# Patient Record
Sex: MALE | Race: WHITE | Employment: OTHER | URBAN - METROPOLITAN AREA
[De-identification: names, ages, dates, MRNs, and addresses within clinical notes are randomized per-mention and may not be internally consistent; named-entity substitution may affect disease eponyms.]

---

## 2020-02-11 ENCOUNTER — APPOINTMENT (OUTPATIENT)
Dept: CT IMAGING | Age: 80
DRG: 065 | End: 2020-02-11
Payer: MEDICARE

## 2020-02-11 ENCOUNTER — HOSPITAL ENCOUNTER (INPATIENT)
Age: 80
LOS: 3 days | Discharge: NURSING, REHAB OR LONG-TERM CARE | DRG: 065 | End: 2020-02-14
Attending: EMERGENCY MEDICINE | Admitting: INTERNAL MEDICINE
Payer: MEDICARE

## 2020-02-11 ENCOUNTER — APPOINTMENT (OUTPATIENT)
Dept: GENERAL RADIOLOGY | Age: 80
DRG: 065 | End: 2020-02-11
Payer: MEDICARE

## 2020-02-11 PROBLEM — I65.02 STENOSIS OF LEFT VERTEBRAL ARTERY: Status: ACTIVE | Noted: 2020-02-11

## 2020-02-11 PROBLEM — I63.9 ACUTE CVA (CEREBROVASCULAR ACCIDENT) (HCC): Status: ACTIVE | Noted: 2020-02-11

## 2020-02-11 LAB
A/G RATIO: 1.4 (ref 1.1–2.2)
ALBUMIN SERPL-MCNC: 4.2 G/DL (ref 3.4–5)
ALP BLD-CCNC: 70 U/L (ref 40–129)
ALT SERPL-CCNC: 15 U/L (ref 10–40)
ANION GAP SERPL CALCULATED.3IONS-SCNC: 12 MMOL/L (ref 3–16)
AST SERPL-CCNC: 22 U/L (ref 15–37)
BASOPHILS ABSOLUTE: 0 K/UL (ref 0–0.2)
BASOPHILS RELATIVE PERCENT: 0.6 %
BILIRUB SERPL-MCNC: 0.4 MG/DL (ref 0–1)
BUN BLDV-MCNC: 13 MG/DL (ref 7–20)
CALCIUM SERPL-MCNC: 9.2 MG/DL (ref 8.3–10.6)
CHLORIDE BLD-SCNC: 99 MMOL/L (ref 99–110)
CO2: 26 MMOL/L (ref 21–32)
CREAT SERPL-MCNC: 1.1 MG/DL (ref 0.8–1.3)
EOSINOPHILS ABSOLUTE: 0.1 K/UL (ref 0–0.6)
EOSINOPHILS RELATIVE PERCENT: 1.3 %
GFR AFRICAN AMERICAN: >60
GFR NON-AFRICAN AMERICAN: >60
GLOBULIN: 3 G/DL
GLUCOSE BLD-MCNC: 104 MG/DL (ref 70–99)
GLUCOSE BLD-MCNC: 167 MG/DL (ref 70–99)
GLUCOSE BLD-MCNC: 188 MG/DL (ref 70–99)
HCT VFR BLD CALC: 42 % (ref 40.5–52.5)
HEMOGLOBIN: 14.5 G/DL (ref 13.5–17.5)
LYMPHOCYTES ABSOLUTE: 2.1 K/UL (ref 1–5.1)
LYMPHOCYTES RELATIVE PERCENT: 33.9 %
MCH RBC QN AUTO: 30.7 PG (ref 26–34)
MCHC RBC AUTO-ENTMCNC: 34.6 G/DL (ref 31–36)
MCV RBC AUTO: 88.6 FL (ref 80–100)
MONOCYTES ABSOLUTE: 0.8 K/UL (ref 0–1.3)
MONOCYTES RELATIVE PERCENT: 12 %
NEUTROPHILS ABSOLUTE: 3.3 K/UL (ref 1.7–7.7)
NEUTROPHILS RELATIVE PERCENT: 52.2 %
PDW BLD-RTO: 14.2 % (ref 12.4–15.4)
PERFORMED ON: ABNORMAL
PERFORMED ON: ABNORMAL
PLATELET # BLD: 234 K/UL (ref 135–450)
PMV BLD AUTO: 7.1 FL (ref 5–10.5)
POTASSIUM REFLEX MAGNESIUM: 4 MMOL/L (ref 3.5–5.1)
RBC # BLD: 4.74 M/UL (ref 4.2–5.9)
SODIUM BLD-SCNC: 137 MMOL/L (ref 136–145)
TOTAL PROTEIN: 7.2 G/DL (ref 6.4–8.2)
TROPONIN: <0.01 NG/ML
WBC # BLD: 6.3 K/UL (ref 4–11)

## 2020-02-11 PROCEDURE — 84484 ASSAY OF TROPONIN QUANT: CPT

## 2020-02-11 PROCEDURE — 2580000003 HC RX 258: Performed by: INTERNAL MEDICINE

## 2020-02-11 PROCEDURE — 6370000000 HC RX 637 (ALT 250 FOR IP): Performed by: INTERNAL MEDICINE

## 2020-02-11 PROCEDURE — 6360000002 HC RX W HCPCS: Performed by: INTERNAL MEDICINE

## 2020-02-11 PROCEDURE — 70496 CT ANGIOGRAPHY HEAD: CPT

## 2020-02-11 PROCEDURE — 85025 COMPLETE CBC W/AUTO DIFF WBC: CPT

## 2020-02-11 PROCEDURE — 71045 X-RAY EXAM CHEST 1 VIEW: CPT

## 2020-02-11 PROCEDURE — 80053 COMPREHEN METABOLIC PANEL: CPT

## 2020-02-11 PROCEDURE — 93005 ELECTROCARDIOGRAM TRACING: CPT | Performed by: EMERGENCY MEDICINE

## 2020-02-11 PROCEDURE — 2060000000 HC ICU INTERMEDIATE R&B

## 2020-02-11 PROCEDURE — 96374 THER/PROPH/DIAG INJ IV PUSH: CPT

## 2020-02-11 PROCEDURE — 6360000004 HC RX CONTRAST MEDICATION: Performed by: EMERGENCY MEDICINE

## 2020-02-11 PROCEDURE — 6360000002 HC RX W HCPCS: Performed by: EMERGENCY MEDICINE

## 2020-02-11 PROCEDURE — 70450 CT HEAD/BRAIN W/O DYE: CPT

## 2020-02-11 PROCEDURE — 99285 EMERGENCY DEPT VISIT HI MDM: CPT

## 2020-02-11 RX ORDER — HYDRALAZINE HYDROCHLORIDE 20 MG/ML
10 INJECTION INTRAMUSCULAR; INTRAVENOUS ONCE
Status: COMPLETED | OUTPATIENT
Start: 2020-02-11 | End: 2020-02-11

## 2020-02-11 RX ORDER — INSULIN GLARGINE 100 [IU]/ML
0.15 INJECTION, SOLUTION SUBCUTANEOUS NIGHTLY
Status: DISCONTINUED | OUTPATIENT
Start: 2020-02-11 | End: 2020-02-14 | Stop reason: HOSPADM

## 2020-02-11 RX ORDER — ONDANSETRON 2 MG/ML
4 INJECTION INTRAMUSCULAR; INTRAVENOUS EVERY 6 HOURS PRN
Status: DISCONTINUED | OUTPATIENT
Start: 2020-02-11 | End: 2020-02-14 | Stop reason: HOSPADM

## 2020-02-11 RX ORDER — DEXTROSE MONOHYDRATE 50 MG/ML
100 INJECTION, SOLUTION INTRAVENOUS PRN
Status: DISCONTINUED | OUTPATIENT
Start: 2020-02-11 | End: 2020-02-14 | Stop reason: HOSPADM

## 2020-02-11 RX ORDER — NICOTINE POLACRILEX 4 MG
15 LOZENGE BUCCAL PRN
Status: DISCONTINUED | OUTPATIENT
Start: 2020-02-11 | End: 2020-02-14 | Stop reason: HOSPADM

## 2020-02-11 RX ORDER — LABETALOL HYDROCHLORIDE 5 MG/ML
10 INJECTION, SOLUTION INTRAVENOUS EVERY 10 MIN PRN
Status: DISCONTINUED | OUTPATIENT
Start: 2020-02-11 | End: 2020-02-13

## 2020-02-11 RX ORDER — ATORVASTATIN CALCIUM 40 MG/1
80 TABLET, FILM COATED ORAL NIGHTLY
Status: DISCONTINUED | OUTPATIENT
Start: 2020-02-11 | End: 2020-02-14 | Stop reason: HOSPADM

## 2020-02-11 RX ORDER — ASPIRIN 300 MG/1
300 SUPPOSITORY RECTAL DAILY
Status: DISCONTINUED | OUTPATIENT
Start: 2020-02-11 | End: 2020-02-14 | Stop reason: HOSPADM

## 2020-02-11 RX ORDER — SODIUM CHLORIDE 0.9 % (FLUSH) 0.9 %
10 SYRINGE (ML) INJECTION PRN
Status: DISCONTINUED | OUTPATIENT
Start: 2020-02-11 | End: 2020-02-14 | Stop reason: HOSPADM

## 2020-02-11 RX ORDER — DEXTROSE MONOHYDRATE 25 G/50ML
12.5 INJECTION, SOLUTION INTRAVENOUS PRN
Status: DISCONTINUED | OUTPATIENT
Start: 2020-02-11 | End: 2020-02-14 | Stop reason: HOSPADM

## 2020-02-11 RX ORDER — SODIUM CHLORIDE 0.9 % (FLUSH) 0.9 %
10 SYRINGE (ML) INJECTION EVERY 12 HOURS SCHEDULED
Status: DISCONTINUED | OUTPATIENT
Start: 2020-02-11 | End: 2020-02-14 | Stop reason: HOSPADM

## 2020-02-11 RX ORDER — ASPIRIN 81 MG/1
81 TABLET ORAL DAILY
Status: DISCONTINUED | OUTPATIENT
Start: 2020-02-11 | End: 2020-02-14 | Stop reason: HOSPADM

## 2020-02-11 RX ORDER — SODIUM CHLORIDE 9 MG/ML
INJECTION, SOLUTION INTRAVENOUS CONTINUOUS
Status: DISCONTINUED | OUTPATIENT
Start: 2020-02-11 | End: 2020-02-12

## 2020-02-11 RX ADMIN — HYDRALAZINE HYDROCHLORIDE 10 MG: 20 INJECTION INTRAMUSCULAR; INTRAVENOUS at 18:11

## 2020-02-11 RX ADMIN — SODIUM CHLORIDE, PRESERVATIVE FREE 10 ML: 5 INJECTION INTRAVENOUS at 20:47

## 2020-02-11 RX ADMIN — ASPIRIN 81 MG: 81 TABLET, COATED ORAL at 20:02

## 2020-02-11 RX ADMIN — ATORVASTATIN CALCIUM 80 MG: 40 TABLET, FILM COATED ORAL at 20:56

## 2020-02-11 RX ADMIN — IOPAMIDOL 75 ML: 755 INJECTION, SOLUTION INTRAVENOUS at 16:20

## 2020-02-11 RX ADMIN — SODIUM CHLORIDE: 9 INJECTION, SOLUTION INTRAVENOUS at 20:01

## 2020-02-11 RX ADMIN — ENOXAPARIN SODIUM 40 MG: 40 INJECTION SUBCUTANEOUS at 20:57

## 2020-02-11 SDOH — HEALTH STABILITY: MENTAL HEALTH: HOW OFTEN DO YOU HAVE A DRINK CONTAINING ALCOHOL?: NEVER

## 2020-02-11 ASSESSMENT — ENCOUNTER SYMPTOMS
SHORTNESS OF BREATH: 0
DIARRHEA: 0
SORE THROAT: 0
NAUSEA: 0
EYE PAIN: 0
WHEEZING: 0
EYE REDNESS: 0
EYE DISCHARGE: 0
VOMITING: 0
BACK PAIN: 0
RHINORRHEA: 0
COUGH: 0
ABDOMINAL PAIN: 0

## 2020-02-11 ASSESSMENT — PAIN SCALES - GENERAL
PAINLEVEL_OUTOF10: 0

## 2020-02-11 NOTE — PROGRESS NOTES
Medication Reconciliation    List of medications patient is currently taking is complete. Source of information: 1. Conversation with patient at bedside                                      2. EPIC records      Allergies  Patient has no known allergies. Notes regarding home medications:   1. Patient denies use of any prescription/OTC medications.     Galileo Motta PharmD, BCPS  2/11/2020 4:34 PM

## 2020-02-11 NOTE — ED NOTES
When attempted to stand the pt pt staggered to the right. Dr Donell Epley at bedside code stroke called.       Darin Rivero RN  02/11/20 5170

## 2020-02-11 NOTE — ED NOTES
Called stroke team [phone] at [phone], Dr. Solitario Lopez called back [address] [place] [address]  02/11/20 0094

## 2020-02-11 NOTE — ED PROVIDER NOTES
[facility]  eMERGENCY dEPARTMENT eNCOUnter        Pt Name: Dorian King  MRN: [id number]  [place]kelgfcharlotte 1940  Date of evaluation: 2/11/2020  Provider: Shavon Ronquillo MD  PCP: No primary care provider on file. CHIEF COMPLAINT       Chief Complaint   Patient presents with    Extremity Weakness     pt state she woke up this am at 1042 and his right leg was not working right. states he went to sleep at midnight last night and was fine. states he almost fell. bp per ems was 220/114 states he has no taken his bp meds for several years. BG was 138       HISTORY OFPRESENT ILLNESS   (Location/Symptom, Timing/Onset, Context/Setting, Quality, Duration, Modifying Factors,Severity)  Note limiting factors. Dorian King is a 70 y.o. male       Location/Symptom: Falling to the right right leg was not working properly  Timing/Onset: 10:42 AM  Context/Setting: Patient stopped taking all of his medications 2 years ago just because he was tired of taking them. This did include blood pressure medication. He went to bed at midnight in his usual state of health. This gentleman still drives. When he got up at [phone] when he tried to go use the men's room started falling to the right. Denying any other symptoms. He has no headache nausea vomiting visual symptoms. Quality: As above he does not have any pain  Duration: Woke up with this at 1042  Modifying Factors: As mentioned he did stop taking all of his medications 3 years ago  Severity: 0 out of 10    Nursing Noteswere all reviewed and agreed with or any disagreements were addressed  in the HPI. REVIEW OF SYSTEMS    (2-9 systems for level 4, 10 or more for level 5)     Review of Systems   Constitutional: Negative for chills, fatigue and fever. HENT: Negative for ear pain, rhinorrhea and sore throat. Eyes: Negative for pain, discharge, redness and visual disturbance.    Respiratory: Negative for cough, shortness of cardiomediastinal silhouette is without acute process. The osseous structures are without acute process. No acute process. Cta Head Neck W Contrast    Result Date: 2/11/2020  EXAMINATION: CTA OF THE HEAD AND NECK WITH CONTRAST 2/11/2020 4:18 pm: TECHNIQUE: CTA of the head and neck was performed with the administration of intravenous contrast. Multiplanar reformatted images are provided for review. MIP images are provided for review. Stenosis of the internal carotid arteries measured using NASCET criteria. Dose modulation, iterative reconstruction, and/or weight based adjustment of the mA/kV was utilized to reduce the radiation dose to as low as reasonably achievable. 3D reconstructed images were performed on a separate workstation and provided for review. COMPARISON: Noncontrast CT head performed earlier the same day. HISTORY: ORDERING SYSTEM PROVIDED HISTORY: falling to the right TECHNOLOGIST PROVIDED HISTORY: Reason for exam:->falling to the right Reason for Exam: Extremity Weakness (pt state she woke up this am at 1042 and his right leg was not working right. states he went to sleep at midnight last night and was fine. states he almost fell. bp per ems was 220/114 states he has no taken his bp meds for several years. BG was 138) Acuity: Acute Type of Exam: Initial FINDINGS: CTA NECK: AORTIC ARCH/ARCH VESSELS: Normal, three-vessel aortic arch anatomy. Calcified and noncalcified atherosclerotic plaque of the aortic arch and proximal arch vessels. No dissection or arterial injury. No significant stenosis of the brachiocephalic or subclavian arteries. CAROTID ARTERIES: Common carotid arteries are normal in course and caliber without focal stenosis. Noncalcified and calcified atherosclerotic plaque of the right carotid bifurcation and proximal right internal carotid artery. There is mild, less than 50%, stenosis of the right internal carotid artery by NASCET criteria.  Calcified and noncalcified 1632 02/11/20 1755 02/11/20 1811   BP: (!) 216/103 (!) 231/103 (!) 216/102 (!) 194/82   Pulse: 92 96 99    Resp: 23 27 18    Temp:       TempSrc:       SpO2: 100% 100% 97%    Weight:       Height:           Patient was given the following medications:  Medications   sodium chloride flush 0.9 % injection 10 mL (has no administration in time range)   sodium chloride flush 0.9 % injection 10 mL (has no administration in time range)   magnesium hydroxide (MILK OF MAGNESIA) 400 MG/5ML suspension 30 mL (has no administration in time range)   ondansetron (ZOFRAN) injection 4 mg (has no administration in time range)   atorvastatin (LIPITOR) tablet 80 mg (has no administration in time range)   enoxaparin (LOVENOX) injection 40 mg (has no administration in time range)   aspirin EC tablet 81 mg (has no administration in time range)     Or   aspirin suppository 300 mg (has no administration in time range)   labetalol (NORMODYNE;TRANDATE) injection 10 mg (has no administration in time range)   0.9 % sodium chloride infusion (has no administration in time range)   insulin glargine (LANTUS) injection vial 9 Units (has no administration in time range)   insulin lispro (HUMALOG) injection vial 3 Units (has no administration in time range)   insulin lispro (HUMALOG) injection vial 0-6 Units (has no administration in time range)   insulin lispro (HUMALOG) injection vial 0-3 Units (has no administration in time range)   glucose (GLUTOSE) 40 % oral gel 15 g (has no administration in time range)   dextrose 50 % IV solution (has no administration in time range)   glucagon (rDNA) injection 1 mg (has no administration in time range)   dextrose 5 % solution (has no administration in time range)   iopamidol (ISOVUE-370) 76 % injection 75 mL (75 mLs Intravenous Given 2/11/20 1620)   hydrALAZINE (APRESOLINE) injection 10 mg (10 mg Intravenous Given 2/11/20 1811)       The patient was made a stroke alert. Case was discussed with Dr. Khushboo Nair twice.

## 2020-02-11 NOTE — H&P
Hospital Medicine History and Physical    2/11/2020    Date of Admission: 2/11/2020    Date of Service: Pt seen/examined on 2/11/2020 and admitted to inpatient. Assessment:  Principal Problem:    Acute CVA (cerebrovascular accident) ([facility]  Active Problems:    Stenosis of left vertebral artery    Diabetes mellitus type 2, uncontrolled ([facility] [address]    Essential hypertension    Noncompliance with medications  Resolved Problems:    * No resolved hospital problems. *    Plan:  1. Acute CVA. Right cerebellar ataxia on exam.  Hold antihypertensive medications to allow permissive hypertension; PRN IV labetalol for systolic blood pressure greater than 432 or diastolic blood pressure greater than 120. Continue antiplatelet therapy, high-dose statin. Obtain MRI-brain, echocardiogram, lipid profile, A1c. Neurochecks. PT/OT. Neurology consult. 2. Hypertensive crisis. Suspect elevated blood pressure in response to acute CVA. Blood pressure management as noted above. 3. Severe stenosis of origin of left vertebral artery. Allow permissive hypertension. Neurology has been consulted; will await GI evaluation and further recommendations. 4. Diabetes type 2, suspect uncontrolled. Check hemoglobin A1c. Continue insulin as ordered. 5. Noncompliance with medication use. Patient has been counseled about importance of compliance with medications. Activities: Up with assist  Prophylaxis: Subcutaneous Lovenox  Code status: Full code    ==========================================================  Chief complaint:  Chief Complaint   Patient presents with    Extremity Weakness     pt state she woke up this am at [address] and his right leg was not working right. states he went to sleep at midnight last night and was fine. states he almost fell. bp per ems was 220/114 states he has no taken his bp meds for several years. BG was 138     History of Presenting Illness:   This is a pleasant 70 y.o. male with history of diabetes type 2, essential hypertension, who presents to the emergency room for evaluation of right leg weakness that started upon waking up this morning. Patient felt like his right leg was not working right, and this was not the case the night prior to ER presentation. He reports history of essential hypertension but he has not been compliant with medication use for years. Per EMS, patient's blood pressure was 220/114 on arrival.    Past Medical History:      Diagnosis Date    Active rheumatic fever     Diabetes mellitus ([address]     Hypertension        Past Surgical History:      Procedure Laterality Date    APPENDECTOMY         Medications (prior to admission):  Prior to Admission medications    Not on File       Allergy(ies):  Patient has no known allergies. Social History:  TOBACCO:  reports that he has never smoked. He has never used smokeless tobacco.  ETOH:  reports no history of alcohol use. Family History:      Family history unknown: Yes       Review of Systems:  Pertinent positives are listed in HPI. At least 10-point ROS reviewed and were negative. Vitals and physical examination:  BP (!) 216/102   Pulse 99   Temp 99.2 °F (37.3 °C) (Oral)   Resp 18   Ht 4' 8\" (1.422 m)   Wt 125 lb 7.1 oz (56.9 kg)   SpO2 97%   BMI 28.12 kg/m²   Gen/overall appearance: Not in acute distress. Alert. Oriented x3. Head: Normocephalic, atraumatic  Eyes: EOMI, good acuity  ENT: Oral mucosa moist  Neck: No JVD, thyromegaly  CVS: Nml S1S2, no MRG, RRR  Pulm: Clear bilaterally. No crackles/wheezes  Gastrointestinal: Soft, NT/ND, +BS  Musculoskeletal: No edema. Warm  Neuro: Right dysmetria, right lower extremity ataxia. Psychiatry: Appropriate affect. Not agitated. Skin: Warm, dry with normal turgor.  No rash  Capillary refill: Brisk,< 3 seconds   Peripheral Pulses: +2 palpable, equal bilaterally       Labs/imaging/EKG:  CBC:   Recent Labs     02/11/20  1617   WBC 6.3   HGB 14.5        BMP:    Recent Labs 02/11/20  1617      K 4.0   CL 99   CO2 26   BUN 13   CREATININE 1.1   GLUCOSE 188*     Hepatic:   Recent Labs     02/11/20  1617   AST 22   ALT 15   BILITOT 0.4   ALKPHOS 70       Ct Head Wo Contrast    Result Date: 2/11/2020  EXAMINATION: CT OF THE HEAD WITHOUT CONTRAST  2/11/2020 4:18 pm TECHNIQUE: CT of the head was performed without the administration of intravenous contrast. Dose modulation, iterative reconstruction, and/or weight based adjustment of the mA/kV was utilized to reduce the radiation dose to as low as reasonably achievable. COMPARISON: None. HISTORY: ORDERING SYSTEM PROVIDED HISTORY: falling to the right since 1042 AM TECHNOLOGIST PROVIDED HISTORY: Reason for exam:->falling to the right since 1042 AM Has a \"code stroke\" or \"stroke alert\" been called? ->Yes Reason for Exam: Extremity Weakness (pt state she woke up this am at 1042 and his right leg was not working right. states he went to sleep at midnight last night and was fine. states he almost fell. bp per ems was 220/114 states he has no taken his bp meds for several years. BG was 138) Acuity: Acute Type of Exam: Initial FINDINGS: BRAIN/VENTRICLES: The ventricles and sulci are diffusely enlarged. Low attenuation is seen in the periventricular and subcortical white matter. No acute intracranial hemorrhage or acute infarct is identified. ORBITS: The visualized portion of the orbits demonstrate no acute abnormality. SINUSES: The visualized paranasal sinuses and mastoid air cells demonstrate no acute abnormality. SOFT TISSUES/SKULL:  No acute abnormality of the visualized skull or soft tissues. No acute intracranial abnormality. Findings were discussed with Ashlyn Morales at 4:32 pm on 2/11/2020. Xr Chest Portable    Result Date: 2/11/2020  EXAMINATION: ONE XRAY VIEW OF THE CHEST 2/11/2020 4:05 pm COMPARISON: None.  HISTORY: ORDERING SYSTEM PROVIDED HISTORY: weakness TECHNOLOGIST PROVIDED HISTORY: Reason for exam:->weakness Reason for Exam: weakness Acuity: Acute Type of Exam: Initial FINDINGS: The lungs are without acute focal process. There is no effusion or pneumothorax. The cardiomediastinal silhouette is without acute process. The osseous structures are without acute process. No acute process. Cta Head Neck W Contrast    Result Date: 2/11/2020  EXAMINATION: CTA OF THE HEAD AND NECK WITH CONTRAST 2/11/2020 4:18 pm: TECHNIQUE: CTA of the head and neck was performed with the administration of intravenous contrast. Multiplanar reformatted images are provided for review. MIP images are provided for review. Stenosis of the internal carotid arteries measured using NASCET criteria. Dose modulation, iterative reconstruction, and/or weight based adjustment of the mA/kV was utilized to reduce the radiation dose to as low as reasonably achievable. 3D reconstructed images were performed on a separate workstation and provided for review. COMPARISON: Noncontrast CT head performed earlier the same day. HISTORY: ORDERING SYSTEM PROVIDED HISTORY: falling to the right TECHNOLOGIST PROVIDED HISTORY: Reason for exam:->falling to the right Reason for Exam: Extremity Weakness (pt state she woke up this am at 1042 and his right leg was not working right. states he went to sleep at midnight last night and was fine. states he almost fell. bp per ems was 220/114 states he has no taken his bp meds for several years. BG was 138) Acuity: Acute Type of Exam: Initial FINDINGS: CTA NECK: AORTIC ARCH/ARCH VESSELS: Normal, three-vessel aortic arch anatomy. Calcified and noncalcified atherosclerotic plaque of the aortic arch and proximal arch vessels. No dissection or arterial injury. No significant stenosis of the brachiocephalic or subclavian arteries. CAROTID ARTERIES: Common carotid arteries are normal in course and caliber without focal stenosis.  Noncalcified and calcified atherosclerotic plaque of the right carotid bifurcation and proximal right internal carotid artery. There is mild, less than 50%, stenosis of the right internal carotid artery by NASCET criteria. Calcified and noncalcified atherosclerotic plaque of the left carotid bifurcation and proximal left internal carotid artery. There is mild, less than 50%, stenosis of the left internal carotid artery by NASCET criteria. No arterial injury or dissection. VERTEBRAL ARTERIES: Vertebral arteries arise from their respective subclavian arteries. There is severe stenosis of the left vertebral artery origin. No focal stenosis of the right vertebral artery. No arterial injury or dissection. SOFT TISSUES: The lung apices are clear. No cervical or superior mediastinal lymphadenopathy. The larynx and pharynx are unremarkable. No acute abnormality of the salivary and thyroid glands. BONES: No acute osseous abnormality. CTA HEAD: ANTERIOR CIRCULATION: No significant stenosis of the intracranial internal carotid, anterior cerebral, or middle cerebral arteries. No aneurysm. POSTERIOR CIRCULATION: No focal stenosis of the intracranial vertebral arteries. PICA origins are visualized. No focal stenosis of the basilar artery or bilateral posterior cerebral arteries. No aneurysm. OTHER: No dural venous sinus thrombosis on this non-dedicated study. BRAIN: No mass effect or midline shift. No extra-axial fluid collection. The gray-white differentiation is maintained. Mild, less than 50%, stenosis of the internal carotid arteries by NASCET criteria. 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 No intracranial flow-limiting stenosis. EKG: Normal sinus rhythm with nonspecific ST/T changes, PACs, PVC, right bundle branch block. I reviewed EKG. Discussed with ER provider. Thank you No primary care provider on file.  for the opportunity to be involved in this patient's care.    -----------------------------  Ann Ray MD  [facility] hospitalist

## 2020-02-12 ENCOUNTER — APPOINTMENT (OUTPATIENT)
Dept: MRI IMAGING | Age: 80
DRG: 065 | End: 2020-02-12
Payer: MEDICARE

## 2020-02-12 ENCOUNTER — APPOINTMENT (OUTPATIENT)
Dept: CT IMAGING | Age: 80
DRG: 065 | End: 2020-02-12
Payer: MEDICARE

## 2020-02-12 PROBLEM — E11.65 DIABETES MELLITUS WITH HYPERGLYCEMIA (HCC): Status: ACTIVE | Noted: 2020-02-12

## 2020-02-12 PROBLEM — I16.9 HYPERTENSIVE CRISIS: Status: ACTIVE | Noted: 2020-02-12

## 2020-02-12 LAB
CHOLESTEROL, TOTAL: 299 MG/DL (ref 0–199)
EKG ATRIAL RATE: 94 BPM
EKG DIAGNOSIS: NORMAL
EKG P AXIS: 69 DEGREES
EKG P-R INTERVAL: 168 MS
EKG Q-T INTERVAL: 386 MS
EKG QRS DURATION: 126 MS
EKG QTC CALCULATION (BAZETT): 482 MS
EKG R AXIS: 79 DEGREES
EKG T AXIS: 48 DEGREES
EKG VENTRICULAR RATE: 94 BPM
ESTIMATED AVERAGE GLUCOSE: 151.3 MG/DL
GLUCOSE BLD-MCNC: 104 MG/DL (ref 70–99)
GLUCOSE BLD-MCNC: 106 MG/DL (ref 70–99)
GLUCOSE BLD-MCNC: 119 MG/DL (ref 70–99)
GLUCOSE BLD-MCNC: 150 MG/DL (ref 70–99)
HBA1C MFR BLD: 6.9 %
HCT VFR BLD CALC: 41.7 % (ref 40.5–52.5)
HDLC SERPL-MCNC: 54 MG/DL (ref 40–60)
HEMOGLOBIN: 14.2 G/DL (ref 13.5–17.5)
LDL CHOLESTEROL CALCULATED: 221 MG/DL
MCH RBC QN AUTO: 30.4 PG (ref 26–34)
MCHC RBC AUTO-ENTMCNC: 34 G/DL (ref 31–36)
MCV RBC AUTO: 89.2 FL (ref 80–100)
PDW BLD-RTO: 14.1 % (ref 12.4–15.4)
PERFORMED ON: ABNORMAL
PLATELET # BLD: 206 K/UL (ref 135–450)
PMV BLD AUTO: 6.8 FL (ref 5–10.5)
RBC # BLD: 4.68 M/UL (ref 4.2–5.9)
TRIGL SERPL-MCNC: 121 MG/DL (ref 0–150)
VLDLC SERPL CALC-MCNC: 24 MG/DL
WBC # BLD: 8.2 K/UL (ref 4–11)

## 2020-02-12 PROCEDURE — 6360000002 HC RX W HCPCS: Performed by: INTERNAL MEDICINE

## 2020-02-12 PROCEDURE — 97166 OT EVAL MOD COMPLEX 45 MIN: CPT

## 2020-02-12 PROCEDURE — 97116 GAIT TRAINING THERAPY: CPT

## 2020-02-12 PROCEDURE — 36415 COLL VENOUS BLD VENIPUNCTURE: CPT

## 2020-02-12 PROCEDURE — 97162 PT EVAL MOD COMPLEX 30 MIN: CPT

## 2020-02-12 PROCEDURE — 2060000000 HC ICU INTERMEDIATE R&B

## 2020-02-12 PROCEDURE — 94760 N-INVAS EAR/PLS OXIMETRY 1: CPT

## 2020-02-12 PROCEDURE — 92523 SPEECH SOUND LANG COMPREHEN: CPT

## 2020-02-12 PROCEDURE — 6370000000 HC RX 637 (ALT 250 FOR IP): Performed by: INTERNAL MEDICINE

## 2020-02-12 PROCEDURE — 2580000003 HC RX 258: Performed by: INTERNAL MEDICINE

## 2020-02-12 PROCEDURE — 97530 THERAPEUTIC ACTIVITIES: CPT

## 2020-02-12 PROCEDURE — 70450 CT HEAD/BRAIN W/O DYE: CPT

## 2020-02-12 PROCEDURE — 92610 EVALUATE SWALLOWING FUNCTION: CPT

## 2020-02-12 PROCEDURE — 83036 HEMOGLOBIN GLYCOSYLATED A1C: CPT

## 2020-02-12 PROCEDURE — 80061 LIPID PANEL: CPT

## 2020-02-12 PROCEDURE — 85027 COMPLETE CBC AUTOMATED: CPT

## 2020-02-12 PROCEDURE — 70551 MRI BRAIN STEM W/O DYE: CPT

## 2020-02-12 PROCEDURE — 93010 ELECTROCARDIOGRAM REPORT: CPT | Performed by: INTERNAL MEDICINE

## 2020-02-12 PROCEDURE — 97535 SELF CARE MNGMENT TRAINING: CPT

## 2020-02-12 RX ADMIN — ASPIRIN 81 MG: 81 TABLET, COATED ORAL at 07:45

## 2020-02-12 RX ADMIN — INSULIN LISPRO 3 UNITS: 100 INJECTION, SOLUTION INTRAVENOUS; SUBCUTANEOUS at 07:51

## 2020-02-12 RX ADMIN — INSULIN GLARGINE 9 UNITS: 100 INJECTION, SOLUTION SUBCUTANEOUS at 20:49

## 2020-02-12 RX ADMIN — ATORVASTATIN CALCIUM 80 MG: 40 TABLET, FILM COATED ORAL at 20:48

## 2020-02-12 RX ADMIN — SODIUM CHLORIDE, PRESERVATIVE FREE 10 ML: 5 INJECTION INTRAVENOUS at 20:49

## 2020-02-12 RX ADMIN — ENOXAPARIN SODIUM 40 MG: 40 INJECTION SUBCUTANEOUS at 20:48

## 2020-02-12 ASSESSMENT — PAIN SCALES - GENERAL
PAINLEVEL_OUTOF10: 0

## 2020-02-12 NOTE — PROGRESS NOTES
initial research, cut off scores do not perfectly predict discharge location: 25% of patients with a score of 18 or greater actually went to a rehab facility and 20% of people with a score less than 18 actually went home. Based on my clinical judgement I recommend that the patient have 5-7 sessions per week of Physical Therapy at d/c to increase the patients independence. Safety devices:   Type of devices:  All fall risk precautions in place, Bed alarm in place, Call light within reach, Left in bed, Nurse notified, Patient at risk for falls        PLAN:  Times per week: 3-5x/wk while in the hospital;    Current Treatment Recommendations: Functional Mobility Training     OutComes Score  How much difficulty turning over in bed?: None  How much difficulty sitting down on / standing up from a chair with arms?: A Little  How much difficulty moving from lying on back to sitting on side of bed?: None  How much help from another person moving to and from a bed to a chair?: A Little  How much help from another person needed to walk in hospital room?: A Little  How much help from another person for climbing 3-5 steps with a railing?: A Little  AM-[facility] Inpatient Mobility Raw Score : 20  AM-[facility] Inpatient T-Scale Score : 47.67  Mobility Inpatient CMS 0-100% Score: 35.83  Mobility Inpatient CMS G-Code Modifier : CJ       Goals  Patient Goals   Patient goals : to get back to being very active  Time Frame for Short term goals: upon d/c  Short term goal 1: sup<>sit Ind   Short term goal 2: sit<>stand Ind   Short term goal 3: amb 150' with or without device Ind  Short term goal 4: pt will perform 10 reps of standing balance exercises with supervision           Therapy Time    Individual Concurrent Group Co-treatment   Time In 1125            Time Out 1205          Minutes 40             Timed Code Treatment Minutes: 25 Minutes      Lit Keenan PT

## 2020-02-12 NOTE — PLAN OF CARE
Problem: Falls - Risk of:  Goal: Will remain free from falls  Description  Will remain free from falls  2/12/2020 0932 by Flor Thompson RN  Outcome: Ongoing  2/11/2020 2245 by Ha Scott RN  Outcome: Ongoing   Bed in lowest position. Patient encouraged to call for assistance, call light in reach. Problem: Falls - Risk of:  Goal: Absence of physical injury  Description  Absence of physical injury  2/11/2020 2245 by Ha Scott RN  Outcome: Ongoing   Patient free from falls. No s/s distress.  Light in reach
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
## 2020-02-12 NOTE — ED NOTES
Entered room pt walking around bed. Explained that for his safety, he should call for assistance before ambulating. Pt verbalizes understanding.       Carin Arechiga RN  02/11/20 2009

## 2020-02-12 NOTE — PROGRESS NOTES
function, rule out aspiration and make recommendations regarding safe dietary consistencies, effective compensatory strategies, and safe eating environment. Dysphagia Treatment Diagnosis: Oropharyngeal Dysphagia   Speech Language Treatment Diagnosis: Dysarthria, cognitive-language      IMPRESSIONS  Dysphagia Diagnosis: Mild pharyngeal stage dysphagia characterized by decreased laryngeal elevation without overt signs/symptoms of penetration/aspiration  Cognitive Diagnosis: Moderate cognitive-language impairments characterized by decreased delayed recall for novel information and the need for extra time for verbal reasoning/problem solving. Speech Diagnosis: Decreased coordination/motor planning for articulatory agility for motor speech. Errors noted in conversation approximately 25% without breakdown in communication    Recommended Diet:  Diet Solids Recommendation: Regular  Liquid Consistency Recommendation: Thin  Medication Administration:  PO  Strategies: 90 degree positioning with all p.o. intake    Plan/Recommendations:   Requires SLP Intervention: Yes  Duration/Frequency of Treatment: ST to tx 3-5 times per week during acute admission    Dysphagia Goals  The patient will tolerate recommended diet without observed clinical signs of aspiration,   The patient/caregiver will demonstrate understanding of compensatory strategies for improved swallowing safety. Speech and Language Goals  Goal 1: The patient will improve articulatory agility for self expression in conversation to <25% errors via articulatory precision tasks and comp strat usage  Goal 2: The patient will improve functional recall of novel information using compensatory memory aids/strategies with mod cues. Goal 3: The patient will improve verbal reasoning/problem solving to independent.     Prognosis  Guarded for cognitive-language; patient reports progressively decreased recall and decreased motor planning prior to current episode    Education  Consulted and agree with results and recommendations: Patient, Family member, RN  Patient Education: Completed on results/recs/plan  Patient Education Response: Verbalizes understanding    Discharge Recommendations:    D/C Recommendations: (suspect potential benefit for continued skilled ST upon discharge from acute care)      ASSESSMENT/OBJECTIVE:  Oral/Motor  Labial Coordination: Reduced  Lingual Coordination: Reduced  Auditory Comprehension  Basic Questions: (WFL)  One Step Basic Commands: (WFL)  Multistep Basic Commands: (WFL)  L/R Discrimination: Moderate  Conversation: (WFL for general)  Reading Comprehension  Reading Status: Within functional limits(for phrases/sentences)  Verbal Expression  Automatic Speech: (WFL)  Confrontation: (with extra time)  Convergent: (WFL)  Divergent: Moderate  Responsive: (with extra time)  Written Expression  Written Expression: Unable to assess(d/t difficulty using dominant hand)  Motor Speech  Dysarthria : Mild(appears ataxic)  Pragmatics/Social Functioning  Pragmatics: Within functional limits  Cognitive-Language  Orientation Level: Oriented to person;Oriented to place;Oriented to situation;Oriented to time  Sustained Attention: [facility])  Memory  Daily Routines: [facility])  People Encountered: [facility])  Prospective Memory: Severe  Short-term Memory: Severe  Working Memory: Mild  Problem Solving  Complex Functional Tasks: To be assessed in therapy  Simple Functional Tasks: [facility] for daily problems)  Verbal Reasoning Skills: Mild  Numeric Reasoning: Within Functional Limits  Dysphagia  Oral Phase: WFL  Decreased Laryngeal Elevation: All    Please accept this as Speech Therapy Discharge status, if pt is discharged prior to next therapy session. SLP Individual Minutes  Time In: 2041  Time Out: 3304  Minutes: 60    Dysphagia Individual Minutes  Time In: 1115  Time Out: 1130  Minutes: 15    Timed Code Treatment: 0  Total Treatment Time: [address]  Dakota Dry, [address] Cherrie Simon, CCC-SLP, [id number]  Speech-Language Pathologist  02/12/20 11:30 AM

## 2020-02-12 NOTE — CARE COORDINATION
Discharge Planning:  SW attempted to meet with pt to discuss d/c plans. Pt was pleasant and agreed to talk with this SW.  Pt stated that PTA he was living in a senior community, in a condo. Pt stated that he is a very active person and loves to be outside. Pt was having a difficult time with his speech when talking with this SW and expressed frustration about this. 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 SW talked with pt about the current recommendations from PT/OT. Pt was struggling to articulate his responses but stated multiple times that what he really wants to work on is his speech and that he thinks that it is reasonable to go home with his son. Pt stated that his son has been staying with him but is gone for work. Pt stated that he really needs to watch his finances and make the right decision. SW tried to explain to this pt that no move would be made to [facility] without pts insurance approval.  Pt replied with \"well, I will think about things, but for now I think I will just work on the physical stuff here. \"  SW talked with pt about HC and pt declined the need for [place] at this time. SW will try to meet with pt and pts son when his son comes to visit tomorrow. Pt stated that his son was bringing clothes to the hospital tomorrow. SW talked with pt about [place], pt stated that he appreciated the information but \"hes fine right now. \"  Pt appears at times to be struggling to fully understand what this SW is saying. GARRETT contacted Katie Urbina with [facility] to give her an update. GARRETT will continue to follow.   Electronically signed by Michael Galan on 2/12/2020 at 4:09 PM
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
## 2020-02-12 NOTE — PROGRESS NOTES
Was completing hourly rounding on patient. Patient was up in the chair at this time 1837 patient stated he would like to get back to bed. Removed chair alarm. Assisted patient contact guard to the bed. Patient requested I empty his urinal. Instructed patient to have a seat. patient sat on side of bed. He stated \" give me a minute\". I had bathroom door open, emptied patients urinal, as I  turned around patient stood up , I instructed patient to have a seat . one of his legs \" gave out\" and he felll on his bottom as I was walking back towards him. Patient denied any pain. assessed coccyx no visual injury.    patient states \" im fine\"   notified Fauzia Sultana supervisor and hospitalist dr Octavio Giordano

## 2020-02-13 ENCOUNTER — APPOINTMENT (OUTPATIENT)
Dept: GENERAL RADIOLOGY | Age: 80
DRG: 065 | End: 2020-02-13
Payer: MEDICARE

## 2020-02-13 PROBLEM — R50.9 ACUTE FEBRILE ILLNESS: Status: ACTIVE | Noted: 2020-02-13

## 2020-02-13 PROBLEM — G93.40 ACUTE ENCEPHALOPATHY: Status: ACTIVE | Noted: 2020-02-13

## 2020-02-13 LAB
ALBUMIN SERPL-MCNC: 3.9 G/DL (ref 3.4–5)
ANION GAP SERPL CALCULATED.3IONS-SCNC: 14 MMOL/L (ref 3–16)
BASOPHILS ABSOLUTE: 0.1 K/UL (ref 0–0.2)
BASOPHILS RELATIVE PERCENT: 0.9 %
BILIRUBIN URINE: NEGATIVE
BLOOD, URINE: NEGATIVE
BUN BLDV-MCNC: 17 MG/DL (ref 7–20)
CALCIUM SERPL-MCNC: 8.9 MG/DL (ref 8.3–10.6)
CHLORIDE BLD-SCNC: 106 MMOL/L (ref 99–110)
CLARITY: CLEAR
CO2: 21 MMOL/L (ref 21–32)
COLOR: YELLOW
CREAT SERPL-MCNC: 1 MG/DL (ref 0.8–1.3)
EOSINOPHILS ABSOLUTE: 0.1 K/UL (ref 0–0.6)
EOSINOPHILS RELATIVE PERCENT: 0.8 %
EPITHELIAL CELLS, UA: 0 /HPF (ref 0–5)
GFR AFRICAN AMERICAN: >60
GFR NON-AFRICAN AMERICAN: >60
GLUCOSE BLD-MCNC: 112 MG/DL (ref 70–99)
GLUCOSE BLD-MCNC: 128 MG/DL (ref 70–99)
GLUCOSE BLD-MCNC: 169 MG/DL (ref 70–99)
GLUCOSE BLD-MCNC: 87 MG/DL (ref 70–99)
GLUCOSE BLD-MCNC: 94 MG/DL (ref 70–99)
GLUCOSE URINE: 100 MG/DL
HCT VFR BLD CALC: 43.3 % (ref 40.5–52.5)
HEMOGLOBIN: 14.5 G/DL (ref 13.5–17.5)
HYALINE CASTS: 1 /LPF (ref 0–8)
KETONES, URINE: 15 MG/DL
LEUKOCYTE ESTERASE, URINE: NEGATIVE
LV EF: 60 %
LVEF MODALITY: NORMAL
LYMPHOCYTES ABSOLUTE: 2 K/UL (ref 1–5.1)
LYMPHOCYTES RELATIVE PERCENT: 26.1 %
MAGNESIUM: 2 MG/DL (ref 1.8–2.4)
MCH RBC QN AUTO: 29.8 PG (ref 26–34)
MCHC RBC AUTO-ENTMCNC: 33.3 G/DL (ref 31–36)
MCV RBC AUTO: 89.4 FL (ref 80–100)
MICROSCOPIC EXAMINATION: YES
MONOCYTES ABSOLUTE: 0.9 K/UL (ref 0–1.3)
MONOCYTES RELATIVE PERCENT: 11.4 %
NEUTROPHILS ABSOLUTE: 4.6 K/UL (ref 1.7–7.7)
NEUTROPHILS RELATIVE PERCENT: 60.8 %
NITRITE, URINE: NEGATIVE
PDW BLD-RTO: 14.3 % (ref 12.4–15.4)
PERFORMED ON: ABNORMAL
PERFORMED ON: NORMAL
PH UA: 5.5 (ref 5–8)
PHOSPHORUS: 3.3 MG/DL (ref 2.5–4.9)
PLATELET # BLD: 221 K/UL (ref 135–450)
PMV BLD AUTO: 7.5 FL (ref 5–10.5)
POTASSIUM SERPL-SCNC: 3.5 MMOL/L (ref 3.5–5.1)
PROCALCITONIN: 0.08 NG/ML (ref 0–0.15)
PROTEIN UA: ABNORMAL MG/DL
RAPID INFLUENZA  B AGN: NEGATIVE
RAPID INFLUENZA A AGN: NEGATIVE
RBC # BLD: 4.85 M/UL (ref 4.2–5.9)
RBC UA: 1 /HPF (ref 0–4)
REPORT: NORMAL
RESPIRATORY PANEL PCR: NORMAL
S PYO AG THROAT QL: NEGATIVE
SODIUM BLD-SCNC: 141 MMOL/L (ref 136–145)
SPECIFIC GRAVITY UA: 1.02 (ref 1–1.03)
URINE REFLEX TO CULTURE: ABNORMAL
URINE TYPE: ABNORMAL
UROBILINOGEN, URINE: 1 E.U./DL
WBC # BLD: 7.6 K/UL (ref 4–11)
WBC UA: 1 /HPF (ref 0–5)

## 2020-02-13 PROCEDURE — 6370000000 HC RX 637 (ALT 250 FOR IP): Performed by: INTERNAL MEDICINE

## 2020-02-13 PROCEDURE — 94760 N-INVAS EAR/PLS OXIMETRY 1: CPT

## 2020-02-13 PROCEDURE — 2060000000 HC ICU INTERMEDIATE R&B

## 2020-02-13 PROCEDURE — 71045 X-RAY EXAM CHEST 1 VIEW: CPT

## 2020-02-13 PROCEDURE — 92526 ORAL FUNCTION THERAPY: CPT

## 2020-02-13 PROCEDURE — 2580000003 HC RX 258: Performed by: INTERNAL MEDICINE

## 2020-02-13 PROCEDURE — 80069 RENAL FUNCTION PANEL: CPT

## 2020-02-13 PROCEDURE — 87081 CULTURE SCREEN ONLY: CPT

## 2020-02-13 PROCEDURE — 84145 PROCALCITONIN (PCT): CPT

## 2020-02-13 PROCEDURE — 81001 URINALYSIS AUTO W/SCOPE: CPT

## 2020-02-13 PROCEDURE — 83735 ASSAY OF MAGNESIUM: CPT

## 2020-02-13 PROCEDURE — 85025 COMPLETE CBC W/AUTO DIFF WBC: CPT

## 2020-02-13 PROCEDURE — 97129 THER IVNTJ 1ST 15 MIN: CPT

## 2020-02-13 PROCEDURE — C8923 2D TTE W OR W/O FOL W/CON,CO: HCPCS

## 2020-02-13 PROCEDURE — 87804 INFLUENZA ASSAY W/OPTIC: CPT

## 2020-02-13 PROCEDURE — 6360000002 HC RX W HCPCS: Performed by: INTERNAL MEDICINE

## 2020-02-13 PROCEDURE — 36415 COLL VENOUS BLD VENIPUNCTURE: CPT

## 2020-02-13 PROCEDURE — 92507 TX SP LANG VOICE COMM INDIV: CPT

## 2020-02-13 PROCEDURE — 87880 STREP A ASSAY W/OPTIC: CPT

## 2020-02-13 PROCEDURE — 0100U HC RESPIRPTHGN MULT REV TRANS & AMP PRB TECH 21 TRGT: CPT

## 2020-02-13 RX ORDER — POTASSIUM CHLORIDE 750 MG/1
40 TABLET, FILM COATED, EXTENDED RELEASE ORAL DAILY
Status: DISCONTINUED | OUTPATIENT
Start: 2020-02-13 | End: 2020-02-14 | Stop reason: HOSPADM

## 2020-02-13 RX ORDER — CARVEDILOL 12.5 MG/1
12.5 TABLET ORAL 2 TIMES DAILY WITH MEALS
Status: DISCONTINUED | OUTPATIENT
Start: 2020-02-13 | End: 2020-02-14 | Stop reason: HOSPADM

## 2020-02-13 RX ORDER — CHLORTHALIDONE 25 MG/1
50 TABLET ORAL DAILY
Status: DISCONTINUED | OUTPATIENT
Start: 2020-02-13 | End: 2020-02-14 | Stop reason: HOSPADM

## 2020-02-13 RX ORDER — CHLORTHALIDONE 25 MG/1
25 TABLET ORAL DAILY
Status: DISCONTINUED | OUTPATIENT
Start: 2020-02-13 | End: 2020-02-13

## 2020-02-13 RX ORDER — LABETALOL HYDROCHLORIDE 5 MG/ML
10 INJECTION, SOLUTION INTRAVENOUS EVERY 4 HOURS PRN
Status: DISCONTINUED | OUTPATIENT
Start: 2020-02-13 | End: 2020-02-14 | Stop reason: HOSPADM

## 2020-02-13 RX ADMIN — INSULIN GLARGINE 9 UNITS: 100 INJECTION, SOLUTION SUBCUTANEOUS at 20:45

## 2020-02-13 RX ADMIN — POTASSIUM CHLORIDE 40 MEQ: 750 TABLET, FILM COATED, EXTENDED RELEASE ORAL at 12:38

## 2020-02-13 RX ADMIN — CARVEDILOL 12.5 MG: 12.5 TABLET, FILM COATED ORAL at 12:39

## 2020-02-13 RX ADMIN — ASPIRIN 81 MG: 81 TABLET, COATED ORAL at 08:15

## 2020-02-13 RX ADMIN — SODIUM CHLORIDE, PRESERVATIVE FREE 10 ML: 5 INJECTION INTRAVENOUS at 20:50

## 2020-02-13 RX ADMIN — INSULIN LISPRO 1 UNITS: 100 INJECTION, SOLUTION INTRAVENOUS; SUBCUTANEOUS at 20:45

## 2020-02-13 RX ADMIN — ENOXAPARIN SODIUM 40 MG: 40 INJECTION SUBCUTANEOUS at 20:45

## 2020-02-13 RX ADMIN — ATORVASTATIN CALCIUM 80 MG: 40 TABLET, FILM COATED ORAL at 20:45

## 2020-02-13 RX ADMIN — SODIUM CHLORIDE, PRESERVATIVE FREE 10 ML: 5 INJECTION INTRAVENOUS at 08:17

## 2020-02-13 RX ADMIN — ONDANSETRON 4 MG: 2 INJECTION INTRAMUSCULAR; INTRAVENOUS at 08:16

## 2020-02-13 RX ADMIN — ONDANSETRON 4 MG: 2 INJECTION INTRAMUSCULAR; INTRAVENOUS at 08:20

## 2020-02-13 RX ADMIN — CHLORTHALIDONE 50 MG: 25 TABLET ORAL at 12:39

## 2020-02-13 RX ADMIN — CARVEDILOL 12.5 MG: 12.5 TABLET, FILM COATED ORAL at 18:53

## 2020-02-13 ASSESSMENT — PAIN SCALES - GENERAL
PAINLEVEL_OUTOF10: 0

## 2020-02-13 ASSESSMENT — PAIN - FUNCTIONAL ASSESSMENT: PAIN_FUNCTIONAL_ASSESSMENT: ACTIVITIES ARE NOT PREVENTED

## 2020-02-13 ASSESSMENT — PAIN DESCRIPTION - PAIN TYPE: TYPE: OTHER (COMMENT)

## 2020-02-13 NOTE — PROGRESS NOTES
Pt has been confused to time and place since the beginning of the shift. He knows he had a stroke, keeps talking about going downtown. He spoke to his son on the phone, and his confusion is unchanged. Has a telle camera in the room.  Will continue to monitor

## 2020-02-13 NOTE — PLAN OF CARE
Problem: Falls - Risk of:  Goal: Will remain free from falls  Description  Will remain free from falls  2/13/2020 1108 by Ridge Hassan RN  Outcome: Ongoing  2/12/2020 2347 by Shirley Seaman RN  Outcome: Ongoing  Goal: Absence of physical injury  Description  Absence of physical injury  2/13/2020 1108 by Ridge Hassan RN  Outcome: Ongoing  2/12/2020 2347 by Shirley Seaman RN  Outcome: Ongoing     Problem: HEMODYNAMIC STATUS  Goal: Patient has stable vital signs and fluid balance  2/13/2020 1108 by Ridge Hassan RN  Outcome: Ongoing  2/12/2020 2347 by Shirley Seaman RN  Outcome: Ongoing     Problem: ACTIVITY INTOLERANCE/IMPAIRED MOBILITY  Goal: Mobility/activity is maintained at optimum level for patient  2/13/2020 1108 by Ridge Hassan RN  Outcome: Ongoing  2/12/2020 2347 by Shirley Seaman RN  Outcome: Ongoing     Problem: COMMUNICATION IMPAIRMENT  Goal: Ability to express needs and understand communication  2/13/2020 1108 by Ridge Hassan RN  Outcome: Ongoing  2/12/2020 2347 by Shirley Seaman RN  Outcome: Ongoing

## 2020-02-13 NOTE — CARE COORDINATION
Pre-cert initiated with insurance for ARU stay in anticipation of the patient coming to the unit before going home since he did require a pre-cert in an attempt to not delay discharge process.

## 2020-02-13 NOTE — PROGRESS NOTES
Expression  Conversation: no apparent word-finding errors in conversation this date  Written Expression  Written Expression: not addressed this date d/t difficulty using dominant hand  Motor Speech  Intelligibility: errors noted approximately 25% of conversation; comp start to reduce rate reinforcement; tongue twister left for room practice  Cognitive-Language  Orientation Level: Oriented to person;Oriented to place;Oriented to situation;Oriented to time  Sustained Attention: Min cues for sustained attention to dining task without distractors  Selective Attention: MAX cues for attention to dining task when distractors present in background  Memory: mod cues for recall of past/future events  Problem Solving: not directly addressed  Dysphagia  Oral Phase: [facility]  Decreased Laryngeal Elevation: All    ASSESSMENT  Accepted and tolerated tx at bedside. Lunch tray arrived at session initiation; focus on dysphagia follow-up and speech/cognitive-language addressed as complemented by dining task  Cognitive Diagnosis: Moderate cognitive-language impairments characterized by decreased delayed recall for novel information and the need for extra time for verbal reasoning/problem solving. Speech Diagnosis: Decreased coordination/motor planning for articulatory agility for motor speech. Errors noted in conversation approximately 25% without breakdown in communication  Dysphagia Diagnosis: Mild pharyngeal stage dysphagia characterized by decreased laryngeal elevation without overt signs/symptoms of penetration/aspiration  Diet Solids Recommendation: Regular  Liquid Consistency Recommendation:  Thin  Medication Administration:  PO  Strategies: 90 degree positioning with all p.o. intake     Plan/Recommendations:   Requires SLP Intervention: Yes  Duration/Frequency of Treatment: ST to tx 3-5 times per week during acute admission     Dysphagia Goals  The patient will tolerate recommended diet without observed clinical signs of aspiration, continue  The patient/caregiver will demonstrate understanding of compensatory strategies for improved swallowing safety. continue  Speech and Language Goals  Goal 1: The patient will improve articulatory agility for self expression in conversation to <25% errors via articulatory precision tasks and comp strat usage continue  Goal 2: The patient will improve functional recall of novel information using compensatory memory aids/strategies with mod cues. continue  Goal 3: The patient will improve verbal reasoning/problem solving to independent. continue     Prognosis  Guarded for cognitive-language; patient reports progressively decreased recall and decreased motor planning prior to current episode     Education  Consulted and agree with results and recommendations: Patient, Family member, RN  Patient Education: Completed on results/recs/plan  Patient Education Response: Verbalizes understanding     Discharge Recommendations:    D/C Recommendations: (suspect potential benefit for continued skilled ST upon discharge from acute care)       Please accept this as Speech Therapy Discharge status, if pt is discharged prior to next therapy session. Timed Code Treatment: 15  Total Treatment Time: [address], [address] [place] [address], [id number]  Speech-Language Pathologist  02/13/20 1:30 PM

## 2020-02-13 NOTE — PROGRESS NOTES
emphasized . The notebook also provides education on Stroke community resources and stroke advocacy. The need for follow-up after discharge was highlighted with patient/family with them being able to repeat understanding of the importance of this.       Electronically signed by Jhonatan Pace RN on 2/12/2020 at 11:53 PM

## 2020-02-13 NOTE — PROGRESS NOTES
Asked patient if he would like to me tell his family. He stated \" no need to do that. I fell on my own. It was my fault\"     Again offered to patient to call. Reviewed safety standards.  Call light in reach , Bed alarm in place

## 2020-02-13 NOTE — CARE COORDINATION
Spoke with patient and son at the bedside. Answered questions regarding ARU admission. They are both agreeable to rehab stay. Explained that his admission is dependent on insurance approval and they voiced understanding.

## 2020-02-14 ENCOUNTER — HOSPITAL ENCOUNTER (INPATIENT)
Age: 80
LOS: 1 days | Discharge: ANOTHER HOSPITAL | DRG: 092 | End: 2020-02-15
Attending: PHYSICAL MEDICINE & REHABILITATION | Admitting: PHYSICAL MEDICINE & REHABILITATION
Payer: MEDICARE

## 2020-02-14 VITALS
BODY MASS INDEX: 22.68 KG/M2 | HEIGHT: 60 IN | WEIGHT: 115.52 LBS | HEART RATE: 64 BPM | DIASTOLIC BLOOD PRESSURE: 83 MMHG | TEMPERATURE: 97.2 F | RESPIRATION RATE: 18 BRPM | OXYGEN SATURATION: 99 % | SYSTOLIC BLOOD PRESSURE: 133 MMHG

## 2020-02-14 PROBLEM — Z87.898 HISTORY OF MEMORY LOSS: Status: ACTIVE | Noted: 2020-02-14

## 2020-02-14 PROBLEM — I16.9 HYPERTENSIVE CRISIS: Status: RESOLVED | Noted: 2020-02-12 | Resolved: 2020-02-14

## 2020-02-14 PROBLEM — G93.40 ACUTE ENCEPHALOPATHY: Status: RESOLVED | Noted: 2020-02-13 | Resolved: 2020-02-14

## 2020-02-14 PROBLEM — I63.9 CEREBRAL INFARCTION, LEFT HEMISPHERE (HCC): Status: ACTIVE | Noted: 2020-02-14

## 2020-02-14 PROBLEM — R50.9 ACUTE FEBRILE ILLNESS: Status: RESOLVED | Noted: 2020-02-13 | Resolved: 2020-02-14

## 2020-02-14 LAB
ALBUMIN SERPL-MCNC: 3.6 G/DL (ref 3.4–5)
ANION GAP SERPL CALCULATED.3IONS-SCNC: 11 MMOL/L (ref 3–16)
BUN BLDV-MCNC: 20 MG/DL (ref 7–20)
CALCIUM SERPL-MCNC: 8.9 MG/DL (ref 8.3–10.6)
CHLORIDE BLD-SCNC: 107 MMOL/L (ref 99–110)
CO2: 22 MMOL/L (ref 21–32)
CREAT SERPL-MCNC: 1.2 MG/DL (ref 0.8–1.3)
GFR AFRICAN AMERICAN: >60
GFR NON-AFRICAN AMERICAN: 59
GLUCOSE BLD-MCNC: 110 MG/DL (ref 70–99)
GLUCOSE BLD-MCNC: 116 MG/DL (ref 70–99)
GLUCOSE BLD-MCNC: 121 MG/DL (ref 70–99)
MAGNESIUM: 2 MG/DL (ref 1.8–2.4)
PERFORMED ON: ABNORMAL
PERFORMED ON: ABNORMAL
PHOSPHORUS: 3.9 MG/DL (ref 2.5–4.9)
POTASSIUM SERPL-SCNC: 3.9 MMOL/L (ref 3.5–5.1)
SODIUM BLD-SCNC: 140 MMOL/L (ref 136–145)

## 2020-02-14 PROCEDURE — 36415 COLL VENOUS BLD VENIPUNCTURE: CPT

## 2020-02-14 PROCEDURE — 97116 GAIT TRAINING THERAPY: CPT

## 2020-02-14 PROCEDURE — 97162 PT EVAL MOD COMPLEX 30 MIN: CPT

## 2020-02-14 PROCEDURE — 94760 N-INVAS EAR/PLS OXIMETRY 1: CPT

## 2020-02-14 PROCEDURE — 6370000000 HC RX 637 (ALT 250 FOR IP): Performed by: INTERNAL MEDICINE

## 2020-02-14 PROCEDURE — 51798 US URINE CAPACITY MEASURE: CPT

## 2020-02-14 PROCEDURE — 80069 RENAL FUNCTION PANEL: CPT

## 2020-02-14 PROCEDURE — 97110 THERAPEUTIC EXERCISES: CPT

## 2020-02-14 PROCEDURE — 2580000003 HC RX 258: Performed by: INTERNAL MEDICINE

## 2020-02-14 PROCEDURE — 6370000000 HC RX 637 (ALT 250 FOR IP): Performed by: PHYSICAL MEDICINE & REHABILITATION

## 2020-02-14 PROCEDURE — 6360000002 HC RX W HCPCS: Performed by: PHYSICAL MEDICINE & REHABILITATION

## 2020-02-14 PROCEDURE — 97530 THERAPEUTIC ACTIVITIES: CPT

## 2020-02-14 PROCEDURE — 1280000000 HC REHAB R&B

## 2020-02-14 PROCEDURE — 83735 ASSAY OF MAGNESIUM: CPT

## 2020-02-14 RX ORDER — ATORVASTATIN CALCIUM 80 MG/1
80 TABLET, FILM COATED ORAL NIGHTLY
Qty: 30 TABLET | Refills: 3 | Status: ON HOLD | OUTPATIENT
Start: 2020-02-14 | End: 2020-03-03 | Stop reason: SDUPTHER

## 2020-02-14 RX ORDER — CHLORTHALIDONE 25 MG/1
50 TABLET ORAL DAILY
Status: CANCELLED | OUTPATIENT
Start: 2020-02-15

## 2020-02-14 RX ORDER — ONDANSETRON 4 MG/1
4 TABLET, FILM COATED ORAL EVERY 8 HOURS PRN
Status: CANCELLED | OUTPATIENT
Start: 2020-02-14

## 2020-02-14 RX ORDER — POLYETHYLENE GLYCOL 3350 17 G/17G
17 POWDER, FOR SOLUTION ORAL DAILY
Status: CANCELLED | OUTPATIENT
Start: 2020-02-14

## 2020-02-14 RX ORDER — ASPIRIN 300 MG/1
300 SUPPOSITORY RECTAL DAILY
Status: DISCONTINUED | OUTPATIENT
Start: 2020-02-15 | End: 2020-02-15 | Stop reason: HOSPADM

## 2020-02-14 RX ORDER — BISACODYL 10 MG
10 SUPPOSITORY, RECTAL RECTAL DAILY PRN
Status: CANCELLED | OUTPATIENT
Start: 2020-02-14

## 2020-02-14 RX ORDER — ASPIRIN 81 MG/1
81 TABLET ORAL DAILY
Status: CANCELLED | OUTPATIENT
Start: 2020-02-15

## 2020-02-14 RX ORDER — LISINOPRIL 10 MG/1
10 TABLET ORAL 2 TIMES DAILY
Qty: 30 TABLET | Refills: 3 | Status: ON HOLD | OUTPATIENT
Start: 2020-02-14 | End: 2020-03-02 | Stop reason: HOSPADM

## 2020-02-14 RX ORDER — POTASSIUM CHLORIDE 750 MG/1
20 TABLET, FILM COATED, EXTENDED RELEASE ORAL DAILY
Status: CANCELLED | OUTPATIENT
Start: 2020-02-15

## 2020-02-14 RX ORDER — ATORVASTATIN CALCIUM 80 MG/1
80 TABLET, FILM COATED ORAL NIGHTLY
Status: DISCONTINUED | OUTPATIENT
Start: 2020-02-14 | End: 2020-02-15 | Stop reason: HOSPADM

## 2020-02-14 RX ORDER — CARVEDILOL 12.5 MG/1
12.5 TABLET ORAL 2 TIMES DAILY WITH MEALS
Status: DISCONTINUED | OUTPATIENT
Start: 2020-02-14 | End: 2020-02-15 | Stop reason: HOSPADM

## 2020-02-14 RX ORDER — SENNA AND DOCUSATE SODIUM 50; 8.6 MG/1; MG/1
2 TABLET, FILM COATED ORAL 2 TIMES DAILY
Status: DISCONTINUED | OUTPATIENT
Start: 2020-02-14 | End: 2020-02-15 | Stop reason: HOSPADM

## 2020-02-14 RX ORDER — ONDANSETRON 4 MG/1
4 TABLET, FILM COATED ORAL EVERY 8 HOURS PRN
Status: DISCONTINUED | OUTPATIENT
Start: 2020-02-14 | End: 2020-02-15 | Stop reason: HOSPADM

## 2020-02-14 RX ORDER — QUETIAPINE FUMARATE 25 MG/1
25 TABLET, FILM COATED ORAL NIGHTLY
Qty: 60 TABLET | Refills: 3 | Status: SHIPPED | OUTPATIENT
Start: 2020-02-14 | End: 2020-06-04

## 2020-02-14 RX ORDER — CARVEDILOL 12.5 MG/1
12.5 TABLET ORAL 2 TIMES DAILY WITH MEALS
Status: CANCELLED | OUTPATIENT
Start: 2020-02-14

## 2020-02-14 RX ORDER — POTASSIUM CHLORIDE 750 MG/1
20 TABLET, FILM COATED, EXTENDED RELEASE ORAL DAILY
Status: DISCONTINUED | OUTPATIENT
Start: 2020-02-15 | End: 2020-02-15 | Stop reason: HOSPADM

## 2020-02-14 RX ORDER — ACETAMINOPHEN 325 MG/1
650 TABLET ORAL EVERY 4 HOURS PRN
Status: DISCONTINUED | OUTPATIENT
Start: 2020-02-14 | End: 2020-02-15 | Stop reason: HOSPADM

## 2020-02-14 RX ORDER — SENNA AND DOCUSATE SODIUM 50; 8.6 MG/1; MG/1
2 TABLET, FILM COATED ORAL 2 TIMES DAILY
Status: CANCELLED | OUTPATIENT
Start: 2020-02-14

## 2020-02-14 RX ORDER — ACETAMINOPHEN 325 MG/1
650 TABLET ORAL EVERY 4 HOURS PRN
Status: CANCELLED | OUTPATIENT
Start: 2020-02-14

## 2020-02-14 RX ORDER — CARVEDILOL 12.5 MG/1
12.5 TABLET ORAL 2 TIMES DAILY WITH MEALS
Qty: 60 TABLET | Refills: 3 | Status: ON HOLD | OUTPATIENT
Start: 2020-02-14 | End: 2020-02-19 | Stop reason: HOSPADM

## 2020-02-14 RX ORDER — ASPIRIN 81 MG/1
81 TABLET ORAL DAILY
Qty: 30 TABLET | Refills: 3 | Status: ON HOLD | OUTPATIENT
Start: 2020-02-15 | End: 2020-03-03 | Stop reason: SDUPTHER

## 2020-02-14 RX ORDER — ASPIRIN 300 MG/1
300 SUPPOSITORY RECTAL DAILY
Status: CANCELLED | OUTPATIENT
Start: 2020-02-15

## 2020-02-14 RX ORDER — POLYETHYLENE GLYCOL 3350 17 G/17G
17 POWDER, FOR SOLUTION ORAL DAILY
Status: DISCONTINUED | OUTPATIENT
Start: 2020-02-14 | End: 2020-02-15 | Stop reason: HOSPADM

## 2020-02-14 RX ORDER — QUETIAPINE FUMARATE 25 MG/1
25 TABLET, FILM COATED ORAL NIGHTLY
Status: CANCELLED | OUTPATIENT
Start: 2020-02-14

## 2020-02-14 RX ORDER — LISINOPRIL 10 MG/1
10 TABLET ORAL 2 TIMES DAILY
Status: DISCONTINUED | OUTPATIENT
Start: 2020-02-14 | End: 2020-02-15 | Stop reason: HOSPADM

## 2020-02-14 RX ORDER — ATORVASTATIN CALCIUM 80 MG/1
80 TABLET, FILM COATED ORAL NIGHTLY
Status: CANCELLED | OUTPATIENT
Start: 2020-02-14

## 2020-02-14 RX ORDER — BISACODYL 10 MG
10 SUPPOSITORY, RECTAL RECTAL DAILY PRN
Status: DISCONTINUED | OUTPATIENT
Start: 2020-02-14 | End: 2020-02-15 | Stop reason: HOSPADM

## 2020-02-14 RX ORDER — QUETIAPINE FUMARATE 25 MG/1
25 TABLET, FILM COATED ORAL NIGHTLY
Status: DISCONTINUED | OUTPATIENT
Start: 2020-02-14 | End: 2020-02-15 | Stop reason: HOSPADM

## 2020-02-14 RX ORDER — POTASSIUM CHLORIDE 1500 MG/1
20 TABLET, FILM COATED, EXTENDED RELEASE ORAL DAILY
Qty: 60 TABLET | Refills: 3 | Status: ON HOLD | OUTPATIENT
Start: 2020-02-15 | End: 2020-03-02 | Stop reason: HOSPADM

## 2020-02-14 RX ORDER — CHLORTHALIDONE 50 MG/1
50 TABLET ORAL DAILY
Qty: 30 TABLET | Refills: 3 | Status: ON HOLD | OUTPATIENT
Start: 2020-02-15 | End: 2020-02-19 | Stop reason: HOSPADM

## 2020-02-14 RX ORDER — CLOPIDOGREL BISULFATE 75 MG/1
75 TABLET ORAL DAILY
Status: DISCONTINUED | OUTPATIENT
Start: 2020-02-14 | End: 2020-02-15 | Stop reason: HOSPADM

## 2020-02-14 RX ORDER — ASPIRIN 81 MG/1
81 TABLET ORAL DAILY
Status: DISCONTINUED | OUTPATIENT
Start: 2020-02-15 | End: 2020-02-15 | Stop reason: HOSPADM

## 2020-02-14 RX ORDER — LISINOPRIL 10 MG/1
10 TABLET ORAL 2 TIMES DAILY
Status: CANCELLED | OUTPATIENT
Start: 2020-02-14

## 2020-02-14 RX ORDER — CHLORTHALIDONE 25 MG/1
50 TABLET ORAL DAILY
Status: DISCONTINUED | OUTPATIENT
Start: 2020-02-15 | End: 2020-02-15 | Stop reason: HOSPADM

## 2020-02-14 RX ORDER — CLOPIDOGREL BISULFATE 75 MG/1
75 TABLET ORAL DAILY
Status: CANCELLED | OUTPATIENT
Start: 2020-02-14

## 2020-02-14 RX ORDER — QUETIAPINE FUMARATE 25 MG/1
25 TABLET, FILM COATED ORAL NIGHTLY
Status: DISCONTINUED | OUTPATIENT
Start: 2020-02-14 | End: 2020-02-14 | Stop reason: HOSPADM

## 2020-02-14 RX ORDER — LISINOPRIL 10 MG/1
10 TABLET ORAL 2 TIMES DAILY
Status: DISCONTINUED | OUTPATIENT
Start: 2020-02-14 | End: 2020-02-14 | Stop reason: HOSPADM

## 2020-02-14 RX ADMIN — CLOPIDOGREL BISULFATE 75 MG: 75 TABLET ORAL at 18:21

## 2020-02-14 RX ADMIN — POTASSIUM CHLORIDE 40 MEQ: 750 TABLET, FILM COATED, EXTENDED RELEASE ORAL at 09:28

## 2020-02-14 RX ADMIN — CARVEDILOL 12.5 MG: 12.5 TABLET, FILM COATED ORAL at 18:21

## 2020-02-14 RX ADMIN — ATORVASTATIN CALCIUM 80 MG: 80 TABLET, FILM COATED ORAL at 20:11

## 2020-02-14 RX ADMIN — CARVEDILOL 12.5 MG: 12.5 TABLET, FILM COATED ORAL at 09:28

## 2020-02-14 RX ADMIN — METFORMIN HYDROCHLORIDE 500 MG: 500 TABLET ORAL at 13:33

## 2020-02-14 RX ADMIN — ASPIRIN 81 MG: 81 TABLET, COATED ORAL at 09:28

## 2020-02-14 RX ADMIN — SODIUM CHLORIDE, PRESERVATIVE FREE 10 ML: 5 INJECTION INTRAVENOUS at 09:28

## 2020-02-14 RX ADMIN — CHLORTHALIDONE 50 MG: 25 TABLET ORAL at 09:28

## 2020-02-14 RX ADMIN — QUETIAPINE FUMARATE 25 MG: 25 TABLET ORAL at 20:11

## 2020-02-14 RX ADMIN — ENOXAPARIN SODIUM 40 MG: 40 INJECTION SUBCUTANEOUS at 20:11

## 2020-02-14 RX ADMIN — LISINOPRIL 10 MG: 10 TABLET ORAL at 20:11

## 2020-02-14 RX ADMIN — SENNOSIDES AND DOCUSATE SODIUM 2 TABLET: 8.6; 5 TABLET ORAL at 20:11

## 2020-02-14 RX ADMIN — LISINOPRIL 10 MG: 10 TABLET ORAL at 13:33

## 2020-02-14 ASSESSMENT — PAIN SCALES - GENERAL
PAINLEVEL_OUTOF10: 0

## 2020-02-14 NOTE — CONSULTS
Department of Marisa Remak Dr. Terrilyn Prader Progress Note  2/14/2020  12:58 PM    Patient Name:   Marla Mei  YOB: 1940    Diagnosis: Acute CVA (cerebrovascular accident) [facility])        Subjective:  Rehab consult received. Chart reviewed. Patient seen. Patient discussed with our rehab unit admission nurse. 68-year-old male with history of diabetes type 2, essential hypertension, who presents to the hospital with right leg weakness. He reports history of essential hypertension but he has not been compliant with medication use for years.  Patient diagnosed with acute CVA, likely secondary to uncontrolled hypertension.  MRI-brain with acute lacunar infarct within the left posterior limb of internal capsule.  Patient seen by neurology, and they found a right sided ataxia, 4+/5 strength in right arm and leg.  Patient on aspirin and high-dose statin. Patient started therapies, but needs more therapy before discharge to home safely. Patient lives at home with his son in a 1 level condo, and the son works daily. Patient was approved by his insurance for rehab unit treatment.     BP (!) 166/80   Pulse 60   Temp 98 °F (36.7 °C) (Oral)   Resp 16   Ht 4' 8\" (1.422 m)   Wt 115 lb 8.3 oz (52.4 kg)   SpO2 97%   BMI 25.90 kg/m²     Last 24 hour lab  Recent Results (from the past 24 hour(s))   POCT Glucose    Collection Time: 02/13/20  4:40 PM   Result Value Ref Range    POC Glucose 128 (H) 70 - 99 mg/dl    Performed on ACCU-CHEK    POCT Glucose    Collection Time: 02/13/20  8:38 PM   Result Value Ref Range    POC Glucose 169 (H) 70 - 99 mg/dl    Performed on ACCU-CHEK    Renal Function Panel    Collection Time: 02/14/20  5:11 AM   Result Value Ref Range    Sodium 140 136 - 145 mmol/L    Potassium 3.9 3.5 - 5.1 mmol/L    Chloride 107 99 - 110 mmol/L    CO2 22 21 - 32 mmol/L    Anion Gap 11 3 - 16    Glucose 121 (H) 70 - 99 mg/dL    BUN 20 7 - 20 mg/dL    CREATININE 1.2 0.8 - 1.3 mg/dL GFR Non- 59 (A) >60    GFR African American >60 >60    Calcium 8.9 8.3 - 10.6 mg/dL    Phosphorus 3.9 2.5 - 4.9 mg/dL    Alb 3.6 3.4 - 5.0 g/dL   Magnesium    Collection Time: 02/14/20  5:11 AM   Result Value Ref Range    Magnesium 2.00 1.80 - 2.40 mg/dL   POCT Glucose    Collection Time: 02/14/20  7:41 AM   Result Value Ref Range    POC Glucose 110 (H) 70 - 99 mg/dl    Performed on ACCU-CHEK    POCT Glucose    Collection Time: 02/14/20 11:34 AM   Result Value Ref Range    POC Glucose 116 (H) 70 - 99 mg/dl    Performed on ACCU-CHEK          Plan: We will plan to admit to our rehab unit today.       Dr. Slick Lara

## 2020-02-14 NOTE — DISCHARGE SUMMARY
dementia in both father and mother. I discussed with patient's son who reports that patient has had ongoing memory issues, sometimes with visual hallucination. I have started patient on Seroquel 25 mg nightly. Overall, he appears quite stable at this time. I have recommended ongoing follow-up with neurology and primary care physician as outpatient, to determine if patient may indeed have underlying dementia. 5. Diabetes type 2, appears diet-controlled with most recent hemoglobin A1c of 6.9. Start metformin 500 mg daily. 6. Patient sustained a fall during hospital stay. He did not hit his head in the process of falls; reportedly landed on his buttock. Follow-up CT-head was unremarkable. 7. Noncompliance with medications. Patient has been counseled about importance of compliance with medications. 8. Patient remained stable at this time and is being discharged to rehab unit. Invasive procedures:  None. Discharge Diagnoses:   Principal Problem:    Acute CVA (cerebrovascular accident) ([address]  Active Problems:    Diabetes mellitus with hyperglycemia ([address]    Stenosis of left vertebral artery    Diabetes mellitus type 2, controlled ([facility]    Essential hypertension    Noncompliance with medications    History of memory loss  Resolved Problems:    Hypertensive crisis    Acute encephalopathy    Acute febrile illness      Physical Exam: BP (!) 166/80   Pulse 60   Temp 98 °F (36.7 °C) (Oral)   Resp 16   Ht 4' 8\" (1.422 m)   Wt 115 lb 8.3 oz (52.4 kg)   SpO2 97%   BMI 25.90 kg/m²   Gen/overall appearance: Not in acute distress. Alert. Head: Normocephalic, atraumatic  Eyes: EOMI, good acuity  ENT: Oral mucosa moist  Neck: No JVD, thyromegaly  CVS: Nml S1S2, no MRG, RRR  Pulm: Clear bilaterally. No crackles/wheezes  Gastrointestinal: Soft, NT/ND, +BS  Musculoskeletal: No edema. Warm  Neuro: No focal deficit. Moves extremity spontaneously. Psychiatry: Appropriate affect. Not agitated.   Skin: Warm, dry with normal turgor. No rash  Capillary refill: Brisk,< 3 seconds   Peripheral Pulses: +2 palpable, equal bilaterally     Significant diagnostic studies that may require follow up:  Ct Head Wo Contrast    Result Date: 2/12/2020  EXAMINATION: CT OF THE HEAD WITHOUT CONTRAST  2/12/2020 9:18 pm TECHNIQUE: CT of the head was performed without the administration of intravenous contrast. Dose modulation, iterative reconstruction, and/or weight based adjustment of the mA/kV was utilized to reduce the radiation dose to as low as reasonably achievable. COMPARISON: 02/11/2020 HISTORY: ORDERING SYSTEM PROVIDED HISTORY: post fall TECHNOLOGIST PROVIDED HISTORY: Reason for exam:->post fall Has a \"code stroke\" or \"stroke alert\" been called? ->No Reason for Exam: post fall Acuity: Acute Type of Exam: Initial FINDINGS: BRAIN/VENTRICLES: Ventricles are midline in position. No intracerebral masses are identified. No mass effect. No midline shift. No acute intracranial hemorrhage is seen. There is intracranial atherosclerosis. . Subtle periventricular hypodensity is seen. Scattered additional areas of hypodensity seen throughout the frontal parietal matter,. .. Punctate ill-defined hypodensity is seen in the posterior limb of the left in the internal capsule ORBITS: The visualized portion of the orbits demonstrate no acute abnormality. SINUSES: No significant mastoid opacification noted. No air-fluid levels in the sinuses. There is mild bowing and spurring of the nasal septum. SOFT TISSUES/SKULL:  No acute abnormality of the visualized skull or soft tissues. No hemorrhage or mass identified.  Tiny ill-defined area of hypodensity in the posterior limb of left internal capsule, compatible with recent tiny infarct     Ct Head Wo Contrast    Result Date: 2/11/2020  EXAMINATION: CT OF THE HEAD WITHOUT CONTRAST  2/11/2020 4:18 pm TECHNIQUE: CT of the head was performed without the administration of intravenous contrast. Dose modulation, iterative reconstruction, and/or weight based adjustment of the mA/kV was utilized to reduce the radiation dose to as low as reasonably achievable. COMPARISON: None. HISTORY: ORDERING SYSTEM PROVIDED HISTORY: falling to the right since 1042 AM TECHNOLOGIST PROVIDED HISTORY: Reason for exam:->falling to the right since 1042 AM Has a \"code stroke\" or \"stroke alert\" been called? ->Yes Reason for Exam: Extremity Weakness (pt state she woke up this am at 1042 and his right leg was not working right. states he went to sleep at midnight last night and was fine. states he almost fell. bp per ems was 220/114 states he has no taken his bp meds for several years. BG was 138) Acuity: Acute Type of Exam: Initial FINDINGS: BRAIN/VENTRICLES: The ventricles and sulci are diffusely enlarged. Low attenuation is seen in the periventricular and subcortical white matter. No acute intracranial hemorrhage or acute infarct is identified. ORBITS: The visualized portion of the orbits demonstrate no acute abnormality. SINUSES: The visualized paranasal sinuses and mastoid air cells demonstrate no acute abnormality. SOFT TISSUES/SKULL:  No acute abnormality of the visualized skull or soft tissues. No acute intracranial abnormality. Findings were discussed with Arnulfo Rodriguez at 4:32 pm on 2/11/2020. Xr Chest Portable    Result Date: 2/11/2020  EXAMINATION: ONE XRAY VIEW OF THE CHEST 2/11/2020 4:05 pm COMPARISON: None. HISTORY: ORDERING SYSTEM PROVIDED HISTORY: weakness TECHNOLOGIST PROVIDED HISTORY: Reason for exam:->weakness Reason for Exam: weakness Acuity: Acute Type of Exam: Initial FINDINGS: The lungs are without acute focal process. There is no effusion or pneumothorax. The cardiomediastinal silhouette is without acute process. The osseous structures are without acute process. No acute process.      Xr Chest 1 Vw    Result Date: 2/13/2020  EXAMINATION: ONE XRAY VIEW OF THE CHEST 2/13/2020 11:43 am COMPARISON: 02/11/2020 HISTORY: ORDERING SYSTEM PROVIDED HISTORY: Fever TECHNOLOGIST PROVIDED HISTORY: Reason for exam:->Fever Reason for Exam: fever Acuity: Acute Type of Exam: Initial FINDINGS: The cardiac silhouette, mediastinal and hilar contours are normal.  Thoracic aortic calcification is present. The lungs are clear. There are no pleural effusions. No acute osseous abnormalities are identified. No acute cardiopulmonary disease. Cta Head Neck W Contrast    Result Date: 2/13/2020  EXAMINATION: CTA OF THE HEAD AND NECK WITH CONTRAST 2/11/2020 4:18 pm: TECHNIQUE: CTA of the head and neck was performed with the administration of intravenous contrast. Multiplanar reformatted images are provided for review. MIP images are provided for review. Stenosis of the internal carotid arteries measured using NASCET criteria. Dose modulation, iterative reconstruction, and/or weight based adjustment of the mA/kV was utilized to reduce the radiation dose to as low as reasonably achievable. 3D reconstructed images were performed on a separate workstation and provided for review. COMPARISON: Noncontrast CT head performed earlier the same day. HISTORY: ORDERING SYSTEM PROVIDED HISTORY: falling to the right TECHNOLOGIST PROVIDED HISTORY: Reason for exam:->falling to the right Reason for Exam: Extremity Weakness (pt state she woke up this am at 1042 and his right leg was not working right. states he went to sleep at midnight last night and was fine. states he almost fell. bp per ems was 220/114 states he has no taken his bp meds for several years. BG was 138) Acuity: Acute Type of Exam: Initial FINDINGS: CTA NECK: AORTIC ARCH/ARCH VESSELS: Normal, three-vessel aortic arch anatomy. Calcified and noncalcified atherosclerotic plaque of the aortic arch and proximal arch vessels. No dissection or arterial injury. No significant stenosis of the brachiocephalic or subclavian arteries.  CAROTID ARTERIES: Common carotid arteries are normal in course and

## 2020-02-14 NOTE — PROGRESS NOTES
Physical Therapy  Facility/Department: [facility] 5W PROGRESSIVE CARE  Daily Treatment Note  NAME: Rosa Isela Mckeon  : 1940  MRN: [id number]    Date of Service: 2020    Discharge Recommendations:  5-7 sessions per week, Patient would benefit from continued therapy after discharge   PT Equipment Recommendations  Equipment Needed: No    Assessment   Body structures, Functions, Activity limitations: Decreased functional mobility ; Decreased balance  Assessment: Alexandria Taylor is a 70 y.o. M admit 20 with ataxia and self reports of R leg weakness -- brain MRI 20: \"Acute lacunar infarct within the left posterior limb of internal capsule. \" Prior to admit pt was living at home with his son and ambulating independently -- reports multiple recent falls. Physical exam today revealed normal leg strength but patient with gait abnormality characterezed by occasional LOB to the L, no ataxia noted today. I anticipate that he would benefit from intensive skilled PT 5-7x/wk at discharge to improve his balance and progress him to an independent level of function. Will follow. Treatment Diagnosis: Unsteady gait, abnormal gait, decreased safety insight  Prognosis: Good  History: Active rheumatic fever, Diabetes mellitus type 2, uncontrolled ([address] Essential hypertension  REQUIRES PT FOLLOW UP: Yes  Activity Tolerance  Activity Tolerance: Patient Tolerated treatment well     Patient Diagnosis(es): The encounter diagnosis was Truncal ataxia. has a past medical history of Active rheumatic fever, Diabetes mellitus type 2, uncontrolled ([address] Essential hypertension, and Noncompliance with medications. has a past surgical history that includes Appendectomy. Restrictions  Restrictions/Precautions  Restrictions/Precautions: Fall Risk  Subjective   General  Chart Reviewed:  Yes  Additional Pertinent Hx: Alexandria Taylor is a 70 y.o. M admit 20 with ataxia and self reports of R leg weakness -- brain MRI 20: Leticia Archibald

## 2020-02-14 NOTE — DISCHARGE INSTR - COC
25.90 kg/m²     Last documented pain score (0-10 scale): Pain Level: 0  Last Weight:   Wt Readings from Last 1 Encounters:   02/14/20 115 lb 8.3 oz (52.4 kg)     Mental Status:  oriented and alert forgetfull pos dmentia per son    IV Access:  Left for arm    Nursing Mobility/ADLs:  Walking   Assisted  Transfer  Assisted  Bathing  Assisted  Dressing  Assisted  Toileting  Assisted  Feeding  Independent  Med Admin  Dependent  Med Delivery   whole    Wound Care Documentation and Therapy:        Elimination:  Continence:   · Bowel: Yes  · Bladder: Yes  Urinary Catheter: None   Colostomy/Ileostomy/Ileal Conduit: No       Date of Last BM: 02/13/2020    Intake/Output Summary (Last 24 hours) at 2/14/2020 1107  Last data filed at 2/14/2020 1102  Gross per 24 hour   Intake 600 ml   Output 1225 ml   Net -625 ml     I/O last 3 completed shifts: In: 240 [P.O.:240]  Out: 875 [Urine:875]    Safety Concerns: At Risk for Falls    Impairments/Disabilities:      None    Nutrition Therapy:  Current Nutrition Therapy:   - Oral Diet:  Cardiac and Low Sodium (2gm)    Routes of Feeding: Oral  Liquids: Thin Liquids  Daily Fluid Restriction: no  Last Modified Barium Swallow with Video (Video Swallowing Test): not done    Treatments at the Time of Hospital Discharge:   Respiratory Treatments:   Oxygen Therapy:  is not on home oxygen therapy.   Ventilator:    - No ventilator support    Rehab Therapies: Physical Therapy and Occupational Therapy  Weight Bearing Status/Restrictions: No weight bearing restirctions  Other Medical Equipment (for information only, NOT a DME order):  walker  Other Treatments:     Patient's personal belongings (please select all that are sent with patient):  None    RN SIGNATURE:  Electronically signed by Araceli Campbell RN on 2/14/20 at 11:12 AM    CASE MANAGEMENT/SOCIAL WORK SECTION    Inpatient Status Date: ***    Readmission Risk Assessment Score:  Readmission Risk              Risk of Unplanned

## 2020-02-15 ENCOUNTER — HOSPITAL ENCOUNTER (INPATIENT)
Age: 80
LOS: 6 days | Discharge: NURSING, REHAB OR LONG-TERM CARE | DRG: 064 | End: 2020-02-21
Attending: INTERNAL MEDICINE | Admitting: INTERNAL MEDICINE
Payer: MEDICARE

## 2020-02-15 ENCOUNTER — APPOINTMENT (OUTPATIENT)
Dept: MRI IMAGING | Age: 80
DRG: 064 | End: 2020-02-15
Attending: INTERNAL MEDICINE
Payer: MEDICARE

## 2020-02-15 ENCOUNTER — APPOINTMENT (OUTPATIENT)
Dept: CT IMAGING | Age: 80
DRG: 064 | End: 2020-02-15
Attending: INTERNAL MEDICINE
Payer: MEDICARE

## 2020-02-15 VITALS
DIASTOLIC BLOOD PRESSURE: 40 MMHG | WEIGHT: 116.84 LBS | SYSTOLIC BLOOD PRESSURE: 82 MMHG | HEIGHT: 60 IN | RESPIRATION RATE: 16 BRPM | HEART RATE: 92 BPM | BODY MASS INDEX: 22.94 KG/M2 | OXYGEN SATURATION: 92 % | TEMPERATURE: 97.8 F

## 2020-02-15 PROBLEM — R41.82 ALTERED MENTAL STATE: Status: ACTIVE | Noted: 2020-02-15

## 2020-02-15 LAB
A/G RATIO: 1.4 (ref 1.1–2.2)
ALBUMIN SERPL-MCNC: 3.8 G/DL (ref 3.4–5)
ALP BLD-CCNC: 61 U/L (ref 40–129)
ALT SERPL-CCNC: 20 U/L (ref 10–40)
ANION GAP SERPL CALCULATED.3IONS-SCNC: 14 MMOL/L (ref 3–16)
ANION GAP SERPL CALCULATED.3IONS-SCNC: 15 MMOL/L (ref 3–16)
AST SERPL-CCNC: 26 U/L (ref 15–37)
BASOPHILS ABSOLUTE: 0 K/UL (ref 0–0.2)
BASOPHILS RELATIVE PERCENT: 0.2 %
BILIRUB SERPL-MCNC: 0.5 MG/DL (ref 0–1)
BUN BLDV-MCNC: 26 MG/DL (ref 7–20)
BUN BLDV-MCNC: 26 MG/DL (ref 7–20)
CALCIUM SERPL-MCNC: 8.8 MG/DL (ref 8.3–10.6)
CALCIUM SERPL-MCNC: 9.5 MG/DL (ref 8.3–10.6)
CHLORIDE BLD-SCNC: 105 MMOL/L (ref 99–110)
CHLORIDE BLD-SCNC: 99 MMOL/L (ref 99–110)
CO2: 21 MMOL/L (ref 21–32)
CO2: 22 MMOL/L (ref 21–32)
CREAT SERPL-MCNC: 1.3 MG/DL (ref 0.8–1.3)
CREAT SERPL-MCNC: 1.3 MG/DL (ref 0.8–1.3)
EOSINOPHILS ABSOLUTE: 0.1 K/UL (ref 0–0.6)
EOSINOPHILS RELATIVE PERCENT: 0.5 %
GFR AFRICAN AMERICAN: >60
GFR AFRICAN AMERICAN: >60
GFR NON-AFRICAN AMERICAN: 53
GFR NON-AFRICAN AMERICAN: 53
GLOBULIN: 2.8 G/DL
GLUCOSE BLD-MCNC: 114 MG/DL (ref 70–99)
GLUCOSE BLD-MCNC: 117 MG/DL (ref 70–99)
GLUCOSE BLD-MCNC: 128 MG/DL (ref 70–99)
GLUCOSE BLD-MCNC: 137 MG/DL (ref 70–99)
GLUCOSE BLD-MCNC: 140 MG/DL (ref 70–99)
HCT VFR BLD CALC: 42.3 % (ref 40.5–52.5)
HCT VFR BLD CALC: 43.4 % (ref 40.5–52.5)
HEMOGLOBIN: 14.3 G/DL (ref 13.5–17.5)
HEMOGLOBIN: 14.6 G/DL (ref 13.5–17.5)
LACTIC ACID: 1.5 MMOL/L (ref 0.4–2)
LYMPHOCYTES ABSOLUTE: 1.3 K/UL (ref 1–5.1)
LYMPHOCYTES RELATIVE PERCENT: 12.6 %
MAGNESIUM: 2 MG/DL (ref 1.8–2.4)
MCH RBC QN AUTO: 30 PG (ref 26–34)
MCH RBC QN AUTO: 30.4 PG (ref 26–34)
MCHC RBC AUTO-ENTMCNC: 33.6 G/DL (ref 31–36)
MCHC RBC AUTO-ENTMCNC: 33.8 G/DL (ref 31–36)
MCV RBC AUTO: 89.3 FL (ref 80–100)
MCV RBC AUTO: 90 FL (ref 80–100)
MONOCYTES ABSOLUTE: 0.7 K/UL (ref 0–1.3)
MONOCYTES RELATIVE PERCENT: 7.1 %
NEUTROPHILS ABSOLUTE: 8.2 K/UL (ref 1.7–7.7)
NEUTROPHILS RELATIVE PERCENT: 79.6 %
PDW BLD-RTO: 14.3 % (ref 12.4–15.4)
PDW BLD-RTO: 14.4 % (ref 12.4–15.4)
PERFORMED ON: ABNORMAL
PLATELET # BLD: 210 K/UL (ref 135–450)
PLATELET # BLD: 229 K/UL (ref 135–450)
PMV BLD AUTO: 7.2 FL (ref 5–10.5)
PMV BLD AUTO: 7.4 FL (ref 5–10.5)
POTASSIUM REFLEX MAGNESIUM: 4.1 MMOL/L (ref 3.5–5.1)
POTASSIUM SERPL-SCNC: 3.9 MMOL/L (ref 3.5–5.1)
RBC # BLD: 4.71 M/UL (ref 4.2–5.9)
RBC # BLD: 4.86 M/UL (ref 4.2–5.9)
S PYO THROAT QL CULT: NORMAL
SODIUM BLD-SCNC: 136 MMOL/L (ref 136–145)
SODIUM BLD-SCNC: 140 MMOL/L (ref 136–145)
TOTAL PROTEIN: 6.6 G/DL (ref 6.4–8.2)
TROPONIN: <0.01 NG/ML
WBC # BLD: 10.3 K/UL (ref 4–11)
WBC # BLD: 7.8 K/UL (ref 4–11)

## 2020-02-15 PROCEDURE — 97110 THERAPEUTIC EXERCISES: CPT

## 2020-02-15 PROCEDURE — 97535 SELF CARE MNGMENT TRAINING: CPT

## 2020-02-15 PROCEDURE — 2060000000 HC ICU INTERMEDIATE R&B

## 2020-02-15 PROCEDURE — 70450 CT HEAD/BRAIN W/O DYE: CPT

## 2020-02-15 PROCEDURE — 97530 THERAPEUTIC ACTIVITIES: CPT

## 2020-02-15 PROCEDURE — 2580000003 HC RX 258: Performed by: INTERNAL MEDICINE

## 2020-02-15 PROCEDURE — 6370000000 HC RX 637 (ALT 250 FOR IP): Performed by: PHYSICAL MEDICINE & REHABILITATION

## 2020-02-15 PROCEDURE — 6370000000 HC RX 637 (ALT 250 FOR IP): Performed by: INTERNAL MEDICINE

## 2020-02-15 PROCEDURE — 85027 COMPLETE CBC AUTOMATED: CPT

## 2020-02-15 PROCEDURE — 97167 OT EVAL HIGH COMPLEX 60 MIN: CPT

## 2020-02-15 PROCEDURE — 36415 COLL VENOUS BLD VENIPUNCTURE: CPT

## 2020-02-15 PROCEDURE — 83605 ASSAY OF LACTIC ACID: CPT

## 2020-02-15 PROCEDURE — 85025 COMPLETE CBC W/AUTO DIFF WBC: CPT

## 2020-02-15 PROCEDURE — 84484 ASSAY OF TROPONIN QUANT: CPT

## 2020-02-15 PROCEDURE — 70551 MRI BRAIN STEM W/O DYE: CPT

## 2020-02-15 PROCEDURE — 83735 ASSAY OF MAGNESIUM: CPT

## 2020-02-15 PROCEDURE — 93005 ELECTROCARDIOGRAM TRACING: CPT | Performed by: INTERNAL MEDICINE

## 2020-02-15 PROCEDURE — 97162 PT EVAL MOD COMPLEX 30 MIN: CPT

## 2020-02-15 PROCEDURE — 80053 COMPREHEN METABOLIC PANEL: CPT

## 2020-02-15 PROCEDURE — 97116 GAIT TRAINING THERAPY: CPT

## 2020-02-15 RX ORDER — ONDANSETRON 2 MG/ML
4 INJECTION INTRAMUSCULAR; INTRAVENOUS EVERY 6 HOURS PRN
Status: DISCONTINUED | OUTPATIENT
Start: 2020-02-15 | End: 2020-02-21 | Stop reason: HOSPADM

## 2020-02-15 RX ORDER — SODIUM CHLORIDE 0.9 % (FLUSH) 0.9 %
10 SYRINGE (ML) INJECTION PRN
Status: DISCONTINUED | OUTPATIENT
Start: 2020-02-15 | End: 2020-02-21 | Stop reason: HOSPADM

## 2020-02-15 RX ORDER — ACETAMINOPHEN 650 MG/1
650 SUPPOSITORY RECTAL EVERY 4 HOURS PRN
Status: DISCONTINUED | OUTPATIENT
Start: 2020-02-15 | End: 2020-02-21 | Stop reason: HOSPADM

## 2020-02-15 RX ORDER — SODIUM CHLORIDE 0.9 % (FLUSH) 0.9 %
10 SYRINGE (ML) INJECTION EVERY 12 HOURS SCHEDULED
Status: DISCONTINUED | OUTPATIENT
Start: 2020-02-15 | End: 2020-02-21 | Stop reason: HOSPADM

## 2020-02-15 RX ORDER — SODIUM CHLORIDE 9 MG/ML
INJECTION, SOLUTION INTRAVENOUS CONTINUOUS
Status: DISCONTINUED | OUTPATIENT
Start: 2020-02-15 | End: 2020-02-16

## 2020-02-15 RX ORDER — 0.9 % SODIUM CHLORIDE 0.9 %
1000 INTRAVENOUS SOLUTION INTRAVENOUS ONCE
Status: DISCONTINUED | OUTPATIENT
Start: 2020-02-15 | End: 2020-02-15 | Stop reason: HOSPADM

## 2020-02-15 RX ADMIN — ASPIRIN 81 MG: 81 TABLET, COATED ORAL at 08:41

## 2020-02-15 RX ADMIN — CARVEDILOL 12.5 MG: 12.5 TABLET, FILM COATED ORAL at 08:42

## 2020-02-15 RX ADMIN — LISINOPRIL 10 MG: 10 TABLET ORAL at 08:41

## 2020-02-15 RX ADMIN — SODIUM CHLORIDE 1000 ML: 9 INJECTION, SOLUTION INTRAVENOUS at 12:11

## 2020-02-15 RX ADMIN — POTASSIUM CHLORIDE 20 MEQ: 750 TABLET, FILM COATED, EXTENDED RELEASE ORAL at 08:42

## 2020-02-15 RX ADMIN — METFORMIN HYDROCHLORIDE 500 MG: 500 TABLET ORAL at 08:41

## 2020-02-15 RX ADMIN — CHLORTHALIDONE 50 MG: 25 TABLET ORAL at 08:41

## 2020-02-15 RX ADMIN — SODIUM CHLORIDE: 9 INJECTION, SOLUTION INTRAVENOUS at 15:30

## 2020-02-15 RX ADMIN — CLOPIDOGREL BISULFATE 75 MG: 75 TABLET ORAL at 08:42

## 2020-02-15 ASSESSMENT — PAIN SCALES - GENERAL
PAINLEVEL_OUTOF10: 0
PAINLEVEL_OUTOF10: 0

## 2020-02-15 NOTE — H&P
300'    OT:   ADL  Grooming: Contact guard assistance(standing sinkside)  Toileting: Contact guard assistance(standing balance)  Additional Comments: anticipate pt will require CGA for standing components of ADLs  Bed mobility  Supine to Sit: Modified independent  Sit to Supine: Modified independent  Scooting: Supervision  Comment: HOB elevated  Transfers  Sit to stand: Stand by assistance  Stand to sit: Stand by assistance    SLP:   Cognitive Diagnosis: Moderate cognitive-language impairments characterized by decreased delayed recall for novel information and the need for extra time for verbal reasoning/problem solving. Speech Diagnosis: Decreased coordination/motor planning for articulatory agility for motor speech.  Errors noted in conversation approximately 25% without breakdown in communication  Dysphagia Diagnosis: Mild pharyngeal stage dysphagia characterized by decreased laryngeal elevation without overt signs/symptoms of penetration/aspiration       has a past medical history of Active rheumatic fever, Diabetes mellitus type 2, uncontrolled ([address] Essential hypertension, and Noncompliance with medications. reports that he has never smoked. He has never used smokeless tobacco. He reports that he does not drink alcohol or use drugs. family history includes Dementia in his father and mother. Prior to Admission medications    Medication Sig Start Date End Date Taking?  Authorizing Provider   aspirin 81 MG EC tablet Take 1 tablet by mouth daily 2/15/20   Lorenza Khan MD   atorvastatin (LIPITOR) 80 MG tablet Take 1 tablet by mouth nightly 2/14/20   Lorenza Khan MD   lisinopril (PRINIVIL;ZESTRIL) 10 MG tablet Take 1 tablet by mouth 2 times daily 2/14/20   Lorenza Khan MD   QUEtiapine (SEROQUEL) 25 MG tablet Take 1 tablet by mouth nightly 2/14/20   Lorenza Khan MD   carvedilol (COREG) 12.5 MG tablet Take 1 tablet by mouth 2 times daily (with meals) 2/14/20   Lorenza Khna MD cannot be  safely provided at a lower level of care such as a skilled nursing facility. I have compared the patients medical and functional status at the time of the  preadmission screening and the same on this date, and there are no significant changes. By signing this document, I acknowledge that I have personally performed a  full physical examination on this patient within 24 hours of admission to  this inpatient rehabilitation facility and have determined the patient to be  able to tolerate the above course of treatment at an intensive level for a  reasonable period of time. I will be completing a detailed individualized  Plan of Care for this patient by day four of the patients stay based upon the  Preadmission Screen, this Post-Admission Evaluation, and the therapy  evaluations. Assessment and Plan:      1. Acute lacunar infarct within the left posterior limb of internal capsule- Likely secondary to uncontrolled hypertension, aspirin,lipitor, plavix. 2. Hypertensive crisis- chlorthalidone,Coreg, lisinopril     3. Diabetes type 2-  diet-controlled, metformin. 4. Noncompliance with medications    Bowels: Schedule Miralax + Senna S. Follow bowel movements. Enema or suppository if needed. Bladder: Check PVR x 3. [facility] if PVR > 200ml or if any volume is > 500 ml. Pain: tylenol is ordered prn.        Tate Mejia MD, 2/15/2020, 8:49 AM

## 2020-02-15 NOTE — PLAN OF CARE
Problem: Falls - Risk of:  Goal: Will remain free from falls  Description  Will remain free from falls  2/15/2020 0927 by Gweneth Heimlich, RN  Outcome: Ongoing  Note:   Fall risk band on patient. Orange light on outside of room. Non skid footwear in place. Alarms used appropriately. Patient instructed to call and wait for staff before getting up. Rounding done to anticipate needs. Appropriate safety devices used for transfers. 2/15/2020 0127 by Heidi Mejia RN  Outcome: Ongoing  2/15/2020 0112 by Heidi Mejia RN  Outcome: Ongoing  Goal: Absence of physical injury  Description  Absence of physical injury  2/15/2020 0127 by Heidi Mejia RN  Outcome: Ongoing  2/15/2020 0112 by Heidi Mejia RN  Outcome: Ongoing     Problem: Skin Integrity:  Goal: Will show no infection signs and symptoms  Description  Will show no infection signs and symptoms  2/15/2020 0927 by Gweneth Heimlich, RN  Outcome: Ongoing  Note:   Skin assessment completed on admission and every shift. Barrier wipes used in the event of incontinence. Pressure relief techniques used as needed while in chair and in bed. Position changes encouraged at least every two hours while in bed.    2/15/2020 0127 by Heidi Mejia RN  Outcome: Ongoing  2/15/2020 0112 by Heidi Mejia RN  Outcome: Ongoing  Goal: Absence of new skin breakdown  Description  Absence of new skin breakdown  2/15/2020 0127 by Heidi Mejia RN  Outcome: Ongoing  2/15/2020 0112 by Heidi Mejia RN  Outcome: Ongoing     Problem: Pain:  Goal: Pain level will decrease  Description  Pain level will decrease  2/15/2020 0927 by Gweneth Heimlich, RN  Outcome: Ongoing  Note:   Patient able to express pain and rate pain using pain scale. Medicate as needed per orders. Reassess patient pain level within one hour after oral pain medication/intervention to assure patient has reduced pain sensation and document outcome. Non pharmaceutical interventions as appropriate.    2/15/2020 0127 by Anu Stanley Nighat Smiley RN  Outcome: Ongoing  2/15/2020 0112 by Tuan Beckham RN  Outcome: Ongoing     Problem: IP BOWEL ELIMINATION  Goal: LTG - patient will utilize adaptive techniques/equipment to complete bowel elimination  2/15/2020 0127 by Tuan Beckham RN  Outcome: Ongoing  2/15/2020 0112 by Tuan Beckham RN  Outcome: Ongoing  Goal: LTG - patient will have regular and routine bowel evacuation  2/15/2020 0127 by Tuan Beckham RN  Outcome: Ongoing  2/15/2020 0112 by Tuan Beckham RN  Outcome: Ongoing     Problem: IP BLADDER/VOIDING  Goal: LTG - patient will complete bladder elimination  2/15/2020 0127 by Tuan Beckham RN  Outcome: Ongoing  2/15/2020 0112 by Tuan Beckham RN  Outcome: Ongoing  Goal: LTG - Patient will utilize adaptive techniques/equipment to complete bladder elimination  2/15/2020 0127 by Tuan Beckham RN  Outcome: Ongoing  2/15/2020 0112 by Tuan Beckham RN  Outcome: Ongoing  Goal: LTG - patient will achieve acceptable level of continence  2/15/2020 0127 by Tuan Beckham RN  Outcome: Ongoing

## 2020-02-15 NOTE — PROGRESS NOTES
Call in to Clinical , order being placed for admit to PCU, just need discharge order. Call in to Dr. Dominik Cam, results from ECG reported, Right Bundle Block Branch, hospitalist will see pt upstairs after admit, verbal order given to place discharge order. Order placed.  Electronically signed by Caleb Smith RN on 2/15/2020 at 12:26 PM

## 2020-02-15 NOTE — PLAN OF CARE
Problem: Falls - Risk of:  Goal: Will remain free from falls  Description  Will remain free from falls  2/15/2020 0127 by Carlotta Subramanian RN  Outcome: Ongoing  2/15/2020 0112 by Carlotta Subramanian RN  Outcome: Ongoing  Goal: Absence of physical injury  Description  Absence of physical injury  2/15/2020 0127 by Carlotta Subramanian RN  Outcome: Ongoing  2/15/2020 0112 by Carlotta Subramanian RN  Outcome: Ongoing     Problem: Skin Integrity:  Goal: Will show no infection signs and symptoms  Description  Will show no infection signs and symptoms  2/15/2020 0127 by Carlotta Subramanian RN  Outcome: Ongoing  2/15/2020 0112 by Carlotta Subramanian RN  Outcome: Ongoing  Goal: Absence of new skin breakdown  Description  Absence of new skin breakdown  2/15/2020 0127 by Carlotta Subramanian RN  Outcome: Ongoing  2/15/2020 0112 by Carlotta Subramanian RN  Outcome: Ongoing     Problem: Pain:  Goal: Pain level will decrease  Description  Pain level will decrease  2/15/2020 0127 by Carlotta Subramanian RN  Outcome: Ongoing  2/15/2020 0112 by Carlotta Subramanian RN  Outcome: Ongoing     Problem: IP BOWEL ELIMINATION  Goal: LTG - patient will utilize adaptive techniques/equipment to complete bowel elimination  2/15/2020 0127 by Carlotta Subramanian RN  Outcome: Ongoing  2/15/2020 0112 by Carlotta Subramanian RN  Outcome: Ongoing  Goal: LTG - patient will have regular and routine bowel evacuation  2/15/2020 0127 by Carlotta Subramanian RN  Outcome: Ongoing  2/15/2020 0112 by Carlotta Subramanian RN  Outcome: Ongoing     Problem: IP BLADDER/VOIDING  Goal: LTG - patient will complete bladder elimination  2/15/2020 0127 by Carlotta Subramanian RN  Outcome: Ongoing  2/15/2020 0112 by Carlotta Subramanian RN  Outcome: Ongoing  Goal: LTG - Patient will utilize adaptive techniques/equipment to complete bladder elimination  2/15/2020 0127 by Carlotta Subramanian RN  Outcome: Ongoing  2/15/2020 0112 by Carlotta Subramanian RN  Outcome: Ongoing  Goal: LTG - patient will achieve acceptable level of continence  Outcome: Ongoing

## 2020-02-15 NOTE — PROGRESS NOTES
Patient admitted to rehab with CVA. A/A/O x  4. Up sitting in chair. Transfers with  Walker x 1. On low sodium diet, tolerating well. Medications taken whole with thin. On lovenox, plavix for DVT prophylaxis. Skin  Warm and dry. On room air. Has been continent of bowel and bladder. LBM 2/15. Chair/bed alarms in use and call light in reach. Will monitor for safety.

## 2020-02-15 NOTE — PROGRESS NOTES
Call placed to emergency contact Castro Zheng at [phone] to update him on change in condition.   Left [facility] to return call to [facility].

## 2020-02-15 NOTE — PROGRESS NOTES
4 Eyes Skin Assessment     The patient is being assess for  Admission    I agree that 2 RN's have performed a thorough Head to Toe Skin Assessment on the patient. ALL assessment sites listed below have been assessed. Areas assessed by both nurses: Juliano Miller RN  [x]   Head, Face, and Ears   [x]   Shoulders, Back, and Chest  [x]   Arms, Elbows, and Hands   [x]   Coccyx, Sacrum, and IschIum  [x]   Legs, Feet, and Heels        Does the Patient have Skin Breakdown?   No         Gal Prevention initiated:  Yes   Wound Care Orders initiated:  No      [facility] nurse consulted for Pressure Injury (Stage 3,4, Unstageable, DTI, NWPT, and Complex wounds), New and Established Ostomies:  No      Nurse 1 eSignature: Electronically signed by Ana Ellis RN on 2/15/20 at 12:39 AM    **SHARE this note so that the co-signing nurse is able to place an eSignature**    Nurse 2 eSignature: Electronically signed by Teo Bonner RN on 2/15/2020 at 3:07 AM

## 2020-02-15 NOTE — PLAN OF CARE
[address] [id number]  [phone]    Vinny Taylor    : 1940  [facility]  MRN: [id number]   PHYSICIAN:  Nicola Anderson MD    Rehabilitation Diagnosis:    1. Acute lacunar infarct within the left posterior limb of internal capsule- Likely secondary to uncontrolled hypertension, aspirin,lipitor, plavix.       2. Hypertensive crisis- chlorthalidone,Coreg, lisinopril      3. Diabetes type 2-  diet-controlled, metformin.     4. Noncompliance with medications     Bowels: Schedule Miralax + Senna S. Follow bowel movements. Enema or suppository if needed.      Bladder: Check PVR x 3. [address] if PVR > 200ml or if any volume is > 500 ml.      Pain: tylenol is ordered prn. ADMIT DATE:2020    Patient Goals: \"I want to be able to get my groceries, take care of my cat, ride my bike\"    Admitting Impairments: Decreased functional mobility ; Decreased ADL status; Decreased strength;Decreased sensation;Decreased safe awareness;Decreased endurance;Decreased high-level IADLs;Decreased balance;Decreased cognition;Decreased vision/visual deficit; Decreased fine motor control    Barriers: decreased insight, decreased coordination, decreased balance, medical co-morbidities     Participation: patient lives with son, is independent and active, enjoys biking in neighborhood; rides rollercoasters; music (plays piano/organ/ strings), cooking        CARE PLAN     NURSING:  Vinny Taylor while on this unit will:     [] Be continent of bowel and bladder     [] Have an adequate number of bowel movements  [] Urinate with no urinary retention >300ml in bladder  [] Complete bladder protocol with anderson removal  [] Maintain O2 SATs at ___%  [] Have pain managed while on ARU       [] Be pain free by discharge   [] Have no skin breakdown while on ARU  [] Have improved skin integrity via wound measurements  [] Have no signs/symptoms of days            SPEECH THERAPY: Goals will be left blank if speech is not following this patient. Goals: These goals were reviewed with this patient at the time of assessment and Ap Murrieta is in agreement    Plan of Care: Pt to be seen ***   mins per day for *** day/week *** weeks. CASE MANAGEMENT:  Goals:   Assist patient/family with discharge planning, patient/family counseling,   and coordination with insurance during ARU stay. Admission Period/Goal QIM CODES  QIM Admit/Goal Score   Eating 4/6   Oral Hygiene 3/6   [address] [place] 3/6   Shower/Bathe Self 4/6   Upper Body Dressing 5/6   Lower Body Dressing 4/6   Putting on/Taking off Footwear 4/6   Roll Left and Right  4/6   Sit to Lying  4/6   Lying to Sitting on Side of Bed  4/6    Sit to Stand  3/6   Chair/Bed to Chair Transfer 4/6   Toilet Transfer  4/6   Car Transfer 3/6   Walk 10 feet  3/6   Walk 50 feet with 2 Turns  3/6   Walk 150 feet with 2 turns  88/6   Walk 10 feet on Uneven Surfaces 88/6   1 Step 3/6   4 Steps  3/6   12 Steps  88/6   Picking up Object  88/4   Wheel 50 feet with 2 Turns   Type?   na   Wheel 150 feet with 2 Turns   Type? na            Ap Murrieta will be seen a minimum of 3 hours of therapy per day, a minimum of 5 out of 7 days per week. [] In this rare instance due to the nature of this patient's medical involvement, this patient will be seen 15 hours per week (900 minutes within a 7 day period). Treatments may include therapeutic exercises, gait training, neuromuscular re-ed, transfer training, community reintegration, bed mobility, w/c mobility and training, self care, home mgmt, cognitive training, energy conservation,dysphagia tx, speech/language/communication therapy, group therapy, and patient/family education.  In addition, dietician/nutritionist may monitor calorie count as well as intake and collaboratively work with SLP on dietary upgrades. Neuropsychology/Psychology may evaluate and provide necessary support.     Medical issues being managed closely and that require 24 hour availability of a physician:   [] Swallowing Precautions  [] Bowel/Bladder Fx  [] Weight bearing precautions   [] Wound Care    [] Pain Mgmt   [] Infection Protection   [] DVT Prophylaxis   [] Fall Precautions  [] Fluid/Electrolyte/Nutrition Balance   [] Voice Protection   [] Respiratory  [] Other:    Medical Prognosis: [] Good  [] Fair    [] Guarded   Total expected IRF days 7-10  Anticipated discharge destination:    [x] Home Independently     [] Home with supervision    []SNF     [] Other                                           Physician anticipated functional outcomes:  ***   IPOC brief synthesis: ***        I have reviewed this initial plan of care and agree with its contents:    Title   Name    Date    Time    Physician:     Case Mgmt:    OT: Ericka Lim OTR/L  #[id number] 2/15/20 9:52 AM      PT:    RN:     ST:    :  [facility]  2/17/2020  5612    Note: patient with rapid response, transfer to progressive care unit with new bilateral CVAs  on 2/15/2020
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
## 2020-02-15 NOTE — H&P
Hospital Medicine History & Physical      PCP: No primary care provider on file. Date of Admission: 2/15/2020    Date of Service: Pt seen/examined on 2/15/2020 and Admitted to Inpatient     Chief Complaint: \"rapid response for syncope\"    History Of Present Illness: The patient is a 70 y.o. male who was admitted at [address] [place] [address],[address] for stroke and d/kwasi to IP rehab yesterday 2/14. This am, he was working with therapy and was going to have a BM when he became minimally responsive and hence rapid response called. During arrival, pt was placed back in bed, was minimally responsive, O2 sats stable but BP was 80/40 and pt was having active diarrhea in bed. Peripheral pulses weak, peripheries cold and clammy. Pt transferred to PCU and admitted. Past Medical History:        Diagnosis Date    Active rheumatic fever     Diabetes mellitus type 2, uncontrolled ([facility]     Essential hypertension     Noncompliance with medications        Past Surgical History:        Procedure Laterality Date    APPENDECTOMY         Medications Prior to Admission:    Prior to Admission medications    Medication Sig Start Date End Date Taking?  Authorizing Provider   aspirin 81 MG EC tablet Take 1 tablet by mouth daily 2/15/20   Brian Huynh MD   atorvastatin (LIPITOR) 80 MG tablet Take 1 tablet by mouth nightly 2/14/20   Brian Huynh MD   lisinopril (PRINIVIL;ZESTRIL) 10 MG tablet Take 1 tablet by mouth 2 times daily 2/14/20   Brian Huynh MD   QUEtiapine (SEROQUEL) 25 MG tablet Take 1 tablet by mouth nightly 2/14/20   Brian Huynh MD   carvedilol (COREG) 12.5 MG tablet Take 1 tablet by mouth 2 times daily (with meals) 2/14/20   Brian Huynh MD   chlorthalidone (HYGROTEN) 50 MG tablet Take 1 tablet by mouth daily 2/15/20   Brian Huynh MD   potassium chloride (KLOR-CON M) 20 MEQ TBCR extended release tablet Take 1 tablet by mouth daily 2/15/20   Femi Denis MD   metFORMIN (GLUCOPHAGE) 500 MG tablet Take 1 tablet by mouth daily (with breakfast) 2/14/20   Femi Denis MD       Allergies:  Patient has no known allergies. Social History:  The patient currently lives at home    TOBACCO:   reports that he has never smoked. He has never used smokeless tobacco.  ETOH:   reports no history of alcohol use. Family History:  Reviewed in detail and negative for DM, Early CAD, Cancer, CVA. Positive as follows:        Problem Relation Age of Onset    Dementia Mother     Dementia Father        REVIEW OF SYSTEMS:   Unable to be obtained 2/2 AMS    PHYSICAL EXAM:    There were no vitals taken for this visit. General appearance: Lethargic  HEENT Normal cephalic, atraumatic without obvious deformity. Pupils equal, round, and reactive to light. Extra ocular muscles intact. Conjunctivae/corneas clear. Neck: Supple, No jugular venous distention/bruits. Trachea midline without thyromegaly or adenopathy with full range of motion. Lungs: diminished b/l  Heart: Regular rate and rhythm with Normal S1/S2   Abdomen: Soft, non-distended without rigidity or guarding and positive bowel sounds   Extremities: No clubbing, cyanosis, or edema bilaterally. Skin: Skin color, texture, turgor normal.  No rashes or lesions.   Neurologic: lethargic, could not be assessed  Mental status: lethargic  Capillary Refill: Acceptable  < 3 seconds  Peripheral Pulses: +2 Easily felt, not easily obliterated with pressure      CBC   Recent Labs     02/13/20  0516 02/15/20  0639   WBC 7.6 7.8   HGB 14.5 14.6   HCT 43.3 43.4    229      RENAL  Recent Labs     02/13/20  0516 02/14/20  0511 02/15/20  0639    140 136   K 3.5 3.9 4.1    107 99   CO2 21 22 22   PHOS 3.3 3.9  --    BUN 17 20 26*   CREATININE 1.0 1.2 1.3     LFT'S  No results for input(s): AST, ALT, ALB, BILIDIR, BILITOT,

## 2020-02-16 PROBLEM — R00.1 BRADYCARDIA: Status: ACTIVE | Noted: 2020-02-16

## 2020-02-16 PROBLEM — R55 HYPOTENSIVE SYNCOPE: Status: ACTIVE | Noted: 2020-02-16

## 2020-02-16 LAB
ANION GAP SERPL CALCULATED.3IONS-SCNC: 15 MMOL/L (ref 3–16)
BASOPHILS ABSOLUTE: 0 K/UL (ref 0–0.2)
BASOPHILS RELATIVE PERCENT: 0.3 %
BUN BLDV-MCNC: 24 MG/DL (ref 7–20)
C DIFF TOXIN/ANTIGEN: NORMAL
CALCIUM SERPL-MCNC: 8.9 MG/DL (ref 8.3–10.6)
CHLORIDE BLD-SCNC: 103 MMOL/L (ref 99–110)
CO2: 21 MMOL/L (ref 21–32)
CREAT SERPL-MCNC: 1.2 MG/DL (ref 0.8–1.3)
EKG ATRIAL RATE: 55 BPM
EKG DIAGNOSIS: NORMAL
EKG P AXIS: 50 DEGREES
EKG P-R INTERVAL: 152 MS
EKG Q-T INTERVAL: 452 MS
EKG QRS DURATION: 126 MS
EKG QTC CALCULATION (BAZETT): 432 MS
EKG R AXIS: 88 DEGREES
EKG T AXIS: 61 DEGREES
EKG VENTRICULAR RATE: 55 BPM
EOSINOPHILS ABSOLUTE: 0.1 K/UL (ref 0–0.6)
EOSINOPHILS RELATIVE PERCENT: 0.9 %
GFR AFRICAN AMERICAN: >60
GFR NON-AFRICAN AMERICAN: 59
GLUCOSE BLD-MCNC: 112 MG/DL (ref 70–99)
GLUCOSE BLD-MCNC: 117 MG/DL (ref 70–99)
GLUCOSE BLD-MCNC: 147 MG/DL (ref 70–99)
GLUCOSE BLD-MCNC: 152 MG/DL (ref 70–99)
GLUCOSE BLD-MCNC: 197 MG/DL (ref 70–99)
HCT VFR BLD CALC: 40.8 % (ref 40.5–52.5)
HEMOGLOBIN: 14 G/DL (ref 13.5–17.5)
LACTIC ACID: 1 MMOL/L (ref 0.4–2)
LYMPHOCYTES ABSOLUTE: 1.5 K/UL (ref 1–5.1)
LYMPHOCYTES RELATIVE PERCENT: 14.2 %
MAGNESIUM: 1.7 MG/DL (ref 1.8–2.4)
MCH RBC QN AUTO: 30.7 PG (ref 26–34)
MCHC RBC AUTO-ENTMCNC: 34.3 G/DL (ref 31–36)
MCV RBC AUTO: 89.5 FL (ref 80–100)
MONOCYTES ABSOLUTE: 1.1 K/UL (ref 0–1.3)
MONOCYTES RELATIVE PERCENT: 10.3 %
NEUTROPHILS ABSOLUTE: 7.6 K/UL (ref 1.7–7.7)
NEUTROPHILS RELATIVE PERCENT: 74.3 %
PDW BLD-RTO: 14 % (ref 12.4–15.4)
PERFORMED ON: ABNORMAL
PLATELET # BLD: 231 K/UL (ref 135–450)
PMV BLD AUTO: 7.2 FL (ref 5–10.5)
POTASSIUM REFLEX MAGNESIUM: 4.1 MMOL/L (ref 3.5–5.1)
RBC # BLD: 4.56 M/UL (ref 4.2–5.9)
SODIUM BLD-SCNC: 139 MMOL/L (ref 136–145)
WBC # BLD: 10.3 K/UL (ref 4–11)
WHITE BLOOD CELLS (WBC), STOOL: ABNORMAL

## 2020-02-16 PROCEDURE — 2580000003 HC RX 258: Performed by: INTERNAL MEDICINE

## 2020-02-16 PROCEDURE — 87505 NFCT AGENT DETECTION GI: CPT

## 2020-02-16 PROCEDURE — 87449 NOS EACH ORGANISM AG IA: CPT

## 2020-02-16 PROCEDURE — 2060000000 HC ICU INTERMEDIATE R&B

## 2020-02-16 PROCEDURE — 94760 N-INVAS EAR/PLS OXIMETRY 1: CPT

## 2020-02-16 PROCEDURE — 83735 ASSAY OF MAGNESIUM: CPT

## 2020-02-16 PROCEDURE — 83605 ASSAY OF LACTIC ACID: CPT

## 2020-02-16 PROCEDURE — 93010 ELECTROCARDIOGRAM REPORT: CPT | Performed by: INTERNAL MEDICINE

## 2020-02-16 PROCEDURE — 87324 CLOSTRIDIUM AG IA: CPT

## 2020-02-16 PROCEDURE — 83630 LACTOFERRIN FECAL (QUAL): CPT

## 2020-02-16 PROCEDURE — 6360000002 HC RX W HCPCS: Performed by: INTERNAL MEDICINE

## 2020-02-16 PROCEDURE — 2500000003 HC RX 250 WO HCPCS: Performed by: NURSE PRACTITIONER

## 2020-02-16 PROCEDURE — 80048 BASIC METABOLIC PNL TOTAL CA: CPT

## 2020-02-16 PROCEDURE — 36415 COLL VENOUS BLD VENIPUNCTURE: CPT

## 2020-02-16 PROCEDURE — 99222 1ST HOSP IP/OBS MODERATE 55: CPT | Performed by: INTERNAL MEDICINE

## 2020-02-16 PROCEDURE — 97530 THERAPEUTIC ACTIVITIES: CPT

## 2020-02-16 PROCEDURE — 97164 PT RE-EVAL EST PLAN CARE: CPT

## 2020-02-16 PROCEDURE — 85025 COMPLETE CBC W/AUTO DIFF WBC: CPT

## 2020-02-16 RX ORDER — ATORVASTATIN CALCIUM 80 MG/1
80 TABLET, FILM COATED ORAL NIGHTLY
Status: DISCONTINUED | OUTPATIENT
Start: 2020-02-16 | End: 2020-02-21 | Stop reason: HOSPADM

## 2020-02-16 RX ORDER — LABETALOL HYDROCHLORIDE 5 MG/ML
10 INJECTION, SOLUTION INTRAVENOUS EVERY 10 MIN PRN
Status: DISCONTINUED | OUTPATIENT
Start: 2020-02-16 | End: 2020-02-21 | Stop reason: HOSPADM

## 2020-02-16 RX ORDER — NICOTINE POLACRILEX 4 MG
15 LOZENGE BUCCAL PRN
Status: DISCONTINUED | OUTPATIENT
Start: 2020-02-16 | End: 2020-02-21 | Stop reason: HOSPADM

## 2020-02-16 RX ORDER — DEXTROSE MONOHYDRATE 50 MG/ML
100 INJECTION, SOLUTION INTRAVENOUS PRN
Status: DISCONTINUED | OUTPATIENT
Start: 2020-02-16 | End: 2020-02-21 | Stop reason: HOSPADM

## 2020-02-16 RX ORDER — ASPIRIN 81 MG/1
81 TABLET, CHEWABLE ORAL DAILY
Status: DISCONTINUED | OUTPATIENT
Start: 2020-02-17 | End: 2020-02-21 | Stop reason: HOSPADM

## 2020-02-16 RX ORDER — CLOPIDOGREL BISULFATE 75 MG/1
75 TABLET ORAL DAILY
Status: DISCONTINUED | OUTPATIENT
Start: 2020-02-17 | End: 2020-02-21 | Stop reason: HOSPADM

## 2020-02-16 RX ORDER — DEXTROSE MONOHYDRATE 25 G/50ML
12.5 INJECTION, SOLUTION INTRAVENOUS PRN
Status: DISCONTINUED | OUTPATIENT
Start: 2020-02-16 | End: 2020-02-21 | Stop reason: HOSPADM

## 2020-02-16 RX ORDER — ONDANSETRON 2 MG/ML
4 INJECTION INTRAMUSCULAR; INTRAVENOUS EVERY 6 HOURS PRN
Status: DISCONTINUED | OUTPATIENT
Start: 2020-02-16 | End: 2020-02-16

## 2020-02-16 RX ADMIN — ENOXAPARIN SODIUM 40 MG: 40 INJECTION SUBCUTANEOUS at 09:55

## 2020-02-16 RX ADMIN — SODIUM CHLORIDE: 9 INJECTION, SOLUTION INTRAVENOUS at 06:32

## 2020-02-16 RX ADMIN — LABETALOL HYDROCHLORIDE 10 MG: 5 INJECTION INTRAVENOUS at 19:55

## 2020-02-16 ASSESSMENT — PAIN SCALES - GENERAL
PAINLEVEL_OUTOF10: 0
PAINLEVEL_OUTOF10: 0

## 2020-02-16 NOTE — PROGRESS NOTES
4 Eyes Skin Assessment     The patient is being assess for  Admission    I agree that 2 RN's have performed a thorough Head to Toe Skin Assessment on the patient. ALL assessment sites listed below have been assessed. Areas assessed by both nurses:   [x]   Head, Face, and Ears   [x]   Shoulders, Back, and Chest  [x]   Arms, Elbows, and Hands   [x]   Coccyx, Sacrum, and IschIum  [x]   Legs, Feet, and Heels        Does the Patient have Skin Breakdown?   No         Gal Prevention initiated:  No   Wound Care Orders initiated:  No      [facility] nurse consulted for Pressure Injury (Stage 3,4, Unstageable, DTI, NWPT, and Complex wounds), New and Established Ostomies:  No      Nurse 1 eSignature: Electronically signed by Axel Zuniga RN on 2/15/20 at 7:06 PM    **SHARE this note so that the co-signing nurse is able to place an eSignature**    Nurse 2 eSignature: {Esignature:[id number]}

## 2020-02-16 NOTE — CONSULTS
Vertigo  · Cardiovascular:  loss of consciousness  · Respiratory: No cough or wheezing    · Gastrointestinal: No abdominal pain, appetite loss, blood in stools, constipation, diarrhea or heartburn  · Genitourinary: No dysuria, trouble voiding, or hematuria  · Musculoskeletal:  No gait disturbance, weakness or joint complaints  · Integumentary: No rash or pruritis  · Neurological:  TIA and stroke symptoms  · Psychiatric: No anxiety or depression  · Endocrine: No malaise, fatigue or temperature intolerance  · Hematologic/Lymphatic: No bleeding problems, blood clots or swollen lymph nodes  · Allergic/Immunologic: No nasal congestion or hives      Objective Data:     BP (!) 144/74   Pulse 84   Temp 98.4 °F (36.9 °C) (Oral)   Resp 18   Ht 4' 8\" (1.422 m)   Wt 114 lb 10.2 oz (52 kg)   SpO2 98%   BMI 25.70 kg/m²     General appearance: alert, appears stated age and cooperative  Alert, awake, oriented x 3  Eyes:  No erythema  Head: atraumatic  Neck:  no JVD  Lungs: clear to auscultation bilaterally  Heart: regular rate and rhythm, S1, S2 normal, no murmur, click, rub or gallop  Abdomen: soft, non-tender; bowel sounds normal; no masses,  no organomegaly  Extremities: extremities normal, atraumatic, no cyanosis or edema  Skin: Skin color, texture, turgor normal. No rashes or lesions  Hematologic: no remarkable bruising       ECG: sinus bradycardia, rate=55     Data Review    Echo:  Summary   Normal LV size,wall thickness and motion. Estimated ejection fraction is   60%. The left atrium is normal in size. Normal right ventricular size and function. Mild mitral and tricuspid regurgitation.       Signature      ------------------------------------------------------------------   Electronically signed by Tova Salazar MD (Interpreting   physician) on 02/13/2020 at 11:58 AM   ------------------------------------------------------------------         Recent Labs     02/14/20  3508 02/15/20  9294 02/15/20  0500 02/16/20  0619    136 140 139   K 3.9 4.1 3.9 4.1    99 105 103   CO2 22 22 21 21   PHOS 3.9  --   --   --    BUN 20 26* 26* 24*   CREATININE 1.2 1.3 1.3 1.2     Recent Labs     02/15/20  0639 02/15/20  1522 02/16/20  0619   WBC 7.8 10.3 10.3   HGB 14.6 14.3 14.0   HCT 43.4 42.3 40.8   MCV 89.3 90.0 89.5    210 231     Lab Results   Component Value Date    TROPONINI <0.01 02/15/2020         Assessment:     Active Problems:    Altered mental state    Bradycardia    Hypotensive syncope  Resolved Problems:    * No resolved hospital problems. *      Plan:     1. The vital signs documented indicate loc from hypotension. Not clear if this was from new bp meds, prolonged standing in rehab post cva, or new cva. Agree with adjusting meds to avoid hypotension. 2. Neurology requests RENEE/ monitoring because of new cva. Will hold npo for tomorrow incase possible.

## 2020-02-16 NOTE — PROGRESS NOTES
Patient has had several watery BMs this shift - very minimal in amount. Unable to collect stool sample due to such little amount. Will continue to attempt to collect sample.     Electronically signed by Juana Roman RN on 2/16/2020 at 3:42 AM
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
## 2020-02-16 NOTE — PROGRESS NOTES
Neurology consult has been called @ 8:33 am 2/16/2020   RN is aware.  Ordered by :Flores Fuentes MD \"acute recurrent CVA\"  Mike Barajas

## 2020-02-16 NOTE — PLAN OF CARE
Problem: Falls - Risk of:  Goal: Will remain free from falls  Description  Will remain free from falls  Outcome: Ongoing  Fall risk precautions in place. Bed in lowest position with wheels locked,bed alarm in place and activated, non-skid socks on pt, fall risk ID on pt, call light in reach, pt encouraged to call before getting out of bed and for any other needs or complaints. Problem: Confusion - Acute:  Goal: Absence of continued neurological deterioration signs and symptoms  Description  Absence of continued neurological deterioration signs and symptoms  Outcome: Ongoing     Problem: ACTIVITY INTOLERANCE/IMPAIRED MOBILITY  Goal: Mobility/activity is maintained at optimum level for patient  Outcome: Ongoing  Assess pts mobility status and compare to normal baseline. Determine if pt uses crutches/cane/walker and make sure they are within reach. Provide bedside commode to conserve pt energy. Encourage adeqaute rest periods, especially before meals, other activities of daily living, exercise sessions, and ambulation. Keep items pt uses frequently within reach to avoid contact straining. Encourage verbalization of feeling regarding limitations regarding activity. Let the pt perform small activities for themselves. Progress the pts activity gradually to eventually get back to their baseline. Assess the need for PT/OT.

## 2020-02-16 NOTE — CONSULTS
nightly  lisinopril (PRINIVIL;ZESTRIL) 10 MG tablet, Take 1 tablet by mouth 2 times daily  QUEtiapine (SEROQUEL) 25 MG tablet, Take 1 tablet by mouth nightly  carvedilol (COREG) 12.5 MG tablet, Take 1 tablet by mouth 2 times daily (with meals)  chlorthalidone (HYGROTEN) 50 MG tablet, Take 1 tablet by mouth daily  potassium chloride (KLOR-CON M) 20 MEQ TBCR extended release tablet, Take 1 tablet by mouth daily  metFORMIN (GLUCOPHAGE) 500 MG tablet, Take 1 tablet by mouth daily (with breakfast)    No Known Allergies    Family History   Problem Relation Age of Onset    Dementia Mother     Dementia Father      Social History     Tobacco Use   Smoking Status Never Smoker   Smokeless Tobacco Never Used     Social History     Substance and Sexual Activity   Drug Use Never     Social History     Substance and Sexual Activity   Alcohol Use Never    Frequency: Never     ROS:  Constitutional- No weight loss or fevers  Eyes- No diplopia. No photophobia. Ears/nose/throat- No dysphagia. No Dysarthria  Cardiovascular- No palpitations. No chest pain  Respiratory- No dyspnea. No Cough  Gastrointestinal- No Abdominal pain. No Vomiting. Genitourinary- No incontinence. No urinary retention  Musculoskeletal- No myalgia. No arthralgia  Skin- No rash. No easy bruising. Psychiatric- No depression. No anxiety  Endocrine- No diabetes. No thyroid issues. Hematologic- No bleeding difficulty. No fatigue  Neurologic- No weakness. No Headache. Exam:  Blood pressure (!) 170/73, pulse 76, temperature 98.6 °F (37 °C), temperature source Oral, resp. rate 17, height 4' 8\" (1.422 m), weight 114 lb 10.2 oz (52 kg), SpO2 98 %. Constitutional    Vital signs: BP, HR, and RR reviewed   General alert, no distress, well-nourished  Eyes: unable to visualize the fundi  Cardiovascular: pulses symmetric in all 4 extremities. No peripheral edema. Psychiatric: cooperative with examination, no psychotic behavior noted.   Neurologic  Mental status: orientation to person, place, time. General fund of knowledge grossly intact   Memory grossly intact   Attention intact as able to attend well to the exam     Language fluent in conversation. No aphasia. Comprehension intact; follows simple commands  Cranial nerves:   CN2: visual fields full   CN 3,4,6: extraocular muscles intact. Pupils are equal, round, reactive bilaterally. CN5: facial sensation symmetric   CN7: face symmetric without dysarthria  CN8: hearing grossly intact  CN9: palate elevated symmetrically  CN11: trap full strength on shoulder shrug  CN12: tongue midline with protrusion  Strength: No pronator drift. Good strength in all 4 extremities. No gross focal paresis. Deep tendon reflexes: normal in all 4 extremities  Sensory: sensory changes in his hands bilaterally. Cerebellar/coordination: a bit of ataxia in his BUE. Tone: normal in all 4 extremities  Gait: deferred at this time for safety. Labs  HgA1c 6.9      Glucose 112  Na 139  K 4.1  BUN 24  Creatinine 1.2  Mg 1.70    WBC 10.3K  Hg 14.0  Platelets 678    Studies  MRI brain w/o 2/15/20, independently reviewed  Increase in size of the previously noted infarct in the left posterior limb of the internal capsule. Numerous additional acute infarcts. MRI brain w/o 2/12/20, independently reviewed  Acute infarct w/in the left posterior limb of the internal capsule. CTA head/neck 2/11/20, independently reviewed  Mild, less than 50%, stenosis of the ICAs. 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 TTE 2/13/20  EF 60%  Normal left atrium. Impression  1. Recent left internal capsule infarct, now w/ additional areas of bilateral acute brain infarcts. Wonder if he may have been hypoperfusing. Would be difficult to exclude central embolic etiology. 2.  Syncope, r/t hypotension and labile HR. 3.  Hyperlipidemia   4. DM    Recommendations  1. RENEE to r/o cardiac source.     2.

## 2020-02-17 LAB
GI BACTERIAL PATHOGENS BY PCR: NORMAL
GLUCOSE BLD-MCNC: 113 MG/DL (ref 70–99)
GLUCOSE BLD-MCNC: 115 MG/DL (ref 70–99)
GLUCOSE BLD-MCNC: 118 MG/DL (ref 70–99)
GLUCOSE BLD-MCNC: 127 MG/DL (ref 70–99)
GLUCOSE BLD-MCNC: 149 MG/DL (ref 70–99)
PERFORMED ON: ABNORMAL

## 2020-02-17 PROCEDURE — 2060000000 HC ICU INTERMEDIATE R&B

## 2020-02-17 PROCEDURE — 6370000000 HC RX 637 (ALT 250 FOR IP): Performed by: INTERNAL MEDICINE

## 2020-02-17 PROCEDURE — 6360000002 HC RX W HCPCS: Performed by: INTERNAL MEDICINE

## 2020-02-17 PROCEDURE — 97166 OT EVAL MOD COMPLEX 45 MIN: CPT

## 2020-02-17 PROCEDURE — 97535 SELF CARE MNGMENT TRAINING: CPT

## 2020-02-17 PROCEDURE — 2580000003 HC RX 258: Performed by: INTERNAL MEDICINE

## 2020-02-17 PROCEDURE — 94761 N-INVAS EAR/PLS OXIMETRY MLT: CPT

## 2020-02-17 RX ORDER — LISINOPRIL 20 MG/1
20 TABLET ORAL DAILY
Status: DISCONTINUED | OUTPATIENT
Start: 2020-02-17 | End: 2020-02-21 | Stop reason: HOSPADM

## 2020-02-17 RX ADMIN — INSULIN LISPRO 1 UNITS: 100 INJECTION, SOLUTION INTRAVENOUS; SUBCUTANEOUS at 16:56

## 2020-02-17 RX ADMIN — ATORVASTATIN CALCIUM 80 MG: 80 TABLET, FILM COATED ORAL at 21:36

## 2020-02-17 RX ADMIN — ENOXAPARIN SODIUM 40 MG: 40 INJECTION SUBCUTANEOUS at 10:45

## 2020-02-17 RX ADMIN — LISINOPRIL 20 MG: 20 TABLET ORAL at 16:56

## 2020-02-17 RX ADMIN — SODIUM CHLORIDE, PRESERVATIVE FREE 10 ML: 5 INJECTION INTRAVENOUS at 21:36

## 2020-02-17 RX ADMIN — SODIUM CHLORIDE, PRESERVATIVE FREE 10 ML: 5 INJECTION INTRAVENOUS at 09:33

## 2020-02-17 ASSESSMENT — PAIN SCALES - GENERAL
PAINLEVEL_OUTOF10: 0

## 2020-02-17 NOTE — CONSULTS
[facility]  Palliative Care   Consult Note    NAME:  [address] [place] [address] RECORD NUMBER:  [id number]  AGE: 70 y.o. GENDER: male  : 1940  TODAY'S DATE:  2020    Subjective     Reason for Consult:  goals of care and code status  Visit Type: Initial Consult      Rigo Lombardo is a 70 y.o. male referred by:  [x] Physician    PAST MEDICAL HISTORY      Diagnosis Date    Active rheumatic fever     Diabetes mellitus type 2, uncontrolled ([address]     Essential hypertension     Noncompliance with medications        PAST SURGICAL HISTORY  Past Surgical History:   Procedure Laterality Date    APPENDECTOMY         FAMILY HISTORY  Family History   Problem Relation Age of Onset    Dementia Mother     Dementia Father        SOCIAL HISTORY  Social History     Tobacco Use    Smoking status: Never Smoker    Smokeless tobacco: Never Used   Substance Use Topics    Alcohol use: Never     Frequency: Never    Drug use: Never       ALLERGIES  No Known Allergies    MEDICATIONS  No current facility-administered medications on file prior to encounter.       Current Outpatient Medications on File Prior to Encounter   Medication Sig Dispense Refill    aspirin 81 MG EC tablet Take 1 tablet by mouth daily 30 tablet 3    atorvastatin (LIPITOR) 80 MG tablet Take 1 tablet by mouth nightly 30 tablet 3    lisinopril (PRINIVIL;ZESTRIL) 10 MG tablet Take 1 tablet by mouth 2 times daily 30 tablet 3    QUEtiapine (SEROQUEL) 25 MG tablet Take 1 tablet by mouth nightly 60 tablet 3    carvedilol (COREG) 12.5 MG tablet Take 1 tablet by mouth 2 times daily (with meals) 60 tablet 3    chlorthalidone (HYGROTEN) 50 MG tablet Take 1 tablet by mouth daily 30 tablet 3    potassium chloride (KLOR-CON M) 20 MEQ TBCR extended release tablet Take 1 tablet by mouth daily 60 tablet 3    metFORMIN (GLUCOPHAGE) 500 MG tablet Take 1 tablet by mouth daily (with breakfast) 60 tablet 3       Objective         BP (!) 183/92
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
## 2020-02-17 NOTE — PROGRESS NOTES
deformity. Neuro: Moves all four extremities. CN 2-12 tested, no defect noted. Psych: Oriented x 3. No anxiety. Data    LABS  CBC:   Recent Labs     02/15/20  0639 02/15/20  1522 02/16/20  0619   WBC 7.8 10.3 10.3   HGB 14.6 14.3 14.0   HCT 43.4 42.3 40.8   MCV 89.3 90.0 89.5    210 231     BMP:   Recent Labs     02/15/20  0639 02/15/20  1522 02/16/20  0619    140 139   K 4.1 3.9 4.1   CL 99 105 103   CO2 22 21 21   BUN 26* 26* 24*   CREATININE 1.3 1.3 1.2   GLUCOSE 137* 140* 117*     POC GLUCOSE:    Recent Labs     02/16/20  1128 02/16/20  1608 02/16/20 2011 02/17/20  0003 02/17/20  0733   POCGLU 197* 152* 147* 115* 118*     LIVER PROFILE:   Recent Labs     02/15/20  1522   AST 26   ALT 20   LABALBU 3.8   BILITOT 0.5   ALKPHOS 61     PT/INR: No results for input(s): PROTIME, INR in the last 72 hours. APTT: No results for input(s): APTT in the last 72 hours. UA:No results for input(s): NITRITE, COLORU, PHUR, LABCAST, WBCUA, RBCUA, MUCUS, TRICHOMONAS, YEAST, BACTERIA, CLARITYU, SPECGRAV, LEUKOCYTESUR, UROBILINOGEN, BILIRUBINUR, BLOODU, GLUCOSEU, KETUA, AMORPHOUS in the last 72 hours. Microbiology:  Wound Culture: No results for input(s): WNDABS, ORG in the last 72 hours. Invalid input(s):  LABGRAM  Nasal Culture: No results for input(s): ORG, MRSAPCR in the last 72 hours. Blood Culture: No results for input(s): BC, BLOODCULT2 in the last 72 hours. Fungal Culture:   No results for input(s): FUNGSM in the last 72 hours. No results for input(s): FUNCXBLD in the last 72 hours. CSF Culture:  No results for input(s): COLORCSF, APPEARCSF, CFTUBE, CLOTCSF, WBCCSF, RBCCSF, NEUTCSF, NUMCELLSCSF, LYMPHSCSF, MONOCSF, GLUCCSF, VOLCSF in the last 72 hours. Respiratory Culture:  No results for input(s): Ceclia Champ in the last 72 hours. AFB:No results for input(s): AFBSMEAR in the last 72 hours. Urine Culture  No results for input(s): LABURIN in the last 72 hours.     RADIOLOGY:    MRI BRAIN agreed with plan. Discussed with consulting physicians, nursing and social work     The note was completed using EMR. Every effort was made to ensure accuracy; however, inadvertent computerized transcription errors may be present.        Eugene Cabello MD
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
## 2020-02-17 NOTE — PROGRESS NOTES
NAME:  Marla Mei  YOB: 1940  MEDICAL RECORD NUMBER:  [id number]  TODAYS DATE:  2/17/2020    Discussed personal risk factors for Stroke /TIA with patient/family, and ways to reduce the risk for a recurrent stroke. Patient's personal risk factors which were identified are:     [] Alcohol Abuse: check with your physician before any alcohol consumption. [] Atrial fibrillation: may cause blood clots. [] Drug Abuse: Seek help, talk with your doctor  [] Clotting Disorder  [] Diabetes  [] Family history of stroke or heart disease  [] High Blood Pressure/Hypertension: work with your physician.  [] High cholesterol: monitor cholesterol levels with your physician.   [] Overweight/Obesity: work with your physician for your ideal body weight.  [] Physical Inactivity: get regular exercise as directed by your physician. [] Personal history of previous TIA or stroke  [] Poor Diet; decrease salt (sodium) in your diet, follow diet directed by physician. [] Smoking: Cigarette/Cigar: stop smoking. Advised pt. that you can reduce your risk for stroke/TIA by modifying/controlling the risk factors that you have. Pt.advised to take the medications as prescribed, which will be detailed in the discharge instructions, and to not stop taking them without consulting their physician. In addition, pt. advised to maintain a healthy diet, exercise regularly and to not smoke. [facility]'s Stroke treatment and prevention, Managing your recovery  notebook  provided and/or reviewed  with patient/family. The notebook includes, but not limited to, sections addressing warning signs & symptoms of a stroke, which are: sudden numbness or weakness especially on one side of the body, sudden confusion, difficulty speaking or understanding, sudden changes in vision, sudden dizziness or loss of balance/ coordination, or sudden severe headache.   The need to call EMS (911) immediately if signs & symptoms occur is

## 2020-02-17 NOTE — CARE COORDINATION
INITIAL CASE MANAGEMENT ASSESSMENT    Reviewed chart, met with patient to assess possible discharge needs. Explained Case Management role/services. SW met with patient alone at bedside. Living Situation: Patient reports that he resides in a senior living [address] with his son. He has a 23lb cat who he cares for throughout the week. He stated that he has no trouble getting into or out of the property at this time. ADLs: Patient reports that prior to medical admission he was independent. He stated that he does not anticipate any needs going home. DME: Patient reports that prior to medical admission he used no durable medical equipment. He stated that he does not anticipate any needs going home. PT/OT Recs: Patient has a continued need for rehabilitation. He will transition back to the [facility] rehab unit. He is in agreement with it at this time. Active Services: Patient reports that prior to medical admission he used no active services. He stated that he does not anticipate any needs going home. Transportation: Prior to medical admission patient stated that he drove himself to and from medical appointments and errands. He stated that his son will likely transport him home at discharge. Medications: Patient reports that he receives medications from [address] [place] [address] for long term but he can't remember where he received medications from locally if he needed them. He stated that he thinks he's likely to use [employer]. PCP: No primary care provider on file. Patient stated that he would like our assistance with finding a provider and obtaining a primary care appointment prior to discharge. PLAN/COMMENTS:  1) Return to [address]  2) Assist patient with obtaining a PCP appointment prior to discharge. Now is not an appropriate time to set up an appointment because we aren't for sure how long his rehabilitation will be.      GARRETT provided contact information for

## 2020-02-17 NOTE — PROGRESS NOTES
2/12/20, independently reviewed  Acute infarct w/in the left posterior limb of the internal capsule. CTA head/neck 2/11/20  Mild, less than 50%, stenosis of the ICAs. 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      TTE 2/13/20  EF 60%  Normal left atrium. Impression  1. Recent left internal capsule infarct, now w/ additional areas of bilateral acute brain infarcts. May be secondary to hypoperfusion. Would be difficult to exclude central embolic etiology. 2.  Syncopal episode  3. Hyperlipidemia   4. DM    Recommendations  1.  RENEE. 2.  Continue DAPT, statin. LDL < 70.    3. Avoid episodes of prolonged hypotension/hypoperfusion. 4.  Telemetry. If workup is unrevealing, would recommend a 30 day event monitor. 5.  Rehabilitation efforts. May need SLP evaluation. Will follow from periphery and review results of RENEE when available. Please call in the interim w/ any additional questions or concerns. Thank you.       Simin Cruz NP  [address] [place] [address] [address] Neurology    A copy of this note was provided for Dr Payal Sparks MD

## 2020-02-18 ENCOUNTER — HOSPITAL ENCOUNTER (INPATIENT)
Dept: CARDIAC CATH/INVASIVE PROCEDURES | Age: 80
Discharge: HOME | DRG: 064 | End: 2020-02-18
Attending: INTERNAL MEDICINE
Payer: MEDICARE

## 2020-02-18 LAB
GLUCOSE BLD-MCNC: 108 MG/DL (ref 70–99)
GLUCOSE BLD-MCNC: 112 MG/DL (ref 70–99)
GLUCOSE BLD-MCNC: 139 MG/DL (ref 70–99)
GLUCOSE BLD-MCNC: 154 MG/DL (ref 70–99)
PERFORMED ON: ABNORMAL

## 2020-02-18 PROCEDURE — 94760 N-INVAS EAR/PLS OXIMETRY 1: CPT

## 2020-02-18 PROCEDURE — 2060000000 HC ICU INTERMEDIATE R&B

## 2020-02-18 PROCEDURE — 6370000000 HC RX 637 (ALT 250 FOR IP): Performed by: INTERNAL MEDICINE

## 2020-02-18 PROCEDURE — 2580000003 HC RX 258: Performed by: INTERNAL MEDICINE

## 2020-02-18 PROCEDURE — 99152 MOD SED SAME PHYS/QHP 5/>YRS: CPT

## 2020-02-18 PROCEDURE — 93312 ECHO TRANSESOPHAGEAL: CPT

## 2020-02-18 PROCEDURE — 6370000000 HC RX 637 (ALT 250 FOR IP)

## 2020-02-18 PROCEDURE — 6360000002 HC RX W HCPCS: Performed by: INTERNAL MEDICINE

## 2020-02-18 PROCEDURE — 6360000002 HC RX W HCPCS

## 2020-02-18 PROCEDURE — 2580000003 HC RX 258

## 2020-02-18 RX ADMIN — INSULIN LISPRO 1 UNITS: 100 INJECTION, SOLUTION INTRAVENOUS; SUBCUTANEOUS at 20:50

## 2020-02-18 RX ADMIN — LISINOPRIL 20 MG: 20 TABLET ORAL at 08:11

## 2020-02-18 RX ADMIN — SODIUM CHLORIDE, PRESERVATIVE FREE 10 ML: 5 INJECTION INTRAVENOUS at 08:11

## 2020-02-18 RX ADMIN — ENOXAPARIN SODIUM 40 MG: 40 INJECTION SUBCUTANEOUS at 08:11

## 2020-02-18 RX ADMIN — SODIUM CHLORIDE, PRESERVATIVE FREE 10 ML: 5 INJECTION INTRAVENOUS at 20:38

## 2020-02-18 RX ADMIN — CLOPIDOGREL BISULFATE 75 MG: 75 TABLET ORAL at 08:11

## 2020-02-18 RX ADMIN — ATORVASTATIN CALCIUM 80 MG: 80 TABLET, FILM COATED ORAL at 20:38

## 2020-02-18 RX ADMIN — ASPIRIN 81 MG 81 MG: 81 TABLET ORAL at 08:11

## 2020-02-18 ASSESSMENT — PAIN SCALES - GENERAL
PAINLEVEL_OUTOF10: 0

## 2020-02-18 NOTE — CONSULTS
Spoke to Dr. Fariha Sosa. Cardiology consult for event monitor. [facility] will follow patient for disposition. If discharged to IRU patient can remain on telemetry monitor.   Anna Marie Kirk MSN, RN [facility]  [phone], [phone]

## 2020-02-18 NOTE — PROGRESS NOTES
Progress Note  Admit Date: 2/15/2020      PCP: No primary care provider on file. CC: F/U for AMS    SUBJECTIVE / Interval History:   Pt feels ok    no complaints         Allergies  Patient has no known allergies. Medications    Scheduled Meds:   lisinopril  20 mg Oral Daily    insulin lispro  0-6 Units Subcutaneous TID WC    insulin lispro  0-3 Units Subcutaneous Nightly    atorvastatin  80 mg Oral Nightly    clopidogrel  75 mg Oral Daily    aspirin  81 mg Oral Daily    sodium chloride flush  10 mL Intravenous 2 times per day    enoxaparin  40 mg Subcutaneous Daily     Continuous Infusions:   dextrose         PRN Meds:  labetalol, glucose, dextrose, glucagon (rDNA), dextrose, sodium chloride flush, magnesium hydroxide, ondansetron, acetaminophen    Vitals    TEMPERATURE:  Current - Temp: 97.8 °F (36.6 °C); Max - Temp  Av.8 °F (36.6 °C)  Min: 97.4 °F (36.3 °C)  Max: 98.3 °F (36.8 °C)  RESPIRATIONS RANGE: Resp  Av.3  Min: 16  Max: 18  PULSE RANGE: Pulse  Av  Min: 69  Max: 83  BLOOD PRESSURE RANGE:  Systolic (56EJK), VZW:350 , Min:148 , MGN:983   ; Diastolic (63IBP), WHK:04, Min:53, Max:92    PULSE OXIMETRY RANGE: SpO2  Av.3 %  Min: 92 %  Max: 98 %  24HR INTAKE/OUTPUT:      Intake/Output Summary (Last 24 hours) at 2020 0840  Last data filed at 2020 1852  Gross per 24 hour   Intake 600 ml   Output 400 ml   Net 200 ml       Exam:    Gen: No distress. Eyes: PERRL. No sclera icterus. No conjunctival injection. ENT: No discharge. Pharynx clear. External appearance of ears and nose normal.  Neck: Trachea midline. No obvious mass. Resp: No accessory muscle use. No crackles. No wheezes. No rhonchi. No dullness on percussion. CV: Regular rate. Regular rhythm. No murmur or rub. No edema. GI: Non-tender. Non-distended. No hernia. Skin: Warm, dry, normal texture and turgor. No nodule on exposed extremities. Lymph: No cervical LAD. No supraclavicular LAD.    M/S: No cyanosis. No clubbing. No joint deformity. Neuro: Moves all four extremities. CN 2-12 tested, no defect noted. Psych: Oriented x 3. No anxiety. Data    LABS  CBC:   Recent Labs     02/15/20  1522 02/16/20  0619   WBC 10.3 10.3   HGB 14.3 14.0   HCT 42.3 40.8   MCV 90.0 89.5    231     BMP:   Recent Labs     02/15/20  1522 02/16/20  0619    139   K 3.9 4.1    103   CO2 21 21   BUN 26* 24*   CREATININE 1.3 1.2   GLUCOSE 140* 117*     POC GLUCOSE:    Recent Labs     02/17/20  0733 02/17/20  1141 02/17/20  1646 02/17/20  2024 02/18/20  0717   POCGLU 118* 127* 149* 113* 112*     LIVER PROFILE:   Recent Labs     02/15/20  1522   AST 26   ALT 20   LABALBU 3.8   BILITOT 0.5   ALKPHOS 61     PT/INR: No results for input(s): PROTIME, INR in the last 72 hours. APTT: No results for input(s): APTT in the last 72 hours. UA:No results for input(s): NITRITE, COLORU, PHUR, LABCAST, WBCUA, RBCUA, MUCUS, TRICHOMONAS, YEAST, BACTERIA, CLARITYU, SPECGRAV, LEUKOCYTESUR, UROBILINOGEN, BILIRUBINUR, BLOODU, GLUCOSEU, KETUA, AMORPHOUS in the last 72 hours. Microbiology:  Wound Culture: No results for input(s): WNDABS, ORG in the last 72 hours. Invalid input(s):  LABGRAM  Nasal Culture: No results for input(s): ORG, MRSAPCR in the last 72 hours. Blood Culture: No results for input(s): BC, BLOODCULT2 in the last 72 hours. Fungal Culture:   No results for input(s): FUNGSM in the last 72 hours. No results for input(s): FUNCXBLD in the last 72 hours. CSF Culture:  No results for input(s): COLORCSF, APPEARCSF, CFTUBE, CLOTCSF, WBCCSF, RBCCSF, NEUTCSF, NUMCELLSCSF, LYMPHSCSF, MONOCSF, GLUCCSF, VOLCSF in the last 72 hours. Respiratory Culture:  No results for input(s): [place]et [place] in the last 72 hours. AFB:No results for input(s): AFBSMEAR in the last 72 hours. Urine Culture  No results for input(s): LABURIN in the last 72 hours.     RADIOLOGY:    MRI BRAIN WO CONTRAST   Final Result   Increasing size of the previously noted infarct in the left posterior limb of   internal capsule      Numerous additional acute infarcts are noted as described. Multifocal small-vessel ischemic change bilaterally. CT HEAD WO CONTRAST   Final Result   New focal hypodensity at the posterior limb of right internal capsule and   evolving hypodensity within the posterior limb of left internal capsule,   compatible with infarcts. Question of anoxic injury is raised. CONSULTS:    IP CONSULT TO CARDIOLOGY  IP CONSULT TO SPIRITUAL SERVICES  IP CONSULT TO NEUROLOGY  IP CONSULT TO PALLIATIVE CARE    ASSESSMENT AND PLAN:      Active Problems:    Altered mental state    Bradycardia    Hypotensive syncope  Resolved Problems:    * No resolved hospital problems. *    Patient is a 70-year-old male who was transferred from acute rehab for altered mental status. Patient was initially admitted to the floor with right-sided weakness and was found to have a left internal capsule infarct. Patient was discharged to inpatient rehab but rapid response was called as the patient lost consciousness in the restroom and his blood pressure dropped to the 80s and the heart rate was fluctuating between tachycardia and bradycardia. Subsequent MRI was done which showed a small areas of infarct which were new.     Acute CVA  -MRI shows new strokes  -On aspirin and statin   -neurology consulted  - RENEE ok  - will need either even monitor or monitor on tele in rehab     Bradycardia with hypotension  -resolved   -Cardiology consult    HTN  - bp trending up  -restart Ace    Syncope  -Orthostatics positive  -Improved with IV fluids    Acute metabolic encephalopathy  -improving     Dm2 -SSI     DVT Prophylaxis: lovenox  Diet: Diet NPO, After Midnight  Code Status: Full Code        Discharge plan - back to acute rehab    The patient and / or the family were informed of the results of any tests, a time was given to answer questions, a plan was proposed

## 2020-02-19 LAB
GLUCOSE BLD-MCNC: 114 MG/DL (ref 70–99)
GLUCOSE BLD-MCNC: 118 MG/DL (ref 70–99)
GLUCOSE BLD-MCNC: 144 MG/DL (ref 70–99)
GLUCOSE BLD-MCNC: 149 MG/DL (ref 70–99)
PERFORMED ON: ABNORMAL

## 2020-02-19 PROCEDURE — 97530 THERAPEUTIC ACTIVITIES: CPT

## 2020-02-19 PROCEDURE — 2580000003 HC RX 258: Performed by: INTERNAL MEDICINE

## 2020-02-19 PROCEDURE — 6360000002 HC RX W HCPCS: Performed by: INTERNAL MEDICINE

## 2020-02-19 PROCEDURE — 6370000000 HC RX 637 (ALT 250 FOR IP): Performed by: INTERNAL MEDICINE

## 2020-02-19 PROCEDURE — 97535 SELF CARE MNGMENT TRAINING: CPT

## 2020-02-19 PROCEDURE — 97116 GAIT TRAINING THERAPY: CPT | Performed by: PHYSICAL THERAPIST

## 2020-02-19 PROCEDURE — 2060000000 HC ICU INTERMEDIATE R&B

## 2020-02-19 RX ORDER — CLOPIDOGREL BISULFATE 75 MG/1
75 TABLET ORAL DAILY
Qty: 30 TABLET | Refills: 3 | Status: ON HOLD | OUTPATIENT
Start: 2020-02-20 | End: 2020-03-03 | Stop reason: SDUPTHER

## 2020-02-19 RX ADMIN — INSULIN LISPRO 1 UNITS: 100 INJECTION, SOLUTION INTRAVENOUS; SUBCUTANEOUS at 08:54

## 2020-02-19 RX ADMIN — CLOPIDOGREL BISULFATE 75 MG: 75 TABLET ORAL at 08:54

## 2020-02-19 RX ADMIN — INSULIN LISPRO 1 UNITS: 100 INJECTION, SOLUTION INTRAVENOUS; SUBCUTANEOUS at 12:00

## 2020-02-19 RX ADMIN — ATORVASTATIN CALCIUM 80 MG: 80 TABLET, FILM COATED ORAL at 21:27

## 2020-02-19 RX ADMIN — ENOXAPARIN SODIUM 40 MG: 40 INJECTION SUBCUTANEOUS at 08:54

## 2020-02-19 RX ADMIN — LISINOPRIL 20 MG: 20 TABLET ORAL at 08:54

## 2020-02-19 RX ADMIN — ASPIRIN 81 MG 81 MG: 81 TABLET ORAL at 08:54

## 2020-02-19 RX ADMIN — SODIUM CHLORIDE, PRESERVATIVE FREE 10 ML: 5 INJECTION INTRAVENOUS at 21:27

## 2020-02-19 RX ADMIN — SODIUM CHLORIDE, PRESERVATIVE FREE 10 ML: 5 INJECTION INTRAVENOUS at 08:55

## 2020-02-19 ASSESSMENT — PAIN SCALES - GENERAL
PAINLEVEL_OUTOF10: 5
PAINLEVEL_OUTOF10: 0

## 2020-02-19 ASSESSMENT — PAIN DESCRIPTION - LOCATION: LOCATION: THROAT

## 2020-02-19 NOTE — PROGRESS NOTES
cyanosis. No clubbing. No joint deformity. Neuro: Moves all four extremities. CN 2-12 tested, no defect noted. Psych: Oriented x 3. No anxiety. Data    LABS  CBC:   No results for input(s): WBC, HGB, HCT, MCV, PLT in the last 72 hours. BMP:   No results for input(s): NA, K, CL, CO2, PHOS, BUN, CREATININE, GLUCOSE in the last 72 hours. Invalid input(s): CA  POC GLUCOSE:    Recent Labs     02/18/20  0717 02/18/20  1108 02/18/20  1628 02/18/20  2039 02/19/20  0738   POCGLU 112* 108* 139* 154* 144*     LIVER PROFILE:   No results for input(s): AST, ALT, LIPASE, AMYLASE, LABALBU, BILIDIR, BILITOT, ALKPHOS in the last 72 hours. PT/INR: No results for input(s): PROTIME, INR in the last 72 hours. APTT: No results for input(s): APTT in the last 72 hours. UA:No results for input(s): NITRITE, COLORU, PHUR, LABCAST, WBCUA, RBCUA, MUCUS, TRICHOMONAS, YEAST, BACTERIA, CLARITYU, SPECGRAV, LEUKOCYTESUR, UROBILINOGEN, BILIRUBINUR, BLOODU, GLUCOSEU, KETUA, AMORPHOUS in the last 72 hours. Microbiology:  Wound Culture: No results for input(s): WNDABS, ORG in the last 72 hours. Invalid input(s):  LABGRAM  Nasal Culture: No results for input(s): ORG, MRSAPCR in the last 72 hours. Blood Culture: No results for input(s): BC, BLOODCULT2 in the last 72 hours. Fungal Culture:   No results for input(s): FUNGSM in the last 72 hours. No results for input(s): FUNCXBLD in the last 72 hours. CSF Culture:  No results for input(s): COLORCSF, APPEARCSF, CFTUBE, CLOTCSF, WBCCSF, RBCCSF, NEUTCSF, NUMCELLSCSF, LYMPHSCSF, MONOCSF, GLUCCSF, VOLCSF in the last 72 hours. Respiratory Culture:  No results for input(s): [place] in the last 72 hours. AFB:No results for input(s): AFBSMEAR in the last 72 hours. Urine Culture  No results for input(s): LABURIN in the last 72 hours.     RADIOLOGY:    MRI BRAIN WO CONTRAST   Final Result   Increasing size of the previously noted infarct in the left posterior limb of   internal metabolic encephalopathy  -improving     Dm2 -SSI     DVT Prophylaxis: lovenox  Diet: DIET CARDIAC;  Code Status: Full Code        Discharge plan - back to acute rehab    The patient and / or the family were informed of the results of any tests, a time was given to answer questions, a plan was proposed and they agreed with plan. Discussed with consulting physicians, nursing and social work     The note was completed using EMR. Every effort was made to ensure accuracy; however, inadvertent computerized transcription errors may be present.        Luisa Barker MD

## 2020-02-19 NOTE — PROGRESS NOTES
Physical Therapy  Facility/Department: [address] PROGRESSIVE CARE  Daily Treatment Note  NAME: Rosaura Madison  : 1940  MRN: [id number]    Date of Service: 2020    Discharge Recommendations:  Patient would benefit from continued therapy after discharge, Continue to assess pending progress, 5-7 sessions per week   PT Equipment Recommendations  Equipment Needed: No  Other: defer to next level of care  Rosaura Madison scored a 17/24 on the AM-PAC short mobility form. Current research shows that an AM-PAC score of 17 or less is typically not associated with a discharge to the patient's home setting. Based on the patients AM-PAC score and their current functional mobility deficits, it is recommended that the patient have 5-7 sessions per week of Physical Therapy at d/c to increase the patients independence. Assessment   Body structures, Functions, Activity limitations: Decreased functional mobility ; Decreased balance  Assessment: Cristine Mena is a 70 y.o. M admit 20 with ataxia and self reports of R leg weakness -- brain MRI 20: \"Acute lacunar infarct within the left posterior limb of internal capsule. \" Patient then had a code called on 2/15/20 due to becoming non responsive with low BP. Prior to admit pt was living at home with his son and ambulating independently -- reports multiple recent falls. Continues to have normal leg strength but patient with gait abnormality characterezed by occasional LOB to the L, ataxia and decreased coordination with R LE including decreased knee flexion and toe drag in swing phase. I anticipate that he would benefit from intensive skilled PT 5-7x/wk at discharge to improve his balance and progress him to an independent level of function. Will follow. Treatment Diagnosis: Unsteady gait, abnormal gait, decreased safety insight  Prognosis: Good  History:  Active rheumatic fever, Diabetes mellitus type 2, uncontrolled ([address] Essential hypertension  Exam: Unsteady gait, abnormal gait, decreased safety insight  Clinical Presentation: evolving  PT Education: General Safety  Barriers to Learning: Impaired memory  REQUIRES PT FOLLOW UP: Yes  Activity Tolerance  Activity Tolerance: Patient Tolerated treatment well     Patient Diagnosis(es): There were no encounter diagnoses. has a past medical history of Active rheumatic fever, Diabetes mellitus type 2, uncontrolled ([address] Essential hypertension, and Noncompliance with medications. has a past surgical history that includes Appendectomy. Restrictions  Restrictions/Precautions  Restrictions/Precautions: Fall Risk  Position Activity Restriction  Other position/activity restrictions: on telemetry  Subjective   General  Chart Reviewed: Yes  Additional Pertinent Hx: Salvador Sultana is a 70 y.o. M admit 2/11/20 with ataxia and self reports of R leg weakness -- brain MRI 2/12/20: \"Acute lacunar infarct within the left posterior limb of internal capsule. \" Patient then had code called on 2/15/20 when he became unresponsive due to drop in BP. New MRI from 2-15 (+) Increasing size of the previously noted infarct in the left posterior limb of internal capsule  Response To Previous Treatment: Patient with no complaints from previous session. Family / Caregiver Present: No  Referring Practitioner: Manoj Rangel MD  Subjective  Subjective: pt agreeable to working with therapy; denies any pain          Orientation  Orientation  Overall Orientation Status: Within Functional Limits  Cognition   Cognition  Memory: Decreased short term memory  Problem Solving: Assistance required to implement solutions;Assistance required to generate solutions  Insights: Decreased awareness of deficits  Initiation: Requires cues for some  Sequencing: Requires cues for some  Cognition Comment: states he feels his memory is poor since the stroke.   Takes extra time to process, mild word finding problems, having hallucinations per pt's report  Objective   Bed 1974         Time Out 1005         Minutes 29              If patient is discharged prior to the next physical therapy visit;  Please see last written PT note for discharge status.     Patricia Baker, PT, [id number]   PATRICIA BAKER, PT

## 2020-02-19 NOTE — CARE COORDINATION
[facility]  Palliative Care   Progress Note    NAME:  [facility] RECORD NUMBER:  [id number]  AGE: 70 y.o. GENDER: male  : 1940  TODAY'S DATE:  2020    Subjective: patient feeling better oriented to person, place, time and situation, but still slow to answer questions    Objective:    Vitals:    20 1100   BP: (!) 122/53   Pulse: 84   Resp: 16   Temp: 97.8 °F (36.6 °C)   SpO2: 97%     Lab Results   Component Value Date    WBC 10.3 2020    HGB 14.0 2020    HCT 40.8 2020    MCV 89.5 2020     2020     Lab Results   Component Value Date    CREATININE 1.2 2020    BUN 24 (H) 2020     2020    K 4.1 2020     2020    CO2 21 2020     Lab Results   Component Value Date    ALT 20 02/15/2020    AST 26 02/15/2020    ALKPHOS 61 02/15/2020    BILITOT 0.5 02/15/2020       Plan: he plans on going to acute rehab then home with his son. He did not discuss code status with his son or advanced care planning. Code Status: Full Code  Discharge Environment:  [] Hospice Consult Agency:  [] Inpatient Hospice    [] Home with [facility]   [] ECF with Hospice  [] ECF skilled care with Hospice to follow   [] Other:    Teaching Time:  0hours  30 min     I will continue to follow [facility] care as needed. Thank you for allowing me to participate in the care of Mr. Dang Sanchez .      Electronically signed by Ayah Watkins RN, [address] on 2020 at 12:28 PM  [address] [place] [address]  Office: [phone] No

## 2020-02-19 NOTE — PROGRESS NOTES
Progress Note    Updates  No significant events overnight. Patient feels OK. Throat is a bit sore from RENEE.       Past Medical History:   Diagnosis Date    Active rheumatic fever     Diabetes mellitus type 2, uncontrolled (HCC)      Current Facility-Administered Medications:     lisinopril (PRINIVIL;ZESTRIL) tablet 20 mg, 20 mg, Oral, Daily, Luisa Barker MD, 20 mg at 02/19/20 0854    labetalol (NORMODYNE;TRANDATE) injection 10 mg, 10 mg, Intravenous, Q10 Min PRN, ERNESTO Moss - CNP, 10 mg at 02/16/20 1955    insulin lispro (HUMALOG) injection vial 0-6 Units, 0-6 Units, Subcutaneous, TID WC, Citlali Nava MD, 1 Units at 02/19/20 0854    insulin lispro (HUMALOG) injection vial 0-3 Units, 0-3 Units, Subcutaneous, Nightly, Citlali Nava MD, 1 Units at 02/18/20 2050    atorvastatin (LIPITOR) tablet 80 mg, 80 mg, Oral, Nightly, Citlali Nava MD, 80 mg at 02/18/20 2038    clopidogrel (PLAVIX) tablet 75 mg, 75 mg, Oral, Daily, Citlali Nava MD, 75 mg at 02/19/20 0854    aspirin chewable tablet 81 mg, 81 mg, Oral, Daily, Citlali Nava MD, 81 mg at 02/19/20 0854    glucose (GLUTOSE) 40 % oral gel 15 g, 15 g, Oral, PRN, Citlali Nava MD    dextrose 50 % IV solution, 12.5 g, Intravenous, PRN, Citlali Nava MD    glucagon (rDNA) injection 1 mg, 1 mg, Intramuscular, PRN, Citlali Nava MD    dextrose 5 % solution, 100 mL/hr, Intravenous, PRN, Citlali Nava MD    sodium chloride flush 0.9 % injection 10 mL, 10 mL, Intravenous, 2 times per day, Citlali Nava MD, 10 mL at 02/19/20 0855    sodium chloride flush 0.9 % injection 10 mL, 10 mL, Intravenous, PRN, Citlali Nava MD    magnesium hydroxide (MILK OF MAGNESIA) 400 MG/5ML suspension 30 mL, 30 mL, Oral, Daily PRN, Citlali Nava MD    ondansetron (ZOFRAN) injection 4 mg, 4 mg, Intravenous, Q6H PRN, Falguni David MD    enoxaparin (LOVENOX) injection 40 mg, 40 mg, No Headache. Labs    HgA1c 6.9    Studies  MRI brain w/o 2/15/20  Increase in size of the previously noted infarct in the left posterior limb of the internal capsule.    Numerous additional acute infarcts.      MRI brain w/o 2/12/20  Acute infarct w/in the left posterior limb of the internal capsule.       CTA head/neck 2/11/20  Mild, less than 50%, stenosis of the ICAs.    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      TTE 2/13/20  EF 60%  Normal left atrium. RENEE 2/18/20  No shunting. No mass or thrombus. Impression  1. Recent left internal capsule infarct, w/ evidence of additional areas of bilateral acute brain infarcts on subsequent MRI. May have hypoperfused w/ regards to newer infarcts given context. RENEE unrevealing.      2.  Hyperlipidemia   3.  DM    Recommendations  1. Continue DAPT for 1-3 months, transitioning back to monotherapy at that time. 2.  Continue high intensity statin. LDL < 70.    3.  Normotension. Normoglycemia. 4.  Telemetry while inpatient. Would recommend longer term cardiac event monitoring if no PAF is identified here. 5.  Rehabilitation efforts. Will sign off. Please call back w/ any additional questions or concerns. Thank you.       Bridget Pace NP  [address] [place] [address] [id number] Neurology    A copy of this note was provided for Dr Dana Aldana MD

## 2020-02-19 NOTE — PLAN OF CARE
Problem: SAFETY  Goal: Free from accidental physical injury  Outcome: Ongoing  Note:   Patient assessed for fall risk; fall precautions initiated. Patient and family instructed about safety devices. Environment kept free of clutter and adequate lighting provided. Bed locked and in lowest position. Call light within reach. Will continue to monitor. Problem: DAILY CARE  Goal: Daily care needs are met  Outcome: Ongoing  Note:   Patient's ability assessed to perform self care and independent activity encouraged according to that ability. Assisted with ADL's as needed. Risk for skin breakdown assessed. Potential discharge needs assessed. Patient and family included in daily care decisions. Problem: PAIN  Goal: Patient's pain/discomfort is manageable  Outcome: Ongoing  Note:   Pain/discomfort being managed with PRN analgesics per MD orders. Pt able to express presence and absence of pain and rate pain appropriately using numerical scale. Problem: Falls - Risk of:  Goal: Will remain free from falls  Description  Will remain free from falls  Outcome: Ongoing  Note:   Bed alarm kept on. Call light in reach. Side rails up x2. Pt reminded to use call light for assistance getting out of bed. Hourly rounding done to anticipate pt needs.        Problem: Confusion - Acute:  Goal: Absence of continued neurological deterioration signs and symptoms  Description  Absence of continued neurological deterioration signs and symptoms  Outcome: Ongoing  Note:         Problem: HEMODYNAMIC STATUS  Goal: Patient has stable vital signs and fluid balance  Outcome: Ongoing  Note:         Problem: COMMUNICATION IMPAIRMENT  Goal: Ability to express needs and understand communication  Outcome: Ongoing  Note:

## 2020-02-19 NOTE — PLAN OF CARE
Problem: DAILY CARE  Goal: Daily care needs are met  Outcome: Ongoing     Problem: PAIN  Goal: Patient's pain/discomfort is manageable  Outcome: Ongoing     Problem: SKIN INTEGRITY  Goal: Skin integrity is maintained or improved  Outcome: Ongoing     Problem: Falls - Risk of:  Goal: Will remain free from falls  Description  Will remain free from falls  Outcome: Ongoing  Goal: Absence of physical injury  Description  Absence of physical injury  Outcome: Ongoing     Problem: Confusion - Acute:  Goal: Absence of continued neurological deterioration signs and symptoms  Description  Absence of continued neurological deterioration signs and symptoms  Outcome: Ongoing     Problem: Injury - Risk of, Physical Injury:  Goal: Will remain free from falls  Description  Will remain free from falls  Outcome: Ongoing  Goal: Absence of physical injury  Description  Absence of physical injury  Outcome: Ongoing     Problem: Mood - Altered:  Goal: Mood stable  Description  Mood stable  Outcome: Ongoing     Problem: Sensory Perception - Impaired:  Goal: Demonstrations of improved sensory functioning will increase  Description  Demonstrations of improved sensory functioning will increase  Outcome: Ongoing     Problem: Sleep Pattern Disturbance:  Goal: Appears well-rested  Description  Appears well-rested  Outcome: Ongoing     Problem: HEMODYNAMIC STATUS  Goal: Patient has stable vital signs and fluid balance  Outcome: Ongoing     Problem: ACTIVITY INTOLERANCE/IMPAIRED MOBILITY  Goal: Mobility/activity is maintained at optimum level for patient  Outcome: Ongoing     Problem: COMMUNICATION IMPAIRMENT  Goal: Ability to express needs and understand communication  Outcome: Ongoing

## 2020-02-19 NOTE — PROGRESS NOTES
monitor)  LE Dressing: Minimal assistance(seated to thread clothes, don socks, footies. Stance clothes over hips with assist)  Toileting: Contact guard assistance(stance at commode to urinate)  Additional Comments: ataxia noted, R hand. Pt also c/o L hand/forearm feeling \"numb\". Pt min impulsive when completing tasks. Standing Balance  Time: 1-3 min  Activity: ADL's, functional mob with RW in room. Functional Mobility  Functional - Mobility Device: Rolling Walker  Activity: To/from bathroom  Assist Level: (CG/Navjot)  Functional Mobility Comments: Assist, cues for steering, R side. Pt also cued for safety, d/t is impulsive, moving quickly  Wheelchair Bed Transfers  Wheelchair/Bed - Technique: Ambulating  Equipment Used: (recliner, chair with arms)  Level of Asssistance: Contact guard assistance  Wheelchair Transfers Comments: RW, cues for hand placement   Cognition  Overall Cognitive Status: Exceptions  Memory: Decreased short term memory  Safety Judgement: Decreased awareness of need for assistance;Decreased awareness of need for safety  Problem Solving: Assistance required to implement solutions;Assistance required to generate solutions  Insights: Decreased awareness of deficits  Initiation: Requires cues for some  Sequencing: Requires cues for some  Cognition Comment: min word finding problems. Decreased insight.  Impulsive        Plan   Plan  Times per week: 3-5  Times per day: Daily  Plan weeks: 3-5 days  Current Treatment Recommendations: Strengthening, Functional Mobility Training, Gait Training, Endurance Training, Balance Training, Neuromuscular Re-education, Safety Education & Training, Self-Care / ADL, Equipment Evaluation, Education, & procurement, Patient/Caregiver Education & Training, Home Management Training, Cognitive/Perceptual Training    AM-PAC Score        AM-[facility] Inpatient Daily Activity Raw Score: 18 (02/19/20 1041)  AM-PAC Inpatient ADL T-Scale Score : 38.66 (02/19/20 1041)  ADL Inpatient CMS 0-100% Score: 46.65 (02/19/20 1041)  ADL Inpatient CMS G-Code Modifier : CK (02/19/20 1041)    Goals  Short term goals  Time Frame for Short term goals: Status: goals ongoing  Short term goal 1: Feeding & grooming IND'ly  Short term goal 2: UB dressing IND'ly  Short term goal 3: LB dressing w/ SBA using margaret tech & A/E prn  Short term goal 4: toilet tasks w/ SBA  Short term goal 5: household transfers w/ SBA using LRAD  Short term goal 6: improve RUE function to use as dominant extremity for 90% of tasks  Short term goal 7: continue to assess IADL skills such as money management, medication management  Long term goals  Time Frame for Long term goals : same as stg  Patient Goals   Patient goals : \"I need to get rid of being unstable on my feet\" (word finding & pausing during statement)--pt transferred from rehab after syncopal episode & is now on acute floor       Therapy Time   Individual Concurrent Group Co-treatment   Time In 1005         Time Out 1043         Minutes 45                 Diony ST/L,515  This note to serve as discharge summary if pt d/c'd prior to next session

## 2020-02-20 LAB
GLUCOSE BLD-MCNC: 109 MG/DL (ref 70–99)
GLUCOSE BLD-MCNC: 120 MG/DL (ref 70–99)
GLUCOSE BLD-MCNC: 157 MG/DL (ref 70–99)
GLUCOSE BLD-MCNC: 162 MG/DL (ref 70–99)
PERFORMED ON: ABNORMAL

## 2020-02-20 PROCEDURE — 6370000000 HC RX 637 (ALT 250 FOR IP): Performed by: INTERNAL MEDICINE

## 2020-02-20 PROCEDURE — 94760 N-INVAS EAR/PLS OXIMETRY 1: CPT

## 2020-02-20 PROCEDURE — 97530 THERAPEUTIC ACTIVITIES: CPT

## 2020-02-20 PROCEDURE — 6360000002 HC RX W HCPCS: Performed by: INTERNAL MEDICINE

## 2020-02-20 PROCEDURE — 2580000003 HC RX 258: Performed by: INTERNAL MEDICINE

## 2020-02-20 PROCEDURE — 97535 SELF CARE MNGMENT TRAINING: CPT

## 2020-02-20 PROCEDURE — 2060000000 HC ICU INTERMEDIATE R&B

## 2020-02-20 RX ADMIN — INSULIN LISPRO 1 UNITS: 100 INJECTION, SOLUTION INTRAVENOUS; SUBCUTANEOUS at 13:12

## 2020-02-20 RX ADMIN — SODIUM CHLORIDE, PRESERVATIVE FREE 10 ML: 5 INJECTION INTRAVENOUS at 09:12

## 2020-02-20 RX ADMIN — INSULIN LISPRO 1 UNITS: 100 INJECTION, SOLUTION INTRAVENOUS; SUBCUTANEOUS at 21:56

## 2020-02-20 RX ADMIN — ENOXAPARIN SODIUM 40 MG: 40 INJECTION SUBCUTANEOUS at 09:11

## 2020-02-20 RX ADMIN — SODIUM CHLORIDE, PRESERVATIVE FREE 10 ML: 5 INJECTION INTRAVENOUS at 22:38

## 2020-02-20 RX ADMIN — CLOPIDOGREL BISULFATE 75 MG: 75 TABLET ORAL at 09:11

## 2020-02-20 RX ADMIN — LISINOPRIL 20 MG: 20 TABLET ORAL at 09:11

## 2020-02-20 RX ADMIN — ATORVASTATIN CALCIUM 80 MG: 80 TABLET, FILM COATED ORAL at 21:52

## 2020-02-20 RX ADMIN — ASPIRIN 81 MG 81 MG: 81 TABLET ORAL at 09:11

## 2020-02-20 ASSESSMENT — PAIN SCALES - GENERAL
PAINLEVEL_OUTOF10: 0

## 2020-02-20 NOTE — DISCHARGE SUMMARY
medications and discharge plan. Signed:  Sammy Ponce MD   2/20/2020    The note was completed using EMR. Every effort was made to ensure accuracy; however, inadvertent computerized transcription errors may be present. Thank you No primary care provider on file. for the opportunity to be involved in this patient's care. If you have any questions or concerns please feel free to contact me at [phone].

## 2020-02-20 NOTE — PROGRESS NOTES
Occupational Therapy  Facility/Department: [address] PROGRESSIVE CARE  Daily Treatment Note  NAME: Elisa Dove  : 1940  MRN: [id number]    Date of Service: 2020    Discharge Recommendations:  Patient would benefit from continued therapy after discharge, 5-7 sessions per week  OT Equipment Recommendations  Other: TBD, may need shower chair    Assessment   Performance deficits / Impairments: Decreased functional mobility ; Decreased ADL status; Decreased strength;Decreased sensation;Decreased safe awareness;Decreased endurance;Decreased high-level IADLs;Decreased balance;Decreased cognition;Decreased vision/visual deficit; Decreased fine motor control;Decreased coordination  Assessment: Pt tolerated session fairly well, limited by the above deficits, including decreased balance, strength, coordination (RUE), cognition (decreased insight, safety awareness) and requiring up to Min A for ADL's, functional mob and SB/CG A for transfers. Pt will benefit from continued OT. Treatment Diagnosis: decreased ADL, IADL, balance, cognition, coordination  Prognosis: Good  History: uncontrolled DM, HTN, Rheumatic fever, falls, small vessel ischemia  OT Education: OT Role;Plan of Care;Transfer Training;ADL Adaptive Strategies  Patient Education: call for assist  Barriers to Learning: is [place]  REQUIRES OT FOLLOW UP: Yes  Activity Tolerance  Activity Tolerance: Patient Tolerated treatment well  Activity Tolerance: HR increasing to 125 Bpm with activity. Safety Devices  Safety Devices in place: Ginna Neff aware)  Type of devices: Gait belt; Chair alarm in place;Call light within reach; Left in chair;Patient at risk for falls;Nurse notified         Patient Diagnosis(es): There were no encounter diagnoses. has a past medical history of Active rheumatic fever, Diabetes mellitus type 2, uncontrolled ([address] Essential hypertension, and Noncompliance with medications.    has a past surgical history that includes Appendectomy. Restrictions  Restrictions/Precautions  Restrictions/Precautions: Fall Risk  Position Activity Restriction  Other position/activity restrictions: on telemetry  Subjective   General  Chart Reviewed: Yes, Progress Notes, Orders  Patient assessed for rehabilitation services?: Yes  Additional Pertinent Hx: pt is a 82-year-old male with history of diabetes type 2, essential hypertension, who presents to the hospital with right leg weakness. He reports history of essential hypertension but he has not been compliant with medication use for years. Patient diagnosed with acute CVA, likely secondary to uncontrolled hypertension. MRI-brain with acute lacunar infarct within the left posterior limb of internal capsule. Patient seen by neurology, and they found a right sided ataxia, 4+/5 strength in right arm and leg. Patient on aspirin and high-dose statin. Patient started therapies, but needs more therapy before discharge to home safely. Patient lives at home with his son in a 1 level condo, and the son works daily. (copied per note Dr Guerrero Banks 2/14/2020) Had syncopal episode while on rehab and was transferred to acute d/t extension of stroke, bradycardia and acute metabolic encephalopathy  Family / Caregiver Present: No  Referring Practitioner: Dr Ray Prince  Diagnosis: acute lacunar infarct left posterior limb internal capsule, syncope, acute metabolic encephalopathy  Subjective  Subjective: Pt met BS, lying in bed. Pt agreeable to OT, requesting to use commode. Pt denied pain, yet continues with c/o L hand/forearm \"numb\" and both hands feeling like \"sponges\".   General Comment  Comments: ok to see per RN      Orientation  Orientation  Overall Orientation Status: Within Functional Limits  Objective    ADL  Feeding: Setup;Supervision(used L hand to open packets, R hand to hold knife to cut [place] toast, R hand to use utensils, hold cup)  Grooming: Stand by assistance;Contact guard assistance(seated/stance to wash hands, face, brush teeth.)  LE Dressing: (pt declined changing clothes from yesterday. Donned shoes with increased time, effort using R hand to tie laces)  Toileting: Contact guard assistance;Stand by assistance(stood at commode to urinate)  Additional Comments: ataxia noted, R hand. Pt also c/o L hand/forearm feeling \"numb\". Pt min impulsive when completing tasks. HR increasing to 125 Bpm with LB dressing. Standing Balance  Time: 2-4 min  Activity: ADL's, functional mob with RW in room, hallway, with CG/Min A. Pt unsteady, difficulty picking up RLE. No LOB. Pt able to visually locate items in room, R and L sides without difficulty.       Functional Mobility  Functional - Mobility Device: Rolling Walker  Activity: To/from bathroom  Functional Mobility Comments: CG/Navjot      Toilet Transfers  Toilet Transfers Comments: NA-pt stood at commode to urinate  Wheelchair Bed Transfers  Wheelchair/Bed - Technique: Ambulating  Equipment Used: Bed(chiar with arms, recliner)  Level of Asssistance: Contact guard assistance;Stand by assistance  Wheelchair Transfers Comments: RW, cues for hand placement  Bed mobility  Supine to Sit: Supervision         Cognition  Overall Cognitive Status: Exceptions  Memory: Decreased short term memory  Safety Judgement: Decreased awareness of need for assistance;Decreased awareness of need for safety            Plan   Plan  Times per week: 3-5  Times per day: Daily  Plan weeks: 3-5 days  Current Treatment Recommendations: Strengthening, Functional Mobility Training, Gait Training, Endurance Training, Balance Training, Neuromuscular Re-education, Safety Education & Training, Self-Care / ADL, Equipment Evaluation, Education, & procurement, Patient/Caregiver Education & Training, Home Management Training, Cognitive/Perceptual Training           AM-PAC Score        AM-[facility] Inpatient Daily Activity Raw Score: 18 (02/20/20 0856)  AM-PAC Inpatient ADL T-Scale Score : 38.66 (02/20/20 0856)  ADL Inpatient CMS 0-100% Score: 46.65 (02/20/20 0856)  ADL Inpatient CMS G-Code Modifier : CK (02/20/20 0856)    Goals  Short term goals  Time Frame for Short term goals: Status: goals ongoing  Short term goal 1: Feeding & grooming IND'ly  Short term goal 2: UB dressing IND'ly  Short term goal 3: LB dressing w/ SBA using margaret tech & A/E prn  Short term goal 4: toilet tasks w/ SBA  Short term goal 5: household transfers w/ SBA using LRAD  Short term goal 6: improve RUE function to use as dominant extremity for 90% of tasks  Short term goal 7: continue to assess IADL skills such as money management, medication management  Long term goals  Time Frame for Long term goals : same as stg  Patient Goals   Patient goals : \"I need to get rid of being unstable on my feet\" (word finding & pausing during statement)--pt transferred from rehab after syncopal episode & is now on acute floor       Therapy Time   Individual Concurrent Group Co-treatment   Time In 0812         Time Out 0856         Minutes 40               Diony ST/L,515  This note to serve as discharge summary if pt d/c'd prior to next session

## 2020-02-20 NOTE — CARE COORDINATION
Case Management:  Plan is for rehab unit today pending insurance approval.  Electronically signed by Giovanni Almeida on 2/20/2020 at 3:47 PM     Case Management:  Kassandra Robison still pending , informed patient.   Electronically signed by Giovanni Almeida on 2/20/2020 at 4:15 PM

## 2020-02-20 NOTE — PLAN OF CARE
Problem: SAFETY  Goal: Free from accidental physical injury  2/19/2020 2336 by Naman Long RN  Outcome: Ongoing     Problem: DAILY CARE  Goal: Daily care needs are met  2/19/2020 2336 by Naman Long RN  Outcome: Ongoing     Problem: PAIN  Goal: Patient's pain/discomfort is manageable  2/19/2020 2336 by Naman Long RN  Outcome: Ongoing     Problem: SKIN INTEGRITY  Goal: Skin integrity is maintained or improved  Outcome: Ongoing     Problem: Falls - Risk of:  Goal: Will remain free from falls  Description  Will remain free from falls  2/19/2020 2336 by Naman Long RN  Outcome: Ongoing     Problem: Confusion - Acute:  Goal: Absence of continued neurological deterioration signs and symptoms  Description  Absence of continued neurological deterioration signs and symptoms  2/19/2020 2336 by Naman Long RN  Outcome: Ongoing     Problem: Injury - Risk of, Physical Injury:  Goal: Will remain free from falls  Description  Will remain free from falls  2/19/2020 2336 by Naman Long RN  Outcome: Ongoing

## 2020-02-20 NOTE — PROGRESS NOTES
cyanosis. No clubbing. No joint deformity. Neuro: Moves all four extremities. CN 2-12 tested, no defect noted. Psych: Oriented x 3. No anxiety. Data    LABS  CBC:   No results for input(s): WBC, HGB, HCT, MCV, PLT in the last 72 hours. BMP:   No results for input(s): NA, K, CL, CO2, PHOS, BUN, CREATININE, GLUCOSE in the last 72 hours. Invalid input(s): CA  POC GLUCOSE:    Recent Labs     02/20/20  0750 02/20/20  1203 02/20/20  1637 02/20/20  2128 02/21/20  0812   POCGLU 120* 162* 109* 157* 106*     LIVER PROFILE:   No results for input(s): AST, ALT, LIPASE, AMYLASE, LABALBU, BILIDIR, BILITOT, ALKPHOS in the last 72 hours. PT/INR: No results for input(s): PROTIME, INR in the last 72 hours. APTT: No results for input(s): APTT in the last 72 hours. UA:No results for input(s): NITRITE, COLORU, PHUR, LABCAST, WBCUA, RBCUA, MUCUS, TRICHOMONAS, YEAST, BACTERIA, CLARITYU, SPECGRAV, LEUKOCYTESUR, UROBILINOGEN, BILIRUBINUR, BLOODU, GLUCOSEU, KETUA, AMORPHOUS in the last 72 hours. Microbiology:  Wound Culture: No results for input(s): WNDABS, ORG in the last 72 hours. Invalid input(s):  LABGRAM  Nasal Culture: No results for input(s): ORG, MRSAPCR in the last 72 hours. Blood Culture: No results for input(s): BC, BLOODCULT2 in the last 72 hours. Fungal Culture:   No results for input(s): FUNGSM in the last 72 hours. No results for input(s): FUNCXBLD in the last 72 hours. CSF Culture:  No results for input(s): COLORCSF, APPEARCSF, CFTUBE, CLOTCSF, WBCCSF, RBCCSF, NEUTCSF, NUMCELLSCSF, LYMPHSCSF, MONOCSF, GLUCCSF, VOLCSF in the last 72 hours. Respiratory Culture:  No results for input(s): [address] in the last 72 hours. AFB:No results for input(s): AFBSMEAR in the last 72 hours. Urine Culture  No results for input(s): LABURIN in the last 72 hours.     RADIOLOGY:    MRI BRAIN WO CONTRAST   Final Result   Increasing size of the previously noted infarct in the left posterior limb of   internal

## 2020-02-20 NOTE — DISCHARGE SUMMARY
[facility] of [facility]  Dr. Maricruz Culver Discharge Summary      Patient Identification:  Gordon Jennings  : 1940  Admit date: 2020  Discharge date: 02/15/2020  Attending provider: Hipolito Michael. MD Aditya  Primary care provider: No primary care provider on file.      Discharge Diagnoses:       Patient Active Problem List   Diagnosis    [facility], [facility]    Essential hypertension    Acute CVA (cerebrovascular accident) ([facility]    Noncompliance with medications    Stenosis of left vertebral artery    Diabetes mellitus with hyperglycemia ([facility]    History of memory loss    Cerebral infarction, left hemisphere ([facility]    Altered mental state            Discharge Functional Status:    Physical therapy:  Bed Mobility: Scooting: Contact guard assistance  Transfers: Sit to Stand: Contact guard assistance  Stand to sit: Contact guard assistance, Minimal Assistance(pt leans backward when backing up and needs min A to sit safely, needs cues for hand placement), Ambulation 1  Surface: level tile  Device: Rolling Walker  Assistance: Contact guard assistance, Minimal assistance  Quality of Gait: rigid trunk, decreased flexion at R knee in swing, decreased step length and height on R, occasional min LOB, adduction of R LE in swing and stance  Gait Deviations: Slow Leslee, Decreased step length, Decreased step height  Distance: 165', multiple shorter distances in gym,    Mobility:  , PT Equipment Recommendations  Equipment Needed: No, Assessment: Vinny Barros is a 70 y.o. M admit 20 with ataxia and self reports of R leg weakness -- brain MRI 20: \"Acute lacunar infarct within the left posterior limb of internal capsule. \" Prior to admit pt was living at home with his son and ambulating independently -- reports multiple recent falls.  Physical exam today revealed normal leg strength but patient with gait abnormality characterezed by occasional LOB to the L, ataxia and decreased coordination with R LE including decreased knee flexion and toe drag in swing phase. I anticipate that he would benefit from intensive skilled PT 5-7x/wk at discharge to improve his balance and progress him to an independent level of function. Will follow.     Occupational therapy:  ,  , Assessment: Pt is a 71 yo male admitted with diagnosis of CVA, right sided weakness, history of falls recently at home. Pt lives with son who works (he does not drive, pt stated he has been in several alcohol rehab facilities and moved in with him due to son running out of money, his wife is , he does have another son who also lives in the area and is helpful. Pt was indep with ADL, IADL, mobilty without a device, driving, riding a bike and generally active. He is currently functioning below his baseline with CGA required for self care, CGA/min assist for transfers and mobility using rolling walker. He demonstrates right UE decreased coordination, right neglect, decreased balance which affect his safety with ADl and mobilty. Antiicpate pt will require 7-10 days inpt rehab with skilled OT services to maximize independence for safe return home           Inpatient Rehabilitation Course:   Gerhardt Butte is a 70 y.o. male admitted to inpatient rehabilitation on 2020 for s/p <principal problem not specified>. The patient participated in an aggressive multidisciplinary inpatient rehabilitation program involving 3 hours per day, 5 days per week of rehabilitation. 46-year-old male with history of diabetes type 2, essential hypertension, who presents to the hospital with right leg weakness. He reports history of essential hypertension but he has not been compliant with medication use for years.  Patient diagnosed with acute CVA, likely secondary to uncontrolled hypertension.   MRI-brain with acute lacunar infarct within the left posterior limb of internal capsule.  Patient seen by neurology, and they fine. states he almost fell. bp per ems was 220/114 states he has no taken his bp meds for several years. BG was 138) Acuity: Acute Type of Exam: Initial FINDINGS: BRAIN/VENTRICLES: The ventricles and sulci are diffusely enlarged. Low attenuation is seen in the periventricular and subcortical white matter. No acute intracranial hemorrhage or acute infarct is identified. ORBITS: The visualized portion of the orbits demonstrate no acute abnormality. SINUSES: The visualized paranasal sinuses and mastoid air cells demonstrate no acute abnormality. SOFT TISSUES/SKULL:  No acute abnormality of the visualized skull or soft tissues.      No acute intracranial abnormality. Findings were discussed with Roselyn Cordova at 4:32 pm on 2/11/2020.      Xr Chest Portable     Result Date: 2/11/2020  EXAMINATION: ONE XRAY VIEW OF THE CHEST 2/11/2020 4:05 pm COMPARISON: None. HISTORY: ORDERING SYSTEM PROVIDED HISTORY: weakness TECHNOLOGIST PROVIDED HISTORY: Reason for exam:->weakness Reason for Exam: weakness Acuity: Acute Type of Exam: Initial FINDINGS: The lungs are without acute focal process. There is no effusion or pneumothorax. The cardiomediastinal silhouette is without acute process. The osseous structures are without acute process.      No acute process.      Xr Chest 1 Vw     Result Date: 2/13/2020  EXAMINATION: ONE XRAY VIEW OF THE CHEST 2/13/2020 11:43 am COMPARISON: 02/11/2020 HISTORY: ORDERING SYSTEM PROVIDED HISTORY: Fever TECHNOLOGIST PROVIDED HISTORY: Reason for exam:->Fever Reason for Exam: fever Acuity: Acute Type of Exam: Initial FINDINGS: The cardiac silhouette, mediastinal and hilar contours are normal.  Thoracic aortic calcification is present. The lungs are clear. There are no pleural effusions.  No acute osseous abnormalities are identified.      No acute cardiopulmonary disease.      Cta Head Neck W Contrast     Result Date: 2/13/2020  EXAMINATION: CTA OF THE HEAD AND NECK WITH CONTRAST 2/11/2020 4:18 pm: arteries arise from their respective subclavian arteries. There is severe stenosis of the left vertebral artery origin. No focal stenosis of the right vertebral artery. No arterial injury or dissection. SOFT TISSUES: The lung apices are clear. No cervical or superior mediastinal lymphadenopathy. The larynx and pharynx are unremarkable. No acute abnormality of the salivary and thyroid glands. BONES: No acute osseous abnormality. CTA HEAD: ANTERIOR CIRCULATION: No significant stenosis of the intracranial internal carotid, anterior cerebral, or middle cerebral arteries. No aneurysm. POSTERIOR CIRCULATION: No focal stenosis of the intracranial vertebral arteries. PICA origins are visualized. No focal stenosis of the basilar artery or bilateral posterior cerebral arteries. No aneurysm. OTHER: No dural venous sinus thrombosis on this non-dedicated study. BRAIN: No mass effect or midline shift. No extra-axial fluid collection. The gray-white differentiation is maintained.      Mild, less than 50%, stenosis of the internal carotid arteries by NASCET criteria. 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 No intracranial flow-limiting stenosis.      Mri Brain Wo Contrast     Result Date: 2/15/2020  EXAMINATION: MRI OF THE BRAIN WITHOUT CONTRAST  2/15/2020 6:36 pm TECHNIQUE: Multiplanar multisequence MRI of the brain was performed without the administration of intravenous contrast. COMPARISON: February 12 HISTORY: ORDERING SYSTEM PROVIDED HISTORY: New CT findings TECHNOLOGIST PROVIDED HISTORY: Reason for exam:->New CT findings Reason for Exam: New focal hypodensity at the posterior limb of right internal capsule and Acuity: Acute Type of Exam: Initial FINDINGS: INTRACRANIAL STRUCTURES/VENTRICLES: Due to artifact. Ventricles and sulci are stable. There is a small amount of high T2 and FLAIR signal noted bilaterally in the periventricular white matter and subcortical white matter.  Focal high T2 and FLAIR lisinopril 10 MG tablet  Commonly known as:  PRINIVIL;ZESTRIL  Take 1 tablet by mouth 2 times daily      metFORMIN 500 MG tablet  Commonly known as:  GLUCOPHAGE  Take 1 tablet by mouth daily (with breakfast)      potassium chloride 20 MEQ Tbcr extended release tablet  Commonly known as:  KLOR-CON M  Take 1 tablet by mouth daily      QUEtiapine 25 MG tablet  Commonly known as:  SEROQUEL  Take 1 tablet by mouth nightly                   I spent over 35 minutes on this discharge encounter between counseling, coordination of care, and medication reconciliation.         To comply with [facility] bylaw R.II.4.1:   Discharge order placed in advance to facilitate patients discharge needs        Benny Faye MD, 2/16/2020, 8:14 AM

## 2020-02-21 ENCOUNTER — HOSPITAL ENCOUNTER (INPATIENT)
Age: 80
LOS: 11 days | Discharge: HOME HEALTH | DRG: 057 | End: 2020-03-03
Attending: PHYSICAL MEDICINE & REHABILITATION | Admitting: PHYSICAL MEDICINE & REHABILITATION
Payer: MEDICARE

## 2020-02-21 VITALS
OXYGEN SATURATION: 99 % | RESPIRATION RATE: 18 BRPM | DIASTOLIC BLOOD PRESSURE: 68 MMHG | TEMPERATURE: 97.2 F | BODY MASS INDEX: 21.99 KG/M2 | SYSTOLIC BLOOD PRESSURE: 159 MMHG | WEIGHT: 111.99 LBS | HEART RATE: 73 BPM | HEIGHT: 60 IN

## 2020-02-21 LAB
GLUCOSE BLD-MCNC: 106 MG/DL (ref 70–99)
GLUCOSE BLD-MCNC: 108 MG/DL (ref 70–99)
GLUCOSE BLD-MCNC: 123 MG/DL (ref 70–99)
GLUCOSE BLD-MCNC: 97 MG/DL (ref 70–99)
PERFORMED ON: ABNORMAL
PERFORMED ON: NORMAL

## 2020-02-21 PROCEDURE — 6360000002 HC RX W HCPCS: Performed by: INTERNAL MEDICINE

## 2020-02-21 PROCEDURE — 2580000003 HC RX 258: Performed by: INTERNAL MEDICINE

## 2020-02-21 PROCEDURE — 6370000000 HC RX 637 (ALT 250 FOR IP): Performed by: PHYSICAL MEDICINE & REHABILITATION

## 2020-02-21 PROCEDURE — 6370000000 HC RX 637 (ALT 250 FOR IP): Performed by: INTERNAL MEDICINE

## 2020-02-21 PROCEDURE — 94760 N-INVAS EAR/PLS OXIMETRY 1: CPT

## 2020-02-21 PROCEDURE — 1280000000 HC REHAB R&B

## 2020-02-21 RX ORDER — LABETALOL HYDROCHLORIDE 5 MG/ML
10 INJECTION, SOLUTION INTRAVENOUS EVERY 10 MIN PRN
Status: DISCONTINUED | OUTPATIENT
Start: 2020-02-21 | End: 2020-02-21

## 2020-02-21 RX ORDER — ACETAMINOPHEN 325 MG/1
650 TABLET ORAL EVERY 4 HOURS PRN
Status: DISCONTINUED | OUTPATIENT
Start: 2020-02-21 | End: 2020-03-03 | Stop reason: HOSPADM

## 2020-02-21 RX ORDER — CLOPIDOGREL BISULFATE 75 MG/1
75 TABLET ORAL DAILY
Status: CANCELLED | OUTPATIENT
Start: 2020-02-22

## 2020-02-21 RX ORDER — HYDRALAZINE HYDROCHLORIDE 25 MG/1
25 TABLET, FILM COATED ORAL EVERY 6 HOURS PRN
Status: DISCONTINUED | OUTPATIENT
Start: 2020-02-21 | End: 2020-03-03 | Stop reason: HOSPADM

## 2020-02-21 RX ORDER — ONDANSETRON 4 MG/1
4 TABLET, FILM COATED ORAL EVERY 8 HOURS PRN
Status: CANCELLED | OUTPATIENT
Start: 2020-02-21

## 2020-02-21 RX ORDER — POLYETHYLENE GLYCOL 3350 17 G/17G
17 POWDER, FOR SOLUTION ORAL DAILY
Status: DISCONTINUED | OUTPATIENT
Start: 2020-02-22 | End: 2020-02-22

## 2020-02-21 RX ORDER — ATORVASTATIN CALCIUM 80 MG/1
80 TABLET, FILM COATED ORAL NIGHTLY
Status: DISCONTINUED | OUTPATIENT
Start: 2020-02-21 | End: 2020-03-03 | Stop reason: HOSPADM

## 2020-02-21 RX ORDER — LISINOPRIL 20 MG/1
20 TABLET ORAL DAILY
Status: DISCONTINUED | OUTPATIENT
Start: 2020-02-22 | End: 2020-03-03 | Stop reason: HOSPADM

## 2020-02-21 RX ORDER — BISACODYL 10 MG
10 SUPPOSITORY, RECTAL RECTAL DAILY PRN
Status: CANCELLED | OUTPATIENT
Start: 2020-02-21

## 2020-02-21 RX ORDER — ACETAMINOPHEN 325 MG/1
650 TABLET ORAL EVERY 4 HOURS PRN
Status: CANCELLED | OUTPATIENT
Start: 2020-02-21

## 2020-02-21 RX ORDER — SENNA AND DOCUSATE SODIUM 50; 8.6 MG/1; MG/1
2 TABLET, FILM COATED ORAL 2 TIMES DAILY
Status: CANCELLED | OUTPATIENT
Start: 2020-02-21

## 2020-02-21 RX ORDER — LABETALOL HYDROCHLORIDE 5 MG/ML
10 INJECTION, SOLUTION INTRAVENOUS EVERY 10 MIN PRN
Status: CANCELLED | OUTPATIENT
Start: 2020-02-21

## 2020-02-21 RX ORDER — POLYETHYLENE GLYCOL 3350 17 G/17G
17 POWDER, FOR SOLUTION ORAL DAILY
Status: CANCELLED | OUTPATIENT
Start: 2020-02-21

## 2020-02-21 RX ORDER — ASPIRIN 81 MG/1
81 TABLET, CHEWABLE ORAL DAILY
Status: CANCELLED | OUTPATIENT
Start: 2020-02-22

## 2020-02-21 RX ORDER — BISACODYL 10 MG
10 SUPPOSITORY, RECTAL RECTAL DAILY PRN
Status: DISCONTINUED | OUTPATIENT
Start: 2020-02-21 | End: 2020-03-03 | Stop reason: HOSPADM

## 2020-02-21 RX ORDER — ONDANSETRON 4 MG/1
4 TABLET, FILM COATED ORAL EVERY 8 HOURS PRN
Status: DISCONTINUED | OUTPATIENT
Start: 2020-02-21 | End: 2020-03-03 | Stop reason: HOSPADM

## 2020-02-21 RX ORDER — ATORVASTATIN CALCIUM 80 MG/1
80 TABLET, FILM COATED ORAL NIGHTLY
Status: CANCELLED | OUTPATIENT
Start: 2020-02-21

## 2020-02-21 RX ORDER — ASPIRIN 81 MG/1
81 TABLET, CHEWABLE ORAL DAILY
Status: DISCONTINUED | OUTPATIENT
Start: 2020-02-22 | End: 2020-03-03 | Stop reason: HOSPADM

## 2020-02-21 RX ORDER — NICOTINE POLACRILEX 4 MG
15 LOZENGE BUCCAL PRN
Status: CANCELLED | OUTPATIENT
Start: 2020-02-21

## 2020-02-21 RX ORDER — NICOTINE POLACRILEX 4 MG
15 LOZENGE BUCCAL PRN
Status: DISCONTINUED | OUTPATIENT
Start: 2020-02-21 | End: 2020-03-03 | Stop reason: HOSPADM

## 2020-02-21 RX ORDER — SENNA AND DOCUSATE SODIUM 50; 8.6 MG/1; MG/1
2 TABLET, FILM COATED ORAL 2 TIMES DAILY
Status: DISCONTINUED | OUTPATIENT
Start: 2020-02-21 | End: 2020-02-22

## 2020-02-21 RX ORDER — CLOPIDOGREL BISULFATE 75 MG/1
75 TABLET ORAL DAILY
Status: DISCONTINUED | OUTPATIENT
Start: 2020-02-22 | End: 2020-03-03 | Stop reason: HOSPADM

## 2020-02-21 RX ORDER — LISINOPRIL 20 MG/1
20 TABLET ORAL DAILY
Status: CANCELLED | OUTPATIENT
Start: 2020-02-22

## 2020-02-21 RX ADMIN — SENNOSIDES AND DOCUSATE SODIUM 2 TABLET: 8.6; 5 TABLET ORAL at 17:33

## 2020-02-21 RX ADMIN — SENNOSIDES AND DOCUSATE SODIUM 2 TABLET: 8.6; 5 TABLET ORAL at 21:38

## 2020-02-21 RX ADMIN — ATORVASTATIN CALCIUM 80 MG: 80 TABLET, FILM COATED ORAL at 21:38

## 2020-02-21 RX ADMIN — ENOXAPARIN SODIUM 40 MG: 40 INJECTION SUBCUTANEOUS at 10:19

## 2020-02-21 RX ADMIN — ASPIRIN 81 MG 81 MG: 81 TABLET ORAL at 10:19

## 2020-02-21 RX ADMIN — CLOPIDOGREL BISULFATE 75 MG: 75 TABLET ORAL at 10:19

## 2020-02-21 RX ADMIN — LISINOPRIL 20 MG: 20 TABLET ORAL at 10:19

## 2020-02-21 RX ADMIN — SODIUM CHLORIDE, PRESERVATIVE FREE 10 ML: 5 INJECTION INTRAVENOUS at 10:22

## 2020-02-21 ASSESSMENT — PAIN SCALES - GENERAL
PAINLEVEL_OUTOF10: 0

## 2020-02-21 NOTE — PROGRESS NOTES
ADMISSION ORIENTATION    [facility] RECORD NUMBER:  [id number]  AGE: 70 y.o. GENDER: male  : 1940  TODAYS DATE:  2020      Room Orientation     Patient admitted to room 3263 per [x] Wheel Chair  [] Bed  [] Recliner  [] Stretcher  [] Other: . Transferred to:  [x] Bed  [] Recliner  [] Other:      Patient Required with walker    Patient was oriented to:  [x] Call Light  [x] Room Phone  [x] TV  [x] Thermostat  [x] Bathroom  [x] Bed and Chair Alarms per Rehab Policy  [x] Closet  [x] Caba Soup and it's Use on Rehab  [] Other:    Patient Verbalized Understanding: Yes  Family Present: NO      Rehab Orientation     [x] 3 Hours of Therapy  through   [x] Focus and Specialty of Physical, Occupational, and Speech and Language Pathology  [x] Weekly Team Conference and Discharge Planning  [x] Roles of the Rehab Doctor, Consulting Doctors, Nursing, Dietary, and Social Work  [x] Everyday Clothing, including proper footwear, for Therapy  [x] Visiting Hours, Visitor Ages, and Visitors Sleeping Overnight in the Hospital  [x] Visitor Participation in Therapy  [x]  and Sundays on Rehab  [x] Introduction of Welcome Folder, including Rehab Expectations, Nurse Manager's Welcome Letter, Visitor's Guide, and Menu Service  [x] Planning Ahead and Ordering Meals  [x] Falls Contract [x] Signed, [x] Copied, and [x] Taped to [place]  [] Other:    Patient Verbalized Understanding:  \"Yes\"  Family Present: NO    Electronically signed by Naeem Walker RN on 2020 at 3:00 PM

## 2020-02-21 NOTE — CONSULTS
Nutrition Assessment (Low Risk)    Type and Reason for Visit: Initial, Consult(per rehab protocol)    Nutrition Recommendations:   Encouraged pt to contact Dietitian should any questions arise regarding diet     Nutrition Assessment:  Patient assessed for nutritional risk. Deemed to be at low risk at this time. Will continue to monitor for changes in status.       Malnutrition Assessment:  · Malnutrition Status: No malnutrition    Nutrition Risk Level   Risk Level: Low    Nutrition Diagnosis:   · Problem: No nutrition diagnosis at this time    Nutrition Intervention:  Food and/or Delivery: Continue current diet  Nutrition Education/Counseling/Coordination of Care:  Continued Inpatient Monitoring, Education declined      Electronically signed by Geraldine Hernandez RD, LD on 2/21/20 at 3:21 PM    Contact Number: [phone]

## 2020-02-21 NOTE — PLAN OF CARE
Problem: SAFETY  Goal: Free from accidental physical injury  Outcome: Ongoing     Problem: DAILY CARE  Goal: Daily care needs are met  Outcome: Ongoing     Problem: PAIN  Goal: Patient's pain/discomfort is manageable  Outcome: Ongoing     Problem: SKIN INTEGRITY  Goal: Skin integrity is maintained or improved  Outcome: Ongoing     Problem: KNOWLEDGE DEFICIT  Goal: Patient/S.O. demonstrates understanding of disease process, treatment plan, medications, and discharge instructions.   Outcome: Ongoing     Problem: Falls - Risk of:  Goal: Will remain free from falls  Description  Will remain free from falls  Outcome: Ongoing     Problem: Injury - Risk of, Physical Injury:  Goal: Will remain free from falls  Description  Will remain free from falls  Outcome: Ongoing     Problem: Confusion - Acute:  Goal: Absence of continued neurological deterioration signs and symptoms  Description  Absence of continued neurological deterioration signs and symptoms  Outcome: Ongoing     Problem: Mood - Altered:  Goal: Mood stable  Description  Mood stable  Outcome: Ongoing     Problem: Sensory Perception - Impaired:  Goal: Demonstrations of improved sensory functioning will increase  Description  Demonstrations of improved sensory functioning will increase  Outcome: Ongoing     Problem: Sleep Pattern Disturbance:  Goal: Appears well-rested  Description  Appears well-rested  Outcome: Ongoing     Problem: ACTIVITY INTOLERANCE/IMPAIRED MOBILITY  Goal: Mobility/activity is maintained at optimum level for patient  Outcome: Ongoing

## 2020-02-21 NOTE — PLAN OF CARE
Problem: SAFETY  Goal: Free from accidental physical injury  2/21/2020 1202 by Pat Noguera RN  Outcome: Ongoing  2/21/2020 0015 by Lolita Briggs RN  Outcome: Ongoing  Goal: Free from intentional harm  Outcome: Ongoing

## 2020-02-22 LAB
ANION GAP SERPL CALCULATED.3IONS-SCNC: 13 MMOL/L (ref 3–16)
BUN BLDV-MCNC: 25 MG/DL (ref 7–20)
CALCIUM SERPL-MCNC: 8.9 MG/DL (ref 8.3–10.6)
CHLORIDE BLD-SCNC: 103 MMOL/L (ref 99–110)
CO2: 22 MMOL/L (ref 21–32)
CREAT SERPL-MCNC: 1.1 MG/DL (ref 0.8–1.3)
GFR AFRICAN AMERICAN: >60
GFR NON-AFRICAN AMERICAN: >60
GLUCOSE BLD-MCNC: 102 MG/DL (ref 70–99)
GLUCOSE BLD-MCNC: 104 MG/DL (ref 70–99)
GLUCOSE BLD-MCNC: 106 MG/DL (ref 70–99)
GLUCOSE BLD-MCNC: 107 MG/DL (ref 70–99)
GLUCOSE BLD-MCNC: 117 MG/DL (ref 70–99)
GLUCOSE BLD-MCNC: 97 MG/DL (ref 70–99)
HCT VFR BLD CALC: 41.8 % (ref 40.5–52.5)
HEMOGLOBIN: 14.4 G/DL (ref 13.5–17.5)
MAGNESIUM: 2 MG/DL (ref 1.8–2.4)
MCH RBC QN AUTO: 30.5 PG (ref 26–34)
MCHC RBC AUTO-ENTMCNC: 34.3 G/DL (ref 31–36)
MCV RBC AUTO: 88.8 FL (ref 80–100)
PDW BLD-RTO: 14.8 % (ref 12.4–15.4)
PERFORMED ON: ABNORMAL
PERFORMED ON: NORMAL
PLATELET # BLD: 268 K/UL (ref 135–450)
PMV BLD AUTO: 6.8 FL (ref 5–10.5)
POTASSIUM REFLEX MAGNESIUM: 3.8 MMOL/L (ref 3.5–5.1)
RBC # BLD: 4.71 M/UL (ref 4.2–5.9)
SODIUM BLD-SCNC: 138 MMOL/L (ref 136–145)
WBC # BLD: 4.6 K/UL (ref 4–11)

## 2020-02-22 PROCEDURE — 97162 PT EVAL MOD COMPLEX 30 MIN: CPT | Performed by: PHYSICAL THERAPIST

## 2020-02-22 PROCEDURE — 97530 THERAPEUTIC ACTIVITIES: CPT

## 2020-02-22 PROCEDURE — 97530 THERAPEUTIC ACTIVITIES: CPT | Performed by: PHYSICAL THERAPIST

## 2020-02-22 PROCEDURE — 83735 ASSAY OF MAGNESIUM: CPT

## 2020-02-22 PROCEDURE — 97535 SELF CARE MNGMENT TRAINING: CPT

## 2020-02-22 PROCEDURE — 1280000000 HC REHAB R&B

## 2020-02-22 PROCEDURE — 97116 GAIT TRAINING THERAPY: CPT | Performed by: PHYSICAL THERAPIST

## 2020-02-22 PROCEDURE — 6360000002 HC RX W HCPCS: Performed by: PHYSICAL MEDICINE & REHABILITATION

## 2020-02-22 PROCEDURE — 85027 COMPLETE CBC AUTOMATED: CPT

## 2020-02-22 PROCEDURE — 92610 EVALUATE SWALLOWING FUNCTION: CPT

## 2020-02-22 PROCEDURE — 92523 SPEECH SOUND LANG COMPREHEN: CPT

## 2020-02-22 PROCEDURE — 80048 BASIC METABOLIC PNL TOTAL CA: CPT

## 2020-02-22 PROCEDURE — 6370000000 HC RX 637 (ALT 250 FOR IP): Performed by: PHYSICAL MEDICINE & REHABILITATION

## 2020-02-22 PROCEDURE — 97166 OT EVAL MOD COMPLEX 45 MIN: CPT

## 2020-02-22 RX ORDER — SENNA AND DOCUSATE SODIUM 50; 8.6 MG/1; MG/1
2 TABLET, FILM COATED ORAL 2 TIMES DAILY PRN
Status: DISCONTINUED | OUTPATIENT
Start: 2020-02-22 | End: 2020-03-03 | Stop reason: HOSPADM

## 2020-02-22 RX ORDER — POLYETHYLENE GLYCOL 3350 17 G/17G
17 POWDER, FOR SOLUTION ORAL DAILY PRN
Status: DISCONTINUED | OUTPATIENT
Start: 2020-02-22 | End: 2020-03-03 | Stop reason: HOSPADM

## 2020-02-22 RX ADMIN — ASPIRIN 81 MG 81 MG: 81 TABLET ORAL at 10:04

## 2020-02-22 RX ADMIN — CLOPIDOGREL BISULFATE 75 MG: 75 TABLET ORAL at 10:04

## 2020-02-22 RX ADMIN — ATORVASTATIN CALCIUM 80 MG: 80 TABLET, FILM COATED ORAL at 21:34

## 2020-02-22 RX ADMIN — LISINOPRIL 20 MG: 20 TABLET ORAL at 12:27

## 2020-02-22 RX ADMIN — ENOXAPARIN SODIUM 40 MG: 40 INJECTION SUBCUTANEOUS at 10:04

## 2020-02-22 ASSESSMENT — PAIN SCALES - GENERAL
PAINLEVEL_OUTOF10: 0

## 2020-02-22 ASSESSMENT — 9 HOLE PEG TEST
TESTTIME_SECONDS: 28.94
TESTTIME_SECONDS: 28.37

## 2020-02-22 NOTE — PLAN OF CARE
Complete education with patient/family with understanding demonstrated for:  [x] Adjustment   [x] Other: CVA, diabetic needs  Nursing interventions may include bowel/bladder training, education for medical assistive devices, medication education, O2 saturation management, energy conservation, stress management techniques, fall prevention, alarms protocol, seating and positioning, skin/wound care, pressure relief instruction,dressing changes,  infection protection, DVT prophylaxis, and/or assistance with in room safety with transfers to bed, toilet, wheelchair, shower as well as bathroom activities and hygiene. Patient/caregiver education for:   [x] Disease/sustained injury/management      [x] Medication Use   [] Surgical intervention   [x] Safety   [x] Body mechanics and or joint protection   [x] Health maintenance         PHYSICAL THERAPY:  Goals:                  Short term goals  Time Frame for Short term goals: In 7-10 days pt will:  Short term goal 1: bed mobility MI  Short term goal 2: transfers MI  Short term goal 3: ambulate 500' with/without device, MI  Short term goal 4: 12 steps with B rails, supervision                These goals were reviewed with this patient at the time of assessment and Felipe Treviño is in agreement. Plan of Care: Pt to be seen 90   mins per day for 5-6x day/week 7-10 days. Current Treatment Recommendations: Strengthening, Transfer Training, Balance Training, Functional Mobility Training, Gait Training, Stair training, Safety Education & Training, Home Exercise Program      OCCUPATIONAL THERAPY:  Goals:             Short term goals  Time Frame for Short term goals: 1-1.5 weeks  Short term goal 1: Pt will groom in stance mod ind. Short term goal 2: Pt will bathe mod ind. with shower chair. Short term goal 3: Pt will dress mod ind. Short term goal 4: Pt will toilet mod ind.   Short term goal 5: Pt will perform functional transfers/ADL mobility mod ind with QIM CODES  QIM Admit/Goal Score   Eating 5/6   Oral Hygiene 4/6   [address] 4/6   Shower/Bathe Self 4/6   Upper Body Dressing 5/6   Lower Body Dressing 3/6   Putting on/Taking off Footwear 4/6   Roll Left and Right  4/6   Sit to Lying  4/6   Lying to Sitting on Side of Bed  4/6    Sit to Stand  4/6   Chair/Bed to Chair Transfer 4/6   Toilet Transfer  4/6   Car Transfer 4/6   Walk 10 feet  4/6   Walk 50 feet with 2 Turns  4/6   Walk 150 feet with 2 turns  4/6   Walk 10 feet on Uneven Surfaces 88/6   1 Step 4/4   4 Steps  4/4   12 Steps  88/4   Picking up Object  88/6   Wheel 50 feet with 2 Turns   Type?   na   Wheel 150 feet with 2 Turns   Type? na            Megan Joe will be seen a minimum of 3 hours of therapy per day, a minimum of 5 out of 7 days per week. [] In this rare instance due to the nature of this patient's medical involvement, this patient will be seen 15 hours per week (900 minutes within a 7 day period). Treatments may include therapeutic exercises, gait training, neuromuscular re-ed, transfer training, community reintegration, bed mobility, w/c mobility and training, self care, home mgmt, cognitive training, energy conservation,dysphagia tx, speech/language/communication therapy, group therapy, and patient/family education. In addition, dietician/nutritionist may monitor calorie count as well as intake and collaboratively work with SLP on dietary upgrades. Neuropsychology/Psychology may evaluate and provide necessary support.     Medical issues being managed closely and that require 24 hour availability of a physician:   [x] Swallowing Precautions  [] Bowel/Bladder Fx  [] Weight bearing precautions   [] Wound Care    [x] Pain Mgmt   [x] Infection Protection   [x] DVT Prophylaxis   [x] Fall Precautions  [x] Fluid/Electrolyte/Nutrition Balance   [] Voice Protection   [] Respiratory  [] Other:    Medical Prognosis: [] Good  [x] Fair    [] Guarded   Total expected IRF days 7-10  Anticipated discharge destination:    [x] Home Independently     [] Home with supervision    []SNF     [] Other                                           Physician anticipated functional outcomes:  Increase gait, transfers, self care to independent with DME as needed to allow safe return home   IPOC brief synthesis: 61-year-old male with history of diabetes type 2, essential hypertension, who presents to the hospital with right leg weakness. He reports history of essential hypertension but he has not been compliant with medication use for years.  Patient diagnosed with acute CVA, likely secondary to uncontrolled hypertension.  MRI-brain with acute lacunar infarct within the left posterior limb of internal capsule.  Patient seen by neurology, and they found a right sided ataxia, 4+/5 strength in right arm and leg.  Patient on aspirin and high-dose statin.  Patient started therapies, but needs more therapy before discharge to home safely.  Patient lives at home with his son in a 1 level condo, and the son works daily. Patti Amin started therapy on our rehabilitation unit one week ago, but was discharged to the acute floor after he developed syncopal episode in therapy.  Further workup revealed evidence of new cerebral infarctions occurring on his repeat MRI scan on 2/15/20, showing Increase in size of the previously noted infarct in the left posterior limb of the internal capsule, with numerous additional bilateral acute infarcts.  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 RENEE negative for shunting, mass or thrombus. Patient seen by Neurology. Patient continued on DAPT. Patient started therapies, but needs more therapy before discharge to home safely.   We have a bed available today for his admission, and he has been approved by his insurance for rehabilitation unit treatment. We will have him continue with telemetry on the rehabilitation unit.         I have reviewed this initial plan of care and agree with its contents:    Title   Name    Date    Time    Physician: Dr. Kavita Vizcaino, 2/22/20, 12 pm    Case Mgmt:  Christianne Santo [place]     Case Management   [phone]    2/24/2020  4:39 PM      OT: Electronically signed by Wang Payne OT on 2/22/2020 at 1:35 PM    PT: Mg Cain, GRABIEL/ Luis Alfredo Meraz, PT #[id number] on 2/22/2020 at 11:48 AM      RN: El Montiel RN, CRRN 02/24/2020 9:23 am    ST: Curt Saleem 87 CCC/SLP [id number]  Speech Language Pathologist    :  Annette Myles, MPT  2/24/2020   [phone]

## 2020-02-22 NOTE — PROGRESS NOTES
grab bar, dressed lower body with Min assist D/T decreased safety(had LOB), transferred/mobility CGA using RW with min cues to slow down/to use walker safetly  OT Education: OT Role;Plan of Care;Transfer Training;ADL Adaptive Strategies  REQUIRES OT FOLLOW UP: Yes  Activity Tolerance  Activity Tolerance: Patient Tolerated treatment well  Safety Devices  Safety Devices in place: Yes  Type of devices: Gait belt; Chair alarm in place;Call light within reach; Left in chair;Patient at risk for falls;Nurse notified           Patient Diagnosis(es): There were no encounter diagnoses. has a past medical history of Active rheumatic fever, Diabetes mellitus type 2, uncontrolled ([facility] [address] Essential hypertension, and Noncompliance with medications. has a past surgical history that includes Appendectomy. Treatment Diagnosis: Impaired: ADL/IADL function, cognition, balance, activity tolerance, coordination      Restrictions  Restrictions/Precautions  Restrictions/Precautions: Fall Risk  Position Activity Restriction  Other position/activity restrictions: on telemetry, cardiac diet    Subjective   General  Chart Reviewed: Yes, Progress Notes, Orders  Patient assessed for rehabilitation services?: Yes  Additional Pertinent Hx: pt is a 72-year-old male with history of diabetes type 2, essential hypertension, who presents to the hospital with right leg weakness. He reports history of essential hypertension but he has not been compliant with medication use for years. Patient diagnosed with acute CVA, likely secondary to uncontrolled hypertension. MRI-brain with acute lacunar infarct within the left posterior limb of internal capsule. Patient seen by neurology, and they found a right sided ataxia, 4+/5 strength in right arm and leg. Patient on aspirin and high-dose statin. Patient started therapies, but needs more therapy before discharge to home safely.   Patient lives at home with his son in a 1 level condo, and the son works start)  LE Bathing: Stand by assistance(used grab bar in stance)  UE Dressing: Setup  LE Dressing: Setup;Minimal assistance(Pt started to don shorts in stance & hand LOB with min A required by OT to recover. Ed pt to don over feet seated. Able to reach feet to son socks/shoes)  Toileting: (simulated CGA/SBA, declined need)  Additional Comments: Pt left seated in recliner with needs in reach, eating lunch after BP & blood sugar checked. Nurse/PCA ware. Tone RUE  RUE Tone: Normotonic  Tone LUE  LUE Tone: Normotonic  Coordination  Movements Are Fluid And Coordinated: No  Coordination and Movement description: Right UE;Decreased speed;Decreased accuracy; Fine motor impairments; Left UE  Quality of Movement Other  Comment: slight ataxia R & L with finger to nose. Bilateral slowed serial opposition     Bed mobility  Comment: NT as up @ start & finish of session(see PT notes)        Cognition  Overall Cognitive Status: Exceptions  Memory: Decreased short term memory;Decreased long term memory;Decreased recall of recent events  Problem Solving: Assistance required to implement solutions;Assistance required to generate solutions  Insights: Decreased awareness of deficits  Cognition Comment:  Decreased insight. Impulsive, significant memory deficit noted. Discussed return to driving and do recommend drivers eval for pt. Perception  Overall Perceptual Status: (noted difficult linit up with chair when going to sit, cont to assess)     Sensation  Overall Sensation Status: Impaired  Additional Comments: Pt reported \"prickly\" feeling/sensativity in L distal arm and L lower leg. Per PT notes: Intact in R and L LE, reports some tingling in L hand, decreased proprioception noted on L foot with great toe.         LUE AROM (degrees)  LUE AROM : WFL  Left Hand AROM (degrees)  Left Hand AROM: WFL  Left Hand General AROM: Cannot fully oppose to digit 5, stated this is pre-morbid  RUE AROM (degrees)  RUE AROM : WFL  Right Hand AROM

## 2020-02-22 NOTE — PROGRESS NOTES
Speech Language Pathology  Facility/Department: [facility]   CLINICAL BEDSIDE SWALLOW EVALUATION    NAME: Sabino Kraft  : 1940  MRN: [id number]    ADMISSION DATE: 2020  ADMITTING DIAGNOSIS: has Diabetes mellitus type 2, controlled ([facility] [place] [address] Essential hypertension; Acute CVA (cerebrovascular accident) ([facility] [place] [address] Noncompliance with medications; Stenosis of left vertebral artery; Diabetes mellitus with hyperglycemia ([facility] [place] [address] History of memory loss; Cerebral infarction, left hemisphere [facility]); Altered mental state; Bradycardia; and Hypotensive syncope on their problem list.  ONSET DATE: 2/15/2020    Recent Chest Xray/CT of Chest:      CXR 2020      Impression   No acute cardiopulmonary disease. Date of Eval: 2020  Evaluating Therapist: Remedios Washington MS CCC/SLP [id number]  Speech Language Pathologist      Current Diet level:  Current Diet : Regular  Current Liquid Diet : Thin      Primary Complaint  Patient Complaint: Pt reports sticking with solids. Pain:  Pain Assessment  Pain Assessment: 0-10  Pain Level: 0  Patient's Stated Pain Goal: No pain    Reason for Referral  Sabino Kraft was referred for a bedside swallow evaluation to assess the efficiency of his swallow function, identify signs and symptoms of aspiration and make recommendations regarding safe dietary consistencies, effective compensatory strategies, and safe eating environment. Impression  Dysphagia Diagnosis: Mild pharyngeal stage dysphagia  Dysphagia Impression : Pt with no overt signs of aspiration or penetration  Pt c/o sticking with solids and has a persistent throat clear with all PO  Follow up is indicated, however there are no indications pt is a safety risk for PO as his dysphagia is further monitored and clarified. His throat clear appears to be habitual per observation and pt report, but this should be confirmed. Recommend continuing current diet of regular with thin liquids at this time.     Dysphagia Outcome Severity Scale: Level 6: Within functional limits/Modified independence     Treatment Plan  Requires SLP Intervention: Yes  Duration/Frequency of Treatment: 5x/week while on rehab  D/C Recommendations: To be determined       Recommended Diet and Intervention  Diet Solids Recommendation: Regular  Liquid Consistency Recommendation: Thin  Recommended Form of Meds: Whole with water  Recommendations: Dysphagia treatment  Therapeutic Interventions: Diet tolerance monitoring    Compensatory Swallowing Strategies  Compensatory Swallowing Strategies: Upright as possible for all oral intake;Remain upright for 30-45 minutes after meals; Alternate solids and liquids    Treatment/Goals  Short-term Goals  Timeframe for Short-term Goals: 10-14 days  Dysphagia Goals: The patient will tolerate recommended diet without observed clinical signs of aspiration    General  Chart Reviewed: Yes  Behavior/Cognition: Alert; Cooperative  Respiratory Status: Room air  O2 Device: None (Room air)  Communication Observation: Functional  Follows Directions: Complex  Dentition: Adequate  Patient Positioning: Upright in chair  Baseline Vocal Quality: Normal  Volitional Cough: Strong  Prior Dysphagia History: None per chart  Consistencies Administered: Reg solid; Dysphagia Soft and Bite-Sized (Dysphagia III); Thin - straw           Vision/Hearing  Vision  Vision: Impaired  Vision Exceptions: Wears glasses for reading  Hearing  Hearing: Within functional limits    Oral Motor Deficits  Oral/Motor  Oral Motor: Exceptions to [facility]  Labial Symmetry: Abnormal symmetry right    Oral Phase Dysfunction  Oral Phase  Oral Phase: WFL  Oral Phase  Oral Phase - Comment: No oral residue. Oral rate seems WFL     Indicators of Pharyngeal Phase Dysfunction   Pharyngeal Phase  Pharyngeal Phase: Exceptions  Indicators of Pharyngeal Phase Dysfunction  Throat Clearing - Delayed: All  Pharyngeal Phase   Pharyngeal: Pt with no overt signs of aspiration or penetration.   Pt with persistant throat clear with all items that pt feels is habitual.  Throat clear noted seperately from PO as well, but not as frequently. Prognosis  Prognosis  Prognosis for safe diet advancement: good  Individuals consulted  Consulted and agree with results and recommendations: Patient    Education  Patient Education: Review of findings and POC  Patient Education Response: Verbalizes understanding;Needs reinforcement  Safety Devices in place: Yes  Type of devices: Call light within reach; Left in chair;Chair alarm in place       Therapy Time  SLP Individual Minutes  Time In: 1320  Time Out: 7030  Minutes: 35 (total time for speech and swallow evaluation)            Ekta Roth MS CCC/SLP [id number]  Speech Language Pathologist  2/22/2020 3:32 PM

## 2020-02-22 NOTE — H&P
[facility] Terrilyn Prader History & Physical      Patient Identification:  Marla Mei  : 1940  Admit date: 2020  Dictation date: 20   Attending provider: Genaro Gurrola MD        Primary care provider: No primary care provider on file. Chief Complaint:   Patient Active Problem List   Diagnosis    Diabetes mellitus type 2, controlled ([address]    Essential hypertension    Acute CVA (cerebrovascular accident) ([address]    Noncompliance with medications    Stenosis of left vertebral artery    Diabetes mellitus with hyperglycemia ([address]    History of memory loss    Cerebral infarction, left hemisphere ([address]    Altered mental state    Bradycardia    Hypotensive syncope       History of Present Illness/Hospital Course:  59-year-old male with history of diabetes type 2, essential hypertension, who presents to the hospital with right leg weakness. He reports history of essential hypertension but he has not been compliant with medication use for years.  Patient diagnosed with acute CVA, likely secondary to uncontrolled hypertension.  MRI-brain with acute lacunar infarct within the left posterior limb of internal capsule.  Patient seen by neurology, and they found a right sided ataxia, 4+/5 strength in right arm and leg.  Patient on aspirin and high-dose statin.  Patient started therapies, but needs more therapy before discharge to home safely.  Patient lives at home with his son in a 1 level condo, and the son works daily. Nikita Mata started therapy on our rehabilitation unit one week ago, but was discharged to the acute floor after he developed syncopal episode in therapy. Further workup revealed evidence of new cerebral infarctions occurring on his repeat MRI scan on 2/15/20, showing Increase in size of the previously noted infarct in the left posterior limb of the internal capsule, with numerous additional bilateral acute infarcts.  CTA head/neck on 20 Dementia in his father and mother. Prior to Admission medications    Medication Sig Start Date End Date Taking? Authorizing Provider   clopidogrel (PLAVIX) 75 MG tablet Take 1 tablet by mouth daily 2/20/20   Darci Oswald MD   aspirin 81 MG EC tablet Take 1 tablet by mouth daily 2/15/20   Jaspal Lemos MD   atorvastatin (LIPITOR) 80 MG tablet Take 1 tablet by mouth nightly 2/14/20   Jaspal Lemos MD   lisinopril (PRINIVIL;ZESTRIL) 10 MG tablet Take 1 tablet by mouth 2 times daily 2/14/20   Jaspal Lemos MD   QUEtiapine (SEROQUEL) 25 MG tablet Take 1 tablet by mouth nightly 2/14/20   Jaspal Lemos MD   potassium chloride (KLOR-CON M) 20 MEQ TBCR extended release tablet Take 1 tablet by mouth daily 2/15/20   Jaspal Lemos MD   metFORMIN (GLUCOPHAGE) 500 MG tablet Take 1 tablet by mouth daily (with breakfast) 2/14/20   Jaspal Lemos MD         REVIEW OF SYSTEMS:   CONSTITUTIONAL: negative for fevers, chills, diaphoresis, activity change, appetite change, fatigue, night sweats and unexpected weight change. EYES: negative for blurred vision, eye discharge, visual disturbance and icterus. HEENT: negative for hearing loss, tinnitus, ear drainage, sinus pressure, nasal congestion, epistaxis and snoring. RESPIRATORY: Negative for hemoptysis, cough, sputum production. CARDIOVASCULAR: negative for chest pain, palpitations, exertional chest pressure/discomfort, edema, syncope. GASTROINTESTINAL: negative for nausea, vomiting, diarrhea, constipation, blood in stool and abdominal pain. GENITOURINARY: negative for frequency, dysuria, urinary incontinence, decreased urine volume, and hematuria. HEMATOLOGIC/LYMPHATIC: negative for easy bruising, bleeding and lymphadenopathy. ALLERGIC/IMMUNOLOGIC: negative for recurrent infections, angioedema, anaphylaxis and drug reactions.    ENDOCRINE: negative for weight changes and diabetic symptoms including polyuria, polydipsia and polyphagia. + DM  MUSCULOSKELETAL: negative for pain, joint swelling, decreased range of motion and muscle weakness. NEUROLOGICAL: negative for headaches, slurred speech, unilateral weakness. PSYCHIATRIC/BEHAVIORAL: negative for hallucinations, behavioral problems, confusion and agitation. All pertinent positives are noted in the HPI. Physical Examination:  Vitals:   Patient Vitals for the past 24 hrs:   BP Temp Temp src Pulse Resp SpO2 Height Weight   02/22/20 1209 126/76 97.6 °F (36.4 °C) Oral 98 18 99 % -- --   02/22/20 1000 108/64 97.4 °F (36.3 °C) Oral 95 18 98 % -- --   02/22/20 0753 115/71 97.5 °F (36.4 °C) Oral 112 17 97 % -- --   02/22/20 0603 110/62 98.1 °F (36.7 °C) Oral 80 18 97 % -- 112 lb 14 oz (51.2 kg)   02/22/20 0202 127/76 98.7 °F (37.1 °C) Oral 80 16 98 % -- --   02/21/20 1530 123/70 97.8 °F (36.6 °C) Oral 77 18 97 % -- --   02/21/20 1345 123/70 97.8 °F (36.6 °C) Oral 77 18 97 % 4' 8\" (1.422 m) 111 lb 15.9 oz (50.8 kg)     Psych: Stable mood, normal judgement, normal affect   Const: No distress  Eyes: Conjunctiva noninjected, no icterus noted; pupils equal, round, and reactive to light. HENT: Atraumatic, normocephalic; Oral mucosa moist  Neck: Trachea midline, neck supple. No thyromegaly noted. CV: Regular rate and rhythm, no murmur rub or gallop noted  Resp: Lungs clear to auscultation bilaterally, no rales wheezes or ronchi, no retractions. Respirations unlabored. GI: Soft, nontender, nondistended. Normal bowel sounds. No palpable masses. Neuro: Alert, oriented, appropriate. No cranial nerve deficits appreciated. Motor examination reveals 4+/5 strength in all four limbs diffusely. Skin: Normal temperature and turgor  MSK: No joint abnormalities noted. Ext: No significant edema appreciated. No varicosities.     Lab Results   Component Value Date    WBC 4.6 02/22/2020    HGB 14.4 02/22/2020    HCT 41.8 02/22/2020    MCV 88.8 02/22/2020     02/22/2020     No results found for: INR, bifurcation and proximal left internal carotid artery. There is mild, less than 50%, stenosis of the left internal carotid artery by NASCET criteria. No arterial injury or dissection. VERTEBRAL ARTERIES: Vertebral arteries arise from their respective subclavian arteries. There is severe stenosis of the left vertebral artery origin. No focal stenosis of the right vertebral artery. No arterial injury or dissection. SOFT TISSUES: The lung apices are clear. No cervical or superior mediastinal lymphadenopathy. The larynx and pharynx are unremarkable. No acute abnormality of the salivary and thyroid glands. BONES: No acute osseous abnormality. CTA HEAD: ANTERIOR CIRCULATION: No significant stenosis of the intracranial internal carotid, anterior cerebral, or middle cerebral arteries. No aneurysm. POSTERIOR CIRCULATION: No focal stenosis of the intracranial vertebral arteries. PICA origins are visualized. No focal stenosis of the basilar artery or bilateral posterior cerebral arteries. No aneurysm. OTHER: No dural venous sinus thrombosis on this non-dedicated study. BRAIN: No mass effect or midline shift. No extra-axial fluid collection. The gray-white differentiation is maintained. Mild, less than 50%, stenosis of the internal carotid arteries by NASCET criteria. 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 No intracranial flow-limiting stenosis.      Mri Brain Wo Contrast    Result Date: 2/15/2020  EXAMINATION: MRI OF THE BRAIN WITHOUT CONTRAST  2/15/2020 6:36 pm TECHNIQUE: Multiplanar multisequence MRI of the brain was performed without the administration of intravenous contrast. COMPARISON: February 12 HISTORY: ORDERING SYSTEM PROVIDED HISTORY: New CT findings TECHNOLOGIST PROVIDED HISTORY: Reason for exam:->New CT findings Reason for Exam: New focal hypodensity at the posterior limb of right internal capsule and Acuity: Acute Type of Exam: Initial FINDINGS: INTRACRANIAL STRUCTURES/VENTRICLES: Due to artifact. Ventricles and sulci are stable. There is a small amount of high T2 and FLAIR signal noted bilaterally in the periventricular white matter and subcortical white matter. Focal high T2 and FLAIR signal in the posterior limb of the internal capsule on the left is noted. There is also ill-defined increased T2/FLAIR signal in the posterolateral left thalamic region. These areas are slightly increased. .  In the lateral right thalamic region abutting the internal capsule, there is additional area of high T2/FLAIR signal.  This is new compared to the prior examination. Hazy increased T2/FLAIR signal throughout the lou is noted. Flow voids are patent. Punctate new diffusion signal hyperintensity in the left posterior temporal white matter is noted on axial image 11. Punctate new diffusion signal in the left occipital lobe is noted on axial image 15. New hyperintense diffusion signal in the lateral aspect of the right thalamus is noted abutting the posterior limb of the internal capsule. Left posterior limb internal capsule high diffusion signal is increased. Faint developing increased diffusion signal is noted in the lateral left thalamus posteriorly. Punctate foci of relative increased diffusion signal in the lou are noted. There are questionable small foci of linear dark gradient echo signal in the superior occipital regions bilaterally. There is no extra-axial collection. ORBITS: No acute orbital abnormality is noted. SINUSES: No fluid levels are noted BONES/SOFT TISSUES: The bone marrow signal intensity appears normal. The soft tissues demonstrate no acute abnormality. Increasing size of the previously noted infarct in the left posterior limb of internal capsule Numerous additional acute infarcts are noted as described. Multifocal small-vessel ischemic change bilaterally.      Mri Brain Without Contrast    Result Date: 2/12/2020  EXAMINATION: MRI OF THE BRAIN WITHOUT CONTRAST  2/12/2020 8:41 am TECHNIQUE: Multiplanar multisequence MRI of the brain was performed without the administration of intravenous contrast. COMPARISON: None. HISTORY: ORDERING SYSTEM PROVIDED HISTORY: CVA TECHNOLOGIST PROVIDED HISTORY: Reason for exam:->CVA Reason for Exam:  pt state she woke up this am at [phone] and his right leg was not working right. states he went to sleep at midnight last night and was fine. states he almost fell. bp per ems was 220/114 states he has no taken his bp meds for several years. BG was 138 Acuity: Acute Type of Exam: Initial FINDINGS: INTRACRANIAL STRUCTURES/VENTRICLES: There is an acute lacunar infarct with left posterior limb of internal capsule. Mild chronic white matter microvascular ischemic changes are present characterized by periventricular white matter signal abnormality. No mass effect or midline shift. No evidence of an acute intracranial hemorrhage. The ventricles and sulci are normal in size and configuration. The sellar/suprasellar regions appear unremarkable. The normal signal voids within the major intracranial vessels appear maintained. ORBITS: The visualized portion of the orbits demonstrate no acute abnormality. SINUSES: The visualized paranasal sinuses and mastoid air cells are well aerated. BONES/SOFT TISSUES: The bone marrow signal intensity appears normal. The soft tissues demonstrate no acute abnormality. Acute lacunar infarct within the left posterior limb of internal capsule. The findings were sent to the Radiology Results [address] at 9:17 am on 2/12/2020to be communicated to a licensed caregiver. The above labs and diagnostic studies have been reviewed by myself upon admission to inpatient rehabilitation. Barriers to Discharge: Patient  has a past medical history of Active rheumatic fever, Diabetes mellitus type 2, uncontrolled ([address] Essential hypertension, and Noncompliance with medications.  Pt lives with son in a condo, and son works daily from 2-10 pm.      POST ADMISSION PHYSICIAN EVALUATION  The patient has agreed to being admitted to our comprehensive inpatient  rehabilitation facility consisting of at least 180 minutes of therapy a day,  5 out of 7 days a week. The patient/family has a good understanding of our discharge process. The  patient has potential to make improvement and is in need of at least two of  the following multidisciplinary therapies including but not limited to  physical, occupational, respiratory, and speech, nutritional services, wound care, and prosthetics and orthotics. Given the patients complex condition  and risk of further medical complications, rehabilitation services cannot be  safely provided at a lower level of care such as a skilled nursing facility. I have compared the patients medical and functional status at the time of the  preadmission screening and the same on this date, and there are no significant changes. By signing this document, I acknowledge that I have personally performed a  full physical examination on this patient within 24 hours of admission to  this inpatient rehabilitation facility and have determined the patient to be  able to tolerate the above course of treatment at an intensive level for a  reasonable period of time. I will be completing a detailed individualized  Plan of Care for this patient by day four of the patients stay based upon the  Preadmission Screen, this Post-Admission Evaluation, and the therapy  evaluations. Assessment and Plan:    Acute lacunar infarct within the left posterior limb of internal capsule,bilateral acute infarcts - aspirin,lipitor, plavix.       Hypertension- lisinopril      Diabetes type 2-  diet-controlled, SSI    Left vertebral artery stenosis, severe- aspirin,lipitor, plavix.     Noncompliance with medications     Bowels: Schedule Miralax + Senna S. Follow bowel movements. Enema or suppository if needed.      Bladder: Check PVR x 3.   [address] if PVR > 200ml or if any volume is > 500 ml.      Pain: tylenol is ordered prn.        Veronica Knutson MD, 2/22/2020, 12:21 PM

## 2020-02-22 NOTE — PLAN OF CARE
Problem: Falls - Risk of:  Goal: Will remain free from falls  Description  Will remain free from falls  2/22/2020 1435 by Ganga Urban RN  Outcome: Ongoing  Note:   Patient free of falls this shift, all safety precautions in place. Problem: Falls - Risk of:  Goal: Absence of physical injury  Description  Absence of physical injury  Outcome: Ongoing  Note:   All safety precautions in place including nonskid socks on feet, bed exit alarm on and posey clip attached to patient when in chair, phones and call light in reach, will continue to monitor. Problem: Daily Care:  Goal: Daily care needs are met  Description  Daily care needs are met  Outcome: Ongoing  Note:   Participates in hourly rounding, states needs as they arise by using call light appropriately, and does call in advance of needs. Problem: Pain:  Goal: Patient's pain/discomfort is manageable  Description  Patient's pain/discomfort is manageable  2/22/2020 1435 by Ganga Urban RN  Outcome: Ongoing  Note:   Patient uses pain scale appropriately. Problem: Skin Integrity:  Goal: Skin integrity will stabilize  Description  Skin integrity will stabilize  2/22/2020 1435 by Ganga Urban RN  Outcome: Ongoing  Note:   Repositioning every 2 hours when in bed or chair. Problem: Discharge Planning:  Goal: Patients continuum of care needs are met  Description  Patients continuum of care needs are met  2/22/2020 1435 by Ganga Urban RN  Outcome: Ongoing  Note:   Discharge planning started upon admission,  consult in place. Problem: Neurological  Goal: Maximum potential motor/sensory/cognitive function  Outcome: Ongoing  Note:   Up in chair for all meals, returned verbal acknowledgement of all safety precautions in place, will continue to monitor.

## 2020-02-22 NOTE — PROGRESS NOTES
Speech Language Pathology  Facility/Department: [facility]  Initial Speech/Language/Cognitive Assessment    NAME: Marie Pacheco  : 1940   MRN: [id number]  ADMISSION DATE: 2020  ADMITTING DIAGNOSIS: has Diabetes mellitus type 2, controlled ([facility] Essential hypertension; Acute CVA (cerebrovascular accident) ([facility] Noncompliance with medications; Stenosis of left vertebral artery; Diabetes mellitus with hyperglycemia ([facility] History of memory loss; Cerebral infarction, left hemisphere [facility]); Altered mental state; Bradycardia; and Hypotensive syncope on their problem list.     History of Present Illness/Hospital Course:  2020: 77-year-old male with history of diabetes type 2, essential hypertension, who presents to the hospital with right leg weakness. He reports history of essential hypertension but he has not been compliant with medication use for years.  Patient diagnosed with acute CVA, likely secondary to uncontrolled hypertension.  MRI-brain with acute lacunar infarct within the left posterior limb of internal capsule.  Patient seen by neurology, and they found a right sided ataxia, 4+/5 strength in right arm and leg.  Patient on aspirin and high-dose statin.  Patient started therapies, but needs more therapy before discharge to home safely.  Patient lives at home with his son in a 1 level condo, and the son works daily. [facility] was approved by his insurance for rehab unit treatment. 2020 Subjective: Rehabilitation consult received. Chart reviewed. Patient discussed with our rehabilitation unit admission nurse. Patient was on our rehabilitation unit one week ago but was discharged to the acute floor after he developed syncopal episode in therapy. Further workup revealed evidence of new cerebral infarctions occurring on his repeat MRI scan. Patient started therapies, but needs more therapy before discharge to home safely.    We have a bed available today for his process and daily living - 80% accurate  Goal 3: Pt will demonstrate accurate orientation to self and time with independent use of external aids - 80%  Goal 4: Additional goals to be developed at completion of evaluation process. Patient/family involved in developing goals and treatment plan: briefly    Subjective:   Previous level of function and limitations: see below     Social/Functional History  Ambulation Assistance: Independent  Transfer Assistance: Independent  Active : Yes  Mode of Transportation: Car  Education: graduated [employer]  Occupation: Retired  Type of occupation:   Leisure & Hobbies: biking in neighborhood; rides [email or web address]; music (plays piano/organ/ strings), Scientologist  IADL Comments: pt states he was very active, enjoys cooking  Additional Comments: Pt independent with finances and meds. Uses automatic bill pay when possible or writes checks. Vision  Vision: Impaired  Vision Exceptions: Wears glasses for reading  Hearing  Hearing: Within functional limits       Objective:     Oral/Motor  Oral Motor: Exceptions to [facility]  Labial Symmetry: Abnormal symmetry right    Motor Speech  Motor Speech: Within Functional Limits    Cognition:     The Cognistat is administered:    Orientation:  7/12  (mild - moderate impairment); of noted concern, pt does not know location of clock in his room independently. Attention:  WNL; passes screen  Comprehension for commands: WNL; passes screen  Sentence Repetition: WNL; passes screen  Confrontation Naming: WNL; passes screen  Memory: Immediate: repeats 2x independently      Delayed: 5/12 (moderate impairment)  Calculations: WNL passes screen  Similarities: WNL passes screen  Judgement: WNL; passes screen; additional questions asked for confirmation with pt possibly incomplete. Additional Assessment    Functional math/check book:  Pt does not follow oral directions accurately independently.   Pt typically uses calculator on his phone for math; he is unable to figure this out and proceeds to estimate. Task d/c'd. Prognosis:  Speech Therapy Prognosis  Prognosis: Good  Individuals consulted  Consulted and agree with results and recommendations: Patient    Education:  Patient Education: Review of findings and POC  Patient Education Response: Verbalizes understanding;Needs reinforcement  Safety Devices in place: Yes  Type of devices: Call light within reach; Left in chair;Chair alarm in place    Therapy Time:   Individual Concurrent Group Co-treatment   Time In 1320         Time Out 1355         Minutes 215 Leisa Meservey, MS CCC/SLP 1107  Speech Language Pathologist    2/22/2020 3:31 PM

## 2020-02-22 NOTE — PROGRESS NOTES
Physical Therapy    Facility/Department: [address] IP REHAB  Initial Assessment and Daily Treatment Note    NAME: Arnel Cannon  : 1940  MRN: [id number]    Date of Service: 2020    Discharge Recommendations:  Continue to assess pending progress, Patient would benefit from continued therapy after discharge, 5-7 sessions per week   PT Equipment Recommendations  Other: Currently using RW, states to only own cane at home, will continue to assess pending progress    Assessment   Body structures, Functions, Activity limitations: Decreased functional mobility ; Decreased balance  Assessment: Per H&P - Alecia Metzger is a 70 y.o. M admit 20 with ataxia and self reports of R leg weakness -- brain MRI 20: \"Acute lacunar infarct within the left posterior limb of internal capsule. \" Patient then had a code called on 2/15/20 due to becoming non responsive with low BP. Pt re-admitted to ARU on 2020. Prior to admit pt was living at home with his son and ambulating independently -- reports multiple recent falls, ambulating with no device. Pt forgetful at times with what DME he currently owns and when telling stories during evaluation. Continues to have normal leg strength but patient with gait abnormality characterized by decreased R knee flexion and dorsiflexion during ambulation causing decreased toe clearance during swing phase, mild ataxia of the RLE when ambulating and navigating steps and decreased coordination with R LE. Pt is supervision for bed mobility. Pt required CGA for all transfers with a RW, requiring constant cues for hand placement. Pt able to ambulate up to 310' with RW, CGA. Pt does have occasional LOB anteriorly when ambulating d/t toe drag which requires CGA to correct. Pt did have a constant HR of 112 before and after ambulation. Pt was able to navigate 8 steps with bilateral rails, CGA. Mild ataxia noted in RLE when navigating steps. Pt navigated a 6\" curb step with RW, CGA.  Pt is safest at this time with a RW for ambulation but may progress to cane or no device pending progress in rehab. Pt would benefit from continued therapies in the ARU to improve balance and coordination with ambulation caused by mild ataxia and decreased strength in RLE. Anticipate 7-10 day stay in the ARU. Treatment Diagnosis: Unsteady gait, abnormal gait, decreased safety insight  Specific instructions for Next Treatment: balance and coordination activities, HEP   Prognosis: Good  Decision Making: Medium Complexity  History: Active rheumatic fever, Diabetes mellitus type 2, uncontrolled ([address] Essential hypertension  Exam: See above  Clinical Presentation: evolving  PT Education: Goals;PT Role;Plan of Care;General Safety;Equipment;Gait Training;Transfer Training;Functional Mobility Training  Patient Education: Rayo Covarrubias safety, hand placement for transfers, gait training with RLE   Barriers to Learning: Impaired memory  REQUIRES PT FOLLOW UP: Yes  Activity Tolerance  Activity Tolerance: Patient Tolerated treatment well       Patient Diagnosis(es): There were no encounter diagnoses. has a past medical history of Active rheumatic fever, Diabetes mellitus type 2, uncontrolled ([address] Essential hypertension, and Noncompliance with medications. has a past surgical history that includes Appendectomy. Restrictions  Restrictions/Precautions  Restrictions/Precautions: Fall Risk  Position Activity Restriction  Other position/activity restrictions: on telemetry  Vision/Hearing  Vision: Impaired  Vision Exceptions: Wears glasses for reading  Hearing Exceptions: Hard of hearing/hearing concerns;Bilateral hearing aid(Does have bilateral hearing aids but does not wear them.  Does not currently have them on rehab unit)     Subjective  General  Chart Reviewed: Yes  Patient assessed for rehabilitation services?: Yes  Additional Pertinent Hx: Duane Castillo is a 70 y.o. M admit 2/11/20 with ataxia and self reports of R leg weakness -- brain MRI 2/12/20: \"Acute lacunar infarct within the left posterior limb of internal capsule. \" Patient then had code called on 2/15/20 when he became unresponsive due to drop in BP. New MRI from 2-15 (+) Increasing size of the previously noted infarct in the left posterior limb of internal capsule. Pt re-admitted to rehab on 2/21/2020. Response To Previous Treatment: Patient with no complaints from previous session. Family / Caregiver Present: No  Referring Practitioner: Michael Singh MD  Referral Date : 02/21/20  Diagnosis: Acute CVA, hypertensive crisis   Follows Commands: Within Functional Limits  General Comment  Comments: Pt in recliner chair at start of session and is agreeable to PT eval and Tx. Pt denies any pain at this time.    Pain Screening  Patient Currently in Pain: Denies  Vital Signs  Patient Currently in Pain: Denies       Orientation  Orientation  Overall Orientation Status: Within Functional Limits  Social/Functional History  Social/Functional History  Lives With: Son  Home Layout: One level  Home Access: Level entry  Bathroom Shower/Tub: Walk-in shower, Tub/Shower unit  Bathroom Toilet: Standard  Bathroom Equipment: Grab bars in shower  Bathroom Accessibility: Walker accessible  Home Equipment: Cane(Need to check with son to confirm home DME, initial eval stating pt owns walker and alert button. )  ADL Assistance: [facility]: Independent  Homemaking Responsibilities: Yes  Ambulation Assistance: Independent  Transfer Assistance: Independent  Active : Yes  Mode of Transportation: Car  Education: graduated seminary  Occupation: Retired  Type of occupation:   Leisure & Hobbies: biking in neighborhood; rides rollercoasters; music (plays piano/organ/ strings)  IADL Comments: pt states he was very active, enjoys cooking  Cognition     BIMS 14/15    Objective    AROM RLE (degrees)  RLE AROM: WFL  AROM LLE (degrees)  LLE AROM : WFL  AROM RUE (degrees)  RUE AROM : WFL  AROM LUE (degrees)  LUE AROM : WFL  Strength RLE  Comment: Gross 4+/5  Strength LLE  Strength LLE: WFL  Comment: Gross 5/5   Strength RUE  Strength RUE: WFL  Strength LUE  Strength LUE: WFL  Tone RLE  RLE Tone: Normotonic  Tone LLE  LLE Tone: Normotonic  Motor Control  Gross Motor?: Exceptions  Comments: Ataxic time with RUE  Coordination  Rapid Alternating Movements: Dysdiadokinesia(R hand falling off leg at times)  Finger to Nose: Dysmetric(Missed nose with RUE two consecutive trials )  Heel to Shin: Normal  Sensation  Overall Sensation Status: Impaired  Additional Comments: Intact tough in R and L LE, reports some tingling in L hand, decreased proprioception noted on L foot with great toe. Bed mobility  Rolling to Left: Supervision  Rolling to Right: Supervision  Supine to Sit: Supervision  Sit to Supine: Supervision  Scooting: Supervision  Comment: flat bed, did not use rails  Transfers  Sit to Stand: Contact guard assistance  Stand to sit: Contact guard assistance  Car Transfer: Contact guard assistance  Comment: Pt requires constant cues for hand placement when transferring with RW, throughout session started to move RW away from pt when sit<>stand to promote automatic hand placement  Ambulation  Ambulation?: Yes  Ambulation 1  Surface: level tile  Device: Rolling Walker  Assistance: Contact guard assistance  Quality of Gait: Decreased toe clearance with RLE during swing phase, causing him to lose his step multiple times, requiring CGA to correct. Some RLE ataxia during gait. Pt given cues to flex at the knee and focus on heel strike. Pt easily distracted and becomes unaware of RLE deficits when ambulating and requires constant verbal cues to focus on flexion and heel strike.    Gait Deviations: Slow Leslee;Decreased step length;Decreased step height  Distance: 220', short distances in room, 310'  Comments: several LOB anteriorly caused by decreased toe clearance of RLE that required CGA to skilled judgement, and was responsible for assessment and treatment of the patient.         Electronically signed by Endy Tony, KEI #[id number] on 2/22/2020 at 12:10 PM

## 2020-02-23 LAB
GLUCOSE BLD-MCNC: 105 MG/DL (ref 70–99)
GLUCOSE BLD-MCNC: 106 MG/DL (ref 70–99)
GLUCOSE BLD-MCNC: 111 MG/DL (ref 70–99)
GLUCOSE BLD-MCNC: 121 MG/DL (ref 70–99)
GLUCOSE BLD-MCNC: 92 MG/DL (ref 70–99)
PERFORMED ON: ABNORMAL
PERFORMED ON: NORMAL

## 2020-02-23 PROCEDURE — 6370000000 HC RX 637 (ALT 250 FOR IP): Performed by: PHYSICAL MEDICINE & REHABILITATION

## 2020-02-23 PROCEDURE — 94760 N-INVAS EAR/PLS OXIMETRY 1: CPT

## 2020-02-23 PROCEDURE — 6360000002 HC RX W HCPCS: Performed by: PHYSICAL MEDICINE & REHABILITATION

## 2020-02-23 PROCEDURE — 1280000000 HC REHAB R&B

## 2020-02-23 RX ADMIN — ATORVASTATIN CALCIUM 80 MG: 80 TABLET, FILM COATED ORAL at 21:53

## 2020-02-23 RX ADMIN — ENOXAPARIN SODIUM 40 MG: 40 INJECTION SUBCUTANEOUS at 08:13

## 2020-02-23 RX ADMIN — LISINOPRIL 20 MG: 20 TABLET ORAL at 08:14

## 2020-02-23 RX ADMIN — ASPIRIN 81 MG 81 MG: 81 TABLET ORAL at 08:14

## 2020-02-23 RX ADMIN — CLOPIDOGREL BISULFATE 75 MG: 75 TABLET ORAL at 08:14

## 2020-02-23 ASSESSMENT — PAIN SCALES - GENERAL
PAINLEVEL_OUTOF10: 0

## 2020-02-23 NOTE — PLAN OF CARE
Problem: Falls - Risk of:  Goal: Will remain free from falls  Description  Will remain free from falls  Outcome: Ongoing  Note:   Pt AAO X4. Free of fall. Transfers with assist of one, walker, and gait belt. Yellow socks on. Wheels locked. Bed lowest position. Alarm on. Call light, over the bed table, and personal belonging in reach. Hourly rounding. Patient instructed to call for needs or changes. Problem: Pain:  Goal: Patient's pain/discomfort is manageable  Description  Patient's pain/discomfort is manageable  Outcome: Ongoing  Note:   Patient denies of any pain during the shift. Will continue to monitor. Problem: Skin Integrity:  Goal: Skin integrity will stabilize  Description  Skin integrity will stabilize  Outcome: Ongoing  Note:   Free of skin breakdown. Able to reposition self. Pillow support. Heels elevated. Clean and dry skin.       Problem: Discharge Planning:  Goal: Patients continuum of care needs are met  Description  Patients continuum of care needs are met  Outcome: Ongoing

## 2020-02-24 LAB
GLUCOSE BLD-MCNC: 111 MG/DL (ref 70–99)
GLUCOSE BLD-MCNC: 95 MG/DL (ref 70–99)
GLUCOSE BLD-MCNC: 95 MG/DL (ref 70–99)
GLUCOSE BLD-MCNC: 97 MG/DL (ref 70–99)
PERFORMED ON: ABNORMAL
PERFORMED ON: NORMAL

## 2020-02-24 PROCEDURE — 97530 THERAPEUTIC ACTIVITIES: CPT

## 2020-02-24 PROCEDURE — 97129 THER IVNTJ 1ST 15 MIN: CPT

## 2020-02-24 PROCEDURE — 94760 N-INVAS EAR/PLS OXIMETRY 1: CPT

## 2020-02-24 PROCEDURE — 1280000000 HC REHAB R&B

## 2020-02-24 PROCEDURE — 6360000002 HC RX W HCPCS: Performed by: PHYSICAL MEDICINE & REHABILITATION

## 2020-02-24 PROCEDURE — 97535 SELF CARE MNGMENT TRAINING: CPT

## 2020-02-24 PROCEDURE — 97116 GAIT TRAINING THERAPY: CPT

## 2020-02-24 PROCEDURE — 97112 NEUROMUSCULAR REEDUCATION: CPT

## 2020-02-24 PROCEDURE — 97130 THER IVNTJ EA ADDL 15 MIN: CPT

## 2020-02-24 PROCEDURE — 6370000000 HC RX 637 (ALT 250 FOR IP): Performed by: PHYSICAL MEDICINE & REHABILITATION

## 2020-02-24 PROCEDURE — 97110 THERAPEUTIC EXERCISES: CPT

## 2020-02-24 RX ADMIN — LISINOPRIL 20 MG: 20 TABLET ORAL at 07:42

## 2020-02-24 RX ADMIN — ENOXAPARIN SODIUM 40 MG: 40 INJECTION SUBCUTANEOUS at 07:42

## 2020-02-24 RX ADMIN — ASPIRIN 81 MG 81 MG: 81 TABLET ORAL at 07:42

## 2020-02-24 RX ADMIN — CLOPIDOGREL BISULFATE 75 MG: 75 TABLET ORAL at 07:42

## 2020-02-24 RX ADMIN — ATORVASTATIN CALCIUM 80 MG: 80 TABLET, FILM COATED ORAL at 21:29

## 2020-02-24 ASSESSMENT — PAIN SCALES - GENERAL
PAINLEVEL_OUTOF10: 0
PAINLEVEL_OUTOF10: 0

## 2020-02-24 NOTE — PROGRESS NOTES
score profile of '1' indicating high probability of independence  - Writing Phone Messages: Pt achieved a score profile of '1' indicating a high probability of IND    · Needed extra time and/or repetition for above subtests      Informal Assessment of : multiple step checkbook balancing (7 part): pt omitted two items; calc errors (mild assist warranted)    Alteranting and divided attention for 2 concept sequential rule application: set up;conditioning to task; moderate assist  n/a   Goal 2: Pt will recall functional information related to rehabilitation process and daily living - 80% accurate         refer to working memory above: Extra time and/or repetition for concrete. Moderate assist with complex  n/a   Goal 3: Pt will demonstrate accurate orientation to self and time with independent use of external aids - 80%   Orientation:   -Without cues  Partially oriented to date/place/room number  -With written cues/verbal prompts  oriented to place; admit DX; date; therapy schedule n/a   Other areas targeted:     Education:       Safety Devices: [] Call light within reach  [] Chair alarm activated  [] Bed alarm activated  [x] Other: transport returned pt to room [] Call light within reach  [] Chair alarm activated  [] Bed alarm activated  [] Other:    Progress Assessment: 2/24/2020: DARLING subtests completed as well as executive function. Pt with difficulty with alternating and divided attention tasks   Plan: Continue as per plan of care.    Continued Tx Upon Discharge: ?    [] Yes [] No [x] TBD based on progress while on ARU [] Vital Stim indicated [] Other:   Estimated discharge date: TBD   Discharge recommendations:   [] Home independently  [] Home with assistance []  24 hour supervision  [] ECF [] Other:     Additional information:     Interventions used during Rehab Stay:  [] Speech/Language Treatment  [] Instruction in HEP [] Group [x] Dysphagia Treatment [x] Cognitive Treatment   [] Other:    Adverse Reactions to

## 2020-02-24 NOTE — PROGRESS NOTES
Pt. Was able to ambulate to the bathroom with walker, gait belt with assistance this shift. Pt.'s ECG electrostickers were changed this shift. Pt. Showed some confusion this shift but was able to be redirected. Pt. Was worried about tingling sensation in left arm this shift. Pt. Also asked student nurse, \"Do you take care of a lot of patients like me? With my condition? Will my tingling ever go away? \" . Pt. Seems to be worried about condition this shift.

## 2020-02-24 NOTE — CARE COORDINATION
MEREDITHW called sister, Aron Whitten  [phone] as approved by patient to introduce myself and to answer her questions. She reports she lives in [place]  And is able to come to [place] to assist him if needed. She would appreciate an update on Wednesday after conference.   Erlinda Bae, [place]     Case Management   [phone]    2/24/2020  4:57 PM

## 2020-02-24 NOTE — PROGRESS NOTES
Physical Therapy  Facility/Department: [address] REHAB  Daily Treatment Note  NAME: Marla Mei  : 1940  MRN: [id number]    Date of Service: 2020    Discharge Recommendations:  Continue to assess pending progress, Patient would benefit from continued therapy after discharge, 5-7 sessions per week   PT Equipment Recommendations  Equipment Needed: No  Other: Currently using RW, states to only own cane at home, will continue to assess pending progress    Assessment   Body structures, Functions, Activity limitations: Decreased functional mobility ; Decreased balance;Decreased endurance;Decreased coordination  Assessment: Pt tolerated session well, demonstrates impaired balance, strength B hips, decreased safety awareness for functional mobility tasks. Plan to continue to progress gait training, balance, coordination, and strength for pt to become more independent with all functional mobility tasks for pt to safely return home. Prognosis: Good  Decision Making: Medium Complexity  PT Education: Goals;PT Role;Plan of Care;General Safety;Equipment;Gait Training;Transfer Training;Functional Mobility Training  Patient Education: RW safety, hand placement for transfers, gait training technique with yellow theraband with RLE   REQUIRES PT FOLLOW UP: Yes  Activity Tolerance  Activity Tolerance: Patient Tolerated treatment well     Patient Diagnosis(es): There were no encounter diagnoses. has a past medical history of Active rheumatic fever, Diabetes mellitus type 2, uncontrolled ([address] Essential hypertension, and Noncompliance with medications. has a past surgical history that includes Appendectomy. Restrictions  Restrictions/Precautions  Restrictions/Precautions: Fall Risk  Position Activity Restriction  Other position/activity restrictions: on telemetry, cardiac diet  Subjective   General  Chart Reviewed:  Yes  Additional Pertinent Hx: Duane Castillo is a 70 y.o. M admit 20 with ataxia and self reports of R leg weakness -- brain MRI 2/12/20: \"Acute lacunar infarct within the left posterior limb of internal capsule. \" Patient then had code called on 2/15/20 when he became unresponsive due to drop in BP. New MRI from 2-15 (+) Increasing size of the previously noted infarct in the left posterior limb of internal capsule. Pt re-admitted to rehab on 2/21/2020. Response To Previous Treatment: Patient with no complaints from previous session. Family / Caregiver Present: No  Subjective  Subjective: Pt c/o fatigue this AM but no pain, agreeable to treatment session. General Comment  Comments: Pt sitting in w/c at beginning of session. Orientation  Orientation  Overall Orientation Status: Within Functional Limits    Social/Functional History  Lives With: Son(son works from 2p to 10p)  Type of Home: ("[employer]")  Home Layout: One level  Home Access: Level entry  Bathroom Shower/Tub: Walk-in shower, Tub/Shower unit(uses tub shower)  Bathroom Toilet: Standard(window sill on R)  Bathroom Equipment: Grab bars in shower  Bathroom Accessibility: Walker accessible  Home Equipment: Cane(Need to check with son to confirm home DME, initial eval stating pt owns walker and alert button. 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 Uses automatic bill pay when possible or writes checks.     Objective      Transfers  Sit to Stand: Stand by assistance  Stand to sit: Stand by assistance  Comment: multiple sit to stand transfers throughout session, pt requiring frequent verbal cues for hand placement during transfers d/t tendency to place hands on RW during transfers. Ambulation  Ambulation?: Yes  Ambulation 1  Surface: level tile  Device: Rolling Walker  Assistance: Stand by assistance;Contact guard assistance  Quality of Gait: Decreased toe clearance with RLE during swing phase, causing him to lose his step multiple times and minor LOB with narrow BRAYDEN; requiring CGA to correct. Some RLE ataxia during gait. decreased swing phase on RLE, decreased step height RLE. Distance: 430' x 1, short distances in therapy gym  Ambulation 2  Surface - 2: level tile  Device 2: [address] [facility] 2: Contact guard assistance  Distance: 220' x 1 with Yellow theraband around pt RLE and therapist RLE to facilitate overcompensation with increased swing phase and step height on RLE. 220' x 1 without yellow theraband, pt demonstrating increased step height, step length on RLE, overall more normalized gait pattern. Comments: Pt tolerated technique well; demonstrated improved gait pattern and foot clearance of RLE with ambulation. Balance  Posture: Good  Sitting - Static: Good  Sitting - Dynamic: Good  Standing - Static: Fair;+  Standing - Dynamic: Fair;-  Comments: sitting and static balance assessed with functional mobility tasks  Exercises  Hip Flexion: standing x 15 BLEs marching standing on blue airex pad with one UE support   Ankle Pumps: standing heel raises x 15 BLEs standing on blue airex pad with BUE support on // bar  Other exercises  Other exercises?: Yes  Other exercises 1: Nustep x 10 minutes, L4, seat/arms 2, BUE/BLE propulsion, avg SPM 67  Other exercises 2: standing mini squats x 12 BLEs standing on blue airex pad with BUE support on // bars            PM session:   S: Pt with no c/o this PM, agreeable to treatment session.     O:   Pt amb 200' x 1 with RW,  yellow theraband around R ankle for resistance to overemphasize increased step length and step height in RLE; CGA; Amb 230' x 1 without yellow theraband, RW, improved step length and step height in RLE, more normalized yanira. Amb over target 10 ft x 6 with RLE to emphasize increased step height and step length through RLE; x 1 with RW, x 5 without AD; pt requiring up to [facility] for correction of multidirectional LOB during task without AD, progressed well to [facility] throughout remainder of task. 0' x 1 with SPC on L side, short distances in therapy gym; SBA/occasional CGA for minor lateral LOB; 40' x 1 without AD, CGA, pt with decreased BRAYDEN without AD; recommend SPC at this time for increased safety with ambulation. Multiple sit to stand transfers with SBA, good recall of hand placement and safety with RW    Standing dynamic balance x 3 minutes standing on blue airex pad with catching, throwing, hitting object with CGA, BRANDT for one correction of posterior LOB; reaching outside BRAYDEN, crossing midline, no UE support. Pt ascend/descend x 16 stairs, 6', CGA, bilateral hand railings, good sequencing with reciprocal gait pattern but narrow BRAYDEN. A: Pt tolerated session well, recommend SPC for short distance ambulation in pt room. Safety Device - Type of devices:  [x]  All fall risk precautions in place [] Bed alarm in place  [] Call light within reach [] Chair alarm in place [] Positioning belt [x] Gait belt [x] Patient at risk for falls [] Left in bed [] Left in chair [] Telesitter in use [] Sitter present [] Nurse notified [x]  None (sitting in wheelchair,returned to room with transportation     Goals  Short term goals  Time Frame for Short term goals: In 7-10 days pt will:  Short term goal 1: bed mobility MI  Short term goal 2: transfers MI  Short term goal 3: ambulate 500' with/without device, MI  Short term goal 4: 12 steps with B rails, supervision   Patient Goals   Patient goals :  To be stable with walking and return to prior level of function before admission     Plan    Plan  Times per week: 5-6x   Times per day: Twice a day  Plan weeks: 7 to 10 days  Specific instructions for Next Treatment: balance and coordination activities, HEP   Current Treatment Recommendations: Strengthening, Transfer Training, Balance Training, Functional Mobility Training, Gait Training, Stair training, Safety Education & Training, Home Exercise Program, Endurance Training  Safety Devices  Type of devices:  All fall risk precautions in place, None, Patient at risk for falls, Gait belt(pt sitting in w/c, returned to room with transport)  Restraints  Initially in place: No     Therapy Time   Individual Concurrent Group Co-treatment   Time In 0815         Time Out 0900         Minutes 45         Timed Code Treatment Minutes: Tj   Time In 6281     Time Out 1600     Minutes 7150 Shelby Pierre, SPT  Therapist was present, directed the patient's care, made skilled judgement, and was responsible for assessment and treatment of the patient         Electronically signed by Tejal Herrera PT on 2/24/20 at 4:35 PM

## 2020-02-24 NOTE — PROGRESS NOTES
Department of [facility]  Dr. Alfaro Husbands Progress Note    Patient Identification:  Joe Aaron  [id number]  : 1940  Admit date: 2020      Diagnosis:   Patient Active Problem List   Diagnosis    Diabetes mellitus type 2, controlled ([facility]    Essential hypertension    Acute CVA (cerebrovascular accident) ([facility] [address] [facility]    Noncompliance with medications    Stenosis of left vertebral artery    Diabetes mellitus with hyperglycemia ([facility] [address] [facility]    History of memory loss    Cerebral infarction, left hemisphere ([facility] [address] [facility]    Altered mental state    Bradycardia    Hypotensive syncope           Subjective: Pt seen this AM.  Patient did well over the weekend, with no problems noted. He is seen sitting up at the bedside eating breakfast this morning. He still notices numbness and tingling involving his left arm and leg, and this is probably related to one of his new cerebral infarctions. Repeat labs over the weekend look good, and are stable.      BP (!) 153/76   Pulse 76   Temp 97.6 °F (36.4 °C) (Oral)   Resp 16   Ht 4' 8\" (1.422 m)   Wt 113 lb 12.1 oz (51.6 kg)   SpO2 96%   BMI 25.50 kg/m²     Last 24 hour lab  Recent Results (from the past 24 hour(s))   POCT Glucose    Collection Time: 20 11:26 AM   Result Value Ref Range    POC Glucose 111 (H) 70 - 99 mg/dl    Performed on ACCU-CHEK    POCT Glucose    Collection Time: 20  4:46 PM   Result Value Ref Range    POC Glucose 105 (H) 70 - 99 mg/dl    Performed on ACCU-CHEK    POCT Glucose    Collection Time: 20  8:33 PM   Result Value Ref Range    POC Glucose 106 (H) 70 - 99 mg/dl    Performed on ACCU-CHEK    POCT Glucose    Collection Time: 20  7:06 AM   Result Value Ref Range    POC Glucose 95 70 - 99 mg/dl    Performed on ACCU-CHEK        Therapy progress:  PT  Position Activity Restriction  Other position/activity restrictions: on telemetry, cardiac diet  Objective     Sit to Stand: Stand by assistance  Stand to sit: Stand by assistance  Device: Rolling Walker  Assistance: Contact guard assistance  Distance: 220', short distances in room, 310'  OT  PT Equipment Recommendations  Other: Currently using RW, states to only own cane at home, will continue to assess pending progress  Toilet - Technique: Ambulating(RW)  Equipment Used: Grab bars(comfort ht)  Toilet Transfers Comments: RW >< comfort ht toilet with R grab bar, window sill on R @ home          SLP:   Diagnosis: Pt presents with signs of cognitive communication deficit, noted in the areas of orientation, memory and problem solving. Additional testing is indicated to clarify these deficits as well as other high level tasks. Pt with possible mild pharyngeal dysphagia (see clinical swallow evaluation report) that will require follow up. Cognitive Diagnosis: Pt present with signs of decreased orientation, decreased memory and possible incomplete thought for verbal problem solving. Aphasia Diagnosis: No signs of aphasia  Speech Diagnosis: No signs of motor speech disorder  Communication Diagnosis: WFL for basic communication. Dysphagia Diagnosis: Mild pharyngeal stage dysphagia  Dysphagia Impression : Pt with   Dysphagia Outcome Severity Scale: Level 6: Within functional limits/Modified independence      Assessment and Plan:     Acute lacunar infarct within the left posterior limb of internal capsule,bilateral acute infarcts - aspirin,lipitor, plavix.       Hypertension- lisinopril      Diabetes type 2-  diet-controlled, SSI     Left vertebral artery stenosis, severe- aspirin,lipitor, plavix.     Noncompliance with medications     Bowels: Schedule Miralax + Senna S. Follow bowel movements.  Enema or suppository if needed.      Bladder: Check PVR x 3. [place] if PVR > 200ml or if any volume is > 500 ml.      Pain: tylenol is ordered prn.   Devika Arrington MD, 2/24/2020, 8:27 AM

## 2020-02-24 NOTE — PATIENT CARE CONFERENCE
[facility]  Inpatient Rehabilitation  Weekly Team Conference Note      Date: 2020  Patient Name:  Saqib Richard    MRN: [id number]  : 1940  Gender: Male   Physician:  [facility]  Diagnosis: Cerebral infarction, left hemisphere [facility]) [I63.9]    CASE MANAGEMENT  Assessment:    Goal is home. PHYSICAL THERAPY    Bed mobility  Rolling to Left: Modified independent  Rolling to Right: Modified independent  Supine to Sit: Modified independent  Sit to Supine: Modified independent  Scooting: Modified independent  Comment: therapy mat, HOB flat, no handrails, pt also able to achieve prone position VENU    Transfers:  Sit to Stand: Stand by assistance  Stand to sit: Stand by assistance  Car Transfer: Supervision  Comment: multiple sit to stand transfers throughout session, good recall of proper hand placement this date. Pt requires increased time, limited by fatigue this AM. Cues for safe hand placement with car transfer    Ambulation 1  Surface: level tile  Device: Single point cane  Assistance: Stand by assistance, Contact guard assistance  Quality of Gait: Pt limited by fatigue this session leading to overall decreased attention; decreased toe clearance with RLE during swing phase, causing him to lose his step multiple times and minor LOB with narrow BRAYDEN; one moderate LOB requiring CGA/BRANDT to correct; Some RLE ataxia during gait. decreased swing phase on RLE, decreased step height RLE.   Gait Deviations: Slow Leslee, Decreased step length, Decreased step height  Distance: 230' x 2, short distances in therapy gym  Comments: several LOB anteriorly caused by decreased toe clearance of RLE that required CGA to correct         Stairs  # Steps : 12  Stairs Height: 6\"  Rails: Bilateral  Curbs: 6\"  Device: No Device, Rolling walker  Assistance: Stand by assistance, Contact guard assistance  Comment: Pt ascend/descend stairs with reciprocal pattern, bilateral hand rails, SBA; ascend/descend curb Bathing: Supervision  LE Bathing: Stand by assistance(Cues to sit for LB dressing due to decreased balance.)  UE Dressing: Setup  LE Dressing: Contact guard assistance, Stand by assistance, Verbal cueing(Cues to sit for LB dressing.)  Toileting: (simulated CGA/SBA, declined need)  Additional Comments: Pt left seated in recliner with needs in reach, eating lunch after BP & blood sugar checked. Nurse/PCA ware. Bed mobility  Rolling to Left: Modified independent  Rolling to Right: Modified independent  Supine to Sit: Modified independent  Sit to Supine: Modified independent  Scooting: Modified independent  Comment: therapy mat, HOB flat, no handrails, pt also able to achieve prone position VENU    Functional Transfers: Toilet Transfers  Toilet - Technique: Ambulating(RW)  Equipment Used: Grab bars(comfort ht)  Toilet Transfer: Contact guard assistance, Stand by assistance  Toilet Transfers Comments: RW >< comfort ht toilet with R grab bar, window sill on R @ home     Shower Transfers  Shower - Transfer From: Nolon Daisha - Transfer Type: To and From  Shower - Transfer To: Shower seat with back  Shower - Technique: Ambulating  Shower Transfers: Contact Guard  Shower Transfers Comments: RW >< shower chair in stall CGA  +min cues    Perception  Overall Perceptual Status: (noted difficult linit up with chair when going to sit, cont to assess)         UE Function:  Min decreased coordination bilat hands as seen by 9 hole peg test, min difficulty with hand writing    Assessment: Pt tolerated session well. Pt required cues for safety as he is impulsive. NUTRITION  Most recent weightWeight: 113 lb 15.7 oz (51.7 kg)  BSA (Calculated - sq m): 1.42 sq meters  BMI (Calculated): 25.6  Diet Order: DIET CARDIAC;  PO Meals Eaten (%): 76 - 100%      NURSING  Can be incontinent of bladder, continent of bowel. Monitor and maintain skin integrity.   Family Education: Patient education: CVA, diabetic needs, safety and fall to allow patient to maximize independence with self-care activities. 2. Pt will improve safety awareness and demonstrate MOD I with functional transfers with LRAD to maximize independence with functional mobility. 3. Pt will demonstrate improved recall of daily events; new learning safety strategies and varied PS and d/c planning with set up; use of compensatory strategies and < mild assist  4. Patient goals : To be stable with walking and return to prior level of function before admission      Team Members Present at Conference:  Physician:Dr Burgess   : Bennie Balbuena    Occupational Therapist: ANDRE Puente/L [address] [place]  Physical Therapist: Carri Avery, PT # [id number]  Speech Therapist: Renetta Fontana. Sawyer,MS,CCC,  Nurse: Sean Osorio RN, CRRN  Dietician: Yasemin Oglesby RD, LD   CYNDY Wright        I led this team conference and I approve the established interdisciplinary plan of care as documented within the medical record of Alison Mckeon.     MD: Dr. Alejandro Gandhi

## 2020-02-24 NOTE — CARE COORDINATION
NIVIA reviewed chart. Bedside RN informed me of request to contact his sister, Lourdes Frazier [phone] who wanted information about his care, follow up. LSW met with patient to introduce  role, initiated discussion regarding DC planning and to inform of weekly team Conferences to review his progress. He prefers to be called Martina Mantilla. SOCIAL WORK ASSESSMENT      GOAL:   To return home with son. HOME SITUATION:  Patient and son live in a [address] [place]  [address] without any steps. They do have a cat in the home. Everything he needs is on the first floor. His other son lives close by and is a single parent. LSW informed him of request from his sister and he did give permission to contact her. He reports she lives in [place] and is concerned about her. He does drive. He reports he is independent at home with all personal care needs. They share household chores. He does his own finances. He does not have a pcp; has not taken medications for blood pressure in over two years. MEREDITHW provided him with [facility] Referral List of PCP MDs and directed him to his insurance card if he wants to obtain an outside MD.    LSW informed him of need for pcp to continue with his medications/scripts post discharge and a pcp is needed for home care services. He does assist with picking up his granddgtr from school a few days of the week and caring for her after school.         PRIOR LEVEL OF FUNCTIONING:       PERSONAL CARE:     Totally independent                                                                       DRIVES:   Yes                                                                     FINANCES:  Independent/does on line                                                                   MEALS:    indeptnt                             GROCERY SHOPS:      DME CURRENTLY AT HOME:  Straight cane, shower bar      CURRENT HOME CARE/SERVICES:  No current

## 2020-02-24 NOTE — PLAN OF CARE
Discharge Summary  Interventional Cardiology      Admit Date: 1/19/2017    Discharge Date:  1/19/2017    Attending Physician: Grey Us MD    Discharge Physician: Davon Rodriguez MD    Principal Diagnoses: Chest pain    Indication for Admission: [facility]    Discharged Condition: Good    Hospital Course:   Patient presented for outpatient LHC which went without complication. LHC was nonobstructive.    Outpatient Plan:  - F/U c PCP for w/u of non-coronary chest pain  - There were no medication changes    Diet: Cardiac diet    Activity: Ad zack, wound care instructions provided    Disposition: Home or Self Care    Discharge Medications:      Medication List      CONTINUE taking these medications          amiodarone 200 MG Tab   Commonly known as:  PACERONE   Take 1 tablet (200 mg total) by mouth once daily.       amlodipine 10 MG tablet   Commonly known as:  NORVASC   Take 1 tablet (10 mg total) by mouth once daily.       aspirin 81 MG EC tablet   Commonly known as:  ECOTRIN       atorvastatin 10 MG tablet   Commonly known as:  LIPITOR   Take 1 tablet (10 mg total) by mouth once daily.       carvedilol 6.25 MG tablet   Commonly known as:  COREG   Take 1 tablet (6.25 mg total) by mouth 2 (two) times daily with meals.       clopidogrel 75 mg tablet   Commonly known as:  PLAVIX   Take 1 tablet (75 mg total) by mouth once daily.       fish oil-omega-3 fatty acids 300-1,000 mg capsule       furosemide 40 MG tablet   Commonly known as:  LASIX       levothyroxine 75 MCG tablet   Commonly known as:  SYNTHROID       lisinopril 20 MG tablet   Commonly known as:  PRINIVIL,ZESTRIL   TAKE ONE TABLET BY MOUTH ONCE DAILY       magnesium 200 mg Tab       multivitamin capsule       nitroGLYCERIN 0.4 MG SL tablet   Commonly known as:  NITROSTAT   Place 1 tablet (0.4 mg total) under the tongue every 5 (five) minutes as needed for Chest pain.       omeprazole 20 MG capsule   Commonly known as:  PRILOSEC       SLO-NIACIN 750 mg Tbsr  Problem: Falls - Risk of:  Goal: Absence of physical injury  Description  Absence of physical injury  Outcome: Ongoing  Note:   Fall risk assessment completed. Fall precautions in place. Call light within reach. Pt educated on calling for assistance before getting up. Walkway free of clutter. Will continue to monitor. Problem: Daily Care:  Goal: Daily care needs are met  Description  Daily care needs are met  Outcome: Ongoing  Note:   Pt is A&O x4 and calls appropriately. Call light within reach at all times. RN/PCA doing hourly rounds. Needs are being met this shift. Will continue to monitor. Problem: Pain:  Goal: Patient's pain/discomfort is manageable  Description  Patient's pain/discomfort is manageable  Outcome: Ongoing  Note:   Pt assessed for pain. Pt denies any pain at this time. Will continue to monitor pt and assess for pain throughout rest of shift. Problem: HEMODYNAMIC STATUS  Goal: Patient has stable vital signs and fluid balance  Outcome: Ongoing  Note:   Pt daily weight and I&O being monitored. Pt currently on telemetry. VSS. CVA education being given. Will continue to educate and monitor. Problem: Skin Integrity:  Goal: Absence of new skin breakdown  Description  Absence of new skin breakdown  Outcome: Ongoing  Note:   Patient's skin was assessed. Skin is currently intact with scattered bruising. No new findings were noted. Patient is being educated on importance of turning/repostioning at least every two hours. RN will continue to educate and monitor. Problem: Serum Glucose Level - Abnormal:  Intervention: Monitor blood glucose measurement  Note:   Blood glucose levels being monitored and treated per order. Dietary restrictions noted and followed. Instructed on signs/symptoms of hypoglycemia and hyperglycemia. Will continue to educate and monitor.   Generic drug:  niacin   Take 1 tablet (750 mg total) by mouth 2 (two) times daily.       VITAMIN C 100 MG tablet   Generic drug:  ascorbic acid (vitamin C)       warfarin 5 MG tablet   Commonly known as:  COUMADIN

## 2020-02-24 NOTE — PROGRESS NOTES
essential hypertension, who presents to the hospital with right leg weakness. He reports history of essential hypertension but he has not been compliant with medication use for years. Patient diagnosed with acute CVA, likely secondary to uncontrolled hypertension. MRI-brain with acute lacunar infarct within the left posterior limb of internal capsule. Patient seen by neurology, and they found a right sided ataxia, 4+/5 strength in right arm and leg. Patient on aspirin and high-dose statin. Patient started therapies, but needs more therapy before discharge to home safely. Patient lives at home with his son in a 1 level condo, and the son works daily. (copied per note Dr Kavita Vizcaino 2/14/2020) Had syncopal episode while on rehab and was transferred to acute d/t extension of stroke, bradycardia and acute metabolic encephalopathy. \"workup revealed evidence of new cerebral infarctions occurring on his repeat MRI scan on 2/15/20, showing Increase in size of the previously noted infarct in the left posterior limb of the internal capsule, with numerous additional bilateral acute infarcts. 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 \" Per Dr Satnam Treadwell 2/21/20  Family / Caregiver Present: No  Referring Practitioner: Dr Kavita Vizcaino, Dr Nolberto Bear  Diagnosis: acute lacunar infarct left posterior limb internal capsule, syncope, acute metabolic encephalopathy  Subjective  Subjective: pt met in dept, agreeable to OT, no complaint of pain  General Comment  Comments:              Objective       Instrumental ADL's  Instrumental ADLs: Yes  Money Management  Money Management Level of Assistance: Supervision  Money Management: able to count 5/5 coins indep, 5/5 making change from one dollar, but took extra time on one (17 cents from one dollar).   Pt states he is very organized, collects coins at home     Balance  Sitting Balance: Independent  Standing Balance: Stand by assistance  Functional Mobility  Functional - Mobility Device: Rolling Walker  Assist Level: Contact guard assistance  Functional Mobility Comments: CGA/SBA with rolling walker to kitchen, then able to amb in kitchen 5 ft at a time without walker across the kitchen. He states he has a gally kitchen, dining area is close, but not in the kitchen. He is hopeful that he will not need the walker at discharge  Wheelchair Bed Transfers  Wheelchair/Bed - Technique: Ambulating  Equipment Used: Wheelchair  Level of Asssistance: Contact guard assistance;Stand by assistance  Wheelchair Transfers Comments: RW              Coordination  Fine Motor: able to  coins from the table with right and left hands. Able to write legibly, but states it is not as smooth or legible as his normal handwriting. He felt his glasses might help, but still coordination is below his norm                  PM session  Pt met in dept, agreeable to OT  Amb with rolling walker 10 ft to chair with arms at table, SBA/CGA. Seated in chair at table, completed cognitive and coordination activities    Cognition - medication management : Pt able to read prescription bottle, fill pill keeper with one mistake (placed 2 pills in one slot)  When instructed to check his work, he was able to find his mistake quickly. Coordination : Pt able to complete writing exercises with right hand with good consistency of shapes  Writing name, numbers 1-20, all legible, improving with fluidity  Red digiflex right hand, difficulty with D 5 as he had PTA - approx 20 reps     2 lb weighted ball, BUE - 10 reps overhead, chest to knees, chest press to improve core strengthening.     Pt to PT after OT                                   Plan   Plan  Times per week: 5-6  Times per day: Twice a day  Plan weeks: 1-1.5 weeks  Current Treatment Recommendations: Strengthening, Functional Mobility Training, Gait Training, Endurance Training, Balance Training, Neuromuscular Re-education, Safety Education & Training, Self-Care / ADL, Equipment Evaluation, Education, & procurement, Patient/Caregiver Education & Training, Home Management Training, Cognitive/Perceptual Training  Plan Comment: Wednesday conference               Goals  Short term goals  Time Frame for Short term goals: 1-1.5 weeks  Short term goal 1: Pt will groom in stance mod ind. Short term goal 2: Pt will bathe mod ind. with shower chair. Short term goal 3: Pt will dress mod ind. Short term goal 4: Pt will toilet mod ind. Short term goal 5: Pt will perform functional transfers/ADL mobility mod ind with AD. Short term goal 6: Pt will perform HEP/ther ex to improve BUE coord to use effectively for small object manipulation(pills/food packets) mod ind. Short term goal 7: Pt will participate cognitive tx to address IADL skills such as money/medication management to perform tasks with supervision/min cues. Short term goal 8: Pt will improve balance/safety/activity tolerance/to perform simple cooking task mod ind. Patient Goals   Patient goals : Pt stated his goal is to get home, do what he did before, be able to take care of his cat.   Above goals will work toward this goal.       Therapy Time   Individual Concurrent Group Co-treatment   Time In 1030         Time Out 1115         Minutes 45         Timed Code Treatment Minutes: 45 Minutes    Therapy Time     Individual Co-treatment   Time In [facility] 605, OTR/L 770

## 2020-02-25 LAB
CREAT SERPL-MCNC: 1.1 MG/DL (ref 0.8–1.3)
GFR AFRICAN AMERICAN: >60
GFR NON-AFRICAN AMERICAN: >60
GLUCOSE BLD-MCNC: 104 MG/DL (ref 70–99)
GLUCOSE BLD-MCNC: 120 MG/DL (ref 70–99)
GLUCOSE BLD-MCNC: 123 MG/DL (ref 70–99)
GLUCOSE BLD-MCNC: 96 MG/DL (ref 70–99)
GLUCOSE BLD-MCNC: 98 MG/DL (ref 70–99)
PERFORMED ON: ABNORMAL
PERFORMED ON: NORMAL
PERFORMED ON: NORMAL
PLATELET # BLD: 326 K/UL (ref 135–450)

## 2020-02-25 PROCEDURE — 6360000002 HC RX W HCPCS: Performed by: PHYSICAL MEDICINE & REHABILITATION

## 2020-02-25 PROCEDURE — 97535 SELF CARE MNGMENT TRAINING: CPT

## 2020-02-25 PROCEDURE — 92526 ORAL FUNCTION THERAPY: CPT

## 2020-02-25 PROCEDURE — 36415 COLL VENOUS BLD VENIPUNCTURE: CPT

## 2020-02-25 PROCEDURE — 97530 THERAPEUTIC ACTIVITIES: CPT

## 2020-02-25 PROCEDURE — 85049 AUTOMATED PLATELET COUNT: CPT

## 2020-02-25 PROCEDURE — 97130 THER IVNTJ EA ADDL 15 MIN: CPT

## 2020-02-25 PROCEDURE — 1280000000 HC REHAB R&B

## 2020-02-25 PROCEDURE — 97116 GAIT TRAINING THERAPY: CPT | Performed by: PHYSICAL THERAPIST

## 2020-02-25 PROCEDURE — 82565 ASSAY OF CREATININE: CPT

## 2020-02-25 PROCEDURE — 97116 GAIT TRAINING THERAPY: CPT

## 2020-02-25 PROCEDURE — 97129 THER IVNTJ 1ST 15 MIN: CPT

## 2020-02-25 PROCEDURE — 97110 THERAPEUTIC EXERCISES: CPT

## 2020-02-25 PROCEDURE — 6370000000 HC RX 637 (ALT 250 FOR IP): Performed by: PHYSICAL MEDICINE & REHABILITATION

## 2020-02-25 PROCEDURE — 97530 THERAPEUTIC ACTIVITIES: CPT | Performed by: PHYSICAL THERAPIST

## 2020-02-25 RX ADMIN — ASPIRIN 81 MG 81 MG: 81 TABLET ORAL at 09:21

## 2020-02-25 RX ADMIN — ATORVASTATIN CALCIUM 80 MG: 80 TABLET, FILM COATED ORAL at 21:39

## 2020-02-25 RX ADMIN — CLOPIDOGREL BISULFATE 75 MG: 75 TABLET ORAL at 09:21

## 2020-02-25 RX ADMIN — LISINOPRIL 20 MG: 20 TABLET ORAL at 09:21

## 2020-02-25 RX ADMIN — ENOXAPARIN SODIUM 40 MG: 40 INJECTION SUBCUTANEOUS at 09:21

## 2020-02-25 ASSESSMENT — PAIN SCALES - GENERAL: PAINLEVEL_OUTOF10: 0

## 2020-02-25 NOTE — PROGRESS NOTES
Occupational Therapy  Facility/Department: [address] REHAB  Daily Treatment Note  NAME: Aron Lopez  : 1940  MRN: [id number]    Date of Service: 2020    Discharge Recommendations:  S Level 1, Home with Home health OT, Home with assist PRN  OT Equipment Recommendations  Other: may need shower chair    Assessment   Performance deficits / Impairments: Decreased functional mobility ; Decreased ADL status; Decreased strength;Decreased safe awareness;Decreased endurance;Decreased high-level IADLs;Decreased balance;Decreased cognition;Decreased vision/visual deficit; Decreased coordination;Decreased sensation  Assessment: Pt tolerated session well. Pt required cues for safety as he is impulsive. Treatment Diagnosis: Impaired: ADL/IADL function, cognition, balance, activity tolerance, coordination  Prognosis: Good  History:  70 y.o. M admit 20 with ataxia and self reports of R leg weakness -- brain MRI 20: \"Acute lacunar infarct within the left posterior limb of internal capsule. \" Patient then had code called on 2/15/20 when he became unresponsive due to drop in BP. New MRI from 2-15 (+) Increasing size of the previously noted infarct in the left posterior limb of internal capsule. Pt re-admitted to rehab on 2020. PMH: uncontrolled DM, HTN, Rheumatic fever, falls, small vessel ischemia  REQUIRES OT FOLLOW UP: Yes  Activity Tolerance  Activity Tolerance: Patient Tolerated treatment well  Safety Devices  Type of devices: Call light within reach; Chair alarm in place; Left in chair;Gait belt         Patient Diagnosis(es): There were no encounter diagnoses. has a past medical history of Active rheumatic fever, Diabetes mellitus type 2, uncontrolled ([address] Essential hypertension, and Noncompliance with medications. has a past surgical history that includes Appendectomy.     Restrictions  Restrictions/Precautions  Restrictions/Precautions: Fall Risk  Position Activity Restriction  Other dressing due to decreased balance.)  UE Dressing: Setup  LE Dressing: Contact guard assistance;Stand by assistance;Verbal cueing(Cues to sit for LB dressing.)        Functional Mobility  Functional - Mobility Device: Rolling Walker  Activity: To/from bathroom  Assist Level: Contact guard assistance  Functional Mobility Comments: Pt requires cues to slow down. Pt with LOB catching right foot on the ground but recovered without assist.  Shower Transfers  Shower - Transfer From: [facility] - Transfer Type: To and From  Shower - Transfer To: Shower seat with back  Shower - Technique: Ambulating  Shower Transfers: Contact Guard  Wheelchair [employer] Transfers  Wheelchair/Bed - Technique: Ambulating  Equipment Used: Other(recliner)  Level of Asssistance: Contact guard assistance;Stand by assistance         PM Session: Pt seen in the department. Pt ambulated in the gym, ADL apartment using cane with CGA. Pt with one LOB but was able to correct. He retrieved clothing from the closet and transported to the chair using a cane. He was able to then place the clothing back on the hangers and place them into the closet. He completed with CGA. Pt reports he mainly heats up meals in the microwave and prepares sandwiches at home. Pt prepared coffee with cues for finding chamber for the water, and controls to operate the . He gathered coffee from the cabinet with CGA. Transfer to the chair with arms, recliner, and wheelchair with CGA. Completed sandbox for 3 minutes.        Plan   Plan  Times per week: 5-6  Times per day: Twice a day  Plan weeks: 1-1.5 weeks  Current Treatment Recommendations: Strengthening, Functional Mobility Training, Gait Training, Endurance Training, Balance Training, Neuromuscular Re-education, Safety Education & Training, Self-Care / ADL, Equipment Evaluation, Education, & procurement, Patient/Caregiver Education & Training, Home Management Training, Cognitive/Perceptual Training  Plan Comment: Wednesday conference    Goals  Short term goals  Time Frame for Short term goals: 1-1.5 weeks  Short term goal 1: Pt will groom in stance mod ind. Short term goal 2: Pt will bathe mod ind. with shower chair. Short term goal 3: Pt will dress mod ind. Short term goal 4: Pt will toilet mod ind. Short term goal 5: Pt will perform functional transfers/ADL mobility mod ind with AD. Short term goal 6: Pt will perform HEP/ther ex to improve BUE coord to use effectively for small object manipulation(pills/food packets) mod ind. Short term goal 7: Pt will participate cognitive tx to address IADL skills such as money/medication management to perform tasks with supervision/min cues. Short term goal 8: Pt will improve balance/safety/activity tolerance/to perform simple cooking task mod ind. Patient Goals   Patient goals : Pt stated his goal is to get home, do what he did before, be able to take care of his cat.   Above goals will work toward this goal.       Therapy Time   Individual Concurrent Group Co-treatment   Time In 0815         Time Out 0915         Minutes 60              Therapy Time     Individual Co-treatment   Time In 1300     Time Out 1345     Minutes [address]

## 2020-02-25 NOTE — PROGRESS NOTES
Patient admitted to rehab [facility]. A&Ox4. Pt currently on telemetry. Transfers with single-point cane x1 assist. On cardiac diet, tolerating well. Medications taken whole with thin liquids. On Lovenox and plavix for DVT prophylaxis. Skin intact with scattered bruising. On room air. Has been continent of bowel and incontinent of bladder. LBM 2/23/2020. Pt denies pain/discomfort. Chair/bed alarms in use and call light in reach. Will continue to monitor.

## 2020-02-25 NOTE — PROGRESS NOTES
Department of [facility] Progress Note    Patient Identification:  Kimberli Doan  [id number]  : 1940  Admit date: 2020      Diagnosis:   Patient Active Problem List   Diagnosis    Diabetes mellitus type 2, controlled ([facility] [address]    Essential hypertension    Acute CVA (cerebrovascular accident) ([facility] [address]    Noncompliance with medications    Stenosis of left vertebral artery    Diabetes mellitus with hyperglycemia ([facility] [address]    History of memory loss    Cerebral infarction, left hemisphere ([facility] [address]    Altered mental state    Bradycardia    Hypotensive syncope           Subjective: Pt seen this AM.  Patient worked in therapies yesterday to improve his transfers, gait, and self-care activities. Patient is able to perform his transfers with standby assist, and ambulate over 200 feet with contact-guard assist using a rolling walker. He still notices numbness and tingling involving his left arm and leg, and this is probably related to one of his new cerebral infarctions. He is being treated by speech therapy in his room this morning for high-level thinking skills. He will be discussed in rehab conference tomorrow.       BP (!) 158/82   Pulse 76   Temp 97.9 °F (36.6 °C) (Oral)   Resp 16   Ht 4' 8\" (1.422 m)   Wt 113 lb 15.7 oz (51.7 kg)   SpO2 99%   BMI 25.55 kg/m²     Last 24 hour lab  Recent Results (from the past 24 hour(s))   POCT Glucose    Collection Time: 20 12:09 PM   Result Value Ref Range    POC Glucose 97 70 - 99 mg/dl    Performed on ACCU-CHEK    POCT Glucose    Collection Time: 20  4:05 PM   Result Value Ref Range    POC Glucose 95 70 - 99 mg/dl    Performed on ACCU-CHEK    POCT Glucose    Collection Time: 20  8:22 PM   Result Value Ref Range    POC Glucose 111 (H) 70 - 99 mg/dl    Performed on ACCU-CHEK    POCT Glucose    Collection Time: 20  1:51 AM   Result Value Ref Range    POC Glucose 96 70 - 99 mg/dl    Performed on ACCU-CHEK POCT Glucose    Collection Time: 02/25/20  6:59 AM   Result Value Ref Range    POC Glucose 123 (H) 70 - 99 mg/dl    Performed on ACCU-CHEK    Platelet count    Collection Time: 02/25/20  7:15 AM   Result Value Ref Range    Platelets 519 155 - 531 K/uL       Therapy progress:  PT  Position Activity Restriction  Other position/activity restrictions: on telemetry, cardiac diet  Objective     Sit to Stand: Stand by assistance  Stand to sit: Stand by assistance  Device: Rolling Walker  Assistance: Stand by assistance, Contact guard assistance  Distance: 430' x 1, short distances in therapy gym  OT  PT Equipment Recommendations  Equipment Needed: No  Other: Currently using RW, states to only own cane at home, will continue to assess pending progress  Toilet - Technique: Ambulating(RW)  Equipment Used: Grab bars(comfort ht)  Toilet Transfers Comments: RW >< comfort ht toilet with R grab bar, window sill on R @ home          SLP:   Diagnosis: Pt presents with signs of cognitive communication deficit, noted in the areas of orientation, memory and problem solving. Additional testing is indicated to clarify these deficits as well as other high level tasks. Pt with possible mild pharyngeal dysphagia (see clinical swallow evaluation report) that will require follow up. Cognitive Diagnosis: Pt present with signs of decreased orientation, decreased memory and possible incomplete thought for verbal problem solving. Aphasia Diagnosis: No signs of aphasia  Speech Diagnosis: No signs of motor speech disorder  Communication Diagnosis: WFL for basic communication.   Dysphagia Diagnosis: Mild pharyngeal stage dysphagia  Dysphagia Impression : Pt with   Dysphagia Outcome Severity Scale: Level 6: Within functional limits/Modified independence      Assessment and Plan:     Acute lacunar infarct within the left posterior limb of internal capsule,bilateral acute infarcts - aspirin,lipitor, plavix.       Hypertension-

## 2020-02-25 NOTE — PROGRESS NOTES
PHYSICAL THERAPY  Progress Note   Second Session    Patient Name: Suleiman Red  Medical Record Number: [id number]    Treatment Diagnosis: Unsteady gait, abnormal gait, impaired balance, decreased safety insight      Chart Reviewed: Yes   Restrictions/Precautions: Fall Risk Other position/activity restrictions: on telemetry, cardiac diet   Additional Pertinent Hx: Kathi Paul is a 70 y.o. M admit 2/11/20 with ataxia and self reports of R leg weakness -- brain MRI 2/12/20: \"Acute lacunar infarct within the left posterior limb of internal capsule. \" Patient then had code called on 2/15/20 when he became unresponsive due to drop in BP. New MRI from 2-15 (+) Increasing size of the previously noted infarct in the left posterior limb of internal capsule. Pt re-admitted to rehab on 2/21/2020. Subjective  Patient denies pain, complain of feeling tired. Prefers to wheeled walker especially since he is fatigued and not yet comfortable using the cane. Objective: Pt ambulated 180' with RW, SBA. Pt was taking R turn into therapy gym and let RW roll forward in front of him and to the left. Pt feet began to adduct and cross, almost losing balance. This required min A to correct and pt was given directions on how to properly turn with RW, while staying inside. Pt completed car transfer with RW, supervision. Pt curious about proper hand placement and was given instruction on safest positioning for hands. Pt mentioned frequent nose bleeds from L nostril when blowing nose, was curious if could be related to stroke. Told pt to consult with nurses and physician so they can talk and possibly re-examine medications for possible side effects. Pt ambulated back to therapy gym with RW, 180', SBA. Pt again displayed LOB to the R when turning to the R with RW, letting it roll out forward and to the L. Required min A to correct.  Pt given another cue to stay inside RW when turning, and is allowed to  RW when turning if feels more comfortable. Pt completed x4 figure 8 patterns on uneven surface (carpet) with RW, SBA. Pt practice turning to the L and R with a rolling walker and was able to demonstrate safely staying inside with no LOB. Pt ambulated [de-identified]' with a single point cane (SPC), CGA. Pt lost sequence multiple times with SPC and was given verbal cues to correct, with one slight LOB anteriorly to the L which required CGA to correct. SPC was measured to correct height of pt L wrist. Pt and PT agreed that RW is safest form of ambulation at this time and will continue to progress to [facility] or [facility]. Pt experiencing a nose bleed at end of session, pt was taken back to room and placed in chair with call light, with chair alarm. RN was notified. Assessment: Pt tolerated session fair, continues to demonstrate impaired balance, strength B hips, and increased fatigue this session. Plan to continue to progress gait training with least restrictive assistive device (LRAD), balance, coordination, and strength for pt to become more independent with all functional mobility tasks for pt to safely return home with his son. Safety Device - Type of devices:  []  All fall risk precautions in place [] Bed alarm in place  [x] Call light within reach [x] Chair alarm in place [] Positioning belt [x] Gait belt [x] Patient at risk for falls [] Left in bed [x] Left in chair [] Telesitter in use [] Sitter present [x] Nurse notified []  None  Henry Florez RN, coming to patient's room as PT was leaving. Therapy Time   Individual Co-treatment   Time In 1803     Time Out 1430     Minutes 45         Electronically signed by Jayne Cisneros on 2/25/2020 at 2:27 PM   Therapist was present, directed the patient's care, made skilled judgement, and was responsible for assessment and treatment of the patient.          Electronically signed by Jameson Eid, PT #[id number] on 2/25/2020 at 2:50 PM

## 2020-02-25 NOTE — PROGRESS NOTES
Activity Restriction  Other position/activity restrictions: on telemetry, cardiac diet  Subjective   General  Chart Reviewed: Yes  Additional Pertinent Hx: Alexandria Taylor is a 70 y.o. M admit 2/11/20 with ataxia and self reports of R leg weakness -- brain MRI 2/12/20: \"Acute lacunar infarct within the left posterior limb of internal capsule. \" Patient then had code called on 2/15/20 when he became unresponsive due to drop in BP. New MRI from 2-15 (+) Increasing size of the previously noted infarct in the left posterior limb of internal capsule. Pt re-admitted to rehab on 2/21/2020. Response To Previous Treatment: Patient with no complaints from previous session. Family / Caregiver Present: No  Subjective  Subjective: Pt c/o increased fatigue this AM but no pain, states he did not sleep well last night, agreeable to treatment session. General Comment  Comments: Pt sitting in w/c at beginning of session.           Orientation  Orientation  Overall Orientation Status: Within Functional Limits    Social/Functional History  Lives With: Son(son works from 2p to 10p)  Type of Home: (condo)  Home Layout: One level  Home Access: Level entry  Bathroom Shower/Tub: Walk-in shower, Tub/Shower unit(uses tub shower)  Bathroom Toilet: Standard(window sill on R)  Bathroom Equipment: Grab bars in shower  Bathroom Accessibility: Walker accessible  Home Equipment: Cane(Need to check with son to confirm home DME, initial eval stating pt owns walker and alert button. )  ADL Assistance: Independent  Homemaking Assistance: Independent  Homemaking Responsibilities: Yes  Ambulation Assistance: Independent  Transfer Assistance: Independent  Active : Yes  Mode of Transportation: Car  Education: graduated [employer]  Occupation: Retired  Type of occupation:   Leisure & Hobbies: biking in neighborhood; rides [employer]; music (plays piano/organ/ strings), Sabianist  IADL Comments: pt states he was very active, enjoys

## 2020-02-25 NOTE — PROGRESS NOTES
Speech Language Pathology  ACUTE REHAB UNIT  SPEECH/LANGUAGE PATHOLOGY      [x] Daily  [x] Weekly Care Conference Note  [] Discharge    Patient:Rk Page  OBP:[id number]  Rehab Dx/Hx: Cerebral infarction, left hemisphere ([address] [I63.9]    Precautions:Fall risk; Memory/Orientation  Home situation: Lives with son who works  ST Dx: [] Aphasia  [] Dysarthria  [] Apraxia   [] Oropharyngeal dysphagia [x] Cognitive   Impairment  [] Other:   Initial Speech Therapy Assessment Diagnosis:   1. Cognitive Diagnosis: Pt present with signs of decreased orientation, decreased memory and possible incomplete thought for verbal problem solving. 2. Aphasia Diagnosis: No signs of aphasia  3. Speech Diagnosis: No signs of motor speech disorder  4. Communication Diagnosis: WFL for basic communication. 5. Dysphagia Diagnosis: Mild pharyngeal stage dysphagia. Dysphagia Impression : Pt with no overt signs of aspiration or penetration. Pt c/o sticking with solids and has a persistent throat clear with all PO  Follow up is indicated, however there are no indications pt is a safety risk for PO as his dysphagia is further monitored and clarified. His throat clear appears to be habitual per observation and pt report, but this should be confirmed.  REgular consistency diet was recommended per documentation  Date of Admit: 2/21/2020  Room #: K1E-9494/3263-01  Date: 2/25/2020       Current functional status (updated daily):         Current Diet Order:DIET CARDIAC;   Behavior: [x] Alert  [x] Cooperative  [x]  Pleasant  [] Confused  [] Agitated  [] Uncooperative  [] Distractible [] Motivated  [] Self-Limiting [] Anxious  [] Other:  Endurance:  [x] Adequate for participation in SLP sessions  [] Reduced overall  [] Lethargic  [] Other:  Safety: [] No concerns at this time  [] Reduced insight into deficits  []  Reduced safety awareness [] Not following call light procedures  [] Unable to Assess  [] Other:  Swallowing Precautions: Compensatory Swallowing Strategies: Upright as possible for all oral intake;Remain upright for 30-45 minutes after meals; Alternate solids and liquids  Barriers toward progress: none unless caregiver support is an issue           Date: 2/25/2020      Tx session 1 Tx session 2   Total Timed Code Min SLP Individual Minutes  Time In: 0730  Time Out: 6232  Minutes: 52  Coded treatment time  25   n/a   Group Treatment Minutes 0 0   Co-Treat Minutes 0 0   Variance/Reason:  n/a    Pain deined    Pain Intervention [] RN notified  [] Repositioned  [] Intervention offered and patient declined  [] N/A  [] Other: [] RN notified  [] Repositioned  [] Intervention offered and patient declined  [] N/A  [] Other:   Subjective     Pt seen bedside  Pt was in recliner for breakfast  Brighter affect  Alert; cooperative        Objective:  Goals       Dysphagia goal: Pt goal is to continue current diet    Therapy targeted pt goal      Dysphagia Goals:   1. The patient will tolerate recommended diet without observed clinical signs of aspiration Direct Dysphagia f/u at a meal  -thin liquids via cup: No immediate overt clinical s/s of penetration/aspiration. Occasional throat clearing    -soft and regular hard solid food consistencies: No immediate overt clinical s/s of penetration/aspiration. Occasional throat clearing      Pt with occasional complaints that certain foods get stuck pointing to lower neck. Trials of swallows while in chin tuck posture     Continue goal       2.  Pt will demonstrate improved swallow response with improved carryover with compensatory swallow strategies and tongue base/laryngeal elevation ex with set up and <mild cues New goal    -training in lingual hold and chin tuck posture before/during the swallow of liquids: overall decrease in throat clearing    -training in bolus formation and lingual hold with chin tuck posture before/during the swallow of foods: overall decrease in throat clearing    Tongue base retraction ex: x 10 reps    Laryngeal elevation ex via:  ·  pitch swing low to high with falsetto hold: x 10 reps  · Falsetto 'ee': > 5 seconds  · Glottals on expiratory effort x 4-5 reps    continue    Cognitive-communication goals  Pt goal is to return home; improve memory; care for self Therapy working toward pt goal    Goal 1: Pt will complete evaluation with assessment of reading, writing, functional calculations, high level reading and organization for medication management. Goal met. Additional assessment revealed deficits in executive function involving alternating/divided attention; memory and mental flexibility with multifaceted situations    n/a   Goal 2: Pt will recall functional information related to rehabilitation process and daily living - 80% accurate       REcall of new learning strategies (compensatory swallow posture; tongue base/laryngeal ex; cardiac diet and rationale; recall of adaptive equipment/fall risks and strategies were included in topic: set up; use of written cues and mild assist    Continue goal  n/a   Goal 3: Pt will demonstrate accurate orientation to self and time with independent use of external aids - 80%   Orientation:   -Without cues  Partially oriented to date/place/room number  -With written cues/verbal prompts:  -pt was oriented to place; admit DX; date; therapy schedule    Continue goal n/a   Other areas targeted:     Education:   Ongoing pt ed    Safety Devices: [x] Call light within reach  [x] Chair alarm activated  [] Bed alarm activated  [] Other:  [] Call light within reach  [] Chair alarm activated  [] Bed alarm activated  [] Other:    Progress Assessment: 2/24/2020: DARLING subtests completed as well as executive function. DARLING norms for individuals >72years of age utilized. Pt achieved a score profile of '1' indicating a high probability of IND for subtests:  Counting Money;  Solving Daily Math Problems; Writing a Check and Balancing a

## 2020-02-25 NOTE — PROGRESS NOTES
RN was notified by PT that pt started having nose bleed towards end of therapy session. Upon entering room to assess pt, pt was sitting up in chair, A&Ox4 and denied pain/discomfort. Pt's nose appeared to have stopped bleeding. Pt states that he's been having frequent nose bleeds ever since the CVA and that it's only been from the left nostril. Pt was given an ice pack and VSS. Will continue to monitor.

## 2020-02-26 LAB
GLUCOSE BLD-MCNC: 112 MG/DL (ref 70–99)
GLUCOSE BLD-MCNC: 89 MG/DL (ref 70–99)
GLUCOSE BLD-MCNC: 91 MG/DL (ref 70–99)
GLUCOSE BLD-MCNC: 98 MG/DL (ref 70–99)
PERFORMED ON: ABNORMAL
PERFORMED ON: NORMAL

## 2020-02-26 PROCEDURE — 1280000000 HC REHAB R&B

## 2020-02-26 PROCEDURE — 97130 THER IVNTJ EA ADDL 15 MIN: CPT

## 2020-02-26 PROCEDURE — 6360000002 HC RX W HCPCS: Performed by: PHYSICAL MEDICINE & REHABILITATION

## 2020-02-26 PROCEDURE — 6370000000 HC RX 637 (ALT 250 FOR IP): Performed by: PHYSICAL MEDICINE & REHABILITATION

## 2020-02-26 PROCEDURE — 97116 GAIT TRAINING THERAPY: CPT | Performed by: PHYSICAL THERAPIST

## 2020-02-26 PROCEDURE — 97129 THER IVNTJ 1ST 15 MIN: CPT

## 2020-02-26 PROCEDURE — 97110 THERAPEUTIC EXERCISES: CPT | Performed by: PHYSICAL THERAPIST

## 2020-02-26 PROCEDURE — 97530 THERAPEUTIC ACTIVITIES: CPT

## 2020-02-26 PROCEDURE — 92526 ORAL FUNCTION THERAPY: CPT

## 2020-02-26 PROCEDURE — 97112 NEUROMUSCULAR REEDUCATION: CPT

## 2020-02-26 PROCEDURE — 97535 SELF CARE MNGMENT TRAINING: CPT

## 2020-02-26 PROCEDURE — 97530 THERAPEUTIC ACTIVITIES: CPT | Performed by: PHYSICAL THERAPIST

## 2020-02-26 RX ADMIN — CLOPIDOGREL BISULFATE 75 MG: 75 TABLET ORAL at 08:43

## 2020-02-26 RX ADMIN — ENOXAPARIN SODIUM 40 MG: 40 INJECTION SUBCUTANEOUS at 08:43

## 2020-02-26 RX ADMIN — ATORVASTATIN CALCIUM 80 MG: 80 TABLET, FILM COATED ORAL at 21:38

## 2020-02-26 RX ADMIN — LISINOPRIL 20 MG: 20 TABLET ORAL at 08:43

## 2020-02-26 RX ADMIN — ASPIRIN 81 MG 81 MG: 81 TABLET ORAL at 08:43

## 2020-02-26 ASSESSMENT — PAIN SCALES - GENERAL
PAINLEVEL_OUTOF10: 0
PAINLEVEL_OUTOF10: 0

## 2020-02-26 NOTE — PROGRESS NOTES
Occupational Therapy  Facility/Department: [address] [facility] REH[facility]  Daily Treatment Note  NAME: Megan Joe  : 1940  MRN: [id number]    Date of Service: 2020    Discharge Recommendations:  S Level 1, Home with Home health OT, Home with assist PRN  OT Equipment Recommendations  Other: may need shower chair    Assessment   Performance deficits / Impairments: Decreased functional mobility ; Decreased ADL status; Decreased strength;Decreased safe awareness;Decreased endurance;Decreased high-level IADLs;Decreased balance;Decreased cognition;Decreased vision/visual deficit; Decreased coordination;Decreased sensation  Assessment: pt used rolling walker today, occassionally catches right foot on floor, but did not lose balance - demonstrates risk for falling. He was able to stand/ambulate approx 20 minutes for laundry activity with SBA/CGA for balance. Cues to set machine and also for pathfinding back to department. He is improving, but not yet back to baseline in balance or cognition for higher level tasks required for functioning independently. Anticipate pt will be ready for discharge early next week as his son works and will be alone at times. Treatment Diagnosis: Impaired: ADL/IADL function, cognition, balance, activity tolerance, coordination  Prognosis: Good  OT Education: IADL Safety  REQUIRES OT FOLLOW UP: Yes  Activity Tolerance  Activity Tolerance: Patient Tolerated treatment well  Safety Devices  Safety Devices in place: Yes  Type of devices: Gait belt(to room per transport at end of session)              has a past medical history of Active rheumatic fever, Diabetes mellitus type 2, uncontrolled ([address] Essential hypertension, and Noncompliance with medications. has a past surgical history that includes Appendectomy.     Restrictions  Restrictions/Precautions  Restrictions/Precautions: Fall Risk  Position Activity Restriction  Other position/activity restrictions: on telemetry, cardiac diet  Subjective   General  Chart Reviewed: Yes, Progress Notes  Patient assessed for rehabilitation services?: Yes  Additional Pertinent Hx: pt is a 65-year-old male with history of diabetes type 2, essential hypertension, who presents to the hospital with right leg weakness. He reports history of essential hypertension but he has not been compliant with medication use for years. Patient diagnosed with acute CVA, likely secondary to uncontrolled hypertension. MRI-brain with acute lacunar infarct within the left posterior limb of internal capsule. Patient seen by neurology, and they found a right sided ataxia, 4+/5 strength in right arm and leg. Patient on aspirin and high-dose statin. Patient started therapies, but needs more therapy before discharge to home safely. Patient lives at home with his son in a 1 level condo, and the son works daily. (copied per note Dr Heber Wilson 2/14/2020) Had syncopal episode while on rehab and was transferred to acute d/t extension of stroke, bradycardia and acute metabolic encephalopathy. \"workup revealed evidence of new cerebral infarctions occurring on his repeat MRI scan on 2/15/20, showing Increase in size of the previously noted infarct in the left posterior limb of the internal capsule, with numerous additional bilateral acute infarcts. 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 \" Per Dr Mari Clarke 2/21/20  Family / Caregiver Present: No  Referring Practitioner: Dr Heber Wilson, Dr David Blandon  Diagnosis: acute lacunar infarct left posterior limb internal capsule, syncope, acute metabolic encephalopathy  Subjective  Subjective: pt met in dept, agreeable to OT, no complaint of pain  General Comment  Comments:         Orientation  Orientation  Overall Orientation Status: Within Functional Limits(except day of week, thought it was thurs)  Objective       Instrumental ADL's  Instrumental ADLs: Yes  Light Housekeeping  Light Housekeeping Level: Walker  Light Housekeeping Level of Assistance: Stand by assistance  Light Housekeeping: Pt amb to his room from therapy dept, gathered clothing from closet, organized into clean and dirty  - placed dirty clothing into bag to hang on walker with cues of OT not to hold in his hand to carry. Amb to activity room, required SBA to place clothing in washer, cues to set machine due to being unfamiliar. He was able to remember name, date to write on ledger      Functional Mobility  Functional - Mobility Device: Rolling Walker  Activity: Retrieve items;Transport items; To/From therapy gym  Assist Level: Stand by assistance  Functional Mobility Comments: SBA for mobility from gym to his room, to activity room and back to dept , but required cues for pathfinding back to dept from laundry. Educated pt with cues for orienting to environment, will have him find areas again in PM session                                   Perception  Overall Perceptual Status: (pt able to complete pipe tree design without difficulty - determining correct size, color of pipe without difficulty)               PM session  Pt met bedside, agreeable to OT, laundry task  Pt required cues to find laundry area. Had min difficulty figuring out how to open washer, then was surprised the clothes were wet. Reminded pt that this was the washer (had just talked about putting the clothing from washer to dryer during this therapy session)  Placed clothes from washer to dryer with only SBA. Assisted to set dryer due to unfamiliar machine. Cues to find therapy dept eventhough we had done this in AM session demonstrating decreased memory  Coordination - placed pegs, washer, collars from tracey pegboard using right hand - only min decreased coordination. Was then able to remove all washers, holding 10 in his hand at a time, then collars, etc, only dropping 2 of 40 pieces. Handwriting - right hand with improved legibility, more fluid in writing.     Returned to laundry area with min cues for pathfinding. Removed clothing from dryer with SBA for balance, using top of dryer for support. Placed in bag, returned to room to fold clothes, min cues for pathfinding. Pt in recliner, chair alarm applied, call light and phone in reach, pt folding clothing when OT left, educated to leave folded clothes on bedside table. Plan   Plan  Times per week: 5-6  Times per day: Twice a day  Plan weeks: 1-1.5 weeks  Current Treatment Recommendations: Strengthening, Functional Mobility Training, Gait Training, Endurance Training, Balance Training, Neuromuscular Re-education, Safety Education & Training, Self-Care / ADL, Equipment Evaluation, Education, & procurement, Patient/Caregiver Education & Training, Home Management Training, Cognitive/Perceptual Training  Plan Comment: Wednesday conference               Goals  Short term goals  Time Frame for Short term goals: 1-1.5 weeks  Short term goal 1: Pt will groom in stance mod ind. Short term goal 2: Pt will bathe mod ind. with shower chair. Short term goal 3: Pt will dress mod ind. Short term goal 4: Pt will toilet mod ind. Short term goal 5: Pt will perform functional transfers/ADL mobility mod ind with AD. Short term goal 6: Pt will perform HEP/ther ex to improve BUE coord to use effectively for small object manipulation(pills/food packets) mod ind. Short term goal 7: Pt will participate cognitive tx to address IADL skills such as money/medication management to perform tasks with supervision/min cues. Short term goal 8: Pt will improve balance/safety/activity tolerance/to perform simple cooking task mod ind. Patient Goals   Patient goals : Pt stated his goal is to get home, do what he did before, be able to take care of his cat.   Above goals will work toward this goal.       Therapy Time   Individual Concurrent Group Co-treatment   Time In 1030         Time Out 1115         Minutes 45         Timed Code Treatment Minutes: 45 Minutes    Therapy Time     Individual Co-treatment   Time In 4203     Time Out 1345     Minutes [address]  Sally Sotelo, OTR/L [id number]

## 2020-02-26 NOTE — PLAN OF CARE
Problem: Falls - Risk of:  Goal: Will remain free from falls  Description  Will remain free from falls  Outcome: Ongoing  Note:   Patient free from falls this shift. Fall precautions in place at all times. Bed in lowest position with two side rails up and wheels locked. Call light within reach. Patient able and agreeable to contact for safety appropriately.

## 2020-02-26 NOTE — PROGRESS NOTES
Precautions: Compensatory Swallowing Strategies: Upright as possible for all oral intake;Remain upright for 30-45 minutes after meals; Alternate solids and liquids  Barriers toward progress: none unless caregiver support is an issue           Date: 2/26/2020      Tx session 1 Tx session 2   Total Timed Code Min SLP Individual Minutes  Time In: 0732  Time Out: 9950  Minutes: 39  Coded treatment time  25   n/a   Group Treatment Minutes 0 0   Co-Treat Minutes 0 0   Variance/Reason:  n/a    Pain deined    Pain Intervention [] RN notified  [] Repositioned  [] Intervention offered and patient declined  [] N/A  [] Other: [] RN notified  [] Repositioned  [] Intervention offered and patient declined  [] N/A  [] Other:   Subjective     Pt seen bedside  Pt was in bed for breakfast  Pt was receptive to getting out of bed to finish breakfast  Brighter affect  Alert; cooperative    Objective:  Goals       Dysphagia goal: Pt goal is to continue current diet    Therapy targeted pt goal      Dysphagia Goals:   1. The patient will tolerate recommended diet without observed clinical signs of aspiration Direct Dysphagia f/u at a meal    Reminders for compensatory head posture 'chin tuck' at onset and throughout the meal    -thin liquids via cup: No immediate overt clinical s/s of penetration/aspiration.     -soft and regular hard solid food consistencies: No immediate overt clinical s/s of penetration/aspiration. Overall when lingual hold; chin tuck posture strategy utilized reduced frequency of throat clearing         2. Pt will demonstrate improved swallow response with improved carryover with compensatory swallow strategies and tongue base/laryngeal elevation ex with set up and <mild cues -training in lingual hold and chin tuck posture before/during the swallow of liquids: overall decrease in throat clearing.  However pt required reminders for use of and sequence of head posture compensatory strategy    -training in bolus formation and lingual hold with chin tuck posture before/during the swallow of foods: overall decrease in throat clearing    Tongue base retraction ex: x 15 reps overall imrpoving    Laryngeal elevation ex via:  ·  pitch swing low to high with falsetto hold: x 15 reps  · BOT with pitch swing low to high with 2 second falsetto hold: x 15 reps improving  · Falsetto 'ee': > 10 second duration  · Glottals on expiratory effort x 4-5 reps  · riley introduced:  Effortful but improved within the session        Cognitive-communication goals  Pt goal is to return home; improve memory; care for self Therapy working toward pt goal    Goal 1: Pt will complete evaluation with assessment of reading, writing, functional calculations, high level reading and organization for medication management. Not targeted    n/a   Goal 2: Pt will recall functional information related to rehabilitation process and daily living - 80% accurate       REcall of new learning strategies (compensatory swallow posture; tongue base/laryngeal ex; cardiac diet and rationale; recall of adaptive equipment/fall risks and strategies were included in topic: set up; use of written cues and mild assist    Recall of current fall risks and adaptive equipment for SLP assisted transfer: Pt directed care for walker    Recall and self monitor for transfer and short ambulation to recliner: set up; intermittent cues for hand placement    Working memory for use of new learning swallow strategy: set up and reminders warranted    Recall of current meds: external cues      n/a   Goal 3: Pt will demonstrate accurate orientation to self and time with independent use of external aids - 80%   Orientation:   -Without cues  · Pt was oriented to place and reason for admit.   · Partially oriented to date/room number  -With written cues/verbal prompts:  Pt was oriented to place; full date; therapy schedule     n/a   Goal 4: Pt will demonstrate improved higher level attention/VPS and PS and reasoning and organization for functional tasks requiring executive function with set up and conditioning to task and <mild cues Mild complex VPS/PS incorporating new learning safety strategies and for mild complex PS tasks: set up; conditioning to task and minimal to mild cues    Other areas targeted:     Education:   Ongoing pt ed    Safety Devices: [x] Call light within reach  [x] Chair alarm activated  [] Bed alarm activated  [] Other:  [] Call light within reach  [] Chair alarm activated  [] Bed alarm activated  [] Other:    Progress Assessment: 2/24/2020: DARLING subtests completed as well as executive function. DARLING norms for individuals >72years of age utilized. Pt achieved a score profile of '1' indicating a high probability of IND for subtests:  Counting Money; Solving Daily Math Problems; Writing a Check and Balancing a Checkbook; Understanding Med Labels; Using a Calendar;Reading Instructions; Writing Phone Messages. Additional testing revealed: Pt with difficulty with alternating and divided attention tasks. These deficits may impact higher level executive function and driving. Will initaite therapy and discuss with OT   2/25/2020: Began training in compensatory swallow strategies and ex; and in memory strategies for new learning safety strategies  2/26/2020: Pt reportedly had a nose bleed 2/25/2020 pm (refer to PT and NSG documentation). No nose bleeds this am. Pt did discuss with MD    Plan: Continue as per plan of care.    Continued Tx Upon Discharge: ?    [] Yes [] No [x] TBD based on progress while on ARU [] Vital Stim indicated [] Other:   Estimated discharge date: TBD   Discharge recommendations:   [] Home independently  [x] Home with assistance []  24 hour supervision  [] ECF [] Other:     Additional information:     Interventions used during Rehab Stay:  [] Speech/Language Treatment  [] Instruction in HEP [] Group [x] Dysphagia Treatment [x] Cognitive Treatment   [] Other:    Adverse Reactions to Treatment/Significant Change in Status:       Electronically Signed by    Santiago Ling. MS Sawyer,CCC,SLP [id number]  Speech and Language Pathologist

## 2020-02-26 NOTE — PROGRESS NOTES
Physical Therapy  Facility/Department: [address] REHAB  Daily Treatment Note - AM and PM Sessions    NAME: Rik Sanchez  : 1940  MRN: [id number]    Date of Service: 2020    Discharge Recommendations:  Continue to assess pending progress, Patient would benefit from continued therapy after discharge   PT Equipment Recommendations  Other: Currently using RW, states to only own cane at home, will continue to assess pending progress    Assessment   Body structures, Functions, Activity limitations: Decreased functional mobility ; Decreased balance;Decreased endurance;Decreased coordination  Assessment: Pt tolerating activity better this AM than yesterday, continues to demonstrate impaired balance, decreased strength in RLE and some fatigue. Plan to continue to progress gait training with LRAD, however at this time pt is safest and requires least amount of assist with RW. Patient SBA/CGA with wheeled walker and up to CGA/mionimal assist with SPC. Pt will continue to benefit from therapies in the ARU to work on balance, gait, coordination of RLE and strength for pt to become more independent with all functional mobility and to safely return home with son. Plan for D/C on Tuesday, 3/3 with home therapy. Specific instructions for Next Treatment: balance and coordination activities, HEP   PT Education: Goals;PT Role;Plan of Care;General Safety;Equipment;Gait Training;Transfer Training;Functional Mobility Training  Patient Education: RW safety, SPC safety, hand placement for transfers  Activity Tolerance  Activity Tolerance: Patient limited by fatigue;Treatment limited secondary to medical complications (free text)     Patient Diagnosis(es): There were no encounter diagnoses. has a past medical history of Active rheumatic fever, Diabetes mellitus type 2, uncontrolled ([address] Essential hypertension, and Noncompliance with medications.    has a past surgical history that includes Appendectomy. Restrictions  Restrictions/Precautions  Restrictions/Precautions: Fall Risk  Position Activity Restriction  Other position/activity restrictions: on telemetry, cardiac diet  Subjective   Subjective  Subjective: Pt in wc at start of session, agreeable to therapy. Pt stating he is feeling more rested today vs. yesterday. Reporting no nosebleeds since last session. Orientation  Orientation  Overall Orientation Status: Within Functional Limits  Cognition      Objective      Transfers  Sit to Stand: Stand by assistance  Stand to sit: Stand by assistance  Comment: Multiple sit to stand transfers throughout session, good recall of proper hand placement this AM. Pt repeating to himself proper hand placement throughout session. Ambulation  Ambulation?: Yes  More Ambulation?: Yes  Ambulation 1  Surface: level tile;carpet;uneven  Device: Rolling Walker  Assistance: Stand by assistance;Contact guard assistance  Quality of Gait: Decreased toe clearance noted with RLE during swing phase, causing minor LOB with narrow BRAYDEN, specifically when crossing threshold from carpet to level tile. RLE ataxia noted during ambulation and on steps. Gait Deviations: Slow Leslee;Decreased step length;Decreased step height  Distance: 230' x 2, short distances in therapy gym, figure 8 pattern over carpeted surface  Comments: several LOB anteriorly caused by decreased toe clearance of RLE that required CGA to correct  Ambulation 2  Surface - 2: level tile  Device 2: Single point cane  Assistance 2: Contact guard assistance;Minimal assistance  Gait Deviations: Deviated path; Slow Leslee  Distance: 80' x 1   Comments: Multiple LOB with SPC when turning and with gait initiation, required min A to correct.    Ambulation 3  Surface - 3: level tile  Device 3: No device  Assistance 3: Contact guard assistance  Gait Deviations: Slow Leslee;Decreased step length;Decreased step height  Distance: 80' x 1   Comments: Pt performed

## 2020-02-26 NOTE — PROGRESS NOTES
[facility]    Patient Identification:  Maryhelen Lesches  [id number]  : 1940  Admit date: 2020      Diagnosis:   Patient Active Problem List   Diagnosis    Diabetes mellitus type 2, controlled ([facility]    Essential hypertension    Acute CVA (cerebrovascular accident) ([facility]    Noncompliance with medications    Stenosis of left vertebral artery    Diabetes mellitus with hyperglycemia ([facility]    History of memory loss    Cerebral infarction, left hemisphere ([facility]    Altered mental state    Bradycardia    Hypotensive syncope           Subjective: Pt seen this AM.  Patient will be discussed this morning in rehab conference with the entire rehab team that includes PT, OT, nursing, dietary, speech and rehab unit management to determine patient's progress and when patient will be ready for discharge.  We think he will be ready for discharge to home next Tuesday, with continued therapies at home at discharge. He now requires standby assistance for his transfers, gait, and going up and down 12 steps. He needs standby assist for his bathing and dressing activities, but is noted to have decreased memory. He did have loss of balance x2 in therapies yesterday. Speech is working with him on his cognition deficits and a decreased memory.       /69   Pulse 83   Temp 98 °F (36.7 °C) (Oral)   Resp 16   Ht 4' 8\" (1.422 m)   Wt 113 lb 15.7 oz (51.7 kg)   SpO2 98%   BMI 25.55 kg/m²     Last 24 hour lab  Recent Results (from the past 24 hour(s))   POCT Glucose    Collection Time: 20 11:34 AM   Result Value Ref Range    POC Glucose 98 70 - 99 mg/dl    Performed on ACCU-CHEK    POCT Glucose    Collection Time: 20  4:34 PM   Result Value Ref Range    POC Glucose 120 (H) 70 - 99 mg/dl    Performed on ACCU-CHEK    POCT Glucose    Collection Time: 20  9:34 PM   Result Value Ref Range    POC Glucose 104 (H) 70 - 99 mg/dl    Performed plavix.     Noncompliance with medications     Bowels: Schedule Miralax + Senna S. Follow bowel movements.  Enema or suppository if needed.      Bladder: Check PVR x 3. [place] if PVR > 200ml or if any volume is > 500 ml.      Pain: tylenol is ordered prn.   Raquel Solis MD, 2/26/2020, 7:35 AM

## 2020-02-26 NOTE — PROGRESS NOTES
Per Trudell Bumpers SW, pt was awaken for discussion. Pt was slow, mildly confused (required reminding of upcoming therapy session), and had a spasm along R arm and R leg. Pt is currently A+Ox4 in therapy, has no deficits/negative for facial droop, good PERRLA, denies SOB, Chest pain, and is in NSR. No new numbness or tingling. Assessment findings no different from this morning. Pt is stable, therapy notified and will monitor closely throughout session. Pt believes he was groggy from nap. Patient denies pain. Will continue to monitor.

## 2020-02-27 LAB
GLUCOSE BLD-MCNC: 103 MG/DL (ref 70–99)
GLUCOSE BLD-MCNC: 106 MG/DL (ref 70–99)
GLUCOSE BLD-MCNC: 131 MG/DL (ref 70–99)
GLUCOSE BLD-MCNC: 134 MG/DL (ref 70–99)
GLUCOSE BLD-MCNC: 68 MG/DL (ref 70–99)
PERFORMED ON: ABNORMAL

## 2020-02-27 PROCEDURE — 97535 SELF CARE MNGMENT TRAINING: CPT

## 2020-02-27 PROCEDURE — 92526 ORAL FUNCTION THERAPY: CPT

## 2020-02-27 PROCEDURE — 97130 THER IVNTJ EA ADDL 15 MIN: CPT

## 2020-02-27 PROCEDURE — 6370000000 HC RX 637 (ALT 250 FOR IP): Performed by: PHYSICAL MEDICINE & REHABILITATION

## 2020-02-27 PROCEDURE — 97530 THERAPEUTIC ACTIVITIES: CPT

## 2020-02-27 PROCEDURE — 1280000000 HC REHAB R&B

## 2020-02-27 PROCEDURE — 6360000002 HC RX W HCPCS: Performed by: PHYSICAL MEDICINE & REHABILITATION

## 2020-02-27 PROCEDURE — 97110 THERAPEUTIC EXERCISES: CPT

## 2020-02-27 PROCEDURE — 97129 THER IVNTJ 1ST 15 MIN: CPT

## 2020-02-27 PROCEDURE — 97116 GAIT TRAINING THERAPY: CPT

## 2020-02-27 RX ADMIN — CLOPIDOGREL BISULFATE 75 MG: 75 TABLET ORAL at 07:31

## 2020-02-27 RX ADMIN — LISINOPRIL 20 MG: 20 TABLET ORAL at 07:31

## 2020-02-27 RX ADMIN — ASPIRIN 81 MG 81 MG: 81 TABLET ORAL at 07:30

## 2020-02-27 RX ADMIN — ATORVASTATIN CALCIUM 80 MG: 80 TABLET, FILM COATED ORAL at 20:05

## 2020-02-27 RX ADMIN — ENOXAPARIN SODIUM 40 MG: 40 INJECTION SUBCUTANEOUS at 07:30

## 2020-02-27 ASSESSMENT — PAIN SCALES - GENERAL
PAINLEVEL_OUTOF10: 0

## 2020-02-27 NOTE — PROGRESS NOTES
Pt BS 68 upon returning from therapy, lunch already in room, pt ate 100% of food and regular lemon-lime soda with lunch on tray. Permission from MD not to finger stick pt again with 100% meal consumed. Will continue to monitor. Electronically signed by Hugo Love RN on 2/27/2020 at 1:13 PM     Dinner .  Electronically signed by Hugo Love RN on 2/27/2020 at 4:56 PM

## 2020-02-27 NOTE — PROGRESS NOTES
Pt up from chair to BR with cane and CGA. Voided urine standing at toilet. To sink to wash hands. To bed. Able to remove his shorts with SBA and socks and shoes with SBA. Able to get BLE's into bed. Call light in reach. Bed alarm on.

## 2020-02-27 NOTE — PROGRESS NOTES
Physical Therapy  Facility/Department: [address] IP REHAB  Daily Treatment Note  NAME: Roly Chung  : 1940  MRN: [id number]    Date of Service: 2020    Discharge Recommendations:  Continue to assess pending progress, Patient would benefit from continued therapy after discharge   PT Equipment Recommendations  Equipment Needed: Yes  Mobility Devices: Alysia Sor: Rolling  Other: Currently using RW, states to only own cane at home, will continue to assess pending progress    Assessment   Body structures, Functions, Activity limitations: Decreased functional mobility ; Decreased balance;Decreased endurance;Decreased coordination  Assessment: Pt tolerating activity well this AM, better balance and foot placement in gt with R LE, better heel strike with R LE. Pt performed standing balance ex with good balance and control. Plan to continue to progress gait training with LRAD, however at this time pt is safest and requires least amount of assist with RW. Pt will continue to benefit from therapies in the ARU to work on balance, gait, coordination of RLE and strength for pt to become more independent with all functional mobility and to safely return home with son. Treatment Diagnosis: Unsteady gait, abnormal gait, impaired balance, decreased safety insight  Specific instructions for Next Treatment: balance and coordination activities, HEP   REQUIRES PT FOLLOW UP: Yes  Activity Tolerance  Activity Tolerance: Patient Tolerated treatment well     Patient Diagnosis(es): There were no encounter diagnoses. has a past medical history of Active rheumatic fever, Diabetes mellitus type 2, uncontrolled ([address] Essential hypertension, and Noncompliance with medications. has a past surgical history that includes Appendectomy.     Restrictions  Restrictions/Precautions  Restrictions/Precautions: Fall Risk  Position Activity Restriction  Other position/activity restrictions: on telemetry, cardiac diet  Subjective General  Chart Reviewed: Yes  Additional Pertinent Hx: Cholo Mosher is a 70 y.o. M admit 2/11/20 with ataxia and self reports of R leg weakness -- brain MRI 2/12/20: \"Acute lacunar infarct within the left posterior limb of internal capsule. \" Patient then had code called on 2/15/20 when he became unresponsive due to drop in BP. New MRI from 2-15 (+) Increasing size of the previously noted infarct in the left posterior limb of internal capsule. Pt re-admitted to rehab on 2/21/2020. Response To Previous Treatment: Patient with no complaints from previous session. Family / Caregiver Present: No  Referring Practitioner: Ramo Hinton MD  Subjective  Subjective: Pt in wc at start of session, agreeable to therapy. Reports sleeping so well last night that he has had a hard time waking up this morning and feels a little shakey. Pain Screening  Patient Currently in Pain: Denies  Vital Signs  Patient Currently in Pain: Denies       Orientation     Cognition      Objective      Transfers  Sit to Stand: Stand by assistance;Supervision  Stand to sit: Stand by assistance;Supervision  Comment: Multiple sit to stand transfers throughout session, good recall of proper hand placement this AM.   Ambulation  Ambulation?: Yes  More Ambulation?: No  Ambulation 1  Surface: level tile;carpet;uneven  Device: Rolling Walker  Assistance: Stand by assistance  Quality of Gait: Better clearance nad heel strike with R LE in swing phase, occasional scuffing of R foot in swing but no toe drag.   Distance: 230' x 2, short distances in therapy gym  Stairs/Curb  Stairs?: Yes  Stairs  # Steps : 12  Stairs Height: 6\"  Rails: Bilateral  Curbs: 6\"  Device: No Device;Rolling walker  Assistance: Stand by assistance  Comment: Needed one cue for wh walker placement when approaching steps, recalled proper technique on curb step, pt ascends and descends reciprocally     Balance  Posture: Good  Sitting - Static: Good  Sitting - Dynamic: Good  Standing - Static: Good;-  Standing - Dynamic: Good;-  Other exercises  Other exercises?: Yes  Other exercises 1: standing bean bag toss at target with L hand support and no LOB or ataxia noted, standing gel ball toss with no UE support, no LOB, able to toss one ball and 2 balls at the same time x 20 reps  Other exercises 2: NuSTep seat and hand position 3, level 3, x 12 minutes, average SPM = 88 , HR = 101 after ex           Goals  Short term goals  Time Frame for Short term goals: In 7-10 days pt will:  Short term goal 1: bed mobility MI - met 2/25  Short term goal 2: transfers MI  Short term goal 3: ambulate 500' with/without device, MI  Short term goal 4: 12 steps with B rails, supervision   Patient Goals   Patient goals : To be stable with walking and return to prior level of function before admission     Plan    Plan  Times per week: 5-6x   Times per day: Twice a day  Plan weeks: 5 days from 2/26 - plan d/c Tues 3/3 pending insurance precert  Specific instructions for Next Treatment: balance and coordination activities, HEP   Current Treatment Recommendations: Strengthening, Transfer Training, Balance Training, Functional Mobility Training, Gait Training, Stair training, Safety Education & Training, Home Exercise Program, Endurance Training  Safety Devices  Type of devices: All fall risk precautions in place, Gait belt, Patient at risk for falls  Restraints  Initially in place: No     Therapy Time   Individual Concurrent Group Co-treatment   Time In 1115         Time Out 1200         Minutes [address] [facility] [place] [address]     PM session: sit to stand SBA, amb 375' with wh walker SBA, good stepping and no foot dragging R LE, no LOB, over 3 surface transitions and 2 180 degree turns.   Standing tapping 6\" cones with R and L foot, once had difficulty with R LE but then figured out correct technique and no other coordination difficulty, then crossed to tap cone in front of opposite foot, again needed to perform once with R LE to learn technique and then did not cause cone to wobble. (10 reps B feet each ex)  Standing beach ball kick with alternating feet no LOB and good coordination  Amb 175' x 2 to ortho gym SBA with wh walker, model car transfer S.    Standing ex: toe/heel lifts,  marching, mini squats, hip flexion, hip extension, hip abduction x 15. Pt requested to use commode, stood to urinate and managed clothing, washed hands all S. Amb with no AD, occasional scuffing of R foot in mid swing, no toe drag, no LOB, no uneven gait pattern. Yuly well, no c/o, no pain or dizziness. Returned to room per transport.   Second Session Therapy Time     Individual Co-treatment   Time In 0271     Time Out 1600     Minutes 45        Electronically signed by Adelaida Coffman, [id number] on 2/27/2020 at 4:00 PM

## 2020-02-27 NOTE — PROGRESS NOTES
Occupational Therapy  Facility/Department: [address] REHAB  Daily Treatment Note  NAME: Alison Mckeon  : 1940  MRN: [id number]    Date of Service: 2020    Discharge Recommendations:  S Level 1, Home with Home health OT, Home with assist PRN  OT Equipment Recommendations  Other: may need shower chair    Assessment   Performance deficits / Impairments: Decreased functional mobility ; Decreased ADL status; Decreased strength;Decreased safe awareness;Decreased endurance;Decreased high-level IADLs;Decreased balance;Decreased cognition;Decreased vision/visual deficit; Decreased coordination;Decreased sensation  Assessment: Pt continues to display impulsivity and decreased safety with walker during ADL. He completed bathing and dressing with SBA, cues for safety to decrease fall risk. Anticipate  pt will be ready for discharge early next week as discussed in conference, try to improve balance/safety to allow use of st cane for ADl/IADL tasks. Cont with plan  Treatment Diagnosis: Impaired: ADL/IADL function, cognition, balance, activity tolerance, coordination  Prognosis: Good  OT Education: ADL Adaptive Strategies; Energy Conservation  REQUIRES OT FOLLOW UP: Yes  Activity Tolerance  Activity Tolerance: Patient Tolerated treatment well  Safety Devices  Safety Devices in place: Yes  Type of devices: Call light within reach;Gait belt; Chair alarm in place; Left in chair              has a past medical history of Active rheumatic fever, Diabetes mellitus type 2, uncontrolled ([address] Essential hypertension, and Noncompliance with medications. has a past surgical history that includes Appendectomy.     Restrictions  Restrictions/Precautions  Restrictions/Precautions: Fall Risk  Position Activity Restriction  Other position/activity restrictions: on telemetry, cardiac diet  Subjective   General  Chart Reviewed: Yes, Progress Notes  Patient assessed for rehabilitation services?: Yes  Additional Pertinent Hx: pt is a 56-year-old male with history of diabetes type 2, essential hypertension, who presents to the hospital with right leg weakness. He reports history of essential hypertension but he has not been compliant with medication use for years. Patient diagnosed with acute CVA, likely secondary to uncontrolled hypertension. MRI-brain with acute lacunar infarct within the left posterior limb of internal capsule. Patient seen by neurology, and they found a right sided ataxia, 4+/5 strength in right arm and leg. Patient on aspirin and high-dose statin. Patient started therapies, but needs more therapy before discharge to home safely. Patient lives at home with his son in a 1 level condo, and the son works daily. (copied per note Dr Kavita Vizcaino 2/14/2020) Had syncopal episode while on rehab and was transferred to acute d/t extension of stroke, bradycardia and acute metabolic encephalopathy. \"workup revealed evidence of new cerebral infarctions occurring on his repeat MRI scan on 2/15/20, showing Increase in size of the previously noted infarct in the left posterior limb of the internal capsule, with numerous additional bilateral acute infarcts. 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 \" Per Dr Satnam Treadwell 2/21/20  Family / Caregiver Present: No  Referring Practitioner: Dr Kavita Vizcaino, Dr Nolberto Bear  Diagnosis: acute lacunar infarct left posterior limb internal capsule, syncope, acute metabolic encephalopathy  Subjective  Subjective: pt met bedside, finishing with speech therapy, agreeable to OT, shower  General Comment  Comments:         Orientation  Orientation  Overall Orientation Status: Within Functional Limits  Objective    ADL  UE Bathing: Supervision  LE Bathing: Stand by assistance(cues to sit for shower except to bathe buttocks.   Crosses legs to reach feet)  UE Dressing: Supervision(supervision/cues for set up)  LE Dressing: Stand by assistance(sat to thread shorts/pants, shoes and socks without cues today)  Additional Comments: wet towel under feet for non skid surface. Nurse stated heart monitor could be removed for shower. Replaced leads after shower. Pt chose to wear shorts and long pants - states he uses them like underwear eventhough he does have more underwear in closet. He placed shoes on prior to long pants, eventhough he stated prior to puttin on second shoe that he should put long pants on prior to shoes. Gathering items prior to shower and then carrying from shower required cues to use front of walker to carry vs        Balance  Sitting Balance: Independent  Standing Balance: Stand by assistance  Functional Mobility  Functional - Mobility Device: Rolling Walker  Activity: Retrieve items;Transport items; To/from bathroom  Assist Level: Stand by assistance  Functional Mobility Comments: cues to use front of walker to carry items to bathroom (pt does not carry over this technique during activities - requires cues each time)  Shower Transfers  Shower - Transfer From: [facility] - Transfer Type: To and From  Shower - Transfer To: Shower seat with back  Shower Transfers: Stand by assistance                             Cognition  Overall Cognitive Status: Exceptions  Memory: Decreased short term memory  Safety Judgement: Decreased awareness of need for assistance;Decreased awareness of need for safety  Problem Solving: Assistance required to implement solutions;Assistance required to generate solutions  Insights: Decreased awareness of deficits  Cognition Comment: cues for safe use of walker, still somewhat impulsive, decreased safety during ADL. PM session  Pt met in dept, finished with lunch, agreeable to OT  Amb with rolling walker with SBA to dept, transferred to chair at table with SBA. Divided attn - completed trailmaking A in 40 sec [facility]  Trailmaking B in 100 sec which is min below norm but not considered deficient.   He did have one mistake on trailmaking B where he connected 2 numbers vs alternating number/letter    IADL - kitchen task - pt amb to kitchen with walker, but then put walker to side and amb without device to gather items to prepare grilled cheese sandwich. Tolerated being up 15 min during task, used stovetop safely turning stove on/off and chosing correct burner. Pt carried plate with grilled cheese to his room, able to find room without cues - amb without device with close SBA due to occasionally scuffing right foot, but no LOB  Transferred to recliner, chair alarm applied, call light in reach, all needs met. Educated on next appt at 3;15 PT                          [address] [address]  Times per week: 5-6  Times per day: Twice a day  Plan weeks: 1-1.5 weeks  Current Treatment Recommendations: Strengthening, Functional Mobility Training, Gait Training, Endurance Training, Balance Training, Neuromuscular Re-education, Safety Education & Training, Self-Care / ADL, Equipment Evaluation, Education, & procurement, Patient/Caregiver Education & Training, Home Management Training, Cognitive/Perceptual Training  Plan Comment: Wednesday conference               Goals  Short term goals  Time Frame for Short term goals: 1-1.5 weeks  Short term goal 1: Pt will groom in stance mod ind. Short term goal 2: Pt will bathe mod ind. with shower chair. Short term goal 3: Pt will dress mod ind. Short term goal 4: Pt will toilet mod ind. Short term goal 5: Pt will perform functional transfers/ADL mobility mod ind with AD. Short term goal 6: Pt will perform HEP/ther ex to improve BUE coord to use effectively for small object manipulation(pills/food packets) mod ind. Short term goal 7: Pt will participate cognitive tx to address IADL skills such as money/medication management to perform tasks with supervision/min cues. Short term goal 8: Pt will improve balance/safety/activity tolerance/to perform simple cooking task mod ind.   Patient Goals   Patient goals : Pt stated his goal is to get home, do what he did before, be able to take care of his cat.   Above goals will work toward this goal.       Therapy Time   Individual Concurrent Group Co-treatment   Time In 0815         Time Out 0900         Minutes 45         Timed Code Treatment Minutes: 45 Minutes    Therapy Time     Individual Co-treatment   Time In 1305     Time Out 1400 [facility]     Minutes STEPHANIE AvilaR/L 770

## 2020-02-27 NOTE — PROGRESS NOTES
Pt awake and AAO sitting up in chair watching TV. Denies pain. Speech clear. Pt s/p CVA with R margaret. Strong hand grasp and foot push. No dysphagia. Lungs clear. No sob or cough. On RA> On telemetry. Belly flat and soft with active BS. LBM 2/25. Voids urine per urinal. No edema noted. Call light in reach. Bed alarm on.

## 2020-02-27 NOTE — PROGRESS NOTES
Department of [facility]  Dr. Fabian Franklin Progress Note    Patient Identification:  Janes Joyce  [id number]  : 1940  Admit date: 2020      Diagnosis:   Patient Active Problem List   Diagnosis    Diabetes mellitus type 2, controlled ([facility]    Essential hypertension    Acute CVA (cerebrovascular accident) ([facility]    Noncompliance with medications    Stenosis of left vertebral artery    Diabetes mellitus with hyperglycemia ([facility]    History of memory loss    Cerebral infarction, left hemisphere ([facility]    Altered mental state    Bradycardia    Hypotensive syncope           Subjective: Pt seen this AM.  Pt seen this AM.  Patient was discussed yesterday in rehab conference with the entire rehab team, and we think patient will be ready for discharge to home next Tuesday, with continued therapies at home at discharge. He requires standby assistance for his transfers, gait, and going up and down 12 steps, but he did have loss of balance yesterday with turning and when his right toe caught on the floor. He is noted to have decreased memory. Speech is working with him on his cognition deficits and a decreased memory.       BP (!) 172/75   Pulse 87   Temp 97.1 °F (36.2 °C) (Oral)   Resp 16   Ht 4' 8\" (1.422 m)   Wt 112 lb 14 oz (51.2 kg)   SpO2 99%   BMI 25.31 kg/m²     Last 24 hour lab  Recent Results (from the past 24 hour(s))   POCT Glucose    Collection Time: 20  7:25 AM   Result Value Ref Range    POC Glucose 112 (H) 70 - 99 mg/dl    Performed on ACCU-CHEK    POCT Glucose    Collection Time: 20 11:23 AM   Result Value Ref Range    POC Glucose 89 70 - 99 mg/dl    Performed on ACCU-CHEK    POCT Glucose    Collection Time: 20  4:46 PM   Result Value Ref Range    POC Glucose 91 70 - 99 mg/dl    Performed on ACCU-CHEK    POCT Glucose    Collection Time: 20  8:15 PM   Result Value Ref Range    POC Glucose 98 70 - 99 mg/dl    Performed on ACCU-CHEK    POCT Glucose    Collection Time: 02/27/20  2:09 AM   Result Value Ref Range    POC Glucose 103 (H) 70 - 99 mg/dl    Performed on ACCU-CHEK        Therapy progress:  PT  Position Activity Restriction  Other position/activity restrictions: on telemetry, cardiac diet  Objective     Sit to Stand: Stand by assistance  Stand to sit: Stand by assistance  Device: Rolling Walker  Assistance: Stand by assistance, Contact guard assistance  Distance: 230' x 2, short distances in therapy gym, figure 8 pattern over carpeted surface  OT  PT Equipment Recommendations  Equipment Needed: No  Other: Currently using RW, states to only own cane at home, will continue to assess pending progress  Toilet - Technique: Ambulating(RW)  Equipment Used: Grab bars(comfort ht)  Toilet Transfers Comments: RW >< comfort ht toilet with R grab bar, window sill on R @ home          SLP:   Diagnosis: Pt presents with signs of cognitive communication deficit, noted in the areas of orientation, memory and problem solving. Additional testing is indicated to clarify these deficits as well as other high level tasks. Pt with possible mild pharyngeal dysphagia (see clinical swallow evaluation report) that will require follow up. Cognitive Diagnosis: Pt present with signs of decreased orientation, decreased memory and possible incomplete thought for verbal problem solving. Aphasia Diagnosis: No signs of aphasia  Speech Diagnosis: No signs of motor speech disorder  Communication Diagnosis: WFL for basic communication.   Dysphagia Diagnosis: Mild pharyngeal stage dysphagia  Dysphagia Impression : Pt with   Dysphagia Outcome Severity Scale: Level 6: Within functional limits/Modified independence      Assessment and Plan:     Acute lacunar infarct within the left posterior limb of internal capsule,bilateral acute infarcts - aspirin,lipitor, plavix.       Hypertension- lisinopril      Diabetes type 2-  diet-controlled, SSI     Left vertebral artery stenosis, severe- aspirin,lipitor, plavix.     Noncompliance with medications     Bowels: Schedule Miralax + Senna S. Follow bowel movements.  Enema or suppository if needed.      Bladder: Check PVR x 3. [place] if PVR > 200ml or if any volume is > 500 ml.      Pain: tylenol is ordered prn.   Spencer Shea MD, 2/27/2020, 7:22 AM

## 2020-02-28 LAB
ANION GAP SERPL CALCULATED.3IONS-SCNC: 13 MMOL/L (ref 3–16)
BUN BLDV-MCNC: 18 MG/DL (ref 7–20)
CALCIUM SERPL-MCNC: 8.8 MG/DL (ref 8.3–10.6)
CHLORIDE BLD-SCNC: 104 MMOL/L (ref 99–110)
CO2: 23 MMOL/L (ref 21–32)
CREAT SERPL-MCNC: 1 MG/DL (ref 0.8–1.3)
GFR AFRICAN AMERICAN: >60
GFR NON-AFRICAN AMERICAN: >60
GLUCOSE BLD-MCNC: 109 MG/DL (ref 70–99)
GLUCOSE BLD-MCNC: 111 MG/DL (ref 70–99)
GLUCOSE BLD-MCNC: 112 MG/DL (ref 70–99)
GLUCOSE BLD-MCNC: 112 MG/DL (ref 70–99)
GLUCOSE BLD-MCNC: 116 MG/DL (ref 70–99)
GLUCOSE BLD-MCNC: 127 MG/DL (ref 70–99)
HCT VFR BLD CALC: 39.3 % (ref 40.5–52.5)
HEMOGLOBIN: 13.3 G/DL (ref 13.5–17.5)
MAGNESIUM: 2.1 MG/DL (ref 1.8–2.4)
MCH RBC QN AUTO: 30.1 PG (ref 26–34)
MCHC RBC AUTO-ENTMCNC: 33.7 G/DL (ref 31–36)
MCV RBC AUTO: 89.3 FL (ref 80–100)
PDW BLD-RTO: 14.1 % (ref 12.4–15.4)
PERFORMED ON: ABNORMAL
PLATELET # BLD: 275 K/UL (ref 135–450)
PMV BLD AUTO: 7 FL (ref 5–10.5)
POTASSIUM SERPL-SCNC: 4.5 MMOL/L (ref 3.5–5.1)
RBC # BLD: 4.4 M/UL (ref 4.2–5.9)
SODIUM BLD-SCNC: 140 MMOL/L (ref 136–145)
WBC # BLD: 7.1 K/UL (ref 4–11)

## 2020-02-28 PROCEDURE — 85027 COMPLETE CBC AUTOMATED: CPT

## 2020-02-28 PROCEDURE — 97530 THERAPEUTIC ACTIVITIES: CPT

## 2020-02-28 PROCEDURE — 36415 COLL VENOUS BLD VENIPUNCTURE: CPT

## 2020-02-28 PROCEDURE — 97129 THER IVNTJ 1ST 15 MIN: CPT

## 2020-02-28 PROCEDURE — 97535 SELF CARE MNGMENT TRAINING: CPT

## 2020-02-28 PROCEDURE — 97110 THERAPEUTIC EXERCISES: CPT | Performed by: PHYSICAL THERAPIST

## 2020-02-28 PROCEDURE — 6370000000 HC RX 637 (ALT 250 FOR IP): Performed by: PHYSICAL MEDICINE & REHABILITATION

## 2020-02-28 PROCEDURE — 97112 NEUROMUSCULAR REEDUCATION: CPT

## 2020-02-28 PROCEDURE — 6360000002 HC RX W HCPCS: Performed by: PHYSICAL MEDICINE & REHABILITATION

## 2020-02-28 PROCEDURE — 1280000000 HC REHAB R&B

## 2020-02-28 PROCEDURE — 97530 THERAPEUTIC ACTIVITIES: CPT | Performed by: PHYSICAL THERAPIST

## 2020-02-28 PROCEDURE — 97130 THER IVNTJ EA ADDL 15 MIN: CPT

## 2020-02-28 PROCEDURE — 97116 GAIT TRAINING THERAPY: CPT | Performed by: PHYSICAL THERAPIST

## 2020-02-28 PROCEDURE — 83735 ASSAY OF MAGNESIUM: CPT

## 2020-02-28 PROCEDURE — 80048 BASIC METABOLIC PNL TOTAL CA: CPT

## 2020-02-28 RX ADMIN — ASPIRIN 81 MG 81 MG: 81 TABLET ORAL at 10:51

## 2020-02-28 RX ADMIN — LISINOPRIL 20 MG: 20 TABLET ORAL at 10:50

## 2020-02-28 RX ADMIN — ATORVASTATIN CALCIUM 80 MG: 80 TABLET, FILM COATED ORAL at 21:06

## 2020-02-28 RX ADMIN — CLOPIDOGREL BISULFATE 75 MG: 75 TABLET ORAL at 10:51

## 2020-02-28 RX ADMIN — ENOXAPARIN SODIUM 40 MG: 40 INJECTION SUBCUTANEOUS at 10:50

## 2020-02-28 ASSESSMENT — PAIN SCALES - GENERAL
PAINLEVEL_OUTOF10: 0
PAINLEVEL_OUTOF10: 0

## 2020-02-28 ASSESSMENT — 9 HOLE PEG TEST
TEST_RESULT: FUNCTIONAL
TESTTIME_SECONDS: 23.26
TESTTIME_SECONDS: 22.92
TEST_RESULT: FUNCTIONAL

## 2020-02-28 NOTE — PROGRESS NOTES
Department of [facility]  Dr. Fabian Franklin Progress Note    Patient Identification:  Janes Joyce  [id number]  : 1940  Admit date: 2020      Diagnosis:   Patient Active Problem List   Diagnosis    Diabetes mellitus type 2, controlled ([address]    Essential hypertension    Acute CVA (cerebrovascular accident) ([address]    Noncompliance with medications    Stenosis of left vertebral artery    Diabetes mellitus with hyperglycemia ([address]    History of memory loss    Cerebral infarction, left hemisphere ([address]    Altered mental state    Bradycardia    Hypotensive syncope           Subjective: Pt seen this AM.  Patient is seen working with speech therapy this morning o improving his higher level cognition skills  He states he did well in therapies yesterday with his balance, and he was moving more slowly to make sure he did not lose his balance. We think patient will be ready for discharge to home next Tuesday, with continued therapies at home at discharge. He requires supervision to standby assistance for his transfers, gait, and going up and down 12 steps. Repeat labs this morning looked good and are stable. His blood sugars are under control.     BP (!) 154/68   Pulse 66   Temp 97.3 °F (36.3 °C) (Oral)   Resp 16   Ht 4' 8\" (1.422 m)   Wt 113 lb 12.1 oz (51.6 kg)   SpO2 98%   BMI 25.50 kg/m²     Last 24 hour lab  Recent Results (from the past 24 hour(s))   POCT Glucose    Collection Time: 20 11:58 AM   Result Value Ref Range    POC Glucose 68 (L) 70 - 99 mg/dl    Performed on ACCU-CHEK    POCT Glucose    Collection Time: 20  4:40 PM   Result Value Ref Range    POC Glucose 131 (H) 70 - 99 mg/dl    Performed on ACCU-CHEK    POCT Glucose    Collection Time: 20  9:01 PM   Result Value Ref Range    POC Glucose 134 (H) 70 - 99 mg/dl    Performed on ACCU-CHEK    POCT Glucose    Collection Time: 20 12:20 AM   Result Value Ref Range    POC Glucose 112 (H) 70 - 99

## 2020-02-28 NOTE — PROGRESS NOTES
Speech Language Pathology  ACUTE REHAB UNIT  SPEECH/LANGUAGE PATHOLOGY      [x] Daily  [] Weekly Care Conference Note  [] Discharge    Patient:Rk Camacho      DCQ:[birth year]  ELY:[id number]  Rehab Dx/Hx: Cerebral infarction, left hemisphere ([facility] [address] [I63.9]    Precautions:Fall risk; Memory/Orientation  Home situation: Lives with son who works  ST Dx: [] Aphasia  [] Dysarthria  [] Apraxia   [x] Oropharyngeal dysphagia [x] Cognitive   Impairment  [] Other:   Initial Speech Therapy Assessment Diagnosis:   1. Cognitive Diagnosis: Pt present with signs of decreased orientation, decreased memory and possible incomplete thought for verbal problem solving. 2. Aphasia Diagnosis: No signs of aphasia  3. Speech Diagnosis: No signs of motor speech disorder  4. Communication Diagnosis: WFL for basic communication. 5. Dysphagia Diagnosis: Mild pharyngeal stage dysphagia. Dysphagia Impression : Pt with no overt signs of aspiration or penetration. Pt c/o sticking with solids and has a persistent throat clear with all PO  Follow up is indicated, however there are no indications pt is a safety risk for PO as his dysphagia is further monitored and clarified. His throat clear appears to be habitual per observation and pt report, but this should be confirmed.  REgular consistency diet was recommended per documentation  Date of Admit: 2/21/2020  Room #: Y3Y-1549/3263-01  Date: 2/28/2020       Current functional status (updated daily):         Current Diet Order:DIET CARDIAC;   Behavior: [x] Alert  [x] Cooperative  [x]  Pleasant  [] Confused  [] Agitated  [] Uncooperative  [] Distractible [] Motivated  [] Self-Limiting [] Anxious  [] Other:  Endurance:  [x] Adequate for participation in SLP sessions  [] Reduced overall  [] Lethargic  [] Other:  Safety: [] No concerns at this time  [] Reduced insight into deficits  []  Reduced safety awareness [] Not following call light procedures  [] Unable to Assess  [] Other:  Swallowing transport returned pt to room [] Call light within reach  [] Chair alarm activated  [] Bed alarm activated  [] Other:    Progress Assessment: 2/24/2020: DARLING subtests completed as well as executive function. DARLING norms for individuals >72years of age utilized. Pt achieved a score profile of '1' indicating a high probability of IND for subtests:  Counting Money; Solving Daily Math Problems; Writing a Check and Balancing a Checkbook; Understanding Med Labels; Using a Calendar;Reading Instructions; Writing Phone Messages. Additional testing revealed: Pt with difficulty with alternating and divided attention tasks. These deficits may impact higher level executive function and driving. Will initaite therapy and discuss with OT   2/25/2020: Began training in compensatory swallow strategies and ex; and in memory strategies for new learning safety strategies  2/26/2020: Pt reportedly had a nose bleed 2/25/2020 pm (refer to PT and NSG documentation). No nose bleeds this am. Pt did discuss with MD  2/28/2020: Ins reportedly approved pt to Tuesday 3/3/2020    Plan: Continue as per plan of care. Continued Tx Upon Discharge: ?    [] Yes [] No [x] TBD based on progress while on ARU [] Vital Stim indicated [] Other:   Estimated discharge date: 3/3/2020   Discharge recommendations:   [] Home independently  [x] Home with assistance []  24 hour supervision  [] ECF [] Other:     Additional information:     Interventions used during Rehab Stay:  [] Speech/Language Treatment  [] Instruction in HEP [] Group [x] Dysphagia Treatment [x] Cognitive Treatment   [] Other:    Adverse Reactions to Treatment/Significant Change in Status:       Electronically Signed by    Tesha White. Sawyer,MS,CCC,SLP [id number]  Speech and Language Pathologist

## 2020-02-28 NOTE — PLAN OF CARE
Problem: Falls - Risk of:  Goal: Will remain free from falls  Description  Will remain free from falls  Outcome: Ongoing  Note:   Fall precautions in place. Westbrook score assessed. Pt transfers with cane/gaitbelt x1. Bed locked in lowest position, nonskid socks on, bed/chair alarm active. Call light and belongings within reach. Rounding hourly to assess needs. Problem: Daily Care:  Goal: Daily care needs are met  Description  Daily care needs are met  Outcome: Ongoing       Problem: Pain:  Goal: Patient's pain/discomfort is manageable  Description  Patient's pain/discomfort is manageable  Outcome: Ongoing  Note:   Pt. able to communicate pain needs based on 0-10 pain scale. Non-pharmacological interventions utilized and PRN pain medications administered as needed. Continue to monitor and assess. Problem: Skin Integrity:  Goal: Skin integrity will stabilize  Description  Skin integrity will stabilize  Outcome: Ongoing  Note:   Skin assessment performed. Gal score assessed. Encouraging and assisted with repositioning as needed. Heels elevated while in chair/bed. Use of waffle cushion while up to chair. Toileting offered during rounding and assisted with care as needed. Problem: HEMODYNAMIC STATUS  Goal: Patient has stable vital signs and fluid balance  Outcome: Ongoing  Note:   Monitoring patient vital signs per protocol. Monitoring intake and output throughout shift. Daily weights performed.

## 2020-02-28 NOTE — PROGRESS NOTES
without RW. Pt navigated 6\" curb step with SBA. Balance  Posture: Good  Sitting - Static: Good  Sitting - Dynamic: Good  Standing - Static: Good  Standing - Dynamic: Good  Comments: Pt completed two trials of a 4 square step test with the second trial being timed at 17.8 seconds   Other exercises  Other exercises 1: standing bean bag toss at target with both L and R hand, no UE support. No LOB or ataxia noted, does use steeping and ankle strategies to maintain balance and to counter swaying. Pt was able to pick the beanbags off the floor with SBA. Able to toss 2 gel balls at once x20 reps with no LOB    Second Session  S/ Patient agreeable to therapy with no pain. O patient ambulated 400' with occasional shuffling noted with decreased arm swing on R side with SBA, moves quickly with cues to slow down. Patient given HEP as follows- bridges x 15, marches in supine x 20, SLR x 15 B LE, clam shells x 15, hip abduction x 15 B LE, noticeable ataxia R LE  Performed balance activity in quadriped position arm lift bilaterally x 5, then lift B LE x 10 with SBA/CGA  Patient able to perform bed mobility with MI  Performed standing sidesteps 20' x 4 towards R and L with no LOB, forward and backwards 20' x 3 each direction- patient with LOB backwards with min/mod assist to correct, moves quickly cues to slow down with no LOB. Patient return to room transfer to recliner with SBA, with chair alarm and call light in reach. Goals  Short term goals  Time Frame for Short term goals: In 7-10 days pt will:  Short term goal 1: bed mobility MI - met 2/25  Short term goal 2: transfers MI  Short term goal 3: ambulate 500' with/without device, MI  Short term goal 4: 12 steps with B rails, supervision   Patient Goals   Patient goals :  To be stable with walking and return to prior level of function before admission     Plan    Plan  Times per week: 5-6x   Times per day: Twice a day  Plan weeks: 5 days from 2/26 - plan d/c Tues 3/3

## 2020-02-28 NOTE — PROGRESS NOTES
Improved with balance today, less impulsive. He did veer min to right while turning corner coming out of bathroom, bumped doorframe, but no LOB                             Cognition  Overall Cognitive Status: Exceptions  Memory: Decreased short term memory  Insights: Decreased awareness of deficits  Cognition Comment: Pt had no memory of completing 9 hole peg test 6 days ago. Completed Rush Hour strategical task with cues - difficulty analyzing movement of pieces      Perception  Overall Perceptual Status: (completed figura pattern 1 without difficulty, #2 with one cues, #3 without cues, but some trial and error)                       Left Hand Strength -  (lbs)  Handle Setting 2: 55, 55, 52  Left 9-Hole Peg Test  Left 9-Hole Peg Test: Functional  Right Hand Strength -  (lbs)  Handle Setting 2: 58,55, 58  Right 9-Hole Peg Test  Right 9-Hole Peg Test: Functional  Fine Motor Skills  Left 9-Hole Peg Test: Functional  Left 9 Hole Peg Test Time (secs): 22.92  Right 9-Hole Peg Test: Functional  Right 9 Hole Peg Test Time (secs): 23.26      PM session  Pt met in dept, agreeable to OT  Completed pathfinding activity - directed pt to go to the gift shop on the first floor. Assisted him to find the elevators and then the gift shop, encouraging him to think about where he was going so he could find his way back. He required cues to determine which floor his room was on, eventhough he could state room number, did not know he was on the third floor. Once in the gift shop he was able to find a birthday card with min cues to reorient to what he was supposed to be finding. He was able to find the elevators to return to dept, but was confused about the button to push to retrieve the elevator (key locks above the elevator light made it look different to him, so he walked to the other set of buttons. Once on elevator, remembered to push button for third floor.     Min/mod cues to find therapy dept, eventhough he has come from that direction several times after laundry activities and othertimes just walking to activity room. In dept, emphasis on bilat integration/coordination. Tossed ball simultaneously OT to patient with fair coordination, some difficulty with eye hand coordination, not fully catching ball prior to tossing causing short toss  Able to toss tennis ball from one hand to the other up in the air -more difficult due to firmness of the ball    Pt continues to demonstrate min decreased coordination right hand, decreased attn to right side, occassinally leaning to right against wall/doorframe . He also demonstrates decreased memory, problem solving as with pathfinding. Cont with plan     Plan   Plan  Times per week: 5-6  Times per day: Twice a day  Plan weeks: 1-1.5 weeks  Current Treatment Recommendations: Strengthening, Functional Mobility Training, Gait Training, Endurance Training, Balance Training, Neuromuscular Re-education, Safety Education & Training, Self-Care / ADL, Equipment Evaluation, Education, & procurement, Patient/Caregiver Education & Training, Home Management Training, Cognitive/Perceptual Training  Plan Comment: Wednesday conference               Goals  Short term goals  Time Frame for Short term goals: 1-1.5 weeks  Short term goal 1: Pt will groom in stance mod ind. Short term goal 2: Pt will bathe mod ind. with shower chair. Short term goal 3: Pt will dress mod ind. Short term goal 4: Pt will toilet mod ind. Short term goal 5: Pt will perform functional transfers/ADL mobility mod ind with AD. Short term goal 6: Pt will perform HEP/ther ex to improve BUE coord to use effectively for small object manipulation(pills/food packets) mod ind. Short term goal 7: Pt will participate cognitive tx to address IADL skills such as money/medication management to perform tasks with supervision/min cues.   Short term goal 8: Pt will improve balance/safety/activity tolerance/to perform simple cooking task mod ind. Patient Goals   Patient goals : Pt stated his goal is to get home, do what he did before, be able to take care of his cat.   Above goals will work toward this goal.       Therapy Time   Individual Concurrent Group Co-treatment   Time In 1115         Time Out 1200         Minutes 45         Timed Code Treatment Minutes: [address] Time     Individual Co-treatment   Time In [address] 1600     Minutes [address] [place] [address]

## 2020-02-29 LAB
GLUCOSE BLD-MCNC: 105 MG/DL (ref 70–99)
GLUCOSE BLD-MCNC: 112 MG/DL (ref 70–99)
GLUCOSE BLD-MCNC: 119 MG/DL (ref 70–99)
GLUCOSE BLD-MCNC: 125 MG/DL (ref 70–99)
PERFORMED ON: ABNORMAL

## 2020-02-29 PROCEDURE — 97530 THERAPEUTIC ACTIVITIES: CPT

## 2020-02-29 PROCEDURE — 94760 N-INVAS EAR/PLS OXIMETRY 1: CPT

## 2020-02-29 PROCEDURE — 1280000000 HC REHAB R&B

## 2020-02-29 PROCEDURE — 6360000002 HC RX W HCPCS: Performed by: PHYSICAL MEDICINE & REHABILITATION

## 2020-02-29 PROCEDURE — 6370000000 HC RX 637 (ALT 250 FOR IP): Performed by: PHYSICAL MEDICINE & REHABILITATION

## 2020-02-29 RX ADMIN — CLOPIDOGREL BISULFATE 75 MG: 75 TABLET ORAL at 07:52

## 2020-02-29 RX ADMIN — ATORVASTATIN CALCIUM 80 MG: 80 TABLET, FILM COATED ORAL at 20:17

## 2020-02-29 RX ADMIN — LISINOPRIL 20 MG: 20 TABLET ORAL at 07:51

## 2020-02-29 RX ADMIN — ASPIRIN 81 MG 81 MG: 81 TABLET ORAL at 07:52

## 2020-02-29 RX ADMIN — ENOXAPARIN SODIUM 40 MG: 40 INJECTION SUBCUTANEOUS at 07:52

## 2020-02-29 ASSESSMENT — PAIN SCALES - GENERAL
PAINLEVEL_OUTOF10: 0
PAINLEVEL_OUTOF10: 0

## 2020-02-29 NOTE — PROGRESS NOTES
Pt resting in recliner comfortably. A&O X4. Admitted on 2/21/2020 with CVA. VSS. Numbness/tingling to LLE, tingling to BUE. HR regular. On telemetry. Lungs sounds clear. Denies any chest pain or SOB. Abd soft and nontender. Active bowel sounds. Skin dry and intact. Transfers with walker X1. Uses urinal. HS medication given. Tolerated well. Call light in reach. Patient instructed to call if there is any needs or changes.  Electronically signed by Rashid Mckee RN on 2/28/2020 at 11:41 PM

## 2020-02-29 NOTE — PROGRESS NOTES
70 yr old male admitted to ARU s/p CVA. A & O x 4. Compliant with staff assist and call light use. Transfers and ambulates with no device and SBA x 1 staff. Continent of bowel and bladder. Up in bedside reclining chair at present and denies any c/o. Call light and needed items within reach.

## 2020-02-29 NOTE — PLAN OF CARE
Problem: Falls - Risk of:  Goal: Will remain free from falls  Description  Will remain free from falls  Outcome: Ongoing  Note:   Risk assessment complete; compliant with staff assist and call light use. Call light and needed items within use. Problem: Pain:  Goal: Patient's pain/discomfort is manageable  Description  Patient's pain/discomfort is manageable  Outcome: Ongoing  Note:   Denies c/o pain; continue to position for comfort. Problem: Skin Integrity:  Goal: Skin integrity will stabilize  Description  Skin integrity will stabilize  Outcome: Ongoing  Note:   Skin remains intact; no change in interventions.

## 2020-02-29 NOTE — PROGRESS NOTES
Occupational Therapy  Facility/Department: [address] REHAB  Daily Treatment Note  NAME: Pura Augustin  : 1940  MRN: [id number]    Date of Service: 2020    Discharge Recommendations:  S Level 1, Home with Home health OT, Home with assist PRN       Assessment   Performance deficits / Impairments: Decreased functional mobility ; Decreased ADL status; Decreased strength;Decreased safe awareness;Decreased endurance;Decreased high-level IADLs;Decreased balance;Decreased cognition;Decreased vision/visual deficit; Decreased coordination;Decreased sensation  Assessment: Pt tolerated session well. Pt completed mobility with SBA. Treatment Diagnosis: Impaired: ADL/IADL function, cognition, balance, activity tolerance, coordination  History:  70 y.o. M admit 20 with ataxia and self reports of R leg weakness -- brain MRI 20: \"Acute lacunar infarct within the left posterior limb of internal capsule. \" Patient then had code called on 2/15/20 when he became unresponsive due to drop in BP. New MRI from 2-15 (+) Increasing size of the previously noted infarct in the left posterior limb of internal capsule. Pt re-admitted to rehab on 2020. PMH: uncontrolled DM, HTN, Rheumatic fever, falls, small vessel ischemia  REQUIRES OT FOLLOW UP: Yes  Activity Tolerance  Activity Tolerance: Patient Tolerated treatment well  Safety Devices  Type of devices: Call light within reach; Chair alarm in place; Left in chair;Gait belt         Patient Diagnosis(es): There were no encounter diagnoses. has a past medical history of Active rheumatic fever, Diabetes mellitus type 2, uncontrolled ([address] Essential hypertension, and Noncompliance with medications. has a past surgical history that includes Appendectomy.     Restrictions  Restrictions/Precautions  Restrictions/Precautions: Fall Risk  Position Activity Restriction  Other position/activity restrictions: on telemetry, cardiac diet  Subjective   General  Chart Reviewed: Yes, Progress Notes  Patient assessed for rehabilitation services?: Yes  Additional Pertinent Hx: pt is a 61-year-old male with history of diabetes type 2, essential hypertension, who presents to the hospital with right leg weakness. He reports history of essential hypertension but he has not been compliant with medication use for years. Patient diagnosed with acute CVA, likely secondary to uncontrolled hypertension. MRI-brain with acute lacunar infarct within the left posterior limb of internal capsule. Patient seen by neurology, and they found a right sided ataxia, 4+/5 strength in right arm and leg. Patient on aspirin and high-dose statin. Patient started therapies, but needs more therapy before discharge to home safely. Patient lives at home with his son in a 1 level condo, and the son works daily. (copied per note Dr Steven Thomson 2/14/2020) Had syncopal episode while on rehab and was transferred to acute d/t extension of stroke, bradycardia and acute metabolic encephalopathy. \"workup revealed evidence of new cerebral infarctions occurring on his repeat MRI scan on 2/15/20, showing Increase in size of the previously noted infarct in the left posterior limb of the internal capsule, with numerous additional bilateral acute infarcts. 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 \" Per Dr Keturah Staples 2/21/20  Family / Caregiver Present: No  Referring Practitioner: Dr Steven Thomson, Dr Chester Oseguera  Diagnosis: acute lacunar infarct left posterior limb internal capsule, syncope, acute metabolic encephalopathy  Subjective  Subjective: Pt agreed to courtesy treatment. General Comment  Comments:         Objective       Functional Mobility  Functional - Mobility Device: No device  Activity: To/From therapy gym  Assist Level: Stand by assistance  Functional Mobility Comments: Ambulated to the rehab gym from his room and returned back to his room at the end of the session.   He was able to this goal.       Therapy Time   Individual Concurrent Group Co-treatment   Time In [facility]         Time Out [address], [address]

## 2020-02-29 NOTE — PROGRESS NOTES
Department of [facility] Progress Note    Patient Identification:  Marivel Leigh  [id number]  : 1940  Admit date: 2020      Diagnosis:   Patient Active Problem List   Diagnosis    Diabetes mellitus type 2, controlled ([facility] [address]    Essential hypertension    Acute CVA (cerebrovascular accident) ([facility] [address]    Noncompliance with medications    Stenosis of left vertebral artery    Diabetes mellitus with hyperglycemia ([facility] [address]    History of memory loss    Cerebral infarction, left hemisphere ([facility] [address]    Altered mental state    Bradycardia    Hypotensive syncope           Subjective:   Sitting up in chair  In good spirits - states he is feeling strong  No events noted overnight     /71   Pulse 72   Temp 97.5 °F (36.4 °C) (Oral)   Resp 16   Ht 4' 8\" (1.422 m)   Wt 113 lb 1.5 oz (51.3 kg)   SpO2 96%   BMI 25.36 kg/m²     Last 24 hour lab  Recent Results (from the past 24 hour(s))   POCT Glucose    Collection Time: 20  5:00 PM   Result Value Ref Range    POC Glucose 111 (H) 70 - 99 mg/dl    Performed on ACCU-CHEK    POCT Glucose    Collection Time: 20  8:59 PM   Result Value Ref Range    POC Glucose 116 (H) 70 - 99 mg/dl    Performed on ACCU-CHEK    POCT Glucose    Collection Time: 20  7:21 AM   Result Value Ref Range    POC Glucose 105 (H) 70 - 99 mg/dl    Performed on ACCU-CHEK    POCT Glucose    Collection Time: 20 11:43 AM   Result Value Ref Range    POC Glucose 112 (H) 70 - 99 mg/dl    Performed on ACCU-CHEK      Physical Exam  Awake, alert, oriented  Mood and affect appropriate  Respiratory pattern unlabored  Abdomen nondistended  Ext warm, well perfused  No edema noted in ext's  Moves all ext, no focal weakness noted     Therapy progress:  PT  Position Activity Restriction  Other position/activity restrictions: on telemetry, cardiac diet  Objective     Sit to Stand: Supervision  Stand to sit: Supervision  Device: Rolling Walker  Assistance: with independent history, evaluation and examination. I agree with the note which has been adjusted to reflect my findings. I have had face to face contact with the patient and performed a substantive portion of the E/M visit. In summary, pleased w/ his progress so far; no issues overnight. Abebe Fitch MD 2/29/2020, 2:47 PM

## 2020-03-01 LAB
GLUCOSE BLD-MCNC: 106 MG/DL (ref 70–99)
GLUCOSE BLD-MCNC: 113 MG/DL (ref 70–99)
GLUCOSE BLD-MCNC: 159 MG/DL (ref 70–99)
GLUCOSE BLD-MCNC: 98 MG/DL (ref 70–99)
PERFORMED ON: ABNORMAL
PERFORMED ON: NORMAL

## 2020-03-01 PROCEDURE — 1280000000 HC REHAB R&B

## 2020-03-01 PROCEDURE — 94760 N-INVAS EAR/PLS OXIMETRY 1: CPT

## 2020-03-01 PROCEDURE — 6370000000 HC RX 637 (ALT 250 FOR IP): Performed by: PHYSICAL MEDICINE & REHABILITATION

## 2020-03-01 PROCEDURE — 6360000002 HC RX W HCPCS: Performed by: PHYSICAL MEDICINE & REHABILITATION

## 2020-03-01 RX ADMIN — ENOXAPARIN SODIUM 40 MG: 40 INJECTION SUBCUTANEOUS at 07:35

## 2020-03-01 RX ADMIN — SALINE NASAL SPRAY 1 SPRAY: 1.5 SOLUTION NASAL at 07:37

## 2020-03-01 RX ADMIN — INSULIN LISPRO 1 UNITS: 100 INJECTION, SOLUTION INTRAVENOUS; SUBCUTANEOUS at 21:24

## 2020-03-01 RX ADMIN — ATORVASTATIN CALCIUM 80 MG: 80 TABLET, FILM COATED ORAL at 21:24

## 2020-03-01 RX ADMIN — LISINOPRIL 20 MG: 20 TABLET ORAL at 07:36

## 2020-03-01 RX ADMIN — ASPIRIN 81 MG 81 MG: 81 TABLET ORAL at 07:36

## 2020-03-01 RX ADMIN — CLOPIDOGREL BISULFATE 75 MG: 75 TABLET ORAL at 07:36

## 2020-03-01 ASSESSMENT — PAIN SCALES - GENERAL: PAINLEVEL_OUTOF10: 0

## 2020-03-01 NOTE — PROGRESS NOTES
Patient admitted to rehab [address]. A&Ox4. Pt currently on telemetry. Transfers without device with SBA x1. On cardiac diet, tolerating well. Medications taken whole with thin liquids. On Lovenox and plavix for DVT prophylaxis. Skin intact with scattered bruising. On room air. Has been continent of bowel and incontinent of bladder. LBM 2/28/2020. Pt denies pain/discomfort. Chair/bed alarms in use and call light in reach. Will continue to monitor.

## 2020-03-02 LAB
ANION GAP SERPL CALCULATED.3IONS-SCNC: 10 MMOL/L (ref 3–16)
BUN BLDV-MCNC: 20 MG/DL (ref 7–20)
CALCIUM SERPL-MCNC: 9.2 MG/DL (ref 8.3–10.6)
CHLORIDE BLD-SCNC: 107 MMOL/L (ref 99–110)
CO2: 27 MMOL/L (ref 21–32)
CREAT SERPL-MCNC: 1.1 MG/DL (ref 0.8–1.3)
GFR AFRICAN AMERICAN: >60
GFR NON-AFRICAN AMERICAN: >60
GLUCOSE BLD-MCNC: 112 MG/DL (ref 70–99)
GLUCOSE BLD-MCNC: 113 MG/DL (ref 70–99)
GLUCOSE BLD-MCNC: 153 MG/DL (ref 70–99)
HCT VFR BLD CALC: 39.5 % (ref 40.5–52.5)
HEMOGLOBIN: 13.5 G/DL (ref 13.5–17.5)
MAGNESIUM: 2 MG/DL (ref 1.8–2.4)
MCH RBC QN AUTO: 30.2 PG (ref 26–34)
MCHC RBC AUTO-ENTMCNC: 34.1 G/DL (ref 31–36)
MCV RBC AUTO: 88.6 FL (ref 80–100)
PDW BLD-RTO: 14.2 % (ref 12.4–15.4)
PERFORMED ON: ABNORMAL
PLATELET # BLD: 273 K/UL (ref 135–450)
PMV BLD AUTO: 7.2 FL (ref 5–10.5)
POTASSIUM SERPL-SCNC: 5.1 MMOL/L (ref 3.5–5.1)
RBC # BLD: 4.46 M/UL (ref 4.2–5.9)
SODIUM BLD-SCNC: 144 MMOL/L (ref 136–145)
WBC # BLD: 7.2 K/UL (ref 4–11)

## 2020-03-02 PROCEDURE — 6360000002 HC RX W HCPCS: Performed by: PHYSICAL MEDICINE & REHABILITATION

## 2020-03-02 PROCEDURE — 80048 BASIC METABOLIC PNL TOTAL CA: CPT

## 2020-03-02 PROCEDURE — 97130 THER IVNTJ EA ADDL 15 MIN: CPT

## 2020-03-02 PROCEDURE — 97530 THERAPEUTIC ACTIVITIES: CPT

## 2020-03-02 PROCEDURE — 97110 THERAPEUTIC EXERCISES: CPT

## 2020-03-02 PROCEDURE — 1280000000 HC REHAB R&B

## 2020-03-02 PROCEDURE — 97116 GAIT TRAINING THERAPY: CPT

## 2020-03-02 PROCEDURE — 97129 THER IVNTJ 1ST 15 MIN: CPT

## 2020-03-02 PROCEDURE — 85027 COMPLETE CBC AUTOMATED: CPT

## 2020-03-02 PROCEDURE — 36415 COLL VENOUS BLD VENIPUNCTURE: CPT

## 2020-03-02 PROCEDURE — 83735 ASSAY OF MAGNESIUM: CPT

## 2020-03-02 PROCEDURE — 97535 SELF CARE MNGMENT TRAINING: CPT

## 2020-03-02 PROCEDURE — 6370000000 HC RX 637 (ALT 250 FOR IP): Performed by: PHYSICAL MEDICINE & REHABILITATION

## 2020-03-02 RX ORDER — LISINOPRIL 20 MG/1
20 TABLET ORAL DAILY
Qty: 30 TABLET | Refills: 3 | Status: SHIPPED | OUTPATIENT
Start: 2020-03-03 | End: 2020-06-04 | Stop reason: SDUPTHER

## 2020-03-02 RX ADMIN — ENOXAPARIN SODIUM 40 MG: 40 INJECTION SUBCUTANEOUS at 08:09

## 2020-03-02 RX ADMIN — SALINE NASAL SPRAY 1 SPRAY: 1.5 SOLUTION NASAL at 09:19

## 2020-03-02 RX ADMIN — ATORVASTATIN CALCIUM 80 MG: 80 TABLET, FILM COATED ORAL at 21:07

## 2020-03-02 RX ADMIN — ASPIRIN 81 MG 81 MG: 81 TABLET ORAL at 08:09

## 2020-03-02 RX ADMIN — LISINOPRIL 20 MG: 20 TABLET ORAL at 08:09

## 2020-03-02 RX ADMIN — CLOPIDOGREL BISULFATE 75 MG: 75 TABLET ORAL at 08:09

## 2020-03-02 RX ADMIN — INSULIN LISPRO 1 UNITS: 100 INJECTION, SOLUTION INTRAVENOUS; SUBCUTANEOUS at 12:16

## 2020-03-02 ASSESSMENT — PAIN SCALES - GENERAL
PAINLEVEL_OUTOF10: 0

## 2020-03-02 NOTE — DISCHARGE INSTR - COC
Continuity of Care Form    Patient Name: Rigo Lombardo   :  1940  MRN:  [id number]    Admit date:  2020  Discharge date:  3/3/2020    Code Status Order: Full Code   Advance Directives:   [facility] Documentation     Date/Time Healthcare Directive Type of Healthcare Directive Copy in [facility] [address] Agent's Name Healthcare Agent's Phone Number    20 1400  No, patient does not have an advance directive for healthcare treatment -- -- -- -- --          Admitting Physician:  Jef Arguello MD  PCP: No primary care provider on file. Discharging Nurse: [facility] [place] [facility]/Room#: L0B-6674/8078-39  Discharging Unit Phone Number: [phone]    Emergency Contact:   Extended Emergency Contact Information  Primary Emergency Contact: [address] Phone: [phone]  Mobile Phone: [phone]  Relation: Child  Preferred language: English   needed? No  Secondary Emergency Contact: Darshana Ortega  Mobile Phone: [phone]  Relation: Child   needed?  No    Past Surgical History:  Past Surgical History:   Procedure Laterality Date    APPENDECTOMY         Immunization History:   Immunization History   Administered Date(s) Administered    Influenza Virus Vaccine 2009    Influenza, High Dose (Fluzone 72 yrs and older) 10/01/2019    Pneumococcal Conjugate 13-valent ([id number]) 2015    Pneumococcal Polysaccharide ([id number]) 2009       Active Problems:  Patient Active Problem List   Diagnosis Code    Diabetes mellitus type 2, controlled ([address] E11.9    Essential hypertension I10    Acute CVA (cerebrovascular accident) ([address] I63.9    Noncompliance with medications Z91.14    Stenosis of left vertebral artery I65.02    Diabetes mellitus with hyperglycemia ([address] E11.65    History of memory loss Z87.898    Cerebral infarction, left hemisphere ([address] I63.9    Altered mental state R41.82    restirctions  Other Medical Equipment (for information only, NOT a DME order):  walker  Other Treatments: none    Patient's personal belongings (please select all that are sent with patient):  Glasses, ring, cellphone    RN SIGNATURE:  Electronically signed by Aftab Carr RN on 3/3/20 at 8:59 AM    CASE MANAGEMENT/SOCIAL WORK SECTION    Inpatient Status Date:   2-    Readmission Risk Assessment Score:  Readmission Risk              Risk of Unplanned Readmission:        16           Discharging to Facility/ Agency   · Name:    [facility]  · Address:  · Phone:    [phone]  · Fax:         [phone]      / signature: Gabino [place]     Case Management   [phone]    3/3/2020  9:14 AM      PHYSICIAN SECTION    Prognosis: Good    Condition at Discharge: Stable    Rehab Potential (if transferring to Rehab): Good    Recommended Labs or Other Treatments After Discharge: PT,OT,VN    Physician Certification: I certify the above information and transfer of Sussy Todd  is necessary for the continuing treatment of the diagnosis listed and that he requires [address] for less 30 days.      Update Admission H&P: No change in H&P    PHYSICIAN SIGNATURE:  Electronically signed by Fiordaliza Moe MD on 3/2/20 at 9:02 AM

## 2020-03-02 NOTE — PROGRESS NOTES
Occupational Therapy  Facility/Department: [address] [facility]  Daily Treatment Note / Discharge Summary  NAME: Keri Wilson  : 1940  MRN: [id number]    Date of Service: 3/2/2020    Discharge Recommendations:  S Level 1, Home with Home health OT, Home with assist PRN  OT Equipment Recommendations  Equipment Needed: Yes  Other: may need shower chair    Assessment   Performance deficits / Impairments: Decreased high-level IADLs  Assessment: Pt has met 8/8 goals, was able to complete shower in stance using grab bar for balance - discussed that he may be safer with shower chair to decrease risk of fall at home. Dressing indep including gathering clothing from closest.  Pt amb without device within hospital room for ADl, gathering items to dress indep. He occassionally scuffs his right foot, but no LOB - needs to continue to be attentive to right foot especially over uneven ground. Pt is able to count money, write checks, make simple meal, but does have  min decreased organization to task. Recommend home OT for safety, carryover in his own environment and drivers eval prior to driving. Recommend shower chair, but pt declines need. Discharge planned 3/3/2020 home with PRN assist of son who lives with him. Treatment Diagnosis: Impaired: ADL/IADL function, cognition, balance, activity tolerance, coordination  Prognosis: Good  OT Education: Equipment  Patient Education: Pt declines shower chair at this time, educated that chair would decrease risk of fall in shower  Activity Tolerance  Activity Tolerance: Patient Tolerated treatment well  Safety Devices  Type of devices: Call light within reach; Chair alarm in place; Left in chair;Gait belt         Patient Diagnosis(es):right CVA     has a past medical history of Active rheumatic fever, Diabetes mellitus type 2, uncontrolled ([address] Essential hypertension, and Noncompliance with medications.    has a past surgical history that includes Appendectomy. Restrictions  Restrictions/Precautions  Restrictions/Precautions: Fall Risk  Position Activity Restriction  Other position/activity restrictions: on telemetry, cardiac diet  Subjective   General  Chart Reviewed: Yes, Progress Notes  Patient assessed for rehabilitation services?: Yes  Additional Pertinent Hx: pt is a 82-year-old male with history of diabetes type 2, essential hypertension, who presents to the hospital with right leg weakness. He reports history of essential hypertension but he has not been compliant with medication use for years. Patient diagnosed with acute CVA, likely secondary to uncontrolled hypertension. MRI-brain with acute lacunar infarct within the left posterior limb of internal capsule. Patient seen by neurology, and they found a right sided ataxia, 4+/5 strength in right arm and leg. Patient on aspirin and high-dose statin. Patient started therapies, but needs more therapy before discharge to home safely. Patient lives at home with his son in a 1 level condo, and the son works daily. (copied per note Dr Valeria José 2/14/2020) Had syncopal episode while on rehab and was transferred to acute d/t extension of stroke, bradycardia and acute metabolic encephalopathy. \"workup revealed evidence of new cerebral infarctions occurring on his repeat MRI scan on 2/15/20, showing Increase in size of the previously noted infarct in the left posterior limb of the internal capsule, with numerous additional bilateral acute infarcts. 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 \" Per Dr Briana Worrell 2/21/20  Family / Caregiver Present: No  Referring Practitioner: Dr Valeria José, Dr Luis Alfredo Ospina  Diagnosis: acute lacunar infarct left posterior limb internal capsule, syncope, acute metabolic encephalopathy  Subjective  Subjective: pt met bedside, sitting on edge of bed with bed alarm in place, ready for shower.   Pt states he is ready for dischrge tomorrow  General

## 2020-03-02 NOTE — PLAN OF CARE
Problem: Falls - Risk of:  Goal: Will remain free from falls  Description  Will remain free from falls  2/22/2020 1435 by Suha Madden RN  Outcome: Ongoing  Note:   Patient free of falls this shift, all safety precautions in place. Problem: Falls - Risk of:  Goal: Absence of physical injury  Description  Absence of physical injury  Outcome: Ongoing  Note:   All safety precautions in place including nonskid socks on feet, bed exit alarm on and posey clip attached to patient when in chair, phones and call light in reach, will continue to monitor. Problem: Daily Care:  Goal: Daily care needs are met  Description  Daily care needs are met  Outcome: Ongoing  Note:   Participates in hourly rounding, states needs as they arise by using call light appropriately, and does call in advance of needs. Problem: Pain:  Goal: Patient's pain/discomfort is manageable  Description  Patient's pain/discomfort is manageable  2/22/2020 1435 by Suha Madden RN  Outcome: Ongoing  Note:   Patient uses pain scale appropriately. Problem: Skin Integrity:  Goal: Skin integrity will stabilize  Description  Skin integrity will stabilize  2/22/2020 1435 by Suha Madden RN  Outcome: Ongoing  Note:   Repositioning every 2 hours when in bed or chair. Problem: Discharge Planning:  Goal: Patients continuum of care needs are met  Description  Patients continuum of care needs are met  2/22/2020 1435 by Suha Madden RN  Outcome: Ongoing  Note:   Discharge planning started upon admission,  consult in place. Problem: Neurological  Goal: Maximum potential motor/sensory/cognitive function  Outcome: Ongoing  Note:   Up in chair for all meals, returned verbal acknowledgement of all safety precautions in place, will continue to monitor.

## 2020-03-02 NOTE — PROGRESS NOTES
memory; language; visuospatial skills    Re-assessment of higher level attention tasks  Trail making B: improved as compared to 2/24/2020 by >50%    n/a   Goal 2: Pt will recall functional information related to rehabilitation process and daily living - 80% accurate       Recall of current meds : external cues warranted (reinforcing need for use of written instructions and instructions for what to do if deciding not to take). Recall of rationale of heart monitor/cardiac diet: external cues    Working memory for use of 2-4 written instructions: set up and re-reading and use of underlining key words/phrases utilized    Auditory Working memory for use of 3-4 sentence paragraph information: repetition warranted (mild)      n/a   Goal 3: Pt will demonstrate accurate orientation to self and time with independent use of external aids - 80%   Orientation:   -Without cues  · Pt was oriented to date, place and reason for admit.   · Pt oriented to d/c date of 3/3/2020  · Partially oriented to room number and >50% today's general therapy schedule  -With written cues/verbal prompts:  · Pt was oriented to place; full date; therapy schedule  · List also of current meds; diet on back of schedule for pt use     n/a   Goal 4: Pt will demonstrate improved higher level attention/VPS and PS and reasoning and organization for functional tasks requiring executive function with set up and conditioning to task and <mild cues Mild complex VPS/PS incorporating new learning safety strategies (ie transfers; dysphagia; meds; diet restrictions etc) and for mild complex PS tasks:  External cues    Mild complex PS/reasoning for use of printed information for :  Spatial organization of 6-12 stimuli: goal not met across task trials    Complex PS and reasoning for deductive and inductive reasoning PS puzzle: last data collection 2/28/2020  · Assist warranted; Errors of reduced mental flexibility; reduced complex processing and making inferences; integrating information n/a   Other areas targeted:     Education:    pt ed completed. Recommending therapy as pt transitions home    Safety Devices: [] Call light within reach  [] Chair alarm activated  [] Bed alarm activated  [x] Other: transport returned pt to room [] Call light within reach  [] Chair alarm activated  [] Bed alarm activated  [] Other:    Progress Assessment: 2/24/2020: DARLING subtests completed as well as executive function. DARILNG norms for individuals >72years of age utilized. Pt achieved a score profile of '1' indicating a high probability of IND for subtests:  Counting Money; Solving Daily Math Problems; Writing a Check and Balancing a Checkbook; Understanding Med Labels; Using a Calendar;Reading Instructions; Writing Phone Messages. Additional testing revealed: Pt with difficulty with alternating and divided attention tasks. These deficits may impact higher level executive function and driving. Will initaite therapy and discuss with OT   2/25/2020: Began training in compensatory swallow strategies and ex; and in memory strategies for new learning safety strategies  2/26/2020: Pt reportedly had a nose bleed 2/25/2020 pm (refer to PT and NSG documentation). No nose bleeds this am. Pt did discuss with MD  2/28/2020: Ins reportedly approved pt to Tuesday 3/3/2020   3/2/2020: Pt overall with improvement in cognitive-linguistic skills. Residual deficits in [facility] and recall of new learning safety strategies/restrictions. Pt does require assist with adv DL tasks requiring higher level executive function. Recommending continued therapy at d/c as pt transitions home.  Agree with referral for 's Evaluation   Plan: D/c 3/3/2020 with son's support   Continued Tx Upon Discharge: ?    [x] Yes [] No [] TBD based on progress while on ARU [] Vital Stim indicated [] Other:   Estimated discharge date: 3/3/2020   Discharge recommendations:   [] Home independently  [x] Home with assistance []  24 hour

## 2020-03-02 NOTE — PROGRESS NOTES
brain MRI 2/12/20: \"Acute lacunar infarct within the left posterior limb of internal capsule. \" Patient then had code called on 2/15/20 when he became unresponsive due to drop in BP. New MRI from 2-15 (+) Increasing size of the previously noted infarct in the left posterior limb of internal capsule. Pt re-admitted to rehab on 2/21/2020. Response To Previous Treatment: Patient with no complaints from previous session. Family / Caregiver Present: No  Referring Practitioner: Jael Noel MD  Subjective  Subjective: Pt sitting in w/c at beginning of session, agreeable to treatment session. Pt reports fatigue this AM.          Orientation  Orientation  Overall Orientation Status: Within Functional Limits(BIMS 15/15)    Social/Functional History  Lives With: Son(son works from 2p to 10p)  Type of Home: ([employer])  Home Layout: One level  Home Access: Level entry  Bathroom Shower/Tub: Walk-in shower, Tub/Shower unit(uses tub shower)  Bathroom Toilet: Standard(window sill on R)  Bathroom Equipment: Grab bars in shower  Bathroom Accessibility: Walker accessible  Home Equipment: Cane(Need to check with son to confirm home DME, initial eval stating pt owns walker and alert button. )  ADL Assistance: Independent  Homemaking Assistance: Independent  Homemaking Responsibilities: Yes  Ambulation Assistance: Independent  Transfer Assistance: Independent  Active : Yes  Mode of Transportation: Car  Education: graduated [employer]  Occupation: Retired  Type of occupation:   Leisure & Hobbies: biking in neighborhood; rides rollercoasters; music (plays piano/organ/ strings), Sabianism  IADL Comments: pt states he was very active, enjoys cooking  Additional Comments: Pt independent with finances and meds. Uses automatic bill pay when possible or writes checks.     Objective   Bed mobility  Bridging: Modified independent   Rolling to Left: Modified independent  Rolling to Right: Modified independent  Supine to Sit: Modified independent  Sit to Supine: Modified independent  Scooting: Modified independent  Comment: bed in ADL suite, HOB flat, no hand rails; pt also able to achieve prone position VENU  Transfers  Sit to Stand: Modified independent  Stand to sit: Modified independent  Car Transfer: Modified independent  Comment: Multiple sit to stand transfers throughout session, good safety awareness and sequencing. also practiced floor to EOB transfer via quadruped to single kneeling position, pt performed with VENU balance, and good awareness with verbal cues for sequencing.   Ambulation  Ambulation?: Yes  Ambulation 1  Surface: level tile;carpet;uneven  Device: No Device  Assistance: Modified Independent  Quality of Gait: Better clearance and heel strike with R LE in swing phase, occasional scuffing of R foot in swing but no toe drag; VENU for balance  Gait Deviations: Slow Leslee;Decreased step length;Decreased step height  Distance: 500' with multiple directional changes  Comments: No LOB  Stairs  # Steps : 18  Stairs Height: 6\"(6, 4\" steps; 12, 6\" steps)  Rails: Bilateral  Curbs: 6\"(x 2, no verbal cues for sequencing, Good balance)  Assistance: Modified independent   Comment: Ascend/descend steps with B railing, reciprocal pattern, good balance     Balance  Posture: Good  Sitting - Static: Good  Sitting - Dynamic: Good  Standing - Static: Good  Standing - Dynamic: Good  Comments: Picking up object with reacher VENU  Exercises  Hip Flexion: standing x 15 BLEs marching standing on blue airex pad, BUE support, CGA, BUE support on // bars  Hip Extension/Leg Presses: step up/down on blue airex pad with CGA, x 10  BLEs, BUE support on // bars  Ankle Pumps: standing heel raises x 10 BLEs standing on blue airex pad with BUE support on // bars  Other exercises  Other exercises 1: Nustep L5 x 10 minutes, seat 3, B arms 4, BUE/BLE propulsion, AVG SPM 90          PM session:   S: Pt with no c/o this PM.    O:  Amb 430' x 1 with MI, no AD, short instructions for Next Treatment: balance and coordination activities, HEP   Current Treatment Recommendations: Strengthening, Transfer Training, Balance Training, Functional Mobility Training, Gait Training, Stair training, Safety Education & Training, Home Exercise Program, Endurance Training  Safety Devices  Type of devices:  All fall risk precautions in place, Gait belt, Patient at risk for falls, None(pt sitting in w/c for next therapy session)  Restraints  Initially in place: No     Therapy Time   Individual Concurrent Group Co-treatment   Time In 1030         Time Out 1115         Minutes 45         Timed Code Treatment Minutes: Tj   Time In [address], SPT  Therapist was present, directed the patient's care, made skilled judgement, and was responsible for assessment and treatment of the patient        Electronically signed by Jerod Meneses PT on 3/2/20 at 3:54 PM

## 2020-03-02 NOTE — PROGRESS NOTES
Department of [facility] [address] On[address] Colonel Progress Note    Patient Identification:  Brayan Holliday  [id number]  : 1940  Admit date: 2020      Diagnosis:   Patient Active Problem List   Diagnosis    Diabetes mellitus type 2, controlled [facility]    Essential hypertension    Acute CVA (cerebrovascular accident) ([facility]    Noncompliance with medications    Stenosis of left vertebral artery    Diabetes mellitus with hyperglycemia ([facility]    History of memory loss    Cerebral infarction, left hemisphere ([facility]    Altered mental state    Bradycardia    Hypotensive syncope           Subjective: Pt seen this AM.  Patient did well over the weekend, with no problems noted. Patient is feeling better today. Patient continues to work with speech therapy on improving his higher level cognition skills  He states he as improved in his therapies with his balance, and he being more careful with his turned toward sudden movements. We think he'll be ready for discharge to home tomorrow, with continued therapies at home at discharge. Repeat labs this morning looked good and are stable. His blood sugars are under control. Will fill out his LUIS M of meds today for his discharge tomorrow.     /71   Pulse 99   Temp 97.3 °F (36.3 °C) (Oral)   Resp 18   Ht 4' 8\" (1.422 m)   Wt 113 lb 15.7 oz (51.7 kg)   SpO2 96%   BMI 25.55 kg/m²     Last 24 hour lab  Recent Results (from the past 24 hour(s))   POCT Glucose    Collection Time: 20  7:28 AM   Result Value Ref Range    POC Glucose 106 (H) 70 - 99 mg/dl    Performed on ACCU-CHEK    POCT Glucose    Collection Time: 20 11:39 AM   Result Value Ref Range    POC Glucose 98 70 - 99 mg/dl    Performed on ACCU-CHEK    POCT Glucose    Collection Time: 20  4:37 PM   Result Value Ref Range    POC Glucose 113 (H) 70 - 99 mg/dl    Performed on ACCU-CHEK    POCT Glucose    Collection Time: 20  8:46 PM   Result Value Ref Range    POC Glucose 159 (H) 70 - 99 mg/dl    Performed on ACCU-CHEK    CBC    Collection Time: 03/02/20  6:26 AM   Result Value Ref Range    WBC 7.2 4.0 - 11.0 K/uL    RBC 4.46 4.20 - 5.90 M/uL    Hemoglobin 13.5 13.5 - 17.5 g/dL    Hematocrit 39.5 (L) 40.5 - 52.5 %    MCV 88.6 80.0 - 100.0 fL    MCH 30.2 26.0 - 34.0 pg    MCHC 34.1 31.0 - 36.0 g/dL    RDW 14.2 12.4 - 15.4 %    Platelets 545 494 - 606 K/uL    MPV 7.2 5.0 - 10.5 fL   POCT Glucose    Collection Time: 03/02/20  7:03 AM   Result Value Ref Range    POC Glucose 112 (H) 70 - 99 mg/dl    Performed on ACCU-CHEK        Therapy progress:  PT  Position Activity Restriction  Other position/activity restrictions: on telemetry, cardiac diet  Objective     Sit to Stand: Supervision  Stand to sit: Supervision  Device: Qire: Supervision  Distance: 440' with two turns over carpet   OT  PT Equipment Recommendations  Equipment Needed: Yes  Mobility Devices: Zac Vietnamese: Rolling  Other: Pt is currently using no AD during therapy sessions, does own a SPC at home. Will continue to assess pending progress. Toilet - Technique: Ambulating(RW)  Equipment Used: Grab bars(comfort ht)  Toilet Transfers Comments: RW >< comfort ht toilet with R grab bar, window sill on R @ home          SLP:   Diagnosis: Pt presents with signs of cognitive communication deficit, noted in the areas of orientation, memory and problem solving. Additional testing is indicated to clarify these deficits as well as other high level tasks. Pt with possible mild pharyngeal dysphagia (see clinical swallow evaluation report) that will require follow up. Cognitive Diagnosis: Pt present with signs of decreased orientation, decreased memory and possible incomplete thought for verbal problem solving. Aphasia Diagnosis: No signs of aphasia  Speech Diagnosis: No signs of motor speech disorder  Communication Diagnosis: WFL for basic communication.   Dysphagia Diagnosis: Mild pharyngeal stage

## 2020-03-02 NOTE — PLAN OF CARE
Problem: Falls - Risk of:  Goal: Will remain free from falls  Description  Will remain free from falls  Outcome: Ongoing  Note:   Pt AAO X4. Free of fall. Transfers with SBA no device and gait belt. Yellow socks on. Wheels locked. Bed lowest position. Alarm on. Call light, over the bed table, and personal belonging in reach. Hourly rounding. Patient instructed to call for needs or changes. Problem: Pain:  Goal: Patient's pain/discomfort is manageable  Description  Patient's pain/discomfort is manageable  Outcome: Ongoing  Note:   Patient denies of any pain during the shift. Will continue to monitor. Problem: Skin Integrity:  Goal: Skin integrity will stabilize  Description  Skin integrity will stabilize  Outcome: Ongoing  Note:   Free of skin breakdown. Able to reposition self. Pillow support. Heels elevated. Clean and dry skin.       Problem: Discharge Planning:  Goal: Patients continuum of care needs are met  Description  Patients continuum of care needs are met  Outcome: Ongoing

## 2020-03-03 VITALS
SYSTOLIC BLOOD PRESSURE: 127 MMHG | WEIGHT: 113.76 LBS | TEMPERATURE: 97.6 F | HEART RATE: 71 BPM | OXYGEN SATURATION: 98 % | DIASTOLIC BLOOD PRESSURE: 57 MMHG | BODY MASS INDEX: 22.33 KG/M2 | HEIGHT: 60 IN | RESPIRATION RATE: 16 BRPM

## 2020-03-03 LAB
GLUCOSE BLD-MCNC: 92 MG/DL (ref 70–99)
GLUCOSE BLD-MCNC: 99 MG/DL (ref 70–99)
PERFORMED ON: NORMAL
PERFORMED ON: NORMAL

## 2020-03-03 PROCEDURE — 6360000002 HC RX W HCPCS: Performed by: PHYSICAL MEDICINE & REHABILITATION

## 2020-03-03 PROCEDURE — 6370000000 HC RX 637 (ALT 250 FOR IP): Performed by: PHYSICAL MEDICINE & REHABILITATION

## 2020-03-03 RX ORDER — ASPIRIN 81 MG/1
81 TABLET ORAL DAILY
Qty: 30 TABLET | Refills: 3 | Status: SHIPPED | OUTPATIENT
Start: 2020-03-03 | End: 2021-02-04 | Stop reason: SDUPTHER

## 2020-03-03 RX ORDER — CLOPIDOGREL BISULFATE 75 MG/1
75 TABLET ORAL DAILY
Qty: 30 TABLET | Refills: 3 | Status: SHIPPED | OUTPATIENT
Start: 2020-03-03 | End: 2020-09-11 | Stop reason: SDUPTHER

## 2020-03-03 RX ORDER — ATORVASTATIN CALCIUM 80 MG/1
80 TABLET, FILM COATED ORAL NIGHTLY
Qty: 30 TABLET | Refills: 3 | Status: SHIPPED | OUTPATIENT
Start: 2020-03-03 | End: 2020-06-04 | Stop reason: SDUPTHER

## 2020-03-03 RX ADMIN — LISINOPRIL 20 MG: 20 TABLET ORAL at 08:45

## 2020-03-03 RX ADMIN — CLOPIDOGREL BISULFATE 75 MG: 75 TABLET ORAL at 08:45

## 2020-03-03 RX ADMIN — ENOXAPARIN SODIUM 40 MG: 40 INJECTION SUBCUTANEOUS at 08:44

## 2020-03-03 RX ADMIN — ASPIRIN 81 MG 81 MG: 81 TABLET ORAL at 08:45

## 2020-03-03 ASSESSMENT — PAIN SCALES - GENERAL
PAINLEVEL_OUTOF10: 0
PAINLEVEL_OUTOF10: 0

## 2020-03-03 NOTE — PLAN OF CARE
Problem: Falls - Risk of:  Goal: Will remain free from falls  Description  Will remain free from falls  Outcome: Ongoing     Problem: Daily Care:  Goal: Daily care needs are met  Description  Daily care needs are met  Outcome: Ongoing     Problem: Pain:  Goal: Patient's pain/discomfort is manageable  Description  Patient's pain/discomfort is manageable  Outcome: Ongoing     Problem: Skin Integrity:  Goal: Skin integrity will stabilize  Description  Skin integrity will stabilize  Outcome: Ongoing     Problem: Discharge Planning:  Goal: Patients continuum of care needs are met  Description  Patients continuum of care needs are met  Outcome: Ongoing     Problem: Neurological  Goal: Maximum potential motor/sensory/cognitive function  Outcome: Ongoing     Problem: HEMODYNAMIC STATUS  Goal: Patient has stable vital signs and fluid balance  Outcome: Ongoing     Problem: Skin Integrity:  Goal: Absence of new skin breakdown  Description  Absence of new skin breakdown  Outcome: Ongoing

## 2020-03-03 NOTE — DISCHARGE SUMMARY
currently using no AD during therapy sessions, does own a SPC at home. Will continue to assess pending progress. , Assessment: Pt tolerated session well, limited slightly by fatigue but demonstrates VENU with all functional mobility tasks, occasional cues for increased step height on RLE. Pt would continue to benefit from skilled PT 2-3x/week in the home health setting to further improve balance, strength, and tolerance to activity upon d/c. Occupational therapy:  ,  , Assessment: Pt has met 8/8 goals, was able to complete shower in stance using grab bar for balance - discussed that he may be safer with shower chair to decrease risk of fall at home. Dressing indep including gathering clothing from closest.  Pt amb without device within hospital room for ADl, gathering items to dress indep. He occassionally scuffs his right foot, but no LOB - needs to continue to be attentive to right foot especially over uneven ground. Pt is able to count money, write checks, make simple meal, but does have  min decreased organization to task. Recommend home OT for safety, carryover in his own environment and drivers eval prior to driving. Recommend shower chair, but pt declines need. Discharge planned 3/3/2020 home with PRN assist of son who lives with him. Speech therapy:   Date: 3/2/2020                                      Current functional status (updated daily):                                                        Current Diet Order:DIET CARDIAC;   Behavior: [x]? Alert  [x]? Cooperative  [x]? Pleasant  []? Confused  []? Agitated  []? Uncooperative  []? Distractible []? Motivated  []? Self-Limiting []? Anxious  []? Other:  Endurance:  [x]? Adequate for participation in SLP sessions  []? Reduced overall  []? Lethargic  []? Other:  Safety: []? No concerns at this time  []? Reduced insight into deficits  []? Reduced safety awareness []? Not following call light procedures  []? Unable to Assess  []? Other:  Swallowing Precautions: Compensatory Swallowing Strategies: Upright as possible for all oral intake;Remain upright for 30-45 minutes after meals; Alternate solids and liquids  Barriers toward progress: none unless caregiver support is an issue         Inpatient Rehabilitation Course:   Fariba Neal is a 70 y.o. male admitted to inpatient rehabilitation on 2/21/2020 for s/p Acute CVA (cerebrovascular accident) ([address] The patient participated in an aggressive multidisciplinary inpatient rehabilitation program involving 3 hours per day, 5 days per week of rehabilitation. 44-year-old male with history of diabetes type 2, essential hypertension, who presents to the hospital with right leg weakness. He reports history of essential hypertension but he has not been compliant with medication use for years.  Patient diagnosed with acute CVA, likely secondary to uncontrolled hypertension.  MRI-brain with acute lacunar infarct within the left posterior limb of internal capsule.  Patient seen by neurology, and they found a right sided ataxia, 4+/5 strength in right arm and leg.  Patient on aspirin and high-dose statin.  Patient started therapies, but needs more therapy before discharge to home safely.  Patient lives at home with his son in a 1 level condo, and the son works daily. Zachary Rivas started therapy on our rehabilitation unit one week ago, but was discharged to the acute floor after he developed syncopal episode in therapy.  Further workup revealed evidence of new cerebral infarctions occurring on his repeat MRI scan on 2/15/20, showing Increase in size of the previously noted infarct in the left posterior limb of the internal capsule, with numerous additional bilateral acute infarcts.  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 RENEE negative for shunting, mass or thrombus. Patient seen by Neurology. Patient continued on DAPT.   Patient started therapies, but needs more therapy before discharge to home safely. [facility] has been approved by his insurance for rehabilitation unit treatment. We will have him continue with telemetry on the rehabilitation unit. After admission to the Rehab Unit, he made steady progress in his therapies as listed above under each therapy section. His upper and lower extremity coordination and strength did improve in therapy, and he was starting to improve with his endurance and functional status as he  participated in his rehab therapies. His balance, labs, blood sugars and blood pressure were monitored while on the rehabilitation unit.  Patient is feeling better today. We think he is ready for discharge to home today, with continued therapies at home at discharge. He states he has improved in his therapies with his balance, and he being more careful with his turning and sudden movements. Repeat labs looked good and are stable. His blood sugars are under control. We have filled out his LUIS M and meds for his discharge today. He was also told not to drive again until he undergoes a driving evaluation test by OT. He will obtain and follow-up with a family doctor after discharge. I gave him enough medication for the next 3 months.       Condition at Discharge: Good    Significant Diagnostics:   Lab Results   Component Value Date     03/02/2020    K 5.1 03/02/2020     03/02/2020    BUN 20 03/02/2020    CREATININE 1.1 03/02/2020    GLUCOSE 112 (H) 03/02/2020    CALCIUM 9.2 03/02/2020     Lab Results   Component Value Date    WBC 7.2 03/02/2020    RBC 4.46 03/02/2020    HGB 13.5 03/02/2020    HCT 39.5 (L) 03/02/2020    MCV 88.6 03/02/2020    MCH 30.2 03/02/2020    MCHC 34.1 03/02/2020    RDW 14.2 03/02/2020     03/02/2020    MPV 7.2 03/02/2020     No results found for: PROTIME, INR  No results found for: APTT  Lab Results   Component Value Date    COLORU YELLOW 02/13/2020    CLARITYU Clear 02/13/2020    GLUCOSEU 100 (A) 02/13/2020 BILIRUBINUR Negative 02/13/2020    KETUA 15 (A) 02/13/2020    SPECGRAV 1.021 02/13/2020    BLOODU Negative 02/13/2020    PHUR 5.5 02/13/2020    PROTEINU TRACE (A) 02/13/2020    UROBILINOGEN 1.0 02/13/2020    NITRU Negative 02/13/2020    LEUKOCYTESUR Negative 02/13/2020    LABMICR YES 02/13/2020    URRFLXCULT Not Indicated 02/13/2020    URINETYPE NotGiven 02/13/2020     Lab Results   Component Value Date    WBCUA 1 02/13/2020    EPIU 0 02/13/2020     No results found for: Candice Chain, LABURIN    Ct Head Wo Contrast    Result Date: 2/15/2020  EXAMINATION: CT OF THE HEAD WITHOUT CONTRAST  2/15/2020 4:03 pm TECHNIQUE: CT of the head was performed without the administration of intravenous contrast. Dose modulation, iterative reconstruction, and/or weight based adjustment of the mA/kV was utilized to reduce the radiation dose to as low as reasonably achievable. COMPARISON: 02/12/2020 HISTORY: ORDERING SYSTEM PROVIDED HISTORY: AMS TECHNOLOGIST PROVIDED HISTORY: Reason for exam:->AMS Has a \"code stroke\" or \"stroke alert\" been called? ->No Reason for Exam: ams Acuity: Acute Type of Exam: Subsequent/Follow-up FINDINGS: BRAIN/VENTRICLES: Mild atrophy. Hypodensity is again demonstrated along the posterior limb of the left internal capsule which is more conspicuous as compared to prior. A similar focus has developed along the posterior limb of the right internal capsule which is new as compared to prior. No mass effect or midline shift. No hydrocephalus. No gross acute hemorrhage. ORBITS: The visualized portion of the orbits demonstrate no acute abnormality. SINUSES: The visualized paranasal sinuses and mastoid air cells demonstrate no acute abnormality. SOFT TISSUES/SKULL:  No acute abnormality of the visualized skull or soft tissues. New focal hypodensity at the posterior limb of right internal capsule and evolving hypodensity within the posterior limb of left internal capsule, compatible with infarcts.   Question of COMPARISON: None. HISTORY: ORDERING SYSTEM PROVIDED HISTORY: falling to the right since 1042 AM TECHNOLOGIST PROVIDED HISTORY: Reason for exam:->falling to the right since 1042 AM Has a \"code stroke\" or \"stroke alert\" been called? ->Yes Reason for Exam: Extremity Weakness (pt state she woke up this am at 1042 and his right leg was not working right. states he went to sleep at midnight last night and was fine. states he almost fell. bp per ems was 220/114 states he has no taken his bp meds for several years. BG was 138) Acuity: Acute Type of Exam: Initial FINDINGS: BRAIN/VENTRICLES: The ventricles and sulci are diffusely enlarged. Low attenuation is seen in the periventricular and subcortical white matter. No acute intracranial hemorrhage or acute infarct is identified. ORBITS: The visualized portion of the orbits demonstrate no acute abnormality. SINUSES: The visualized paranasal sinuses and mastoid air cells demonstrate no acute abnormality. SOFT TISSUES/SKULL:  No acute abnormality of the visualized skull or soft tissues. No acute intracranial abnormality. Findings were discussed with Cassidy Snow at 4:32 pm on 2/11/2020. Xr Chest Portable    Result Date: 2/11/2020  EXAMINATION: ONE XRAY VIEW OF THE CHEST 2/11/2020 4:05 pm COMPARISON: None. HISTORY: ORDERING SYSTEM PROVIDED HISTORY: weakness TECHNOLOGIST PROVIDED HISTORY: Reason for exam:->weakness Reason for Exam: weakness Acuity: Acute Type of Exam: Initial FINDINGS: The lungs are without acute focal process. There is no effusion or pneumothorax. The cardiomediastinal silhouette is without acute process. The osseous structures are without acute process. No acute process.      Xr Chest 1 Vw    Result Date: 2/13/2020  EXAMINATION: ONE XRAY VIEW OF THE CHEST 2/13/2020 11:43 am COMPARISON: 02/11/2020 HISTORY: ORDERING SYSTEM PROVIDED HISTORY: Fever TECHNOLOGIST PROVIDED HISTORY: Reason for exam:->Fever Reason for Exam: fever Acuity: Acute Type of carotid artery. There is mild, less than 50%, stenosis of the right internal carotid artery by NASCET criteria. Calcified and noncalcified atherosclerotic plaque of the left carotid bifurcation and proximal left internal carotid artery. There is mild, less than 50%, stenosis of the left internal carotid artery by NASCET criteria. No arterial injury or dissection. VERTEBRAL ARTERIES: Vertebral arteries arise from their respective subclavian arteries. There is severe stenosis of the left vertebral artery origin. No focal stenosis of the right vertebral artery. No arterial injury or dissection. SOFT TISSUES: The lung apices are clear. No cervical or superior mediastinal lymphadenopathy. The larynx and pharynx are unremarkable. No acute abnormality of the salivary and thyroid glands. BONES: No acute osseous abnormality. CTA HEAD: ANTERIOR CIRCULATION: No significant stenosis of the intracranial internal carotid, anterior cerebral, or middle cerebral arteries. No aneurysm. POSTERIOR CIRCULATION: No focal stenosis of the intracranial vertebral arteries. PICA origins are visualized. No focal stenosis of the basilar artery or bilateral posterior cerebral arteries. No aneurysm. OTHER: No dural venous sinus thrombosis on this non-dedicated study. BRAIN: No mass effect or midline shift. No extra-axial fluid collection. The gray-white differentiation is maintained. Mild, less than 50%, stenosis of the internal carotid arteries by NASCET criteria. 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 No intracranial flow-limiting stenosis.      Mri Brain Wo Contrast    Result Date: 2/15/2020  EXAMINATION: MRI OF THE BRAIN WITHOUT CONTRAST  2/15/2020 6:36 pm TECHNIQUE: Multiplanar multisequence MRI of the brain was performed without the administration of intravenous contrast. COMPARISON: February 12 HISTORY: ORDERING SYSTEM PROVIDED HISTORY: New CT findings TECHNOLOGIST PROVIDED HISTORY: Reason for exam:->New CT as described. Multifocal small-vessel ischemic change bilaterally. Mri Brain Without Contrast    Result Date: 2/12/2020  EXAMINATION: MRI OF THE BRAIN WITHOUT CONTRAST  2/12/2020 8:41 am TECHNIQUE: Multiplanar multisequence MRI of the brain was performed without the administration of intravenous contrast. COMPARISON: None. HISTORY: ORDERING SYSTEM PROVIDED HISTORY: CVA TECHNOLOGIST PROVIDED HISTORY: Reason for exam:->CVA Reason for Exam:  pt state she woke up this am at [phone] and his right leg was not working right. states he went to sleep at midnight last night and was fine. states he almost fell. bp per ems was 220/114 states he has no taken his bp meds for several years. BG was 138 Acuity: Acute Type of Exam: Initial FINDINGS: INTRACRANIAL STRUCTURES/VENTRICLES: There is an acute lacunar infarct with left posterior limb of internal capsule. Mild chronic white matter microvascular ischemic changes are present characterized by periventricular white matter signal abnormality. No mass effect or midline shift. No evidence of an acute intracranial hemorrhage. The ventricles and sulci are normal in size and configuration. The sellar/suprasellar regions appear unremarkable. The normal signal voids within the major intracranial vessels appear maintained. ORBITS: The visualized portion of the orbits demonstrate no acute abnormality. SINUSES: The visualized paranasal sinuses and mastoid air cells are well aerated. BONES/SOFT TISSUES: The bone marrow signal intensity appears normal. The soft tissues demonstrate no acute abnormality. Acute lacunar infarct within the left posterior limb of internal capsule. The findings were sent to the Radiology Results [address] at 9:17 am on 2/12/2020to be communicated to a licensed caregiver. Patient Instructions: Follow-up visits: He will obtain and follow-up with a family doctor after discharge.      Discharge Medications:     Medication List      CHANGE how you take these medications    lisinopril 20 MG tablet  Commonly known as:  PRINIVIL;ZESTRIL  Take 1 tablet by mouth daily  What changed:    · medication strength  · how much to take  · when to take this        CONTINUE taking these medications    aspirin 81 MG EC tablet  Take 1 tablet by mouth daily     atorvastatin 80 MG tablet  Commonly known as:  LIPITOR  Take 1 tablet by mouth nightly     clopidogrel 75 MG tablet  Commonly known as:  PLAVIX  Take 1 tablet by mouth daily     metFORMIN 500 MG tablet  Commonly known as:  GLUCOPHAGE  Take 1 tablet by mouth daily (with breakfast)     QUEtiapine 25 MG tablet  Commonly known as:  SEROQUEL  Take 1 tablet by mouth nightly        STOP taking these medications    potassium chloride 20 MEQ Tbcr extended release tablet  Commonly known as:  KLOR-CON M           Where to Get Your Medications      You can get these medications from any pharmacy    Bring a paper prescription for each of these medications  · aspirin 81 MG EC tablet  · atorvastatin 80 MG tablet  · clopidogrel 75 MG tablet  · lisinopril 20 MG tablet  · metFORMIN 500 MG tablet            I spent over 35 minutes on this discharge encounter between counseling, coordination of care, and medication reconciliation.         To comply with [facility] bylaw R.II.4.1:   Discharge order placed in advance to facilitate patients discharge needs      Cara Mayen MD, 3/3/2020, 8:28 AM

## 2020-03-03 NOTE — PROGRESS NOTES
Department of [facility]  Dr. Kayleen Montelongo Progress Note    Patient Identification:  Warren Horvath  [id number]  : 1940  Admit date: 2020      Diagnosis:   Patient Active Problem List   Diagnosis    [facility] 2, [facility]    Essential hypertension    [facility]cerebro[facility] accident) [facility]    Noncompliance with medications    Stenosis of left vertebral artery    Diabetes mellitus with hyperglycemia ([facility]    History of memory loss    Cerebral infarction, left hemisphere ([facility]    Altered mental state    Bradycardia    Hypotensive syncope           Subjective: Pt seen this AM.  Patient is feeling better today. We think he is ready for discharge to home today, with continued therapies at home at discharge. He states he has improved in his therapies with his balance, and he being more careful with his turning and sudden movements. Repeat labs looked good and are stable. His blood sugars are under control. We have filled out his LUIS M and meds for his discharge today. He will obtain and follow-up with a family doctor after discharge. I gave him enough medication for the next 3 months. He was also told not to drive again until he undergoes a driving evaluation test by OT in about a month.     BP (!) 145/74   Pulse 71   Temp 97.6 °F (36.4 °C) (Oral)   Resp 16   Ht 4' 8\" (1.422 m)   Wt 113 lb 12.1 oz (51.6 kg)   SpO2 98%   BMI 25.50 kg/m²     Last 24 hour lab  Recent Results (from the past 24 hour(s))   POCT Glucose    Collection Time: 20 12:07 PM   Result Value Ref Range    POC Glucose 153 (H) 70 - 99 mg/dl    Performed on ACCU-CHEK    POCT Glucose    Collection Time: 20  4:27 PM   Result Value Ref Range    POC Glucose 112 (H) 70 - 99 mg/dl    Performed on ACCU-CHEK    POCT Glucose    Collection Time: 20  9:05 PM   Result Value Ref Range    POC Glucose 113 (H) 70 - 99 mg/dl    Performed on ACCU-CHEK    POCT Glucose    Collection Time: 03/03/20  7:09 AM   Result Value Ref Range    POC Glucose 99 70 - 99 mg/dl    Performed on ACCU-CHEK        Therapy progress:  PT  Position Activity Restriction  Other position/activity restrictions: on telemetry, cardiac diet  Objective     Sit to Stand: Modified independent  Stand to sit: Modified independent  Device: No Device  Assistance: Modified Independent  Distance: 500' with multiple directional changes  OT  PT Equipment Recommendations  Equipment Needed: No  Mobility Devices: Hope Sanches: Rolling  Other: Pt is currently using no AD during therapy sessions, does own a SPC at home. Will continue to assess pending progress. Toilet - Technique: Ambulating  Equipment Used: Grab bars(comfort ht)  Toilet Transfers Comments: RW >< comfort ht toilet with R grab bar, window sill on R @ home          SLP:   Diagnosis: Pt presents with signs of cognitive communication deficit, noted in the areas of orientation, memory and problem solving. Additional testing is indicated to clarify these deficits as well as other high level tasks. Pt with possible mild pharyngeal dysphagia (see clinical swallow evaluation report) that will require follow up. Cognitive Diagnosis: Pt present with signs of decreased orientation, decreased memory and possible incomplete thought for verbal problem solving. Aphasia Diagnosis: No signs of aphasia  Speech Diagnosis: No signs of motor speech disorder  Communication Diagnosis: WFL for basic communication.   Dysphagia Diagnosis: Mild pharyngeal stage dysphagia  Dysphagia Impression : Pt with   Dysphagia Outcome Severity Scale: Level 6: Within functional limits/Modified independence      Assessment and Plan:     Acute lacunar infarct within the left posterior limb of internal capsule,bilateral acute infarcts - aspirin,lipitor, plavix.       Hypertension- lisinopril      Diabetes type 2-  diet-controlled, SSI     Left vertebral artery stenosis, severe- aspirin,lipitor,

## 2020-03-03 NOTE — CARE COORDINATION
Call placed to Dr Slick Lara to inform him that home care agency is unable to accept this referral unless he is willing to oversee his home care and sigh off on any orders he may need for home care. He is most agreeable to overseeing his homecare needs.   [place], [place]     Case Management   [phone]    3/3/2020  2:57 PM

## 2020-03-03 NOTE — PROGRESS NOTES
Discharge instructions given to patient. Questions answered. Prescriptions given and sent to retail pharmacy.

## 2020-03-03 NOTE — CARE COORDINATION
SOCIAL WORK DISCHARGE SUMMARY:      DISCHARGE DATE:                 Tuesday, 3-3-2020      DISCHARGE PLACE:                home                HOME CARE AGENCY:            [facility]              PHONE NUMBER          [phone]             FAX NUMBER                 [phone]      TRANSPORTATION:                Friend             TIME:                                 10-12      PREFERRED PHARMACY:        [address] [place] [address]:                            MD orders:   Sn/PT/OT    Dme:   None.     [place], [place]     Case Management   [phone]    3/3/2020  9:12 AM

## 2020-03-24 ENCOUNTER — OFFICE VISIT (OUTPATIENT)
Dept: FAMILY MEDICINE CLINIC | Age: 80
End: 2020-03-24
Payer: MEDICARE

## 2020-03-24 VITALS
WEIGHT: 117.4 LBS | BODY MASS INDEX: 23.05 KG/M2 | SYSTOLIC BLOOD PRESSURE: 130 MMHG | HEIGHT: 60 IN | DIASTOLIC BLOOD PRESSURE: 78 MMHG

## 2020-03-24 LAB
ANION GAP SERPL CALCULATED.3IONS-SCNC: 14 MMOL/L (ref 3–16)
BUN BLDV-MCNC: 13 MG/DL (ref 7–20)
CALCIUM SERPL-MCNC: 9.4 MG/DL (ref 8.3–10.6)
CHLORIDE BLD-SCNC: 106 MMOL/L (ref 99–110)
CO2: 24 MMOL/L (ref 21–32)
CREAT SERPL-MCNC: 1.2 MG/DL (ref 0.8–1.3)
FOLATE: 11.34 NG/ML (ref 4.78–24.2)
GFR AFRICAN AMERICAN: >60
GFR NON-AFRICAN AMERICAN: 59
GLUCOSE BLD-MCNC: 136 MG/DL (ref 70–99)
HCT VFR BLD CALC: 43.1 % (ref 40.5–52.5)
HEMOGLOBIN: 14.4 G/DL (ref 13.5–17.5)
MCH RBC QN AUTO: 30.3 PG (ref 26–34)
MCHC RBC AUTO-ENTMCNC: 33.3 G/DL (ref 31–36)
MCV RBC AUTO: 90.8 FL (ref 80–100)
PDW BLD-RTO: 14.6 % (ref 12.4–15.4)
PLATELET # BLD: 223 K/UL (ref 135–450)
PMV BLD AUTO: 7.4 FL (ref 5–10.5)
POTASSIUM SERPL-SCNC: 4.6 MMOL/L (ref 3.5–5.1)
RBC # BLD: 4.75 M/UL (ref 4.2–5.9)
SODIUM BLD-SCNC: 144 MMOL/L (ref 136–145)
VITAMIN B-12: 241 PG/ML (ref 211–911)
WBC # BLD: 4.8 K/UL (ref 4–11)

## 2020-03-24 PROCEDURE — 99204 OFFICE O/P NEW MOD 45 MIN: CPT | Performed by: FAMILY MEDICINE

## 2020-03-24 PROCEDURE — G8417 CALC BMI ABV UP PARAM F/U: HCPCS | Performed by: FAMILY MEDICINE

## 2020-03-24 PROCEDURE — 1123F ACP DISCUSS/DSCN MKR DOCD: CPT | Performed by: FAMILY MEDICINE

## 2020-03-24 PROCEDURE — 1036F TOBACCO NON-USER: CPT | Performed by: FAMILY MEDICINE

## 2020-03-24 PROCEDURE — G8482 FLU IMMUNIZE ORDER/ADMIN: HCPCS | Performed by: FAMILY MEDICINE

## 2020-03-24 PROCEDURE — G8427 DOCREV CUR MEDS BY ELIG CLIN: HCPCS | Performed by: FAMILY MEDICINE

## 2020-03-24 PROCEDURE — 4040F PNEUMOC VAC/ADMIN/RCVD: CPT | Performed by: FAMILY MEDICINE

## 2020-03-24 PROCEDURE — 1111F DSCHRG MED/CURRENT MED MERGE: CPT | Performed by: FAMILY MEDICINE

## 2020-03-24 RX ORDER — TRAZODONE HYDROCHLORIDE 50 MG/1
50 TABLET ORAL NIGHTLY
COMMUNITY
End: 2020-06-04

## 2020-03-24 RX ORDER — ROSUVASTATIN CALCIUM 20 MG/1
20 TABLET, COATED ORAL DAILY
COMMUNITY
End: 2020-06-04

## 2020-03-24 RX ORDER — AMLODIPINE BESYLATE 10 MG/1
10 TABLET ORAL DAILY
COMMUNITY
End: 2020-08-17 | Stop reason: SDUPTHER

## 2020-03-24 ASSESSMENT — PATIENT HEALTH QUESTIONNAIRE - PHQ9
SUM OF ALL RESPONSES TO PHQ QUESTIONS 1-9: 0
1. LITTLE INTEREST OR PLEASURE IN DOING THINGS: 0
SUM OF ALL RESPONSES TO PHQ9 QUESTIONS 1 & 2: 0
SUM OF ALL RESPONSES TO PHQ QUESTIONS 1-9: 0
2. FEELING DOWN, DEPRESSED OR HOPELESS: 0

## 2020-03-24 ASSESSMENT — ENCOUNTER SYMPTOMS
SINUS PRESSURE: 0
TROUBLE SWALLOWING: 0
EYE DISCHARGE: 0
VOMITING: 0
CHEST TIGHTNESS: 0
RHINORRHEA: 0
ABDOMINAL PAIN: 0
NAUSEA: 0
DIARRHEA: 0
SHORTNESS OF BREATH: 0
SORE THROAT: 0
COUGH: 0
WHEEZING: 0

## 2020-03-24 NOTE — PROGRESS NOTES
3/24/2020    Saqib Richard (:  1940) is a 70 y.o. male, here for evaluation of the following medical concerns:  Patient is a new patient, here to establish care with pcp. 1.  Encounter to establish care- patient was recent released from the hospital elaine. One month ago, patient stated that he is feeling well and taking the medications prescribed, he has a PCP several years go but didn't see the provider on a regular basis. Patient is a poor historian at times and I'm concerned about dementia. He acknowledges slight memory loss but stated this is a residual effect of the CVA and it is improving. 2.  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 Patient was admitted on  and discharged on 3/3/20, likely secondary to uncontrolled htn- according to the note by  [facility]  \"MRI-brain with acute lacunar infarct within the left posterior limb of internal capsule.  Patient seen by neurology, and they found a right sided ataxia, 4+/5 strength in right arm and leg.  Patient on aspirin and high-dose statin.  Patient started therapies, but needs more therapy before discharge to home safely.  Patient lives at home with his son in a 1 level condo, and the son works daily. Marcellus Yanez started therapy on our rehabilitation unit one week ago, but was discharged to the acute floor after he developed syncopal episode in therapy.  Further workup revealed evidence of new cerebral infarctions occurring on his repeat MRI scan on 2/15/20, showing Increase in size of the previously noted infarct in the left posterior limb of the internal capsule, with numerous additional bilateral acute infarcts.  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 RENEE negative for shunting, mass or thrombus. Patient seen by Neurology.  Patient continued on DAPT.  Patient started therapies, but needs more therapy before discharge to home safely\",. Patient has not followed with anyone for the last month, doesn't have PT or OT at this time, doesn't want this at his home. 3.  DM II- Patient is taking medication as prescribed, A1C was 6.4 today, denied side effects, is not checking his glucose on a regular basis, understands the importance of doing this but hasn't been checking his glucose on a regular basis. 4.  Tingling all over the body-down right leg, present since his CVA, continue to take his medication, doesn't have an evaluation scheduled with a Neurologist, stated that this sensation is in all aspects but worse in lower extremity. Patient denied weakness, denied pain at this time. 5.  Neck lesion on left side- lesion on left neck area, according to the patient present for years? Really is not sure, he didn't have this evaluated in the hospital, no pain. 6. HTN- well controlled today, taking medication as prescribed, no side effects from the medication, denied headache, vision change, or dizziness. Today, he denied chest pain, sob, n, v, or diarrhea. HPI    Review of Systems   Constitutional: Negative for activity change, fatigue, fever and unexpected weight change. HENT: Negative for congestion, ear pain, hearing loss, rhinorrhea, sinus pressure, sore throat and trouble swallowing. Eyes: Negative for discharge and visual disturbance. Respiratory: Negative for cough, chest tightness, shortness of breath and wheezing. Cardiovascular: Negative for chest pain, palpitations and leg swelling. Gastrointestinal: Negative for abdominal pain, diarrhea, nausea and vomiting. Endocrine: Negative for cold intolerance, heat intolerance, polydipsia and polyphagia. Genitourinary: Negative for discharge, frequency, penile pain, testicular pain and urgency. Musculoskeletal: Negative for arthralgias. Skin: Positive for color change. Negative for rash. Neurological: Positive for numbness.  Negative for dizziness, tremors, seizures, syncope, facial asymmetry, speech difficulty, weakness, light-headedness and headaches. Psychiatric/Behavioral: Positive for decreased concentration. Negative for dysphoric mood. The patient is not nervous/anxious. Prior to Visit Medications    Medication Sig Taking?  Authorizing Provider   metFORMIN (GLUCOPHAGE) 500 MG tablet Take 1 tablet by mouth daily (with breakfast) Yes Isamar Villafana MD   aspirin 81 MG EC tablet Take 1 tablet by mouth daily Yes Isamar Villafana MD   atorvastatin (LIPITOR) 80 MG tablet Take 1 tablet by mouth nightly Yes Isamar Villafana MD   clopidogrel (PLAVIX) 75 MG tablet Take 1 tablet by mouth daily Yes Isamar Villafana MD   lisinopril (PRINIVIL;ZESTRIL) 20 MG tablet Take 1 tablet by mouth daily Yes Isamar Villafana MD   QUEtiapine (SEROQUEL) 25 MG tablet Take 1 tablet by mouth nightly Yes Alexis Chung MD   amLODIPine (NORVASC) 10 MG tablet Take 10 mg by mouth daily  Historical Provider, MD   traZODone (DESYREL) 50 MG tablet Take 50 mg by mouth nightly  Historical Provider, MD   rosuvastatin (CRESTOR) 20 MG tablet Take 20 mg by mouth daily  Historical Provider, MD        No Known Allergies    Past Medical History:   Diagnosis Date    Active rheumatic fever     Diabetes mellitus type 2, uncontrolled ([facility] [address]     Essential hypertension     Noncompliance with medications        Past Surgical History:   Procedure Laterality Date    APPENDECTOMY         Social History     Socioeconomic History    Marital status: Single     Spouse name: Not on file    Number of children: Not on file    Years of education: Not on file    Highest education level: Not on file   Occupational History    Not on file   Social Needs    Financial resource strain: Not on file    Food insecurity     Worry: Not on file     Inability: Not on file    Transportation needs     Medical: Not on file     Non-medical: Not on file   Tobacco Use    Smoking status: Never Smoker    Smokeless to review history  - HEIDI Garcia MD, Neurology, [facility]  - CBC  - Basic Metabolic Panel  - Hemoglobin A1C    3. Controlled type 2 diabetes mellitus without complication, without long-term current use of insulin ([address]  Patient will need to keep a log of his glucose readings and bring them to the office. He needs to monitor his diet and exercise. Attempt to lose weight. Discussed proper eating habits. Continue to take medication as prescribed. Will obtain A1C at this visit. Educated on signs and symptoms for immediate evaluation in the ER.   - CBC  - Basic Metabolic Panel  - Hemoglobin A1C    4. Essential hypertension  Well controlled. Discussed signs and symptoms for immediate evaluation in the ER. Reduce sodium. Monitor diet, exercise and lose weight. Keep a BP log and bring it to your next appointment. Needs to rtc in one month for BP check  - HEIDI Garcia MD, Neurology, [facility]  - CBC  - Basic Metabolic Panel  - Hemoglobin A1C    5. Paresthesia  Stable  Labs obtained  Make  appointments with specialist.   Johnna Starr MD, Neurology, [facility]  - CBC  - Basic Metabolic Panel  - Hemoglobin A1C    6. Lesion of neck  Referred for procedure  Educated on the importance of having this done. Answered questions  - US SOFT TISSUE LIMITED AREA; Future  - CBC  - Basic Metabolic Panel  - Hemoglobin A1C      Return in about 4 weeks (around 4/21/2020).

## 2020-03-25 LAB
ESTIMATED AVERAGE GLUCOSE: 137 MG/DL
HBA1C MFR BLD: 6.4 %

## 2020-03-29 ASSESSMENT — ENCOUNTER SYMPTOMS: COLOR CHANGE: 1

## 2020-06-04 ENCOUNTER — OFFICE VISIT (OUTPATIENT)
Dept: FAMILY MEDICINE CLINIC | Age: 80
End: 2020-06-04
Payer: MEDICARE

## 2020-06-04 VITALS
HEART RATE: 68 BPM | TEMPERATURE: 98.2 F | RESPIRATION RATE: 14 BRPM | HEIGHT: 60 IN | SYSTOLIC BLOOD PRESSURE: 174 MMHG | OXYGEN SATURATION: 96 % | WEIGHT: 118.8 LBS | DIASTOLIC BLOOD PRESSURE: 90 MMHG | BODY MASS INDEX: 23.32 KG/M2

## 2020-06-04 LAB — HBA1C MFR BLD: 6.3 %

## 2020-06-04 PROCEDURE — 1036F TOBACCO NON-USER: CPT | Performed by: FAMILY MEDICINE

## 2020-06-04 PROCEDURE — G8417 CALC BMI ABV UP PARAM F/U: HCPCS | Performed by: FAMILY MEDICINE

## 2020-06-04 PROCEDURE — 83036 HEMOGLOBIN GLYCOSYLATED A1C: CPT | Performed by: FAMILY MEDICINE

## 2020-06-04 PROCEDURE — G8427 DOCREV CUR MEDS BY ELIG CLIN: HCPCS | Performed by: FAMILY MEDICINE

## 2020-06-04 PROCEDURE — 4040F PNEUMOC VAC/ADMIN/RCVD: CPT | Performed by: FAMILY MEDICINE

## 2020-06-04 PROCEDURE — 1123F ACP DISCUSS/DSCN MKR DOCD: CPT | Performed by: FAMILY MEDICINE

## 2020-06-04 PROCEDURE — 99214 OFFICE O/P EST MOD 30 MIN: CPT | Performed by: FAMILY MEDICINE

## 2020-06-04 RX ORDER — ATORVASTATIN CALCIUM 80 MG/1
80 TABLET, FILM COATED ORAL NIGHTLY
Qty: 30 TABLET | Refills: 3 | Status: SHIPPED | OUTPATIENT
Start: 2020-06-04 | End: 2020-08-12 | Stop reason: SDUPTHER

## 2020-06-04 RX ORDER — LISINOPRIL 20 MG/1
20 TABLET ORAL DAILY
Qty: 30 TABLET | Refills: 3 | Status: SHIPPED | OUTPATIENT
Start: 2020-06-04 | End: 2020-06-12

## 2020-06-04 ASSESSMENT — PATIENT HEALTH QUESTIONNAIRE - PHQ9
SUM OF ALL RESPONSES TO PHQ QUESTIONS 1-9: 2
SUM OF ALL RESPONSES TO PHQ9 QUESTIONS 1 & 2: 2
2. FEELING DOWN, DEPRESSED OR HOPELESS: 1
1. LITTLE INTEREST OR PLEASURE IN DOING THINGS: 1
SUM OF ALL RESPONSES TO PHQ QUESTIONS 1-9: 2

## 2020-06-04 NOTE — PROGRESS NOTES
2020     Sarah Patient (:  1940) is a 70 y.o. male, here for evaluation of the following medical concerns: This is the second time I have seen the patient and he is alone. He is a poor historian and has been out of his medication for elaine. 1 month. 1.  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 Patient was admitted on  and discharged on 3/3/20, likely secondary to uncontrolled htn- according to the note by Dr. Angela Bell  \"MRI-brain with acute lacunar infarct within the left posterior limb of internal capsule.  Patient seen by neurology, and they found a right sided ataxia, 4+/5 strength in right arm and leg.  Patient on aspirin and high-dose statin.  Patient started therapies, but needs more therapy before discharge to home safely.  Patient lives at home with his son in a 1 level condo, and the son works daily. Angelica Teran started therapy on our rehabilitation unit one week ago, but was discharged to the acute floor after he developed syncopal episode in therapy.  Further workup revealed evidence of new cerebral infarctions occurring on his repeat MRI scan on 2/15/20, showing Increase in size of the previously noted infarct in the left posterior limb of the internal capsule, with numerous additional bilateral acute infarcts.  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 RENEE negative for shunting, mass or thrombus. Patient seen by Neurology.  Patient continued on DAPT.  Patient started therapies, but needs more therapy before discharge to home safely\",.     Patient has not followed with anyone for the last month, doesn't have PT or OT at this time, doesn't want this at his home.      2.  DM II- Patient is taking medication as prescribed, A1C was 6.3 today, denied side effects, is not checking his glucose on a regular basis, understands the importance of doing this but hasn't been checking his glucose on a regular basis.       3. HTN- well controlled today, taking medication as prescribed, no side effects from the medication, denied headache, vision change, or dizziness.      Today, he denied chest pain, sob, n, v, or diarrhea. HPI    Review of Systems   Constitutional: Negative for activity change, fatigue, fever and unexpected weight change. HENT: Negative for congestion, ear pain, rhinorrhea and sore throat. Respiratory: Negative for shortness of breath. Cardiovascular: Negative for chest pain. Gastrointestinal: Negative for abdominal pain, diarrhea, nausea and vomiting. Neurological: Negative for light-headedness and headaches. Psychiatric/Behavioral: Negative for confusion and dysphoric mood. The patient is not nervous/anxious. Prior to Visit Medications    Medication Sig Taking?  Authorizing Provider   metFORMIN (GLUCOPHAGE) 500 MG tablet Take 1 tablet by mouth daily (with breakfast) Yes Pilo Barber, DO   lisinopril (PRINIVIL;ZESTRIL) 20 MG tablet Take 1 tablet by mouth daily Yes Pilo Barber, DO   atorvastatin (LIPITOR) 80 MG tablet Take 1 tablet by mouth nightly Yes Viktoriya Tee, DO   aspirin 81 MG EC tablet Take 1 tablet by mouth daily Yes Chandni Burgess MD   amLODIPine (NORVASC) 10 MG tablet Take 10 mg by mouth daily  Historical Provider, MD   clopidogrel (PLAVIX) 75 MG tablet Take 1 tablet by mouth daily  Patient not taking: Reported on 6/4/2020  Thuy Calhoun MD        Social History     Tobacco Use    Smoking status: Never Smoker    Smokeless tobacco: Never Used   Substance Use Topics    Alcohol use: Never     Frequency: Never        Vitals:    06/04/20 1337 06/04/20 1347   BP: (!) 178/80 (!) 174/90   Site: Left Upper Arm    Position: Sitting    Cuff Size: Medium Adult    Pulse: 68    Resp: 14    Temp: 98.2 °F (36.8 °C)    TempSrc: Oral    SpO2: 96%    Weight: 118 lb 12.8 oz (53.9 kg)    Height: 4' 8\" (1.422 m)      Estimated body mass index is 26.63 kg/m² as calculated from the following:    Height as of this encounter: 4' 8\" (1.422 m). Weight as of this encounter: 118 lb 12.8 oz (53.9 kg). Physical Exam  Vitals signs and nursing note reviewed. Constitutional:       Appearance: Normal appearance. HENT:      Head: Normocephalic and atraumatic. Right Ear: External ear normal.      Left Ear: External ear normal.      Nose: Nose normal.      Mouth/Throat:      Mouth: Mucous membranes are moist.   Cardiovascular:      Rate and Rhythm: Regular rhythm. Heart sounds: Normal heart sounds. No murmur. Pulmonary:      Effort: Pulmonary effort is normal.   Abdominal:      General: Bowel sounds are normal.      Tenderness: There is no abdominal tenderness. Skin:     General: Skin is dry. Neurological:      General: No focal deficit present. Mental Status: He is alert and oriented to person, place, and time. Psychiatric:         Mood and Affect: Mood normal.         Behavior: Behavior normal.         ASSESSMENT/PLAN:  1. Controlled type 2 diabetes mellitus without complication, without long-term current use of insulin ([address]  Patient will need to keep a log of his glucose readings and bring them to the office. He needs to monitor his diet and exercise. Attempt to lose weight. Discussed proper eating habits. Continue to take medication as prescribed. Will obtain A1C at this visit. Educated on signs and symptoms for immediate evaluation in the ER.   - POCT glycosylated hemoglobin (Hb A1C)    2. Essential hypertension  Difficult and poorly controlled. Discussed signs and symptoms for immediate evaluation in the ER. Reduce sodium. Monitor diet, exercise and lose weight. Keep a BP log and bring it to your next appointment. Needs to rtc in one month for BP check    3.  Acute CVA (cerebrovascular accident) ([address]  Stable  Continue with medication  Keep appointments with specialist.   Jam Alegria questions      Return in about

## 2020-06-08 ASSESSMENT — ENCOUNTER SYMPTOMS
ABDOMINAL PAIN: 0
SHORTNESS OF BREATH: 0
SORE THROAT: 0
VOMITING: 0
RHINORRHEA: 0
DIARRHEA: 0
NAUSEA: 0

## 2020-06-12 ENCOUNTER — OFFICE VISIT (OUTPATIENT)
Dept: FAMILY MEDICINE CLINIC | Age: 80
End: 2020-06-12
Payer: MEDICARE

## 2020-06-12 VITALS
WEIGHT: 116.2 LBS | HEIGHT: 60 IN | BODY MASS INDEX: 22.81 KG/M2 | SYSTOLIC BLOOD PRESSURE: 160 MMHG | DIASTOLIC BLOOD PRESSURE: 96 MMHG

## 2020-06-12 LAB
A/G RATIO: 1.9 (ref 1.1–2.2)
ALBUMIN SERPL-MCNC: 4.5 G/DL (ref 3.4–5)
ALP BLD-CCNC: 91 U/L (ref 40–129)
ALT SERPL-CCNC: 21 U/L (ref 10–40)
ANION GAP SERPL CALCULATED.3IONS-SCNC: 13 MMOL/L (ref 3–16)
AST SERPL-CCNC: 24 U/L (ref 15–37)
BILIRUB SERPL-MCNC: 0.5 MG/DL (ref 0–1)
BUN BLDV-MCNC: 16 MG/DL (ref 7–20)
CALCIUM SERPL-MCNC: 9.3 MG/DL (ref 8.3–10.6)
CHLORIDE BLD-SCNC: 104 MMOL/L (ref 99–110)
CO2: 26 MMOL/L (ref 21–32)
CREAT SERPL-MCNC: 1.1 MG/DL (ref 0.8–1.3)
FOLATE: 5.13 NG/ML (ref 4.78–24.2)
GFR AFRICAN AMERICAN: >60
GFR NON-AFRICAN AMERICAN: >60
GLOBULIN: 2.4 G/DL
GLUCOSE BLD-MCNC: 134 MG/DL (ref 70–99)
POTASSIUM SERPL-SCNC: 4.4 MMOL/L (ref 3.5–5.1)
REASON FOR REJECTION: NORMAL
REJECTED TEST: NORMAL
SODIUM BLD-SCNC: 143 MMOL/L (ref 136–145)
TOTAL PROTEIN: 6.9 G/DL (ref 6.4–8.2)
TSH REFLEX FT4: 1.81 UIU/ML (ref 0.27–4.2)
VITAMIN B-12: 247 PG/ML (ref 211–911)

## 2020-06-12 PROCEDURE — 1123F ACP DISCUSS/DSCN MKR DOCD: CPT | Performed by: FAMILY MEDICINE

## 2020-06-12 PROCEDURE — 99214 OFFICE O/P EST MOD 30 MIN: CPT | Performed by: FAMILY MEDICINE

## 2020-06-12 PROCEDURE — 36415 COLL VENOUS BLD VENIPUNCTURE: CPT | Performed by: FAMILY MEDICINE

## 2020-06-12 PROCEDURE — 1036F TOBACCO NON-USER: CPT | Performed by: FAMILY MEDICINE

## 2020-06-12 PROCEDURE — G8417 CALC BMI ABV UP PARAM F/U: HCPCS | Performed by: FAMILY MEDICINE

## 2020-06-12 PROCEDURE — G8427 DOCREV CUR MEDS BY ELIG CLIN: HCPCS | Performed by: FAMILY MEDICINE

## 2020-06-12 PROCEDURE — 4040F PNEUMOC VAC/ADMIN/RCVD: CPT | Performed by: FAMILY MEDICINE

## 2020-06-12 RX ORDER — LISINOPRIL 40 MG/1
40 TABLET ORAL DAILY
Qty: 30 TABLET | Refills: 0 | Status: SHIPPED | OUTPATIENT
Start: 2020-06-12 | End: 2020-08-12 | Stop reason: SDUPTHER

## 2020-06-12 NOTE — PROGRESS NOTES
Provider   lisinopril (PRINIVIL;ZESTRIL) 40 MG tablet Take 1 tablet by mouth daily Yes Pilo Barber DO   metFORMIN (GLUCOPHAGE) 500 MG tablet Take 1 tablet by mouth daily (with breakfast) Yes Pilo Barber DO   atorvastatin (LIPITOR) 80 MG tablet Take 1 tablet by mouth nightly Yes Pilo Barber DO   amLODIPine (NORVASC) 10 MG tablet Take 10 mg by mouth daily Yes Historical Provider, MD   aspirin 81 MG EC tablet Take 1 tablet by mouth daily Yes Alex Gallagher MD   clopidogrel (PLAVIX) 75 MG tablet Take 1 tablet by mouth daily  Patient not taking: Reported on 6/4/2020  Alex Gallagher MD        Social History     Tobacco Use    Smoking status: Never Smoker    Smokeless tobacco: Never Used   Substance Use Topics    Alcohol use: Never     Frequency: Never        Vitals:    06/12/20 1034 06/12/20 1051   BP: (!) 168/98 (!) 160/96   Weight: 116 lb 3.2 oz (52.7 kg)    Height: 4' 8\" (1.422 m)      Estimated body mass index is 26.05 kg/m² as calculated from the following:    Height as of this encounter: 4' 8\" (1.422 m). Weight as of this encounter: 116 lb 3.2 oz (52.7 kg). Physical Exam  Nursing note reviewed. Constitutional:       Appearance: He is well-developed and normal weight. HENT:      Head: Normocephalic and atraumatic. Right Ear: Tympanic membrane, ear canal and external ear normal.      Left Ear: Tympanic membrane, ear canal and external ear normal.      Nose: Nose normal.      Mouth/Throat:      Mouth: Mucous membranes are moist.   Eyes:      Conjunctiva/sclera: Conjunctivae normal.   Neck:      Musculoskeletal: Neck supple. No muscular tenderness. Thyroid: No thyromegaly. Comments: Mild mass on left cervical region, present for years, firm but freely moveable will obtain ultrasound  Cardiovascular:      Rate and Rhythm: Normal rate and regular rhythm. Heart sounds: No murmur. Pulmonary:      Effort: Pulmonary effort is normal.      Breath sounds: Normal breath sounds.  No wheezing or rales. Abdominal:      General: Bowel sounds are normal.      Palpations: Abdomen is soft. Tenderness: There is no abdominal tenderness. Musculoskeletal: Normal range of motion. Lymphadenopathy:      Cervical: Cervical adenopathy present. Skin:     General: Skin is warm. Findings: No rash. Neurological:      Mental Status: He is alert and oriented to person, place, and time. Cranial Nerves: No cranial nerve deficit. Deep Tendon Reflexes: Reflexes normal.   Psychiatric:         Behavior: Behavior normal.         Judgment: Judgment normal.         ASSESSMENT/PLAN:  1. Acute CVA (cerebrovascular accident) ([address]  Stable  Continue to encourage that patient make appointment with Neurologist  Patient understands  Will rtc in one month for follow up. - Comprehensive Metabolic Panel  - Vitamin B12 & Folate  - TSH WITH REFLEX TO FT4    2. Essential hypertension  Difficult and poorly controlled. Discussed signs and symptoms for immediate evaluation in the ER. Reduce sodium. Monitor diet, exercise and lose weight. Keep a BP log and bring it to your next appointment. Needs to rtc in one month for BP check  - Comprehensive Metabolic Panel  - Vitamin B12 & Folate  - TSH WITH REFLEX TO FT4    3. Lightheadedness  Stable  Continue with medication  Keep appointments with specialist.   Answered questions  - Comprehensive Metabolic Panel  - Vitamin B12 & Folate  - TSH WITH REFLEX TO FT4    4. Lymphadenopathy, cervical  Referred for imaging  Educated on the importance of having this done. Answered questions  - US SOFT TISSUE LIMITED AREA; Future  - Comprehensive Metabolic Panel  - Vitamin B12 & Folate    5. Vertebral artery stenosis, left  Stable but will send to vascular surgeon, probably not causing symptoms but will have him evaluated, patient is difficult to obtain history from.    Continue with medication  Keep appointments with specialist.   [address] -

## 2020-06-15 ENCOUNTER — TELEPHONE (OUTPATIENT)
Dept: FAMILY MEDICINE CLINIC | Age: 80
End: 2020-06-15

## 2020-06-15 NOTE — TELEPHONE ENCOUNTER
Please let the patient know that his blood apparently clotted and they need another sample if he is able to do so. If not we can do it in the lab at his next visit.

## 2020-06-19 ENCOUNTER — INITIAL CONSULT (OUTPATIENT)
Dept: SURGERY | Age: 80
End: 2020-06-19
Payer: MEDICARE

## 2020-06-19 VITALS
HEART RATE: 78 BPM | WEIGHT: 116 LBS | SYSTOLIC BLOOD PRESSURE: 192 MMHG | DIASTOLIC BLOOD PRESSURE: 99 MMHG | BODY MASS INDEX: 26.01 KG/M2

## 2020-06-19 PROCEDURE — G8427 DOCREV CUR MEDS BY ELIG CLIN: HCPCS | Performed by: SURGERY

## 2020-06-19 PROCEDURE — 1036F TOBACCO NON-USER: CPT | Performed by: SURGERY

## 2020-06-19 PROCEDURE — 1123F ACP DISCUSS/DSCN MKR DOCD: CPT | Performed by: SURGERY

## 2020-06-19 PROCEDURE — 99204 OFFICE O/P NEW MOD 45 MIN: CPT | Performed by: SURGERY

## 2020-06-19 PROCEDURE — G8417 CALC BMI ABV UP PARAM F/U: HCPCS | Performed by: SURGERY

## 2020-06-19 PROCEDURE — 4040F PNEUMOC VAC/ADMIN/RCVD: CPT | Performed by: SURGERY

## 2020-06-19 RX ORDER — GABAPENTIN 300 MG/1
CAPSULE ORAL 4 TIMES DAILY
COMMUNITY
End: 2020-10-27 | Stop reason: SDUPTHER

## 2020-06-19 ASSESSMENT — ENCOUNTER SYMPTOMS
SHORTNESS OF BREATH: 0
DIARRHEA: 0
SORE THROAT: 0
EYES NEGATIVE: 1
GASTROINTESTINAL NEGATIVE: 1
ALLERGIC/IMMUNOLOGIC NEGATIVE: 1
VOMITING: 0
NAUSEA: 0
RESPIRATORY NEGATIVE: 1
ABDOMINAL PAIN: 0

## 2020-06-19 NOTE — PATIENT INSTRUCTIONS
Bereket Finch may wish to schedule an appointment with Dr. Darwin Espinoza at [facility], phone number [phone] for a second opinion. Follow up with Dr. Stacy Hernandez as needed.

## 2020-06-19 NOTE — PROGRESS NOTES
Non-medical: Not on file   Tobacco Use    Smoking status: Never Smoker    Smokeless tobacco: Never Used   Substance and Sexual Activity    Alcohol use: Never     Frequency: Never    Drug use: Never    Sexual activity: Not on file   Lifestyle    Physical activity     Days per week: Not on file     Minutes per session: Not on file    Stress: Not on file   Relationships    Social connections     Talks on phone: Not on file     Gets together: Not on file     Attends [employer] service: Not on file     Active member of club or organization: Not on file     Attends meetings of clubs or organizations: Not on file     Relationship status: Not on file    Intimate partner violence     Fear of current or ex partner: Not on file     Emotionally abused: Not on file     Physically abused: Not on file     Forced sexual activity: Not on file   Other Topics Concern    Not on file   Social History Narrative    Not on file       Family History   Problem Relation Age of Onset    Dementia Mother     Dementia Father          Current Outpatient Medications:     gabapentin (NEURONTIN) 300 MG capsule, Take by mouth 4 times daily. , Disp: , Rfl:     lisinopril (PRINIVIL;ZESTRIL) 40 MG tablet, Take 1 tablet by mouth daily, Disp: 30 tablet, Rfl: 0    metFORMIN (GLUCOPHAGE) 500 MG tablet, Take 1 tablet by mouth daily (with breakfast), Disp: 60 tablet, Rfl: 3    atorvastatin (LIPITOR) 80 MG tablet, Take 1 tablet by mouth nightly, Disp: 30 tablet, Rfl: 3    amLODIPine (NORVASC) 10 MG tablet, Take 10 mg by mouth daily, Disp: , Rfl:     aspirin 81 MG EC tablet, Take 1 tablet by mouth daily, Disp: 30 tablet, Rfl: 3    clopidogrel (PLAVIX) 75 MG tablet, Take 1 tablet by mouth daily, Disp: 30 tablet, Rfl: 3    Patient has no known allergies.     Vitals:    06/19/20 1023   BP: (!) 192/99   Site: Left Upper Arm   Pulse: 78   Weight: 116 lb (52.6 kg)       Office Visit on 06/12/2020   Component Date Value Ref Range Status    Sodium

## 2020-07-07 ENCOUNTER — TELEPHONE (OUTPATIENT)
Dept: CARDIOLOGY CLINIC | Age: 80
End: 2020-07-07

## 2020-07-07 NOTE — TELEPHONE ENCOUNTER
Pt came in and CARLOTTA Juan gave to pt. Dex Briones, [address]  registered monitor with [employer]:  [id number]    [employer] sent fax on 5/19/2020 and 6/3/2020 asking for records. Request was sent to [facility] [place] each time . Due to records not being received, insurance denied. Today, I sent Bill Sen notes from [facility] order, and copy of [facility] serial and kit ID number. Fax and [employer] correspondence scanned and attached.

## 2020-07-07 NOTE — TELEPHONE ENCOUNTER
Received order from [facility] for 30 day event monitor. It appears the order was placed and received by [facility] who requested medical records prior to authorizing and sending out monitor due to insurance denial and them needing to appeal.     Flower Paul, will you please follow up on this to see if medical records were sent as you requested (see media). I have asked patient to come to the office to  a monitor.

## 2020-08-10 ENCOUNTER — TELEPHONE (OUTPATIENT)
Dept: CARDIOLOGY CLINIC | Age: 80
End: 2020-08-10

## 2020-08-10 PROCEDURE — 93272 ECG/REVIEW INTERPRET ONLY: CPT | Performed by: INTERNAL MEDICINE

## 2020-08-12 ENCOUNTER — OFFICE VISIT (OUTPATIENT)
Dept: FAMILY MEDICINE CLINIC | Age: 80
End: 2020-08-12
Payer: MEDICARE

## 2020-08-12 VITALS
WEIGHT: 113 LBS | DIASTOLIC BLOOD PRESSURE: 102 MMHG | TEMPERATURE: 96.9 F | HEIGHT: 60 IN | SYSTOLIC BLOOD PRESSURE: 172 MMHG | BODY MASS INDEX: 22.19 KG/M2

## 2020-08-12 PROCEDURE — 99214 OFFICE O/P EST MOD 30 MIN: CPT | Performed by: FAMILY MEDICINE

## 2020-08-12 PROCEDURE — S9123 NURSING CARE IN HOME RN: HCPCS | Performed by: FAMILY MEDICINE

## 2020-08-12 PROCEDURE — 1123F ACP DISCUSS/DSCN MKR DOCD: CPT | Performed by: FAMILY MEDICINE

## 2020-08-12 PROCEDURE — G8427 DOCREV CUR MEDS BY ELIG CLIN: HCPCS | Performed by: FAMILY MEDICINE

## 2020-08-12 PROCEDURE — 4040F PNEUMOC VAC/ADMIN/RCVD: CPT | Performed by: FAMILY MEDICINE

## 2020-08-12 PROCEDURE — 1036F TOBACCO NON-USER: CPT | Performed by: FAMILY MEDICINE

## 2020-08-12 PROCEDURE — G8417 CALC BMI ABV UP PARAM F/U: HCPCS | Performed by: FAMILY MEDICINE

## 2020-08-12 RX ORDER — LISINOPRIL 40 MG/1
40 TABLET ORAL DAILY
Qty: 30 TABLET | Refills: 0 | Status: SHIPPED | OUTPATIENT
Start: 2020-08-12 | End: 2020-08-31

## 2020-08-12 RX ORDER — ATORVASTATIN CALCIUM 80 MG/1
80 TABLET, FILM COATED ORAL NIGHTLY
Qty: 30 TABLET | Refills: 3 | Status: SHIPPED | OUTPATIENT
Start: 2020-08-12 | End: 2020-10-27 | Stop reason: SDUPTHER

## 2020-08-12 ASSESSMENT — ENCOUNTER SYMPTOMS
VOMITING: 0
ABDOMINAL PAIN: 0
NAUSEA: 0
SHORTNESS OF BREATH: 0
RHINORRHEA: 0
SINUS PRESSURE: 0
TROUBLE SWALLOWING: 0
DIARRHEA: 0
SORE THROAT: 0

## 2020-08-12 NOTE — PROGRESS NOTES
2020     Janneth Shelby (:  1940) is a 68 y.o. male, here for evaluation of the following medical concerns:    HPI     1.  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 Viridiana Fleming was admitted on  and discharged on 3/3/20, likely secondary to uncontrolled htn- according to the note by Dr. Antionette Fajardo, patient is much improved today no obvious signs of CVA, questionable memory? Patient was alert and oriented x 3 today, he believes he has made an appointment with Neurology for evaluation,  vascular surgeon evaluated the patient but it is believed very little can be done at this time.      \"MRI-brain with acute lacunar infarct within the left posterior limb of internal capsule.  Patient seen by neurology, and they found a right sided ataxia, 4+/5 strength in right arm and leg.  Patient on aspirin and high-dose statin.  Patient started therapies, but needs more therapy before discharge to home safely.  Patient lives at home with his son in a 1 level condo, and the son works daily. Viridiana Fleming started therapy on our rehabilitation unit one week ago, but was discharged to the acute floor after he developed syncopal episode in therapy.  Further workup revealed evidence of new cerebral infarctions occurring on his repeat MRI scan on 2/15/20, showing Increase in size of the previously noted infarct in the left posterior limb of the internal capsule, with numerous additional bilateral acute infarcts.  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 RENEE negative for shunting, mass or thrombus. Patient seen by Neurology.  Patient continued on DAPT.  Patient started therapies, but needs more therapy before discharge to home safely\",.     Patient has not followed with anyone for the last several months, doesn't have PT or OT at this time, doesn't want this at his home.      **  Mild, less than 50%, stenosis of the internal carotid arteries by NASCET   criteria.       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       No intracranial flow-limiting stenosis.           2 HTN- POORLY- controlled today, taking medication as prescribed, no side effects from the medication, denied headache, vision change, or dizziness. Patient is need to have this controlled as soon as possible, he fails to bring in logs, states he either forgot them or doesn't have time? Patient is not taking his Norvasc, I will have home health follow with patient to help with care, unable to discuss with son.      3.  lightheaddedness-  Has noticed this over the last month, worse when turning his head too quickly, baseline with minimal movement, hearing is wnl, denied that the room is spinning, added that the lightheadedness is just slightly      4. Left neck mass- present for years, patient has been told in the past it was benign? Would like imaging to see what it is, has had several images of brain and neck that didn't reveal a tumor or mass, will order ultrasound for the patient. Patient still hasn't had this obtained at this time.      Today, he denied chest pain, sob, n, v, or diarrhea.     Review of Systems   Constitutional: Negative for activity change, fatigue, fever and unexpected weight change. HENT: Negative for congestion, ear pain, rhinorrhea, sinus pressure, sore throat and trouble swallowing. Eyes: Negative for visual disturbance. Respiratory: Negative for shortness of breath. Cardiovascular: Negative for chest pain, palpitations and leg swelling. Gastrointestinal: Negative for abdominal pain, diarrhea, nausea and vomiting. Musculoskeletal: Negative for arthralgias. Skin: Negative for rash. Neurological: Positive for light-headedness. Negative for dizziness, syncope and headaches. Psychiatric/Behavioral: Positive for confusion and decreased concentration. Negative for dysphoric mood.  The patient is not nervous/anxious. Prior to Visit Medications    Medication Sig Taking? Authorizing Provider   amLODIPine (NORVASC) 10 MG tablet Take 1 tablet by mouth daily Yes Pilo Barber, DO   lisinopril (PRINIVIL;ZESTRIL) 40 MG tablet Take 1 tablet by mouth daily Yes Pilo Barber, DO   atorvastatin (LIPITOR) 80 MG tablet Take 1 tablet by mouth nightly Yes Pilo Barber DO   gabapentin (NEURONTIN) 300 MG capsule Take by mouth 4 times daily. Yes Historical Provider, MD   metFORMIN (GLUCOPHAGE) 500 MG tablet Take 1 tablet by mouth daily (with breakfast) Yes Kit Arellano,    aspirin 81 MG EC tablet Take 1 tablet by mouth daily Yes Janina Allison MD   clopidogrel (PLAVIX) 75 MG tablet Take 1 tablet by mouth daily Yes Janina Allison MD        Social History     Tobacco Use    Smoking status: Never Smoker    Smokeless tobacco: Never Used   Substance Use Topics    Alcohol use: Never     Frequency: Never        Vitals:    08/12/20 0919   BP: (!) 172/102   Temp: 96.9 °F (36.1 °C)   Weight: 113 lb (51.3 kg)   Height: 4' 8\" (1.422 m)     Estimated body mass index is 25.33 kg/m² as calculated from the following:    Height as of this encounter: 4' 8\" (1.422 m). Weight as of this encounter: 113 lb (51.3 kg). Physical Exam  Vitals signs and nursing note reviewed. Constitutional:       Appearance: He is well-developed. HENT:      Head: Normocephalic and atraumatic. Right Ear: Tympanic membrane, ear canal and external ear normal.      Left Ear: Tympanic membrane, ear canal and external ear normal.      Mouth/Throat:      Mouth: Mucous membranes are moist.   Eyes:      Pupils: Pupils are equal, round, and reactive to light. Neck:      Thyroid: No thyromegaly. Cardiovascular:      Rate and Rhythm: Normal rate and regular rhythm. Heart sounds: No murmur. Pulmonary:      Effort: Pulmonary effort is normal.      Breath sounds: Normal breath sounds. No wheezing or rales.    Abdominal:      General: Bowel sounds are normal.      Palpations: Abdomen is soft. Tenderness: There is no abdominal tenderness. Skin:     Findings: No rash. Neurological:      General: No focal deficit present. Mental Status: He is alert and oriented to person, place, and time. Psychiatric:         Behavior: Behavior normal.         Judgment: Judgment normal.         ASSESSMENT/PLAN:  1. Controlled type 2 diabetes mellitus without complication, without long-term current use of insulin ([address]  Patient will need to keep a log of his glucose readings and bring them to the office. He needs to monitor his diet and exercise. Attempt to lose weight. Discussed proper eating habits. Continue to take medication as prescribed. Will obtain A1C at this visit. Educated on signs and symptoms for immediate evaluation in the ER.   - [address] Bryan Monroy RN  - External Referral To Home Health    2. Cerebral infarction, left hemisphere [facility])  Stable  Continue with medication  Keep appointments with specialist.   Still hasn't made appointment with Neurology. I have instructed patient numerous times but he hasn't made appointment. Answered questions  - [address] Bryan Monroy RN  - External Referral To Home Health    3. History of memory loss  Oriented in room  Will order home health  Answered questions  Will consider additional evaluation.   - [address] Bryan Monroy RN  - External Referral To Home Health    4. Essential hypertension  Difficult and poorly controlled. Discussed signs and symptoms for immediate evaluation in the ER. Reduce sodium. Monitor diet, exercise and lose weight. Keep a BP log and bring it to your next appointment. Needs to rtc in one month for BP check  - [address] Bryan Monroy RN  - External Referral To Home Health    5.  Non-compliant behavior  Patient is non compliant  I don't believe this is simply memory issues  Patient seems to be caring for son at home  Patient understands the need to take medication and have BP well controlled at this time  I have repeatedly informed him that this could lead to another stroke or MI. Patient understands and is adamant he will do a better job. - [facility] RN  - External Referral To Home Health      Return in about 1 week (around 8/19/2020) for blood pressure check.

## 2020-08-17 ENCOUNTER — TELEPHONE (OUTPATIENT)
Dept: FAMILY MEDICINE CLINIC | Age: 80
End: 2020-08-17

## 2020-08-17 RX ORDER — AMLODIPINE BESYLATE 10 MG/1
10 TABLET ORAL DAILY
Qty: 30 TABLET | Refills: 2 | Status: SHIPPED | OUTPATIENT
Start: 2020-08-17 | End: 2020-10-27 | Stop reason: SDUPTHER

## 2020-08-17 NOTE — TELEPHONE ENCOUNTER
It has been on the patient's med list since March? He continues to forget his medications thus the reason home nursing has been referred. Will call it in again.   Dr. Anika Mazariegos

## 2020-08-17 NOTE — TELEPHONE ENCOUNTER
It has been on the patient's med list since March? He continues to forget his medications thus the reason home nursing has been referred. Will call it in again.   Dr. Ortega Prom

## 2020-08-17 NOTE — TELEPHONE ENCOUNTER
Lakesha Figueroa, RN [facility], is requesting a verbal order that Dr. Jordan Maher will sign homecare orders. Also, patient is not taking the Amlodipine that was on his after visit summary. States his BP is running high. Please advise.      192/86 sitting  182/80  186/78 standing  184/82

## 2020-08-17 NOTE — TELEPHONE ENCOUNTER
Mekhi Brooks advised. She will notify the pt to  the medication. She also scheduled another appointment with the pt for tomorrow to follow up his pressure and medication.   CATHY

## 2020-08-18 ENCOUNTER — TELEPHONE (OUTPATIENT)
Dept: FAMILY MEDICINE CLINIC | Age: 80
End: 2020-08-18

## 2020-08-20 ENCOUNTER — OFFICE VISIT (OUTPATIENT)
Dept: FAMILY MEDICINE CLINIC | Age: 80
End: 2020-08-20
Payer: MEDICARE

## 2020-08-20 VITALS
TEMPERATURE: 97.9 F | OXYGEN SATURATION: 96 % | HEIGHT: 60 IN | BODY MASS INDEX: 22.38 KG/M2 | SYSTOLIC BLOOD PRESSURE: 152 MMHG | HEART RATE: 79 BPM | DIASTOLIC BLOOD PRESSURE: 78 MMHG | WEIGHT: 114 LBS

## 2020-08-20 PROCEDURE — 1123F ACP DISCUSS/DSCN MKR DOCD: CPT | Performed by: FAMILY MEDICINE

## 2020-08-20 PROCEDURE — 1036F TOBACCO NON-USER: CPT | Performed by: FAMILY MEDICINE

## 2020-08-20 PROCEDURE — G8417 CALC BMI ABV UP PARAM F/U: HCPCS | Performed by: FAMILY MEDICINE

## 2020-08-20 PROCEDURE — G8427 DOCREV CUR MEDS BY ELIG CLIN: HCPCS | Performed by: FAMILY MEDICINE

## 2020-08-20 PROCEDURE — 4040F PNEUMOC VAC/ADMIN/RCVD: CPT | Performed by: FAMILY MEDICINE

## 2020-08-20 PROCEDURE — 99214 OFFICE O/P EST MOD 30 MIN: CPT | Performed by: FAMILY MEDICINE

## 2020-08-20 NOTE — PROGRESS NOTES
2020     Brenda De Dios (:  1940) is a 68 y.o. male, here for evaluation of the following medical concerns:  Patient returns today after one week due to poorly controlled HTN. HPI     HTN- much improved today , taking medication as prescribed, no side effects from the medication, denied headache, vision change, or dizziness.  Patient has home nursing which has made a big difference in his care,  he fails to bring in logs but his nurse has informed me of his resutls, states he either forgots them or doesn't have time? Patient is  taking his Norvasc, I will have home health follow with patient to help with care, unable to discuss with son. 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 Patel Flowers was admitted on  and discharged on 3/3/20, likely secondary to uncontrolled htn- according to the note by Dr. Berna Akhtar, patient is much improved today no obvious signs of CVA, questionable memory?  Patient was alert and oriented x 3 today, he believes he has made an appointment with Neurology for evaluation,  vascular surgeon evaluated the patient but it is believed very little can be done at this time.      \"MRI-brain with acute lacunar infarct within the left posterior limb of internal capsule.  Patient seen by neurology, and they found a right sided ataxia, 4+/5 strength in right arm and leg.  Patient on aspirin and high-dose statin.  Patient started therapies, but needs more therapy before discharge to home safely.  Patient lives at home with his son in a 1 level condo, and the son works daily. Patel Flowers started therapy on our rehabilitation unit one week ago, but was discharged to the acute floor after he developed syncopal episode in therapy.  Further workup revealed evidence of new cerebral infarctions occurring on his repeat MRI scan on 2/15/20, showing Increase in size of the previously noted infarct in the left posterior limb of the internal capsule, with numerous additional bilateral acute infarcts.  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 RENEE negative for shunting, mass or thrombus.       DM II- Patient is taking medication as prescribed, A1C was 6.4 today, denied side effects, is not checking his glucose on a regular basis, understands the importance of doing this but hasn't been checking his glucose on a regular basis. Today, denied chest pain, sob, n, v, or diarrhea. Review of Systems   Constitutional: Negative for activity change, fatigue, fever and unexpected weight change. Eyes: Negative for visual disturbance. Respiratory: Negative for shortness of breath. Cardiovascular: Negative for chest pain. Gastrointestinal: Negative for abdominal pain, diarrhea, nausea and vomiting. Neurological: Negative for dizziness, facial asymmetry, speech difficulty, weakness and headaches. Prior to Visit Medications    Medication Sig Taking? Authorizing Provider   amLODIPine (NORVASC) 10 MG tablet Take 1 tablet by mouth daily Yes Pilo Barber, DO   lisinopril (PRINIVIL;ZESTRIL) 40 MG tablet Take 1 tablet by mouth daily Yes Pilo Barber, DO   atorvastatin (LIPITOR) 80 MG tablet Take 1 tablet by mouth nightly Yes Pilo Barber, DO   gabapentin (NEURONTIN) 300 MG capsule Take by mouth 4 times daily.  Yes Historical Provider, MD   metFORMIN (GLUCOPHAGE) 500 MG tablet Take 1 tablet by mouth daily (with breakfast) Yes Derrick Mae, DO   aspirin 81 MG EC tablet Take 1 tablet by mouth daily Yes Anamika Antonio MD   clopidogrel (PLAVIX) 75 MG tablet Take 1 tablet by mouth daily Yes Anamika Antonio MD        Social History     Tobacco Use    Smoking status: Never Smoker    Smokeless tobacco: Never Used   Substance Use Topics    Alcohol use: Never     Frequency: Never        Vitals:    08/20/20 1019 08/20/20 1037   BP: (!) 152/78 (!) 152/78   Pulse: 79    Temp: 97.9 °F (36.6 °C)    SpO2: 96%    Weight: 114 lb (51.7 kg)    Height: 4' 8\" (1.422 m)      Estimated body mass index is 25.56 kg/m² as calculated from the following:    Height as of this encounter: 4' 8\" (1.422 m). Weight as of this encounter: 114 lb (51.7 kg). Physical Exam  Vitals signs and nursing note reviewed. HENT:      Head: Normocephalic and atraumatic. Right Ear: Tympanic membrane, ear canal and external ear normal.      Left Ear: Tympanic membrane, ear canal and external ear normal.   Cardiovascular:      Rate and Rhythm: Regular rhythm. Heart sounds: Normal heart sounds. No murmur. Pulmonary:      Breath sounds: Normal breath sounds. No wheezing. Neurological:      Mental Status: He is alert and oriented to person, place, and time. Psychiatric:         Behavior: Behavior normal.         Thought Content: Thought content normal.         ASSESSMENT/PLAN:  1. Essential hypertension  Much improved  Discussed signs and symptoms for immediate evaluation in the ER. Reduce sodium. Monitor diet, exercise and lose weight. Keep a BP log and bring it to your next appointment. Needs to rtc in one month for BP check    2. Acute CVA (cerebrovascular accident) ([address]  Stable  Continue with medicationt. Answered questions    3. DM II   Patient will need to keep a log of his glucose readings and bring them to the office. He needs to monitor his diet and exercise. Attempt to lose weight. Discussed proper eating habits. Continue to take medication as prescribed. Will obtain A1C at this visit. Educated on signs and symptoms for immediate evaluation in the ER. Return in about 3 months (around 11/20/2020).

## 2020-08-25 ENCOUNTER — TELEPHONE (OUTPATIENT)
Dept: FAMILY MEDICINE CLINIC | Age: 80
End: 2020-08-25

## 2020-08-26 ASSESSMENT — ENCOUNTER SYMPTOMS
NAUSEA: 0
SHORTNESS OF BREATH: 0
VOMITING: 0
ABDOMINAL PAIN: 0
DIARRHEA: 0

## 2020-08-31 RX ORDER — LISINOPRIL 40 MG/1
TABLET ORAL
Qty: 30 TABLET | Refills: 0 | Status: SHIPPED | OUTPATIENT
Start: 2020-08-31 | End: 2020-09-02

## 2020-09-02 RX ORDER — LISINOPRIL 40 MG/1
TABLET ORAL
Qty: 30 TABLET | Refills: 0 | Status: SHIPPED | OUTPATIENT
Start: 2020-09-02 | End: 2020-09-08

## 2020-09-08 RX ORDER — LISINOPRIL 40 MG/1
TABLET ORAL
Qty: 30 TABLET | Refills: 0 | Status: SHIPPED | OUTPATIENT
Start: 2020-09-08 | End: 2020-09-28

## 2020-09-11 ENCOUNTER — TELEPHONE (OUTPATIENT)
Dept: FAMILY MEDICINE CLINIC | Age: 80
End: 2020-09-11

## 2020-09-11 RX ORDER — CLOPIDOGREL BISULFATE 75 MG/1
75 TABLET ORAL DAILY
Qty: 30 TABLET | Refills: 3 | Status: SHIPPED | OUTPATIENT
Start: 2020-09-11 | End: 2020-10-27 | Stop reason: SDUPTHER

## 2020-09-28 RX ORDER — LISINOPRIL 40 MG/1
TABLET ORAL
Qty: 30 TABLET | Refills: 0 | Status: SHIPPED | OUTPATIENT
Start: 2020-09-28 | End: 2020-10-27 | Stop reason: SDUPTHER

## 2020-10-12 ENCOUNTER — TELEPHONE (OUTPATIENT)
Dept: FAMILY MEDICINE CLINIC | Age: 80
End: 2020-10-12

## 2020-10-12 NOTE — TELEPHONE ENCOUNTER
Ant Duque from [facility] is calling to let us know they are discharging the patient pt is independent in ordering his own medications and insurance will no longer cover just [facility]

## 2020-10-27 RX ORDER — ATORVASTATIN CALCIUM 80 MG/1
80 TABLET, FILM COATED ORAL NIGHTLY
Qty: 30 TABLET | Refills: 3 | Status: SHIPPED | OUTPATIENT
Start: 2020-10-27 | End: 2020-12-28

## 2020-10-27 RX ORDER — LISINOPRIL 40 MG/1
TABLET ORAL
Qty: 30 TABLET | Refills: 0 | Status: SHIPPED | OUTPATIENT
Start: 2020-10-27 | End: 2020-10-30

## 2020-10-27 RX ORDER — AMLODIPINE BESYLATE 10 MG/1
10 TABLET ORAL DAILY
Qty: 30 TABLET | Refills: 2 | Status: SHIPPED | OUTPATIENT
Start: 2020-10-27 | End: 2020-10-30

## 2020-10-27 RX ORDER — GABAPENTIN 300 MG/1
300 CAPSULE ORAL 4 TIMES DAILY
Qty: 120 CAPSULE | Refills: 2 | Status: SHIPPED | OUTPATIENT
Start: 2020-10-27 | End: 2021-02-04 | Stop reason: SDUPTHER

## 2020-10-27 RX ORDER — CLOPIDOGREL BISULFATE 75 MG/1
75 TABLET ORAL DAILY
Qty: 30 TABLET | Refills: 3 | Status: SHIPPED | OUTPATIENT
Start: 2020-10-27 | End: 2020-12-28

## 2020-10-30 RX ORDER — LISINOPRIL 40 MG/1
TABLET ORAL
Qty: 30 TABLET | Refills: 0 | Status: SHIPPED | OUTPATIENT
Start: 2020-10-30 | End: 2021-02-04 | Stop reason: SDUPTHER

## 2020-10-30 RX ORDER — AMLODIPINE BESYLATE 10 MG/1
TABLET ORAL
Qty: 30 TABLET | Refills: 2 | Status: SHIPPED | OUTPATIENT
Start: 2020-10-30 | End: 2020-11-12

## 2020-11-04 NOTE — TELEPHONE ENCOUNTER
Received fax for medication refill from [facility], medications already sent 10/27/2020

## 2020-11-12 RX ORDER — AMLODIPINE BESYLATE 10 MG/1
TABLET ORAL
Qty: 30 TABLET | Refills: 2 | Status: SHIPPED | OUTPATIENT
Start: 2020-11-12 | End: 2020-12-28

## 2020-11-20 ENCOUNTER — OFFICE VISIT (OUTPATIENT)
Dept: FAMILY MEDICINE CLINIC | Age: 80
End: 2020-11-20
Payer: MEDICARE

## 2020-11-20 ENCOUNTER — OFFICE VISIT (OUTPATIENT)
Dept: ENT CLINIC | Age: 80
End: 2020-11-20
Payer: MEDICARE

## 2020-11-20 VITALS
TEMPERATURE: 97.7 F | SYSTOLIC BLOOD PRESSURE: 122 MMHG | HEIGHT: 60 IN | WEIGHT: 115 LBS | DIASTOLIC BLOOD PRESSURE: 74 MMHG | BODY MASS INDEX: 22.58 KG/M2

## 2020-11-20 VITALS
HEIGHT: 60 IN | BODY MASS INDEX: 22.38 KG/M2 | HEART RATE: 98 BPM | WEIGHT: 114 LBS | TEMPERATURE: 97.7 F | SYSTOLIC BLOOD PRESSURE: 147 MMHG | DIASTOLIC BLOOD PRESSURE: 77 MMHG

## 2020-11-20 LAB — HBA1C MFR BLD: 5.9 %

## 2020-11-20 PROCEDURE — 1123F ACP DISCUSS/DSCN MKR DOCD: CPT | Performed by: FAMILY MEDICINE

## 2020-11-20 PROCEDURE — 83036 HEMOGLOBIN GLYCOSYLATED A1C: CPT | Performed by: FAMILY MEDICINE

## 2020-11-20 PROCEDURE — G8427 DOCREV CUR MEDS BY ELIG CLIN: HCPCS | Performed by: FAMILY MEDICINE

## 2020-11-20 PROCEDURE — G8482 FLU IMMUNIZE ORDER/ADMIN: HCPCS | Performed by: FAMILY MEDICINE

## 2020-11-20 PROCEDURE — 4040F PNEUMOC VAC/ADMIN/RCVD: CPT | Performed by: FAMILY MEDICINE

## 2020-11-20 PROCEDURE — 99203 OFFICE O/P NEW LOW 30 MIN: CPT | Performed by: OTOLARYNGOLOGY

## 2020-11-20 PROCEDURE — 1036F TOBACCO NON-USER: CPT | Performed by: FAMILY MEDICINE

## 2020-11-20 PROCEDURE — 99214 OFFICE O/P EST MOD 30 MIN: CPT | Performed by: FAMILY MEDICINE

## 2020-11-20 PROCEDURE — G8420 CALC BMI NORM PARAMETERS: HCPCS | Performed by: FAMILY MEDICINE

## 2020-11-20 RX ORDER — CYANOCOBALAMIN (VITAMIN B-12) 500 MCG
1 TABLET ORAL 2 TIMES DAILY
Qty: 60 TABLET | Refills: 0 | Status: SHIPPED | OUTPATIENT
Start: 2020-11-20 | End: 2021-02-04 | Stop reason: SDUPTHER

## 2020-11-20 NOTE — PROGRESS NOTES
2020     Jessie Lopez (:  1940) is a 68 y.o. male, here for evaluation of the following medical concerns:    HPI     HTN- much improved today , taking medication as prescribed, no side effects from the medication, denied headache, vision change, or dizziness.  Patient has home nursing which has made a big difference in his care,  he fails to bring in logs but his nurse has informed me of his resutls, states he either forgots them or doesn't have time?  Patient is  taking his Norvasc, I will have home health follow with patient to help with care, unable to discuss with son.      CVA- stoke in Feb. 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 Gabriela Rodríguez was admitted on  and discharged on 3/3/20, likely secondary to uncontrolled htn- according to the note by Dr. Roxy De Souza, patient is much improved today no obvious signs of CVA, questionable memory?  Patient was alert and oriented x 3 today, he saw Neuro in August, patient does have increased lightheadedness at times,   vascular surgeon evaluated the patient several months ag but it is believed very little can be done at this time.       \"MRI-brain with acute lacunar infarct within the left posterior limb of internal capsule.  Patient seen by neurology, and they found a right sided ataxia, 4+/5 strength in right arm and leg.  Patient on aspirin and high-dose statin.  Patient started therapies, but needs more therapy before discharge to home safely.  Patient lives at home with his son in a 1 level condo, and the son works daily. Gabriela Rodríguez started therapy on our rehabilitation unit one week ago, but was discharged to the acute floor after he developed syncopal episode in therapy.  Further workup revealed evidence of new cerebral infarctions occurring on his repeat MRI scan on 2/15/20, showing Increase in size of the previously noted infarct in the left posterior limb of the internal capsule, with numerous additional bilateral acute infarcts.  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 RENEE negative for shunting, mass or thrombus.        DM II- Patient is taking medication as prescribed, A1C was 5. 9!! today, denied side effects, is not checking his glucose on a regular basis, understands the importance of doing this but hasn't been checking his glucose on a regular basis.        Left neck mass- present for years, patient has been told in the past it was benign? Would like imaging to see what it is, has had several images of brain and neck that didn't reveal a tumor or mass, will order ultrasound for the patient.  Patient still hasn't had this obtained at this time, continues to forget, with that said he is here now and I will get him in with ENT today to have this finally evaluated. Diarrhea-patient has been complaining of loose stools, not watery, not sure if secondary to stress or medications, no abdominal pain, no nausea or vomiting.      Today, denied chest pain, sob, n, v, or diarrhea. Review of Systems   Constitutional: Negative for activity change, fatigue, fever and unexpected weight change. HENT: Negative for congestion, ear pain, rhinorrhea, sinus pressure, sore throat and trouble swallowing. Eyes: Negative for discharge and visual disturbance. Respiratory: Negative for cough, chest tightness, shortness of breath and wheezing. Cardiovascular: Negative for chest pain, palpitations and leg swelling. Gastrointestinal: Positive for diarrhea. Negative for abdominal pain, nausea and vomiting. Endocrine: Negative for cold intolerance, heat intolerance, polydipsia and polyphagia. Musculoskeletal: Negative for arthralgias. Skin: Negative for rash. Neurological: Positive for light-headedness. Negative for dizziness, syncope and headaches. Psychiatric/Behavioral: Positive for decreased concentration. Negative for dysphoric mood.  The patient is not nervous/anxious. Prior to Visit Medications    Medication Sig Taking? Authorizing Provider   Cyanocobalamin (VITAMIN B 12) 500 MCG TABS Take 1 tablet by mouth 2 times daily Yes Pilo Barber DO   amLODIPine (NORVASC) 10 MG tablet TAKE 1 TABLET BY MOUTH ONE TIME A DAY Yes Piol Barber DO   lisinopril (PRINIVIL;ZESTRIL) 40 MG tablet TAKE 1 TABLET BY MOUTH ONE TIME A DAY Yes Pilo Barber DO   atorvastatin (LIPITOR) 80 MG tablet Take 1 tablet by mouth nightly Yes Brodie Homans, DO   clopidogrel (PLAVIX) 75 MG tablet Take 1 tablet by mouth daily Yes Pilo Barber DO   gabapentin (NEURONTIN) 300 MG capsule Take 1 capsule by mouth 4 times daily for 30 days. Yes Brodie Homans, DO   metFORMIN (GLUCOPHAGE) 500 MG tablet Take 1 tablet by mouth daily (with breakfast) Yes Brodie Homans, DO   aspirin 81 MG EC tablet Take 1 tablet by mouth daily Yes Ulises Rees MD        Social History     Tobacco Use    Smoking status: Never Smoker    Smokeless tobacco: Never Used   Substance Use Topics    Alcohol use: Never     Frequency: Never        Vitals:    11/20/20 1014   BP: 122/74   Temp: 97.7 °F (36.5 °C)   Weight: 115 lb (52.2 kg)   Height: 4' 8\" (1.422 m)     Estimated body mass index is 25.78 kg/m² as calculated from the following:    Height as of this encounter: 4' 8\" (1.422 m). Weight as of this encounter: 115 lb (52.2 kg). Physical Exam  Vitals signs and nursing note reviewed. Constitutional:       Appearance: He is well-developed. HENT:      Head: Normocephalic and atraumatic. Right Ear: Tympanic membrane, ear canal and external ear normal.      Left Ear: Tympanic membrane, ear canal and external ear normal.      Nose: Nose normal.      Mouth/Throat:      Mouth: Mucous membranes are moist.   Eyes:      Conjunctiva/sclera: Conjunctivae normal.   Neck:      Thyroid: No thyromegaly. Cardiovascular:      Rate and Rhythm: Normal rate and regular rhythm. Heart sounds: No murmur. Pulmonary:      Effort: Pulmonary effort is normal.      Breath sounds: Normal breath sounds. No wheezing or rales. Abdominal:      General: Bowel sounds are normal.      Palpations: Abdomen is soft. Tenderness: There is no abdominal tenderness. Skin:     Findings: No rash. Neurological:      Mental Status: He is alert and oriented to person, place, and time. Psychiatric:         Behavior: Behavior normal.         Judgment: Judgment normal.         ASSESSMENT/PLAN:  1. Controlled type 2 diabetes mellitus without complication, without long-term current use of insulin ([address]  Patient will need to keep a log of his glucose readings and bring them to the office. He needs to monitor his diet and exercise. Attempt to lose weight. Discussed proper eating habits. Continue to take medication as prescribed. Will obtain A1C at this visit. Educated on signs and symptoms for immediate evaluation in the ER.   - POCT glycosylated hemoglobin (Hb A1C)  - CBC Auto Differential; Future  - Comprehensive Metabolic Panel; Future  - Lipid Panel; Future    2. Acute CVA (cerebrovascular accident) ([address]  Stable  Continue with medication  Keep appointments with specialist.   Berhane High questions  - POCT glycosylated hemoglobin (Hb A1C)  - CBC Auto Differential; Future  - Comprehensive Metabolic Panel; Future  - Lipid Panel; Future    3. Essential hypertension  well controlled. Discussed signs and symptoms for immediate evaluation in the ER. Reduce sodium. Monitor diet, exercise and lose weight. Keep a BP log and bring it to your next appointment. Needs to rtc in one month for BP check  - POCT glycosylated hemoglobin (Hb A1C)  - CBC Auto Differential; Future  - Comprehensive Metabolic Panel; Future  - Lipid Panel; Future    4.  History of memory loss  Stable  Continue with medication  Keep appointments with specialist.   Answered questions  - POCT glycosylated hemoglobin (Hb A1C)  - CBC Auto Differential; Future  - Comprehensive Metabolic Panel; Future  - Lipid Panel; Future    5. Mass of left side of neck  Referred to ENT    - POCT glycosylated hemoglobin (Hb A1C)  - CBC Auto Differential; Future  - Comprehensive Metabolic Panel; Future  - Lipid Panel; Future    6. Vitamin B 12 deficiency  Take medication as prescribed. Push fluids and rest  Discussed conservative treatment  Discussed signs and symptoms for immediate evaluation in the ER  RTC if no improved. - POCT glycosylated hemoglobin (Hb A1C)  - CBC Auto Differential; Future  - Comprehensive Metabolic Panel; Future  - Lipid Panel; Future    7. Diarrhea, unspecified type  Push fluids  rtc if not improved  Discussed diet  If not improved will seek evaluation from GI  Colonoscopy. - Domenic Mckee MD, Gastroenterology, [facility]      Return in about 2 weeks (around 12/4/2020).

## 2020-11-20 NOTE — PROGRESS NOTES
 Smokeless tobacco: Never Used   Substance and Sexual Activity    Alcohol use: Never     Frequency: Never    Drug use: Never    Sexual activity: Not on file   Lifestyle    Physical activity     Days per week: Not on file     Minutes per session: Not on file    Stress: Not on file   Relationships    Social connections     Talks on phone: Not on file     Gets together: Not on file     Attends [employer] service: Not on file     Active member of club or organization: Not on file     Attends meetings of clubs or organizations: Not on file     Relationship status: Not on file    Intimate partner violence     Fear of current or ex partner: Not on file     Emotionally abused: Not on file     Physically abused: Not on file     Forced sexual activity: Not on file   Other Topics Concern    Not on file   Social History Narrative    Not on file       DRUG/FOOD ALLERGIES: Patient has no known allergies. CURRENT MEDICATIONS  Prior to Admission medications    Medication Sig Start Date End Date Taking? Authorizing Provider   Cyanocobalamin (VITAMIN B 12) 500 MCG TABS Take 1 tablet by mouth 2 times daily 11/20/20 12/20/20  Tristan Mendosa, DO   amLODIPine (NORVASC) 10 MG tablet TAKE 1 TABLET BY MOUTH ONE TIME A DAY 11/12/20   Tristan Mendosa, DO   lisinopril (PRINIVIL;ZESTRIL) 40 MG tablet TAKE 1 TABLET BY MOUTH ONE TIME A DAY 10/30/20   Tristan Mendosa, DO   atorvastatin (LIPITOR) 80 MG tablet Take 1 tablet by mouth nightly 10/27/20   Tristan Mendosa, DO   clopidogrel (PLAVIX) 75 MG tablet Take 1 tablet by mouth daily 10/27/20   Tristan Mendosa, DO   gabapentin (NEURONTIN) 300 MG capsule Take 1 capsule by mouth 4 times daily for 30 days.  10/27/20 11/26/20  Tristan Mendosa, DO   metFORMIN (GLUCOPHAGE) 500 MG tablet Take 1 tablet by mouth daily (with breakfast) 10/27/20   Tristan Mendosa, DO   aspirin 81 MG EC tablet Take 1 tablet by mouth daily 3/3/20   Lisa Osorio MD       REVIEW OF SYSTEMS  The following systems were reviewed and revealed the following in addition to any already discussed in the HPI:    CONSTITUTIONAL: no weight loss, no fever, no night sweats, no chills  EYES: no vision changes, no blurry vision  EARS: no changes in hearing, no otalgia  NOSE: no epistaxis, no rhinorrhea  Neck: Neck mass  RESPIRATORY: no  Difficulty breathing, no shortness of breath  CV: no chest pain, no Peripheral vascular disease  HEME: No coagulation disorder, no Bleeding disorder  NEURO: no TIA or stroke-like symptoms  SKIN: No new rashes in the head and neck, no recent skin cancers  MOUTH: No new ulcers, no recent teeth infections  GASTROINTESTINAL: No diarrhea, stomach pain  PSYCH: No anxiety, no depression      PHYSICAL EXAM  BP (!) 147/77 (Site: Left Upper Arm, Position: Sitting, Cuff Size: Medium Adult)   Pulse 98   Temp 97.7 °F (36.5 °C) (Temporal)   Ht 5' (1.524 m)   Wt 114 lb (51.7 kg)   BMI 22.26 kg/m²     GENERAL: No Acute Distress, Alert and Oriented, no Hoarseness, strong voice  EYES: EOMI, Anti-icteric  HENT:   Head: Normocephalic and atraumatic. Face:  Symmetric, facial nerve intact, no sinus tenderness  Right Ear: Normal external ear, normal external auditory canal, intact tympanic membrane with normal mobility and aerated middle ear  Left Ear: Normal external ear, normal external auditory canal, intact tympanic membrane with normal mobility and aerated middle ear  Mouth/Oral Cavity:  normal lips, Uvula is midline, no mucosal lesions, no trismus, somewhat poor dentition, normal salivary quality/flow  Oropharynx/Larynx:  normal oropharynx, normal tonsils; normal larynx/nasopharynx on mirror exam  Nose:Normal external nasal appearance. Anterior rhinoscopy shows a normal septum. normal turbinates. Normal mucosa   NECK: Superficial mobile, 1 to 2 cm lesion just lateral to the sternocleidomastoid on the left side. no other neck masses noted. no overlying skin changes.   CHEST: Normal respiratory effort, no retractions, breathing comfortably  SKIN: No rashes, normal appearing skin, no evidence of skin lesions/tumors  Neuro:  cranial nerve II-XII intact; normal gait  Cardio:  no edema        RADIOLOGY  Summary of findings:  I reviewed the CT scan that the patient had in February. There is a somewhat ill-defined raised area just lateral to the SCM in the same region. The external jugular vein is somewhat wide in this area. No other neck masses noted            ASSESSMENT/PLAN  1. Neck mass  I am unsure exactly what this represents. The patient says has been there for 10 years. I would think that the CTA that he had in February which showed just a little bit better. It could just be a thrombosed external jugular vein. I think an ultrasound may give us a better idea of what this is particularly if it is just the external jugular vein. I will call him with the results of the ultrasound. The patient does not desire anything to be done at this time. I think this is reasonable if it is really been there 10 years and the ultrasound does not show anything that might be more concerning.  - US HEAD NECK SOFT TISSUE THYROID; Future             I have performed a head and neck physical exam personally or was physically present during the key or critical portions of the service. Medical Decision Making:   The following items were considered in medical decision making:  Independent review of images  Review / order clinical lab tests  Review / order radiology tests  Decision to obtain old records

## 2020-11-21 ENCOUNTER — HOSPITAL ENCOUNTER (OUTPATIENT)
Dept: ULTRASOUND IMAGING | Age: 80
Discharge: HOME | End: 2020-11-21
Payer: MEDICARE

## 2020-11-21 PROCEDURE — 76536 US EXAM OF HEAD AND NECK: CPT

## 2020-11-24 ENCOUNTER — TELEPHONE (OUTPATIENT)
Dept: ENT CLINIC | Age: 80
End: 2020-11-24

## 2020-11-24 NOTE — TELEPHONE ENCOUNTER
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 Given how long its been there, it does not seem as though is getting big in a hurry. Patient does not desire to have it removed at this time. I think that this is reasonable. If he decides he would like to have removed, I am happy to see him we could do this likely under local anesthesia.

## 2020-11-29 RX ORDER — PSYLLIUM SEED (WITH DEXTROSE)
1 POWDER (GRAM) ORAL 2 TIMES DAILY
Qty: 30 PACKET | Refills: 4 | Status: SHIPPED | OUTPATIENT
Start: 2020-11-29 | End: 2020-12-29

## 2020-11-29 ASSESSMENT — ENCOUNTER SYMPTOMS
CHEST TIGHTNESS: 0
RHINORRHEA: 0
EYE DISCHARGE: 0
SORE THROAT: 0
DIARRHEA: 1
VOMITING: 0
NAUSEA: 0
SINUS PRESSURE: 0
TROUBLE SWALLOWING: 0
WHEEZING: 0
ABDOMINAL PAIN: 0
COUGH: 0
SHORTNESS OF BREATH: 0

## 2020-12-28 RX ORDER — CLOPIDOGREL BISULFATE 75 MG/1
TABLET ORAL
Qty: 90 TABLET | Refills: 0 | Status: ON HOLD | OUTPATIENT
Start: 2020-12-28 | End: 2021-04-20 | Stop reason: HOSPADM

## 2020-12-28 RX ORDER — AMLODIPINE BESYLATE 10 MG/1
TABLET ORAL
Qty: 90 TABLET | Refills: 0 | Status: SHIPPED | OUTPATIENT
Start: 2020-12-28 | End: 2021-03-23 | Stop reason: SDUPTHER

## 2020-12-28 RX ORDER — ATORVASTATIN CALCIUM 80 MG/1
TABLET, FILM COATED ORAL
Qty: 90 TABLET | Refills: 0 | Status: SHIPPED | OUTPATIENT
Start: 2020-12-28 | End: 2021-12-30

## 2021-02-04 RX ORDER — GABAPENTIN 300 MG/1
300 CAPSULE ORAL 4 TIMES DAILY
Qty: 120 CAPSULE | Refills: 0 | Status: SHIPPED | OUTPATIENT
Start: 2021-02-04 | End: 2021-03-23 | Stop reason: SDUPTHER

## 2021-02-04 RX ORDER — CYANOCOBALAMIN (VITAMIN B-12) 500 MCG
1 TABLET ORAL 2 TIMES DAILY
Qty: 60 TABLET | Refills: 0 | Status: SHIPPED | OUTPATIENT
Start: 2021-02-04 | End: 2021-05-05

## 2021-02-04 RX ORDER — ASPIRIN 81 MG/1
81 TABLET ORAL DAILY
Qty: 30 TABLET | Refills: 0 | Status: SHIPPED | OUTPATIENT
Start: 2021-02-04 | End: 2021-05-05

## 2021-02-04 RX ORDER — LISINOPRIL 40 MG/1
TABLET ORAL
Qty: 30 TABLET | Refills: 0 | Status: SHIPPED | OUTPATIENT
Start: 2021-02-04 | End: 2021-04-21

## 2021-03-23 ENCOUNTER — OFFICE VISIT (OUTPATIENT)
Dept: FAMILY MEDICINE CLINIC | Age: 81
End: 2021-03-23
Payer: MEDICARE

## 2021-03-23 VITALS
HEIGHT: 60 IN | WEIGHT: 117.6 LBS | SYSTOLIC BLOOD PRESSURE: 132 MMHG | BODY MASS INDEX: 23.09 KG/M2 | TEMPERATURE: 98 F | DIASTOLIC BLOOD PRESSURE: 64 MMHG

## 2021-03-23 DIAGNOSIS — E11.9 CONTROLLED TYPE 2 DIABETES MELLITUS WITHOUT COMPLICATION, WITHOUT LONG-TERM CURRENT USE OF INSULIN (HCC): Primary | ICD-10-CM

## 2021-03-23 DIAGNOSIS — Z12.5 PROSTATE CANCER SCREENING: ICD-10-CM

## 2021-03-23 DIAGNOSIS — I10 ESSENTIAL HYPERTENSION: ICD-10-CM

## 2021-03-23 DIAGNOSIS — I63.9 ACUTE CVA (CEREBROVASCULAR ACCIDENT) (HCC): ICD-10-CM

## 2021-03-23 LAB
A/G RATIO: 1.7 (ref 1.1–2.2)
ALBUMIN SERPL-MCNC: 4.6 G/DL (ref 3.4–5)
ALP BLD-CCNC: 89 U/L (ref 40–129)
ALT SERPL-CCNC: 20 U/L (ref 10–40)
ANION GAP SERPL CALCULATED.3IONS-SCNC: 11 MMOL/L (ref 3–16)
AST SERPL-CCNC: 18 U/L (ref 15–37)
BILIRUB SERPL-MCNC: 0.5 MG/DL (ref 0–1)
BUN BLDV-MCNC: 25 MG/DL (ref 7–20)
CALCIUM SERPL-MCNC: 9.4 MG/DL (ref 8.3–10.6)
CHLORIDE BLD-SCNC: 107 MMOL/L (ref 99–110)
CO2: 23 MMOL/L (ref 21–32)
CREAT SERPL-MCNC: 1.2 MG/DL (ref 0.8–1.3)
GFR AFRICAN AMERICAN: >60
GFR NON-AFRICAN AMERICAN: 58
GLOBULIN: 2.7 G/DL
GLUCOSE BLD-MCNC: 125 MG/DL (ref 70–99)
HBA1C MFR BLD: 6.2 %
HCT VFR BLD CALC: 37.9 % (ref 40.5–52.5)
HEMOGLOBIN: 13 G/DL (ref 13.5–17.5)
MCH RBC QN AUTO: 31.2 PG (ref 26–34)
MCHC RBC AUTO-ENTMCNC: 34.4 G/DL (ref 31–36)
MCV RBC AUTO: 90.8 FL (ref 80–100)
PDW BLD-RTO: 13.6 % (ref 12.4–15.4)
PLATELET # BLD: 242 K/UL (ref 135–450)
PMV BLD AUTO: 8 FL (ref 5–10.5)
POTASSIUM SERPL-SCNC: 4.1 MMOL/L (ref 3.5–5.1)
PROSTATE SPECIFIC ANTIGEN: 0.55 NG/ML (ref 0–4)
RBC # BLD: 4.17 M/UL (ref 4.2–5.9)
SODIUM BLD-SCNC: 141 MMOL/L (ref 136–145)
TOTAL PROTEIN: 7.3 G/DL (ref 6.4–8.2)
WBC # BLD: 7.1 K/UL (ref 4–11)

## 2021-03-23 PROCEDURE — G8420 CALC BMI NORM PARAMETERS: HCPCS | Performed by: FAMILY MEDICINE

## 2021-03-23 PROCEDURE — 83036 HEMOGLOBIN GLYCOSYLATED A1C: CPT | Performed by: FAMILY MEDICINE

## 2021-03-23 PROCEDURE — G8482 FLU IMMUNIZE ORDER/ADMIN: HCPCS | Performed by: FAMILY MEDICINE

## 2021-03-23 PROCEDURE — 1036F TOBACCO NON-USER: CPT | Performed by: FAMILY MEDICINE

## 2021-03-23 PROCEDURE — 36415 COLL VENOUS BLD VENIPUNCTURE: CPT | Performed by: FAMILY MEDICINE

## 2021-03-23 PROCEDURE — 99214 OFFICE O/P EST MOD 30 MIN: CPT | Performed by: FAMILY MEDICINE

## 2021-03-23 PROCEDURE — G8427 DOCREV CUR MEDS BY ELIG CLIN: HCPCS | Performed by: FAMILY MEDICINE

## 2021-03-23 PROCEDURE — 1123F ACP DISCUSS/DSCN MKR DOCD: CPT | Performed by: FAMILY MEDICINE

## 2021-03-23 PROCEDURE — 4040F PNEUMOC VAC/ADMIN/RCVD: CPT | Performed by: FAMILY MEDICINE

## 2021-03-23 RX ORDER — GABAPENTIN 300 MG/1
300 CAPSULE ORAL 4 TIMES DAILY
Qty: 120 CAPSULE | Refills: 0 | Status: SHIPPED | OUTPATIENT
Start: 2021-03-23 | End: 2021-05-05

## 2021-03-23 RX ORDER — AMLODIPINE BESYLATE 10 MG/1
TABLET ORAL
Qty: 90 TABLET | Refills: 0 | Status: SHIPPED | OUTPATIENT
Start: 2021-03-23 | End: 2021-08-31

## 2021-03-23 ASSESSMENT — ENCOUNTER SYMPTOMS
SHORTNESS OF BREATH: 0
COUGH: 0
SORE THROAT: 0
ANAL BLEEDING: 0
ABDOMINAL PAIN: 0
SINUS PRESSURE: 0
RHINORRHEA: 0
WHEEZING: 0
CHEST TIGHTNESS: 0
EYE DISCHARGE: 0
NAUSEA: 0
DIARRHEA: 0
VOMITING: 0

## 2021-03-23 ASSESSMENT — PATIENT HEALTH QUESTIONNAIRE - PHQ9
2. FEELING DOWN, DEPRESSED OR HOPELESS: 0
SUM OF ALL RESPONSES TO PHQ QUESTIONS 1-9: 0

## 2021-03-23 NOTE — PROGRESS NOTES
3/23/2021     Neris Liang (:  1940) is a 68 y.o. male, here for evaluation of the following medical concerns:    HPI     HTN- much improved today , taking medication as prescribed, no side effects from the medication, denied headache, vision change, or dizziness.  Patient has home nursing which has made a big difference in his care,  he fails to bring in logs but his nurse has informed me of his results, states he either forgets them or doesn't have time?  Patient is  taking his Norvasc, I will have home health follow with patient to help with care, unable to discuss with son.      CVA- stoke in Feb. 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 Taco Freed was admitted on  and discharged on 3/3/20, likely secondary to uncontrolled htn- according to the note by Dr. Magaly Trujillo, patient is much improved today no obvious signs of CVA, questionable memory?  Patient was alert and oriented x 3 today, he saw Neuro in August, patient does have increased lightheadedness at times,   vascular surgeon evaluated the patient several months ago but it is believed very little can be done at this time.       \"MRI-brain with acute lacunar infarct within the left posterior limb of internal capsule.  Patient seen by neurology, and they found a right sided ataxia, 4+/5 strength in right arm and leg.  Patient on aspirin and high-dose statin.  Patient started therapies, but needs more therapy before discharge to home safely.  Patient lives at home with his son in a 1 level condo, and the son works daily. Taco Freed started therapy on our rehabilitation unit one week ago, but was discharged to the acute floor after he developed syncopal episode in therapy.  Further workup revealed evidence of new cerebral infarctions occurring on his repeat MRI scan on 2/15/20, showing Increase in size of the previously noted infarct in the left posterior limb of the internal capsule, with numerous additional bilateral acute infarcts.  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 RENEE negative for shunting, mass or thrombus.        DM II- Patient is taking medication as prescribed, A1C was 6.2 today, denied side effects, is not checking his glucose on a regular basis, understands the importance of doing this but hasn't been checking his glucose on a regular basis.         Left neck mass- present for years, patient has been told in the past it was benign? Saw ENT and was informed benign, he has declined to have this removed. PSA screening- has been going to the restroom at night more, poor stream at times, unable to completely empty, declined CHRIS but opted for PSA.    Today, denied chest pain, sob, n, v, or diarrhea.      Review of Systems   Constitutional: Negative for activity change, fatigue, fever and unexpected weight change. HENT: Negative for congestion, ear pain, rhinorrhea, sinus pressure and sore throat. Eyes: Negative for discharge and visual disturbance. Respiratory: Negative for cough, chest tightness, shortness of breath and wheezing. Cardiovascular: Negative for chest pain, palpitations and leg swelling. Gastrointestinal: Negative for abdominal pain, anal bleeding, diarrhea, nausea and vomiting. Endocrine: Negative for cold intolerance, heat intolerance, polydipsia and polyphagia. Genitourinary: Positive for frequency and urgency. Negative for discharge, dysuria, genital sores, hematuria, penile pain, penile swelling, scrotal swelling and testicular pain. Musculoskeletal: Negative for arthralgias. Skin: Negative for rash. Neurological: Negative for dizziness, syncope, light-headedness and headaches. Psychiatric/Behavioral: Positive for decreased concentration. Negative for dysphoric mood and suicidal ideas. The patient is not nervous/anxious. Prior to Visit Medications    Medication Sig Taking?  Authorizing Provider deficit present. Mental Status: He is alert and oriented to person, place, and time. Psychiatric:         Behavior: Behavior normal.         Judgment: Judgment normal.         ASSESSMENT/PLAN:  1. Controlled type 2 diabetes mellitus without complication, without long-term current use of insulin ([facility]  Patient will need to keep a log of his glucose readings and bring them to the office. He needs to monitor his diet and exercise. Attempt to lose weight. Discussed proper eating habits. Continue to take medication as prescribed. Will obtain A1C at this visit. Educated on signs and symptoms for immediate evaluation in the ER.   - POCT glycosylated hemoglobin (Hb A1C)  - Comprehensive Metabolic Panel  - CBC  - Psa screening    2. Acute CVA (cerebrovascular accident) ([facility] [address]  Stable  Continue with medication  Keep appointments with specialist.   Baylee Or questions  - POCT glycosylated hemoglobin (Hb A1C)  - Comprehensive Metabolic Panel  - CBC  - Psa screening    3. Essential hypertension  well controlled. Discussed signs and symptoms for immediate evaluation in the ER. Reduce sodium. Monitor diet, exercise and lose weight. Keep a BP log and bring it to your next appointment. Needs to rtc in one month for BP check  - POCT glycosylated hemoglobin (Hb A1C)  - Comprehensive Metabolic Panel  - CBC  - Psa screening    4. Prostate cancer screening  Lab obtained  Educated on the importance of having this done. Answered questions  - POCT glycosylated hemoglobin (Hb A1C)  - Comprehensive Metabolic Panel  - CBC  - Psa screening      Return in about 3 months (around 6/23/2021).

## 2021-04-19 ENCOUNTER — APPOINTMENT (OUTPATIENT)
Dept: GENERAL RADIOLOGY | Age: 81
DRG: 149 | End: 2021-04-19
Payer: MEDICARE

## 2021-04-19 ENCOUNTER — APPOINTMENT (OUTPATIENT)
Dept: CT IMAGING | Age: 81
DRG: 149 | End: 2021-04-19
Payer: MEDICARE

## 2021-04-19 ENCOUNTER — HOSPITAL ENCOUNTER (INPATIENT)
Age: 81
LOS: 1 days | Discharge: HOME | DRG: 149 | End: 2021-04-20
Attending: EMERGENCY MEDICINE | Admitting: STUDENT IN AN ORGANIZED HEALTH CARE EDUCATION/TRAINING PROGRAM
Payer: MEDICARE

## 2021-04-19 DIAGNOSIS — S52.502A CLOSED FRACTURE OF DISTAL END OF LEFT RADIUS, UNSPECIFIED FRACTURE MORPHOLOGY, INITIAL ENCOUNTER: ICD-10-CM

## 2021-04-19 DIAGNOSIS — R27.0 ATAXIA: Primary | ICD-10-CM

## 2021-04-19 DIAGNOSIS — R42 DIZZINESS: ICD-10-CM

## 2021-04-19 PROBLEM — S62.102A LEFT WRIST FRACTURE: Status: ACTIVE | Noted: 2021-04-19

## 2021-04-19 PROBLEM — Z86.73 HISTORY OF CVA (CEREBROVASCULAR ACCIDENT): Status: ACTIVE | Noted: 2021-04-19
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
## 2021-04-19 LAB
ANION GAP SERPL CALCULATED.3IONS-SCNC: 12 MMOL/L (ref 3–16)
BASOPHILS ABSOLUTE: 0 K/UL (ref 0–0.2)
BASOPHILS RELATIVE PERCENT: 0.4 %
BILIRUBIN URINE: NEGATIVE
BLOOD, URINE: NEGATIVE
BUN BLDV-MCNC: 19 MG/DL (ref 7–20)
CALCIUM SERPL-MCNC: 9.2 MG/DL (ref 8.3–10.6)
CHLORIDE BLD-SCNC: 103 MMOL/L (ref 99–110)
CLARITY: CLEAR
CO2: 23 MMOL/L (ref 21–32)
COLOR: YELLOW
CREAT SERPL-MCNC: 1.2 MG/DL (ref 0.8–1.3)
EOSINOPHILS ABSOLUTE: 0.2 K/UL (ref 0–0.6)
EOSINOPHILS RELATIVE PERCENT: 1.6 %
GFR AFRICAN AMERICAN: >60
GFR NON-AFRICAN AMERICAN: 58
GLUCOSE BLD-MCNC: 124 MG/DL (ref 70–99)
GLUCOSE BLD-MCNC: 126 MG/DL (ref 70–99)
GLUCOSE URINE: NEGATIVE MG/DL
HCT VFR BLD CALC: 38.3 % (ref 40.5–52.5)
HEMOGLOBIN: 13.1 G/DL (ref 13.5–17.5)
INR BLD: 1.03 (ref 0.86–1.14)
KETONES, URINE: NEGATIVE MG/DL
LEUKOCYTE ESTERASE, URINE: NEGATIVE
LYMPHOCYTES ABSOLUTE: 1.2 K/UL (ref 1–5.1)
LYMPHOCYTES RELATIVE PERCENT: 12.1 %
MCH RBC QN AUTO: 30.9 PG (ref 26–34)
MCHC RBC AUTO-ENTMCNC: 34.3 G/DL (ref 31–36)
MCV RBC AUTO: 89.8 FL (ref 80–100)
MICROSCOPIC EXAMINATION: NORMAL
MONOCYTES ABSOLUTE: 0.7 K/UL (ref 0–1.3)
MONOCYTES RELATIVE PERCENT: 6.9 %
NEUTROPHILS ABSOLUTE: 8 K/UL (ref 1.7–7.7)
NEUTROPHILS RELATIVE PERCENT: 79 %
NITRITE, URINE: NEGATIVE
PDW BLD-RTO: 13.5 % (ref 12.4–15.4)
PERFORMED ON: ABNORMAL
PH UA: 6 (ref 5–8)
PLATELET # BLD: 219 K/UL (ref 135–450)
PMV BLD AUTO: 7.2 FL (ref 5–10.5)
POTASSIUM REFLEX MAGNESIUM: 4.5 MMOL/L (ref 3.5–5.1)
PROTEIN UA: NEGATIVE MG/DL
PROTHROMBIN TIME: 12 SEC (ref 10–13.2)
RBC # BLD: 4.26 M/UL (ref 4.2–5.9)
SODIUM BLD-SCNC: 138 MMOL/L (ref 136–145)
SPECIFIC GRAVITY UA: 1.01 (ref 1–1.03)
TROPONIN: <0.01 NG/ML
URINE REFLEX TO CULTURE: NORMAL
URINE TYPE: NORMAL
UROBILINOGEN, URINE: 0.2 E.U./DL
WBC # BLD: 10.2 K/UL (ref 4–11)

## 2021-04-19 PROCEDURE — 80048 BASIC METABOLIC PNL TOTAL CA: CPT

## 2021-04-19 PROCEDURE — 6360000004 HC RX CONTRAST MEDICATION: Performed by: EMERGENCY MEDICINE

## 2021-04-19 PROCEDURE — 73130 X-RAY EXAM OF HAND: CPT

## 2021-04-19 PROCEDURE — 81003 URINALYSIS AUTO W/O SCOPE: CPT

## 2021-04-19 PROCEDURE — G0378 HOSPITAL OBSERVATION PER HR: HCPCS

## 2021-04-19 PROCEDURE — 70450 CT HEAD/BRAIN W/O DYE: CPT

## 2021-04-19 PROCEDURE — 85025 COMPLETE CBC W/AUTO DIFF WBC: CPT

## 2021-04-19 PROCEDURE — 93005 ELECTROCARDIOGRAM TRACING: CPT | Performed by: EMERGENCY MEDICINE

## 2021-04-19 PROCEDURE — 71045 X-RAY EXAM CHEST 1 VIEW: CPT

## 2021-04-19 PROCEDURE — 73590 X-RAY EXAM OF LOWER LEG: CPT

## 2021-04-19 PROCEDURE — 2060000000 HC ICU INTERMEDIATE R&B

## 2021-04-19 PROCEDURE — 73110 X-RAY EXAM OF WRIST: CPT

## 2021-04-19 PROCEDURE — 99283 EMERGENCY DEPT VISIT LOW MDM: CPT

## 2021-04-19 PROCEDURE — 84484 ASSAY OF TROPONIN QUANT: CPT

## 2021-04-19 PROCEDURE — 85610 PROTHROMBIN TIME: CPT

## 2021-04-19 PROCEDURE — 70496 CT ANGIOGRAPHY HEAD: CPT

## 2021-04-19 RX ORDER — DEXTROSE MONOHYDRATE 25 G/50ML
12.5 INJECTION, SOLUTION INTRAVENOUS PRN
Status: DISCONTINUED | OUTPATIENT
Start: 2021-04-19 | End: 2021-04-20 | Stop reason: HOSPADM

## 2021-04-19 RX ORDER — GABAPENTIN 300 MG/1
300 CAPSULE ORAL 4 TIMES DAILY
Status: DISCONTINUED | OUTPATIENT
Start: 2021-04-20 | End: 2021-04-20 | Stop reason: HOSPADM

## 2021-04-19 RX ORDER — SODIUM CHLORIDE 9 MG/ML
25 INJECTION, SOLUTION INTRAVENOUS PRN
Status: DISCONTINUED | OUTPATIENT
Start: 2021-04-19 | End: 2021-04-20 | Stop reason: HOSPADM

## 2021-04-19 RX ORDER — SODIUM CHLORIDE 0.9 % (FLUSH) 0.9 %
5-40 SYRINGE (ML) INJECTION EVERY 12 HOURS SCHEDULED
Status: DISCONTINUED | OUTPATIENT
Start: 2021-04-20 | End: 2021-04-20 | Stop reason: HOSPADM

## 2021-04-19 RX ORDER — ASPIRIN 81 MG/1
81 TABLET ORAL DAILY
Status: DISCONTINUED | OUTPATIENT
Start: 2021-04-20 | End: 2021-04-20 | Stop reason: HOSPADM

## 2021-04-19 RX ORDER — DEXTROSE MONOHYDRATE 50 MG/ML
100 INJECTION, SOLUTION INTRAVENOUS PRN
Status: DISCONTINUED | OUTPATIENT
Start: 2021-04-19 | End: 2021-04-20 | Stop reason: HOSPADM

## 2021-04-19 RX ORDER — ATORVASTATIN CALCIUM 80 MG/1
80 TABLET, FILM COATED ORAL NIGHTLY
Status: DISCONTINUED | OUTPATIENT
Start: 2021-04-20 | End: 2021-04-20 | Stop reason: HOSPADM

## 2021-04-19 RX ORDER — ONDANSETRON 2 MG/ML
4 INJECTION INTRAMUSCULAR; INTRAVENOUS EVERY 6 HOURS PRN
Status: DISCONTINUED | OUTPATIENT
Start: 2021-04-19 | End: 2021-04-20 | Stop reason: HOSPADM

## 2021-04-19 RX ORDER — CLOPIDOGREL BISULFATE 75 MG/1
75 TABLET ORAL DAILY
Status: DISCONTINUED | OUTPATIENT
Start: 2021-04-20 | End: 2021-04-20 | Stop reason: HOSPADM

## 2021-04-19 RX ORDER — NICOTINE POLACRILEX 4 MG
15 LOZENGE BUCCAL PRN
Status: DISCONTINUED | OUTPATIENT
Start: 2021-04-19 | End: 2021-04-20 | Stop reason: HOSPADM

## 2021-04-19 RX ORDER — SODIUM CHLORIDE 0.9 % (FLUSH) 0.9 %
5-40 SYRINGE (ML) INJECTION PRN
Status: DISCONTINUED | OUTPATIENT
Start: 2021-04-19 | End: 2021-04-20 | Stop reason: HOSPADM

## 2021-04-19 RX ADMIN — IOPAMIDOL 75 ML: 755 INJECTION, SOLUTION INTRAVENOUS at 20:16

## 2021-04-19 ASSESSMENT — PAIN SCALES - GENERAL
PAINLEVEL_OUTOF10: 0
PAINLEVEL_OUTOF10: 0

## 2021-04-20 ENCOUNTER — APPOINTMENT (OUTPATIENT)
Dept: MRI IMAGING | Age: 81
DRG: 149 | End: 2021-04-20
Payer: MEDICARE

## 2021-04-20 ENCOUNTER — TELEPHONE (OUTPATIENT)
Dept: CARDIOLOGY CLINIC | Age: 81
End: 2021-04-20

## 2021-04-20 VITALS
WEIGHT: 115.52 LBS | HEART RATE: 66 BPM | RESPIRATION RATE: 20 BRPM | TEMPERATURE: 98.8 F | HEIGHT: 60 IN | SYSTOLIC BLOOD PRESSURE: 127 MMHG | DIASTOLIC BLOOD PRESSURE: 66 MMHG | BODY MASS INDEX: 22.68 KG/M2 | OXYGEN SATURATION: 99 %

## 2021-04-20 DIAGNOSIS — R00.1 BRADYCARDIA: ICD-10-CM

## 2021-04-20 DIAGNOSIS — I63.9 ACUTE CVA (CEREBROVASCULAR ACCIDENT) (HCC): Primary | ICD-10-CM

## 2021-04-20 LAB
ANION GAP SERPL CALCULATED.3IONS-SCNC: 12 MMOL/L (ref 3–16)
BASOPHILS ABSOLUTE: 0 K/UL (ref 0–0.2)
BASOPHILS RELATIVE PERCENT: 0.5 %
BUN BLDV-MCNC: 16 MG/DL (ref 7–20)
CALCIUM SERPL-MCNC: 8.8 MG/DL (ref 8.3–10.6)
CHLORIDE BLD-SCNC: 108 MMOL/L (ref 99–110)
CHOLESTEROL, TOTAL: 136 MG/DL (ref 0–199)
CO2: 22 MMOL/L (ref 21–32)
CREAT SERPL-MCNC: 1 MG/DL (ref 0.8–1.3)
EKG ATRIAL RATE: 80 BPM
EKG DIAGNOSIS: NORMAL
EKG P AXIS: 78 DEGREES
EKG P-R INTERVAL: 162 MS
EKG Q-T INTERVAL: 416 MS
EKG QRS DURATION: 130 MS
EKG QTC CALCULATION (BAZETT): 479 MS
EKG R AXIS: 92 DEGREES
EKG T AXIS: 61 DEGREES
EKG VENTRICULAR RATE: 80 BPM
EOSINOPHILS ABSOLUTE: 0.3 K/UL (ref 0–0.6)
EOSINOPHILS RELATIVE PERCENT: 3 %
ESTIMATED AVERAGE GLUCOSE: 137 MG/DL
GFR AFRICAN AMERICAN: >60
GFR NON-AFRICAN AMERICAN: >60
GLUCOSE BLD-MCNC: 121 MG/DL (ref 70–99)
GLUCOSE BLD-MCNC: 129 MG/DL (ref 70–99)
GLUCOSE BLD-MCNC: 191 MG/DL (ref 70–99)
HBA1C MFR BLD: 6.4 %
HCT VFR BLD CALC: 38.3 % (ref 40.5–52.5)
HDLC SERPL-MCNC: 56 MG/DL (ref 40–60)
HEMOGLOBIN: 13.2 G/DL (ref 13.5–17.5)
LDL CHOLESTEROL CALCULATED: 70 MG/DL
LYMPHOCYTES ABSOLUTE: 1.7 K/UL (ref 1–5.1)
LYMPHOCYTES RELATIVE PERCENT: 18.3 %
MAGNESIUM: 2.1 MG/DL (ref 1.8–2.4)
MCH RBC QN AUTO: 30.9 PG (ref 26–34)
MCHC RBC AUTO-ENTMCNC: 34.4 G/DL (ref 31–36)
MCV RBC AUTO: 89.8 FL (ref 80–100)
MONOCYTES ABSOLUTE: 0.9 K/UL (ref 0–1.3)
MONOCYTES RELATIVE PERCENT: 9.2 %
NEUTROPHILS ABSOLUTE: 6.5 K/UL (ref 1.7–7.7)
NEUTROPHILS RELATIVE PERCENT: 69 %
PDW BLD-RTO: 13.8 % (ref 12.4–15.4)
PERFORMED ON: ABNORMAL
PERFORMED ON: ABNORMAL
PLATELET # BLD: 211 K/UL (ref 135–450)
PMV BLD AUTO: 7.3 FL (ref 5–10.5)
POTASSIUM SERPL-SCNC: 4.2 MMOL/L (ref 3.5–5.1)
RBC # BLD: 4.26 M/UL (ref 4.2–5.9)
SODIUM BLD-SCNC: 142 MMOL/L (ref 136–145)
TRIGL SERPL-MCNC: 49 MG/DL (ref 0–150)
VLDLC SERPL CALC-MCNC: 10 MG/DL
WBC # BLD: 9.4 K/UL (ref 4–11)

## 2021-04-20 PROCEDURE — 85025 COMPLETE CBC W/AUTO DIFF WBC: CPT

## 2021-04-20 PROCEDURE — 97165 OT EVAL LOW COMPLEX 30 MIN: CPT

## 2021-04-20 PROCEDURE — 97116 GAIT TRAINING THERAPY: CPT | Performed by: PHYSICAL THERAPIST

## 2021-04-20 PROCEDURE — 70551 MRI BRAIN STEM W/O DYE: CPT

## 2021-04-20 PROCEDURE — 36415 COLL VENOUS BLD VENIPUNCTURE: CPT

## 2021-04-20 PROCEDURE — 6370000000 HC RX 637 (ALT 250 FOR IP): Performed by: STUDENT IN AN ORGANIZED HEALTH CARE EDUCATION/TRAINING PROGRAM

## 2021-04-20 PROCEDURE — 80061 LIPID PANEL: CPT

## 2021-04-20 PROCEDURE — 2580000003 HC RX 258: Performed by: STUDENT IN AN ORGANIZED HEALTH CARE EDUCATION/TRAINING PROGRAM

## 2021-04-20 PROCEDURE — 97161 PT EVAL LOW COMPLEX 20 MIN: CPT | Performed by: PHYSICAL THERAPIST

## 2021-04-20 PROCEDURE — 83735 ASSAY OF MAGNESIUM: CPT

## 2021-04-20 PROCEDURE — 92610 EVALUATE SWALLOWING FUNCTION: CPT

## 2021-04-20 PROCEDURE — G0378 HOSPITAL OBSERVATION PER HR: HCPCS

## 2021-04-20 PROCEDURE — 80048 BASIC METABOLIC PNL TOTAL CA: CPT

## 2021-04-20 PROCEDURE — 96372 THER/PROPH/DIAG INJ SC/IM: CPT

## 2021-04-20 PROCEDURE — 93010 ELECTROCARDIOGRAM REPORT: CPT | Performed by: INTERNAL MEDICINE

## 2021-04-20 PROCEDURE — 83036 HEMOGLOBIN GLYCOSYLATED A1C: CPT

## 2021-04-20 PROCEDURE — 94760 N-INVAS EAR/PLS OXIMETRY 1: CPT

## 2021-04-20 PROCEDURE — 97530 THERAPEUTIC ACTIVITIES: CPT

## 2021-04-20 PROCEDURE — 6360000002 HC RX W HCPCS: Performed by: STUDENT IN AN ORGANIZED HEALTH CARE EDUCATION/TRAINING PROGRAM

## 2021-04-20 RX ADMIN — GABAPENTIN 300 MG: 300 CAPSULE ORAL at 13:28

## 2021-04-20 RX ADMIN — ASPIRIN 81 MG: 81 TABLET, COATED ORAL at 08:14

## 2021-04-20 RX ADMIN — ENOXAPARIN SODIUM 40 MG: 40 INJECTION SUBCUTANEOUS at 08:14

## 2021-04-20 RX ADMIN — GABAPENTIN 300 MG: 300 CAPSULE ORAL at 08:13

## 2021-04-20 RX ADMIN — INSULIN LISPRO 1 UNITS: 100 INJECTION, SOLUTION INTRAVENOUS; SUBCUTANEOUS at 13:28

## 2021-04-20 RX ADMIN — GABAPENTIN 300 MG: 300 CAPSULE ORAL at 00:33

## 2021-04-20 RX ADMIN — Medication 10 ML: at 08:14

## 2021-04-20 RX ADMIN — CLOPIDOGREL BISULFATE 75 MG: 75 TABLET ORAL at 08:14

## 2021-04-20 RX ADMIN — ATORVASTATIN CALCIUM 80 MG: 80 TABLET, FILM COATED ORAL at 00:33

## 2021-04-20 ASSESSMENT — PAIN SCALES - GENERAL: PAINLEVEL_OUTOF10: 0
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
## 2021-04-20 NOTE — PLAN OF CARE
Problem: Falls - Risk of:  Goal: Will remain free from falls  Description: Will remain free from falls  4/20/2021 0925 by Elías Frazier RN  Outcome: Ongoing     Problem: Falls - Risk of:  Goal: Absence of physical injury  Description: Absence of physical injury  4/20/2021 0925 by Elías Frazier RN  Outcome: Ongoing  Bed alarm on, bed in lowest position, wheels locked. Fall risk assessment completed every shift. Nonskid socks on, fall sign posted. Pt educated on use of call light. No falls on this shift. Problem: HEMODYNAMIC STATUS  Goal: Patient has stable vital signs and fluid balance  4/20/2021 0925 by Elías Frazier RN  Outcome: Ongoing     Problem: ACTIVITY INTOLERANCE/IMPAIRED MOBILITY  Goal: Mobility/activity is maintained at optimum level for patient  4/20/2021 2807 by Elías Frazier RN  Outcome: Ongoing     Problem: COMMUNICATION IMPAIRMENT  Goal: Ability to express needs and understand communication  4/20/2021 0925 by Elías Frazier RN  Outcome: Ongoing  Completing NIH per orders. Assessing NIH and reporting abnormal findings to MD. Collaborating with PT/OT/Speech. Maintaining airway and oxygenation. Assessing vital signs q 4 hours. Assisting pt with mobility as needed, promoting independence when indicated. Providing at least 15 minutes of stroke education per shift. Collaborating with case management/social work for safe and appropriate discharge.
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
## 2021-04-20 NOTE — PLAN OF CARE
Problem: Falls - Risk of:  Goal: Will remain free from falls  Description: Will remain free from falls  Outcome: Ongoing     Problem: ACTIVITY INTOLERANCE/IMPAIRED MOBILITY  Goal: Mobility/activity is maintained at optimum level for patient  Outcome: Ongoing     Problem: COMMUNICATION IMPAIRMENT  Goal: Ability to express needs and understand communication  Outcome: Ongoing

## 2021-04-20 NOTE — PROGRESS NOTES
NAME:  Farrukh Veliz  YOB: 1940  MEDICAL RECORD NUMBER:  [id number]  TODAYS DATE:  4/20/2021    Discussed personal risk factors for Stroke /TIA with patient/family, and ways to reduce the risk for a recurrent stroke. Patient's personal risk factors which were identified are:     [] Alcohol Abuse: check with your physician before any alcohol consumption. [] Atrial fibrillation: may cause blood clots. [] Drug Abuse: Seek help, talk with your doctor  [] Clotting Disorder  [x] Diabetes  [x] Family history of stroke or heart disease  [x] High Blood Pressure/Hypertension: work with your physician. [x] High cholesterol: monitor cholesterol levels with your physician.   [] Overweight/Obesity: work with your physician for your ideal body weight.  [] Physical Inactivity: get regular exercise as directed by your physician. [x] Personal history of previous TIA or stroke  [] Poor Diet; decrease salt (sodium) in your diet, follow diet directed by physician. [] Smoking: Cigarette/Cigar: stop smoking. Advised pt. that you can reduce your risk for stroke/TIA by modifying/controlling the risk factors that you have. Pt.advised to take the medications as prescribed, which will be detailed in the discharge instructions, and to not stop taking them without consulting their physician. In addition, pt. advised to maintain a healthy diet, exercise regularly and to not smoke. [facility]'s Stroke treatment and prevention, Managing your recovery  notebook  provided and/or reviewed  with patient/family. The notebook includes, but not limited to, sections addressing warning signs & symptoms of a stroke, which are: sudden numbness or weakness especially on one side of the body, sudden confusion, difficulty speaking or understanding, sudden changes in vision, sudden dizziness or loss of balance/ coordination, or sudden severe headache.   The need to call EMS (911) immediately if signs & symptoms occur is emphasized . The notebook also provides education on Stroke community resources and stroke advocacy. The need for follow-up after discharge was highlighted with patient/family with them being able to repeat understanding of the importance of this.       Electronically signed by Eleanor Carballo RN on 4/20/2021 at 8:22 AM
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
## 2021-04-20 NOTE — PROGRESS NOTES
Data- discharge order received, pt verbalized agreement to discharge, disposition to previous residence, no needs for HHC/DME. Action- discharge instructions prepared and given to pt, pt verbalized understanding. Medication information reviewed. Discharge instruction summary: Diet- low sodium, Activity- independent, Primary Care Physician as follows: Arnol Bautista, [address] [place] [address] [phone] f/u appointment with Dr. Yvrose Mckeon. Per Dr Gordon Lauren, pt will be mailed a Holter monitor for long term cardiac monitoring. Pt given instruction on this. Pt is to be non weight bearing on the left arm, keeping it clean and dry. Pt verbalized understanding. Response- Pt belongings gathered, IV removed. Disposition is home (no HHC/DME needs), transported with wheelchair, taken to lobby via w/c w/ staff, no complications.      Electronically signed by Jadiel Raya RN on 4/20/2021 at 6:04 PM
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
## 2021-04-20 NOTE — PROGRESS NOTES
4 Eyes Skin Assessment     NAME:  Elizabeth Jeffrey OF BIRTH:  1940  MEDICAL RECORD NUMBER:  [id number]    The patient is being assess for  Admission    I agree that 2 RN's have performed a thorough Head to Toe Skin Assessment on the patient. ALL assessment sites listed below have been assessed. Areas assessed by both nurses:    Head, Face, Ears, Shoulders, Back, Chest, Arms, Elbows, Hands, Sacrum. Buttock, Coccyx, Ischium and Legs. Feet and Heels        Does the Patient have a Wound?  No noted wound(s)       Gal Prevention initiated:  Yes   Wound Care Orders initiated:  No    Pressure Injury (Stage 3,4, Unstageable, DTI, NWPT, and Complex wounds) if present place consult order under [de-identified] No    New and Established Ostomies if present place consult order under : No      Nurse 1 eSignature: Electronically signed by Herve Minaya RN on 4/20/21 at 1:14 AM EDT    **SHARE this note so that the co-signing nurse is able to place an eSignature**    Nurse 2 eSignature: Electronically signed by Christelle Reyna RN on 4/20/21 at 6:51 AM EDT

## 2021-04-20 NOTE — PROGRESS NOTES
Patient admitted to room 5129 @ 23:28. A&O x4, occasional delayed responses to remember things, patient states that this is baseline for him, decreased vision in R eye also baseline, wears glasses normally. Hard of hearing. Appeared fidgety during assessment. Skin warm and dry, L wrist in splint. Resp easy and even on RA. Took meds whole, tolerated well. Ambulated to bathroom, tolerated well, gait steady. In bed, call light in reach.

## 2021-04-20 NOTE — TELEPHONE ENCOUNTER
Enrolled patient on [employer]. Left vm for patient to let him know we are mailing a 30 day monitor. Was advised to give us a call if he had any questions.
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
## 2021-04-20 NOTE — PROGRESS NOTES
Pharmacy Medication Reconciliation Note     List of medications patient is currently taking is complete. Source of information:   1. Patient  2. EMR    Notes regarding home medications:   1. Patient received all of his morning home medication doses today before presenting to the ER. 2. Patient states he is also taking another pill for his nerves along with his gabapentin but can not remember the name. Patient states it comes in the mail but he does not remember where it comes from. Pt unsure if it is a prescription med or a supplement but says he talked to his PCP about it and he said it was ok to take. I could find no evidence of this medication on patient's chart or dispense history.       [id number] [facility], Pharmacy Intern  4/19/2021 10:50 PM

## 2021-04-20 NOTE — CARE COORDINATION
INITIAL CASE MANAGEMENT ASSESSMENT     Reviewed chart, spoke with patient to assess possible discharge needs. Explained Case Management role/services. Living Situation: Patient lives with adult son in a 1 floor condo; 0 skye. ADLs: Independent      DME: Walker, cane, grab bars in the shower    PT/OT Recs: Date of Service: 4/20/2021     Discharge Recommendations:  Home with assist PRN   PT/OT Equipment Recommendations  Equipment Needed: No     Active Services: No active services. Patient stated he previously had [facility]. Patient reported no need for [place] [address] at this time. Transportation: Patient's car is in the ED parking lot. Patient plans on driving himself home at discharge. If unable to drive self, his son will be able to provide transport. SW notified RN. Medications: [address] [place] [address] at discharge    PCP: Dimitri Stringer DO      HD/PD: N/A    PLAN/COMMENTS: Patient plans to return home with his son. Patient reports no anticipated needs. SW/CM provided contact information for patient or family to call with any questions. SW/CM will follow and assist as needed.     EMILIANO Casas, [place], Social Work  [phone]  Electronically signed by EMILIANO Casas, [facility] on 4/20/2021 at 2:32 PM

## 2021-04-20 NOTE — TELEPHONE ENCOUNTER
Patient being discharged from hospital and needs to have a 30 day event monitor mailed to him. His nurse his on the floor was going to make him aware that the monitor will be mailed.

## 2021-04-20 NOTE — FLOWSHEET NOTE
04/20/21 1058   Vital Signs   Orthostatic B/P and Pulse?  Yes   Blood Pressure Lying 148/70   Pulse Lying 73 PER MINUTE   Blood Pressure Sitting 143/68   Pulse Sitting 72 PER MINUTE   Blood Pressure Standing 150/68   Pulse Standing 72 PER MINUTE

## 2021-04-21 ENCOUNTER — TELEPHONE (OUTPATIENT)
Dept: FAMILY MEDICINE CLINIC | Age: 81
End: 2021-04-21

## 2021-04-21 RX ORDER — LISINOPRIL 40 MG/1
TABLET ORAL
Qty: 30 TABLET | Refills: 0 | Status: SHIPPED | OUTPATIENT
Start: 2021-04-21 | End: 2021-05-26

## 2021-04-21 NOTE — TELEPHONE ENCOUNTER
[facility] Initial Follow Up Call    Outreach made within 2 business days of discharge: Yes    Patient: Nunu Majano Patient : 1940   MRN: <[id number]>  Reason for Admission: There are no discharge diagnoses documented for the most recent discharge. Discharge Date: 21       Spoke with: Fabiola Kim    Discharge department/facility: [facility] Interactive Patient Contact:  Was patient able to fill all prescriptions: Yes  Was patient instructed to bring all medications to the follow-up visit:   Is patient taking all medications as directed in the discharge summary? Yes  Does patient understand their discharge instructions: Yes  Does patient have questions or concerns that need addressed prior to 7-14 day follow up office visit: yes, patient is very confused and is very upset over his \"older life problems\". Tried to get patient to schedule an appointment with Dr. Elvin Bravo. Patient would like to speak with Dr Elvin Bravo. Please call patient [phone].     Scheduled appointment with PCP within 7-14 days    Follow Up  Future Appointments   Date Time Provider Veda Grace   2021 12:45 PM MD LULA Zarate [facility]   2021  8:45 AM DO Janice Biggs [place], [place]

## 2021-04-21 NOTE — TELEPHONE ENCOUNTER
Please have patient make an appointment before June. If he is scheduled prior to lunch he can be placed for a 15 minute appointment.

## 2021-04-23 ENCOUNTER — OFFICE VISIT (OUTPATIENT)
Dept: ORTHOPEDIC SURGERY | Age: 81
End: 2021-04-23
Payer: MEDICARE

## 2021-04-23 VITALS — BODY MASS INDEX: 22.97 KG/M2 | HEIGHT: 60 IN | WEIGHT: 117 LBS | RESPIRATION RATE: 16 BRPM

## 2021-04-23 DIAGNOSIS — S52.572A OTHER CLOSED INTRA-ARTICULAR FRACTURE OF DISTAL END OF LEFT RADIUS, INITIAL ENCOUNTER: Primary | ICD-10-CM

## 2021-04-23 PROCEDURE — 25600 CLTX DST RDL FX/EPHYS SEP WO: CPT | Performed by: ORTHOPAEDIC SURGERY

## 2021-04-23 PROCEDURE — 99204 OFFICE O/P NEW MOD 45 MIN: CPT | Performed by: ORTHOPAEDIC SURGERY

## 2021-04-23 NOTE — Clinical Note
Dear  Emely Moffett, DO,    Thank you very much for your referral or Mr. Diomedes Betancourt to me for evaluation and treatment of his Hand & Wrist condition. I appreciate your confidence in me and thank you for allowing me the opportunity to care for your patients. If I can be of any further assistance to you on this or any other patient, please do not hesitate to contact me. Sincerely,    Luther Du.  Ning López MD
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
## 2021-04-23 NOTE — PROGRESS NOTES
I applied a Short Arm Fiberglass Cast to [employer] Left, Wrist.  I applied casting stockinette,  1 rolls of padding in an overlapping fashion. Nunu Majano requested Black color fiberglass. I rolled 2 rolls of fiberglass in an overlapping fashion. His  Left, Wrist was maintained in a 0 degree Neutral Alignment. At the conclusion of the procedure, Lindsay Willett's nail beds were pink in color, the extremity is warm to the touch. Capillary refill is less than 2 seconds. Nunu Majano was instructed in proper care of cast.  Do not get wet, keep all items out of cast.  If cast is painful please make appointment to get checked. He was also briefed on circulation compromise. If digits are cold, blue, and tingling patient must must seek care. If after hours patient is to go to Emergency Room. During office hours patient must come in to office.

## 2021-04-27 ENCOUNTER — OFFICE VISIT (OUTPATIENT)
Dept: FAMILY MEDICINE CLINIC | Age: 81
End: 2021-04-27

## 2021-04-27 VITALS
HEIGHT: 60 IN | BODY MASS INDEX: 22.58 KG/M2 | SYSTOLIC BLOOD PRESSURE: 130 MMHG | WEIGHT: 115 LBS | DIASTOLIC BLOOD PRESSURE: 60 MMHG

## 2021-04-27 DIAGNOSIS — Z91.81 AT HIGH RISK FOR FALLS: Primary | ICD-10-CM

## 2021-04-27 DIAGNOSIS — Z09 HOSPITAL DISCHARGE FOLLOW-UP: ICD-10-CM

## 2021-04-27 DIAGNOSIS — S52.515S CLOSED NONDISPLACED FRACTURE OF STYLOID PROCESS OF LEFT RADIUS, SEQUELA: ICD-10-CM

## 2021-04-27 PROCEDURE — 99024 POSTOP FOLLOW-UP VISIT: CPT | Performed by: FAMILY MEDICINE
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
## 2021-05-03 ASSESSMENT — ENCOUNTER SYMPTOMS
WHEEZING: 0
DIARRHEA: 0
ABDOMINAL PAIN: 0
SINUS PRESSURE: 0
COUGH: 0
NAUSEA: 0
SORE THROAT: 0
CHEST TIGHTNESS: 0
SHORTNESS OF BREATH: 0
VOMITING: 0
RHINORRHEA: 0

## 2021-05-05 ENCOUNTER — TELEPHONE (OUTPATIENT)
Dept: FAMILY MEDICINE CLINIC | Age: 81
End: 2021-05-05

## 2021-05-05 RX ORDER — ASPIRIN 81 MG/1
TABLET ORAL
Qty: 30 TABLET | Refills: 0 | Status: SHIPPED | OUTPATIENT
Start: 2021-05-05 | End: 2021-05-22 | Stop reason: SDUPTHER

## 2021-05-05 RX ORDER — GABAPENTIN 300 MG/1
CAPSULE ORAL
Qty: 120 CAPSULE | Refills: 0 | Status: SHIPPED | OUTPATIENT
Start: 2021-05-05 | End: 2021-05-22 | Stop reason: SDUPTHER

## 2021-05-05 RX ORDER — CHOLECALCIFEROL (VITAMIN D3) 125 MCG
CAPSULE ORAL
Qty: 60 TABLET | Refills: 0 | Status: SHIPPED | OUTPATIENT
Start: 2021-05-05 | End: 2021-05-22 | Stop reason: SDUPTHER
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
## 2021-05-21 RX ORDER — GABAPENTIN 300 MG/1
CAPSULE ORAL
Qty: 120 CAPSULE | Refills: 0 | Status: CANCELLED | OUTPATIENT
Start: 2021-05-21

## 2021-05-21 RX ORDER — ASPIRIN 81 MG/1
TABLET ORAL
Qty: 30 TABLET | Refills: 0 | Status: CANCELLED | OUTPATIENT
Start: 2021-05-21

## 2021-05-21 RX ORDER — CHOLECALCIFEROL (VITAMIN D3) 125 MCG
CAPSULE ORAL
Qty: 60 TABLET | Refills: 0 | Status: CANCELLED | OUTPATIENT
Start: 2021-05-21

## 2021-05-22 RX ORDER — GABAPENTIN 300 MG/1
CAPSULE ORAL
Qty: 120 CAPSULE | Refills: 2 | Status: SHIPPED | OUTPATIENT
Start: 2021-05-22 | End: 2021-12-30

## 2021-05-22 RX ORDER — CHOLECALCIFEROL (VITAMIN D3) 125 MCG
CAPSULE ORAL
Qty: 60 TABLET | Refills: 0 | Status: SHIPPED | OUTPATIENT
Start: 2021-05-22 | End: 2021-07-12

## 2021-05-22 RX ORDER — ASPIRIN 81 MG/1
TABLET ORAL
Qty: 30 TABLET | Refills: 3 | Status: SHIPPED | OUTPATIENT
Start: 2021-05-22 | End: 2022-04-21 | Stop reason: SDUPTHER

## 2021-05-26 RX ORDER — CLOPIDOGREL BISULFATE 75 MG/1
TABLET ORAL
Qty: 30 TABLET | Refills: 3 | Status: SHIPPED | OUTPATIENT
Start: 2021-05-26 | End: 2022-04-21 | Stop reason: SDUPTHER

## 2021-05-26 RX ORDER — LISINOPRIL 40 MG/1
TABLET ORAL
Qty: 30 TABLET | Refills: 0 | Status: SHIPPED | OUTPATIENT
Start: 2021-05-26 | End: 2021-07-12

## 2021-05-28 ENCOUNTER — OFFICE VISIT (OUTPATIENT)
Dept: ORTHOPEDIC SURGERY | Age: 81
End: 2021-05-28
Payer: MEDICARE

## 2021-05-28 VITALS — BODY MASS INDEX: 22.58 KG/M2 | WEIGHT: 115 LBS | HEIGHT: 60 IN

## 2021-05-28 DIAGNOSIS — S52.572A OTHER CLOSED INTRA-ARTICULAR FRACTURE OF DISTAL END OF LEFT RADIUS, INITIAL ENCOUNTER: Primary | ICD-10-CM

## 2021-05-28 PROCEDURE — L3908 WHO COCK-UP NONMOLDE PRE OTS: HCPCS | Performed by: ORTHOPAEDIC SURGERY

## 2021-05-28 PROCEDURE — 99024 POSTOP FOLLOW-UP VISIT: CPT | Performed by: ORTHOPAEDIC SURGERY

## 2021-05-28 NOTE — Clinical Note
Dear  Jamee Vallejo DO,    Thank you very much for your referral or Mr. Eugene Sen to me for evaluation and treatment of his Hand & Wrist condition. I appreciate your confidence in me and thank you for allowing me the opportunity to care for your patients. If I can be of any further assistance to you on this or any other patient, please do not hesitate to contact me. Sincerely,    Roslyn Bird.  Ciera Palencia MD

## 2021-05-29 NOTE — PROGRESS NOTES
activities until full range of motion and comfort have been regained. We discussed the option of pursuing formalized hand therapy and a prescription  was not indicated. As this patient has demonstrated risk-factors for Osteoporosis & has had a recent history of a fracture, I have referred him back to his Primary Care Physician for evaluation of Osteoporosis including possible consideration for DEXA Scan, if this is felt to be clinically indicated. Mr. Gabbie Lomas is instructed to contact his Primary Care Physician to discuss and arrange such evaluation. I have asked Mr. Gabbie Lomas to follow-up with me by either scheduling an appointment for 4 weeks from now or by calling me at that time if he has not been able to regain full painless range of motion and functional use of the injured extremity. He is also specifically instructed to return to the office or call for an appointment sooner if his symptoms are changing or worsening prior to that time. History of appendectomy    History of cataract surgery    History of ovarian cystectomy    History of tonsillectomy    Hx of lumpectomy

## 2021-05-29 NOTE — PATIENT INSTRUCTIONS
Wrist Fracture Instructions  After Cast is Removed    Dr. Jos Holt    1. Please wear removable brace for all activities and use of the hand for next 2 - 4 weeks. 2. Brace should be removed when doing exercises and bathing. (As instructed below). 3. You should sleep in the brace for the same time frame. 4. Perform the following exercises VIGOROUSLY at least four times a day. Exercises should be performed in the seated position with elbow on tabletop or other firm surface. If you cannot make these motions on your own, you may use other hand to assist in making these motions. A. Fully straighten fingers until hand is flat. Fully bend fingers until hand is in a full fist.   B. Bend wrist forward and backward (grasp hand around knuckles with other hand to do so). C. Rotate forearm so that your palm faces towards your face. Rotate forearm so that your palm faces away from your face (grasp hand around wrist with other hand to do so). D. Fully straighten elbow. Fully bend elbow. 5. Continue light use of the hand progressing to more normal us as it feels comfortable to do so. 6. In 2 - 4 weeks you may discontinue using the brace and resume normal use of the hand and wrist if you have regained full and painless motion and function. 7. If you are unable to achieve  full and painless motion and function over 4 weeks, please call the office at [phone] to schedule a follow-up appointment with Dr. José Cordova.

## 2021-05-30 PROCEDURE — 93270 REMOTE 30 DAY ECG REV/REPORT: CPT | Performed by: INTERNAL MEDICINE

## 2021-06-16 ENCOUNTER — TELEPHONE (OUTPATIENT)
Dept: ORTHOPEDIC SURGERY | Age: 81
End: 2021-06-16

## 2021-06-23 ENCOUNTER — TELEPHONE (OUTPATIENT)
Dept: FAMILY MEDICINE CLINIC | Age: 81
End: 2021-06-23

## 2021-06-23 NOTE — TELEPHONE ENCOUNTER
----- Message from Stacy Javier sent at 6/23/2021 12:42 PM EDT -----  Subject: Message to Provider    QUESTIONS  Information for Provider? Pt has some new concerns about her balance/being   unsteady. Not experiencing it right now- but says since her stroke she   occasionally feels unstable on the right side of her body and has had   falls in the past. Apologizes for missing appointment today. Pt would like   to come in any time before 2pm any day of the week.  ---------------------------------------------------------------------------  --------------  CALL BACK INFO  What is the best way for the office to contact you? OK to leave message on   voicemail  Preferred Call Back Phone Number? [phone]  ---------------------------------------------------------------------------  --------------  SCRIPT ANSWERS  Relationship to Patient? Self  Appointment reason? Well Care/Follow Ups  Select a Well Care/Follow Ups appointment reason? Adult Existing Condition   Follow Up [Diabetes, CHF, COPD, Hypertension/Blood Pressure Check]  (Is the patient requesting to be seen urgently for their symptoms?)? No  Is this follow up request related to routine Diabetes Management? No  Are you having any new concerns about your existing condition? Yes  Red flag items?  Lightheaded or dizzy [Things are spinning, problem with   balance, vertigo]

## 2021-06-30 NOTE — TELEPHONE ENCOUNTER
Pt called about getting I with a neurologist for the dizziness and lightheaded.  Who is the the pt neurologist? Pt would like a referral to a neurologist. Please give pt a call [phone]

## 2021-07-01 PROCEDURE — 93272 ECG/REVIEW INTERPRET ONLY: CPT | Performed by: INTERNAL MEDICINE

## 2021-07-02 ENCOUNTER — TELEPHONE (OUTPATIENT)
Dept: CARDIOLOGY CLINIC | Age: 81
End: 2021-07-02

## 2021-07-02 ENCOUNTER — TELEPHONE (OUTPATIENT)
Dept: ORTHOPEDIC SURGERY | Age: 81
End: 2021-07-02

## 2021-07-02 DIAGNOSIS — R00.1 BRADYCARDIA: ICD-10-CM

## 2021-07-02 DIAGNOSIS — I63.9 ACUTE CVA (CEREBROVASCULAR ACCIDENT) (HCC): ICD-10-CM

## 2021-07-02 NOTE — TELEPHONE ENCOUNTER
Appointment Request     Patient requesting earlier appointment: Yes  Appointment offered to patient: YES  Patient Contact Number: [phone]    PATIENT HAD APPOINTMENT SCHEDULE FOR 2021    PATIENT HAD A  AND WAS UNABLE TO MAKE APPOINTMENT    PATIENT STILL HAVE WRAP ON HIS LT HAND.     PATIENT REQUESTING A SOONER APPOINTMENT THAN 2021    PLEASE CALL PATIENT AT THE ABOVE NUMBER

## 2021-07-02 NOTE — TELEPHONE ENCOUNTER
Patient received an event monitor at discharge from the hospital. Dr. Joaquin Sams reviewed and it showed baseline normal sinus rhythm with a few episodes of NSVT. No atrial fibrillation noted. Left Radha Reyes a message to return call to review the results. Placed in folder for billing and will send copy to PCP office.

## 2021-07-07 NOTE — TELEPHONE ENCOUNTER
Merna Shaver with the results of the monitor. Please send results to PCP office for their review also since the patient does not follow with cardiology on a routine basis.

## 2021-07-12 ENCOUNTER — OFFICE VISIT (OUTPATIENT)
Dept: ORTHOPEDIC SURGERY | Age: 81
End: 2021-07-12

## 2021-07-12 VITALS — WEIGHT: 115 LBS | BODY MASS INDEX: 22.58 KG/M2 | HEIGHT: 60 IN | RESPIRATION RATE: 16 BRPM

## 2021-07-12 DIAGNOSIS — S52.572A OTHER CLOSED INTRA-ARTICULAR FRACTURE OF DISTAL END OF LEFT RADIUS, INITIAL ENCOUNTER: Primary | ICD-10-CM

## 2021-07-12 PROCEDURE — 99024 POSTOP FOLLOW-UP VISIT: CPT | Performed by: PHYSICIAN ASSISTANT

## 2021-07-12 RX ORDER — CHOLECALCIFEROL (VITAMIN D3) 125 MCG
CAPSULE ORAL
Qty: 60 TABLET | Refills: 0 | Status: SHIPPED | OUTPATIENT
Start: 2021-07-12 | End: 2021-09-28

## 2021-07-12 RX ORDER — LISINOPRIL 40 MG/1
TABLET ORAL
Qty: 30 TABLET | Refills: 0 | Status: SHIPPED | OUTPATIENT
Start: 2021-07-12 | End: 2021-08-31

## 2021-07-12 NOTE — PATIENT INSTRUCTIONS
Wrist Fracture Instructions  After Cast is Removed    Dr. Miley Perez    1. Please wear removable brace for all activities and use of the hand for next 2 - 4 weeks. 2. Brace should be removed when doing exercises and bathing. (As instructed below). 3. You should sleep in the brace for the same time frame. 4. Perform the following exercises VIGOROUSLY at least four times a day. Exercises should be performed in the seated position with elbow on tabletop or other firm surface. If you cannot make these motions on your own, you may use other hand to assist in making these motions. A. Fully straighten fingers until hand is flat. Fully bend fingers until hand is in a full fist.   B. Bend wrist forward and backward (grasp hand around knuckles with other hand to do so). C. Rotate forearm so that your palm faces towards your face. Rotate forearm so that your palm faces away from your face (grasp hand around wrist with other hand to do so). D. Fully straighten elbow. Fully bend elbow. 5. Continue light use of the hand progressing to more normal us as it feels comfortable to do so. 6. In 2 - 4 weeks you may discontinue using the brace and resume normal use of the hand and wrist if you have regained full and painless motion and function. 7. If you are unable to achieve  full and painless motion and function over 4 weeks, please call the office at [phone] to schedule a follow-up appointment with Dr. Angel Snow.

## 2021-07-12 NOTE — PROGRESS NOTES
Mr. Darryl Hui returns today in follow-up of his recent left Distal Radius Fracture which occurred approximately 12 weeks ago. Options for treatment of his fracture, such as closed reduction or surgical stabilization were discussed & considered during prior visits and were Not indicated. He has noted decreased discomfort and decreased swelling. He notes no symptoms of numbness, tingling, no symptoms related to perfusion. Physical Exam:  Skin (after immobilization is removed) is Skin color, texture, turgor normal. No rashes or lesions. Digits:  full range of motion . FPL function is intact, EPL function is intact  Wrist range of motion is stiff from immobilization. Sensation is normal.  Vascular examination reveals normal, good capillary refill and good color. Swelling is minimal.  no clinical evidence of residual skeletal deformity. Fracture site is not tender to palpation. There is not crepitance or gross motion at the previous fracture site. Impression:  Mr. Darryl Hui is doing fairly well after recent left Distal Radius Fracture. It would appear that he has healed his fracture. Plan:  Mr. Darryl Hui is fitted with a removable forearm based protective orthosis. he is advised regarding the appropriate wear and use of this device. We discussed weaning from the splint for sedentary and protected activity. He is also instructed in  work on Active & Passive range of motion of the digits, wrist, & elbow. These modalities were demonstrated to him today. We discussed the continued restrictions on the use of the injured hand & wrist and the limitations on resumption of activities until full range of motion and comfort have been regained. We discussed the option of pursuing formalized hand therapy and a prescription  was not indicated.     As this patient has demonstrated risk-factors for Osteoporosis & has had a recent history of a fracture, I have referred him back to his Primary Care Physician for evaluation of Osteoporosis including possible consideration for DEXA Scan, if this is felt to be clinically indicated. Mr. Vidya Sarmiento is instructed to contact his Primary Care Physician to discuss and arrange such evaluation. I have asked Mr. Vidya Sarmiento to follow-up with me by either scheduling an appointment for 4 weeks from now or by calling me at that time if he has not been able to regain full painless range of motion and functional use of the injured extremity. He is also specifically instructed to return to the office or call for an appointment sooner if his symptoms are changing or worsening prior to that time.

## 2021-08-16 ENCOUNTER — OFFICE VISIT (OUTPATIENT)
Dept: ORTHOPEDIC SURGERY | Age: 81
End: 2021-08-16

## 2021-08-16 VITALS — WEIGHT: 115 LBS | RESPIRATION RATE: 16 BRPM | HEIGHT: 60 IN | BODY MASS INDEX: 22.58 KG/M2

## 2021-08-16 DIAGNOSIS — S52.572A OTHER CLOSED INTRA-ARTICULAR FRACTURE OF DISTAL END OF LEFT RADIUS, INITIAL ENCOUNTER: Primary | ICD-10-CM

## 2021-08-16 PROCEDURE — 99024 POSTOP FOLLOW-UP VISIT: CPT | Performed by: ORTHOPAEDIC SURGERY

## 2021-08-16 NOTE — PATIENT INSTRUCTIONS
Wrist Fracture Instructions  After Cast is Removed    Dr. Nilo Rodriguez    1. Please wear removable brace for all activities and use of the hand for next 2 - 4 weeks. 2. Brace should be removed when doing exercises and bathing. (As instructed below). 3. You should sleep in the brace for the same time frame. 4. Perform the following exercises VIGOROUSLY at least four times a day. Exercises should be performed in the seated position with elbow on tabletop or other firm surface. If you cannot make these motions on your own, you may use other hand to assist in making these motions. A. Fully straighten fingers until hand is flat. Fully bend fingers until hand is in a full fist.   B. Bend wrist forward and backward (grasp hand around knuckles with other hand to do so). C. Rotate forearm so that your palm faces towards your face. Rotate forearm so that your palm faces away from your face (grasp hand around wrist with other hand to do so). D. Fully straighten elbow. Fully bend elbow. 5. Continue light use of the hand progressing to more normal us as it feels comfortable to do so. 6. In 2 - 4 weeks you may discontinue using the brace and resume normal use of the hand and wrist if you have regained full and painless motion and function. 7. If you are unable to achieve  full and painless motion and function over 4 weeks, please call the office at [phone] to schedule a follow-up appointment with Dr. Garcia Zaragoza.

## 2021-08-16 NOTE — PROGRESS NOTES
Mr. Jersey Francisco returns today in follow-up of his recent left Distal Radius Fracture which occurred approximately 4 months ago. Options for treatment of his fracture, such as closed reduction or surgical stabilization were discussed & considered during prior visits and were Not indicated. He has noted decreased discomfort and decreased swelling. concerningly, he reports that he is still regularly wearing his orthosis    He notes no symptoms of numbness, tingling, no symptoms related to perfusion. Physical Exam:  Skin (after immobilization is removed) is Skin color, texture, turgor normal. No rashes or lesions. Digits:  full range of motion . FPL function is intact, EPL function is intact  Wrist range of motion is stiff from immobilization . Sensation is normal.  Vascular examination reveals normal and good capillary refill. Swelling is minimal.  no clinical evidence of residual skeletal deformity. Fracture site is not tender to palpation. There is not crepitance or gross motion at the previous fracture site. Impression:  Mr. Jersey Francisco is doing well after recent left Distal Radius Fracture. It would appear that he has healed his fracture. Plan:  Mr. Jersey Francisco is instructed in the unrestricted  work on Active & Passive range of motion of the digits, wrist, & elbow. These modalities were demonstrated to him today. We discussed the unrestricted resumption of use of the injured hand & wrist  until full range of motion and comfort have been regained. We discussed the option of pursuing formalized hand therapy and a prescription  was offered and declined by the patient. I have asked Mr. Jersey Francisco to follow-up with me by either scheduling an appointment for 4 weeks from now or by calling me at that time if he has not been able to regain full painless range of motion and functional use of the injured extremity.       He is also specifically instructed to return to the What Type Of Note Output Would You Prefer (Optional)?: Standard Output Hpi Title: Evaluation of Skin Lesions office or call for an appointment sooner if his symptoms are changing or worsening prior to that time. How Severe Are Your Spot(S)?: mild Have Your Spot(S) Been Treated In The Past?: has not been treated Family Member: Father

## 2021-08-31 RX ORDER — AMLODIPINE BESYLATE 10 MG/1
TABLET ORAL
Qty: 90 TABLET | Refills: 0 | Status: SHIPPED | OUTPATIENT
Start: 2021-08-31 | End: 2021-09-28

## 2021-08-31 RX ORDER — LISINOPRIL 40 MG/1
TABLET ORAL
Qty: 30 TABLET | Refills: 0 | Status: SHIPPED | OUTPATIENT
Start: 2021-08-31 | End: 2021-09-28

## 2021-09-28 RX ORDER — CHOLECALCIFEROL (VITAMIN D3) 125 MCG
CAPSULE ORAL
Qty: 60 TABLET | Refills: 0 | Status: SHIPPED | OUTPATIENT
Start: 2021-09-28 | End: 2021-12-30

## 2021-09-28 RX ORDER — AMLODIPINE BESYLATE 10 MG/1
TABLET ORAL
Qty: 90 TABLET | Refills: 0 | Status: SHIPPED | OUTPATIENT
Start: 2021-09-28 | End: 2022-04-21 | Stop reason: SDUPTHER

## 2021-09-28 RX ORDER — LISINOPRIL 40 MG/1
TABLET ORAL
Qty: 30 TABLET | Refills: 0 | Status: SHIPPED | OUTPATIENT
Start: 2021-09-28 | End: 2022-04-21 | Stop reason: SDUPTHER

## 2021-12-30 RX ORDER — GABAPENTIN 300 MG/1
CAPSULE ORAL
Qty: 120 CAPSULE | Refills: 2 | Status: SHIPPED | OUTPATIENT
Start: 2021-12-30 | End: 2022-07-28 | Stop reason: SDUPTHER

## 2021-12-30 RX ORDER — ATORVASTATIN CALCIUM 80 MG/1
TABLET, FILM COATED ORAL
Qty: 30 TABLET | Refills: 3 | Status: SHIPPED | OUTPATIENT
Start: 2021-12-30 | End: 2022-04-21 | Stop reason: SDUPTHER

## 2021-12-30 RX ORDER — CHOLECALCIFEROL (VITAMIN D3) 125 MCG
CAPSULE ORAL
Qty: 60 TABLET | Refills: 0 | Status: SHIPPED | OUTPATIENT
Start: 2021-12-30 | End: 2022-04-21 | Stop reason: SDUPTHER

## 2022-02-24 RX ORDER — CLOPIDOGREL BISULFATE 75 MG/1
TABLET ORAL
Qty: 30 TABLET | Refills: 3 | OUTPATIENT
Start: 2022-02-24

## 2022-02-24 RX ORDER — CHOLECALCIFEROL (VITAMIN D3) 125 MCG
CAPSULE ORAL
Qty: 60 TABLET | Refills: 0 | OUTPATIENT
Start: 2022-02-24

## 2022-04-18 ENCOUNTER — TELEPHONE (OUTPATIENT)
Dept: FAMILY MEDICINE CLINIC | Age: 82
End: 2022-04-18

## 2022-04-18 NOTE — TELEPHONE ENCOUNTER
[facility] CLINICAL PHARMACY: ADHERENCE REVIEW  Identified care gap per [employer]: fills at [address]: Diabetes and Statin adherence    Last Visit: 04.27.21      Patient also appears to be prescribed: Lisinopril. ASSESSMENT  DIABETES ADHERENCE    Insurance Records claims through 04.12.22 (Prior Year [place] = PASSED; YTD [facility] [place] = 100%; Potential Fail Date: 07.14.22):   Metformin 500 mg tab last filled on 02.24.22 for 60 day supply. Next refill due: 04.25.22    Per [employer] Portal:  (same as above). Per [employer]:   last picked up on 02.28.22 for 60 day supply. Yes refills remaining. Billed through [employer] Value Date    LABA1C 6.4 04/20/2021    LABA1C 6.2 03/23/2021    LABA1C 5.9 11/20/2020     NOTE A1c not yet completed this calendar year    [address] Records claims through 04.12.22 (Prior Year [place] = PASSED; YTD [facility] [place] = 63%; Potential Fail Date: 05.14.22): Atorvastatin 80 mg tab last filled on 02.24.22 for 30 day supply. Next refill due: 03.26.22    Per [employer] Portal:  (same as above). Per [employer]:   last picked up on 02.28.22 for 30 day supply. Yes refills remaining. Billed through [employer] Value Date    CHOL 136 04/20/2021    TRIG 49 04/20/2021    HDL 56 04/20/2021    LDLCALC 70 04/20/2021     ALT   Date Value Ref Range Status   03/23/2021 20 10 - 40 U/L Final     AST   Date Value Ref Range Status   03/23/2021 18 15 - 37 U/L Final     The ASCVD Risk score (Starr Mcgraw, et al., 2013) failed to calculate for the following reasons: The patient has a prior MI or stroke diagnosis     PLAN  The following are interventions that have been identified:   - Patient overdue refilling Atorvastatin and active on home medication list.     Attempting to reach patient to review.     Is patient interested in changing prescription from a 30-day supply to a 90-day supply?      Unable to leave a message, voicemail is full. (mychart inactive)         No future appointments.     Flavio Harvey, [address] [facility]  Phone: toll free [phone]

## 2022-04-19 NOTE — TELEPHONE ENCOUNTER
Spoke with Mr. Mayda Jackson. He would like a 90 day supply of all medications sent to [facility] Please.   · Metformin 500 mg Tablet  · Gabapentin 300 mg capsule  · Atorvastatin 80 mg tablets  · Lisinopril 40 mg tablets  · Amlodipine 10 mg tablet  · Clopidogrel 75 mg Tablet  · Aspirin 81 EC tablet    Alma Roman, PharmD, [facility]   Department, toll free: [phone] Option 3

## 2022-04-19 NOTE — TELEPHONE ENCOUNTER
CLINICAL PHARMACY: ADHERENCE REVIEW    2nd Attempt Documentation:   Unable to leave a voicemail, mailbox is full.

## 2022-04-21 ENCOUNTER — OFFICE VISIT (OUTPATIENT)
Dept: FAMILY MEDICINE CLINIC | Age: 82
End: 2022-04-21

## 2022-04-21 VITALS
DIASTOLIC BLOOD PRESSURE: 74 MMHG | SYSTOLIC BLOOD PRESSURE: 136 MMHG | HEIGHT: 60 IN | WEIGHT: 118.4 LBS | BODY MASS INDEX: 23.25 KG/M2

## 2022-04-21 DIAGNOSIS — I63.9 ACUTE CVA (CEREBROVASCULAR ACCIDENT) (HCC): ICD-10-CM

## 2022-04-21 DIAGNOSIS — E11.9 CONTROLLED TYPE 2 DIABETES MELLITUS WITHOUT COMPLICATION, WITHOUT LONG-TERM CURRENT USE OF INSULIN (HCC): ICD-10-CM

## 2022-04-21 DIAGNOSIS — I10 ESSENTIAL HYPERTENSION: ICD-10-CM

## 2022-04-21 DIAGNOSIS — Z12.5 SCREENING PSA (PROSTATE SPECIFIC ANTIGEN): Primary | ICD-10-CM

## 2022-04-21 DIAGNOSIS — R82.998 URINE WHITE BLOOD CELLS INCREASED: ICD-10-CM

## 2022-04-21 DIAGNOSIS — R41.3 MEMORY IMPAIRMENT: ICD-10-CM

## 2022-04-21 LAB
A/G RATIO: 1.8 (ref 1.1–2.2)
ALBUMIN SERPL-MCNC: 4.9 G/DL (ref 3.4–5)
ALP BLD-CCNC: 88 U/L (ref 40–129)
ALT SERPL-CCNC: 21 U/L (ref 10–40)
ANION GAP SERPL CALCULATED.3IONS-SCNC: 15 MMOL/L (ref 3–16)
AST SERPL-CCNC: 25 U/L (ref 15–37)
BILIRUB SERPL-MCNC: 0.6 MG/DL (ref 0–1)
BILIRUBIN, POC: NORMAL
BLOOD URINE, POC: NORMAL
BUN BLDV-MCNC: 19 MG/DL (ref 7–20)
CALCIUM SERPL-MCNC: 10.3 MG/DL (ref 8.3–10.6)
CHLORIDE BLD-SCNC: 104 MMOL/L (ref 99–110)
CLARITY, POC: CLEAR
CO2: 23 MMOL/L (ref 21–32)
COLOR, POC: YELLOW
CREAT SERPL-MCNC: 1.3 MG/DL (ref 0.8–1.3)
FOLATE: >20 NG/ML (ref 4.78–24.2)
GFR AFRICAN AMERICAN: >60
GFR NON-AFRICAN AMERICAN: 53
GLUCOSE BLD-MCNC: 119 MG/DL (ref 70–99)
GLUCOSE URINE, POC: NORMAL
HCT VFR BLD CALC: 41.7 % (ref 40.5–52.5)
HEMOGLOBIN: 14.1 G/DL (ref 13.5–17.5)
KETONES, POC: NORMAL
LEUKOCYTE EST, POC: NORMAL
MCH RBC QN AUTO: 31.1 PG (ref 26–34)
MCHC RBC AUTO-ENTMCNC: 33.9 G/DL (ref 31–36)
MCV RBC AUTO: 91.9 FL (ref 80–100)
NITRITE, POC: NORMAL
PDW BLD-RTO: 13.4 % (ref 12.4–15.4)
PH, POC: 5
PLATELET # BLD: 259 K/UL (ref 135–450)
PMV BLD AUTO: 7.6 FL (ref 5–10.5)
POTASSIUM SERPL-SCNC: 4.4 MMOL/L (ref 3.5–5.1)
PROSTATE SPECIFIC ANTIGEN: 0.97 NG/ML (ref 0–4)
PROTEIN, POC: NORMAL
RBC # BLD: 4.54 M/UL (ref 4.2–5.9)
SODIUM BLD-SCNC: 142 MMOL/L (ref 136–145)
SPECIFIC GRAVITY, POC: 1.01
TOTAL PROTEIN: 7.6 G/DL (ref 6.4–8.2)
TSH REFLEX FT4: 1.71 UIU/ML (ref 0.27–4.2)
UROBILINOGEN, POC: 0.2
VITAMIN B-12: 760 PG/ML (ref 211–911)
WBC # BLD: 6.2 K/UL (ref 4–11)

## 2022-04-21 PROCEDURE — 36415 COLL VENOUS BLD VENIPUNCTURE: CPT | Performed by: FAMILY MEDICINE

## 2022-04-21 PROCEDURE — 81002 URINALYSIS NONAUTO W/O SCOPE: CPT | Performed by: FAMILY MEDICINE

## 2022-04-21 PROCEDURE — 99214 OFFICE O/P EST MOD 30 MIN: CPT | Performed by: FAMILY MEDICINE

## 2022-04-21 PROCEDURE — 3044F HG A1C LEVEL LT 7.0%: CPT | Performed by: FAMILY MEDICINE

## 2022-04-21 RX ORDER — GABAPENTIN 300 MG/1
300 CAPSULE ORAL 4 TIMES DAILY
Qty: 360 CAPSULE | Refills: 0 | Status: CANCELLED | OUTPATIENT
Start: 2022-04-21

## 2022-04-21 RX ORDER — AMLODIPINE BESYLATE 10 MG/1
TABLET ORAL
Qty: 90 TABLET | Refills: 0 | Status: SHIPPED | OUTPATIENT
Start: 2022-04-21 | End: 2022-07-28 | Stop reason: SDUPTHER

## 2022-04-21 RX ORDER — GABAPENTIN 300 MG/1
CAPSULE ORAL
Qty: 120 CAPSULE | Refills: 2 | Status: CANCELLED | OUTPATIENT
Start: 2022-04-21 | End: 2022-07-20

## 2022-04-21 RX ORDER — LISINOPRIL 40 MG/1
TABLET ORAL
Qty: 30 TABLET | Refills: 0 | Status: SHIPPED | OUTPATIENT
Start: 2022-04-21 | End: 2022-07-28 | Stop reason: SDUPTHER

## 2022-04-21 RX ORDER — ASPIRIN 81 MG/1
TABLET ORAL
Qty: 30 TABLET | Refills: 3 | Status: SHIPPED | OUTPATIENT
Start: 2022-04-21

## 2022-04-21 RX ORDER — CLOPIDOGREL BISULFATE 75 MG/1
TABLET ORAL
Qty: 30 TABLET | Refills: 3 | Status: SHIPPED | OUTPATIENT
Start: 2022-04-21

## 2022-04-21 RX ORDER — ATORVASTATIN CALCIUM 80 MG/1
TABLET, FILM COATED ORAL
Qty: 30 TABLET | Refills: 3 | Status: SHIPPED | OUTPATIENT
Start: 2022-04-21

## 2022-04-21 RX ORDER — CHOLECALCIFEROL (VITAMIN D3) 125 MCG
CAPSULE ORAL
Qty: 60 TABLET | Refills: 0 | Status: SHIPPED | OUTPATIENT
Start: 2022-04-21 | End: 2022-06-24

## 2022-04-21 ASSESSMENT — PATIENT HEALTH QUESTIONNAIRE - PHQ9
2. FEELING DOWN, DEPRESSED OR HOPELESS: 1
SUM OF ALL RESPONSES TO PHQ QUESTIONS 1-9: 2
SUM OF ALL RESPONSES TO PHQ QUESTIONS 1-9: 2
SUM OF ALL RESPONSES TO PHQ9 QUESTIONS 1 & 2: 2
SUM OF ALL RESPONSES TO PHQ QUESTIONS 1-9: 2
1. LITTLE INTEREST OR PLEASURE IN DOING THINGS: 1
SUM OF ALL RESPONSES TO PHQ QUESTIONS 1-9: 2
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
## 2022-04-21 NOTE — Clinical Note
Honey Hernandez,   I hope all is well. This patient lives with his son but doesn't appear to be able to keep his appointments   He is having problems with ADL. I was supposed to have an AWV with him today to discuss but no answer. Any help you can provide would be great.    Dr. Earl Bay

## 2022-04-21 NOTE — TELEPHONE ENCOUNTER
Dr. Kiko Samayoa, DO,     Outreach regarding medication adherence. Patient requests 90-day supply of gabapentin to assist with adherence. Order(s) pended for your convenience. Last visit: 04/21/2022, Next visit: 07/21/2022    See encounter note(s) below for complete details. Please let me know if you have any questions. Thank you,  Markus Enciso PharmD, [facility], [address] [place] [address], toll free: [phone], option 1    =======================================================    POP[facility]ATION [facility] CLINICAL PHARMACY REVIEW: ADHERENCE REVIEW    Per notes below and Epic/[facility] chart review, can see prescriptions have been filled in 2022 but late to fill and needs 90ds and/or refills. Patient had visit with PCP today and all meds were refilled except gabapentin. Will send 90ds refill request to PCP.      Markus Enciso PharmD, [facility], [address] [place] [address], toll free: [phone], option 1

## 2022-04-22 LAB
ESTIMATED AVERAGE GLUCOSE: 139.9 MG/DL
HBA1C MFR BLD: 6.5 %
URINE CULTURE, ROUTINE: NORMAL

## 2022-04-25 NOTE — TELEPHONE ENCOUNTER
Not refilling gabapentin until I can talk with patient.   He needs phone call AWV and he is not answering his phone or returning calls

## 2022-04-25 NOTE — TELEPHONE ENCOUNTER
POPULATION [facility]ALTH CLINICAL PHARMACY REVIEW: ADHERENCE REVIEW    PCP would like to discuss meds with patient before prescribing/adjusting dose of gabapentin. Added appointment note for July appointment. Will close encounter at this time.      Td Rashid, PharmD, [facility]  Department, toll free: [phone], option 1     =======================================================    For Pharmacy Admin Tracking Only   Recommendation Provided To: Provider: 1 via Note to Provider and Patient/Caregiver: 1 via Telephone   Intervention Detail: Adherence Monitorin and Dose Adjustment: 1, reason: Renal Adjustment   Gap Closed?: Yes    Intervention Accepted By: Provider: 0 and Patient/Caregiver: 1   Time Spent (min): 45

## 2022-04-25 NOTE — TELEPHONE ENCOUNTER
Dr. Mario Dixon, DO,    Consider decreasing gabapentin frequency (current dose: 300mg 4x daily) given CrCl 30-49mL/minute. Recommended maintenance dose is 900mg/ day in 2-3 divided doses (gabapentin 300mg 3x daily). Patient would need updated prescription sent to pharmacy with new directions if you would like to try decreased frequency.      See encounter note(s) below for complete details. Please let me know if you have any questions.      Thank you,  Ericka GuerinD, BCACP, [employer]  Department, toll free: [phone], option 1     =======================================================    POPULATION HEALTH CLINICAL PHARMACY REVIEW: ADHERENCE REVIEW    Gabapentin Rx not signed for 90ds. Patient should have 30ds refill available at pharmacy. Based on renal function, could consider dose decrease as patient CrCl has consistently 30-49. Appears patient has been on current dose for a while. Will share recommendation with provider. Estimated Creatinine Clearance: 33 mL/min (based on SCr of 1.3 mg/dL).     Lab Results   Component Value Date    LABGLOM 53 04/21/2022    LABGLOM >60 04/20/2021    LABGLOM 58 04/19/2021    LABGLOM 58 03/23/2021     aMrtha Nguyen PharmD, TRUNGCP, [employer]  Department, toll free: [phone], option 1

## 2022-04-27 NOTE — TELEPHONE ENCOUNTER
Felicia Osorioo is needing to follow up with a Phone AWV. Need to discuss his gabapentin and labs. I have left three messages.

## 2022-05-02 ASSESSMENT — ENCOUNTER SYMPTOMS
DIARRHEA: 0
NAUSEA: 0
SINUS PRESSURE: 0
SORE THROAT: 0
BLOOD IN STOOL: 0
VOMITING: 0
ABDOMINAL PAIN: 0
SHORTNESS OF BREATH: 0
RHINORRHEA: 0
COUGH: 0
ANAL BLEEDING: 0
TROUBLE SWALLOWING: 0

## 2022-05-06 ENCOUNTER — CARE COORDINATION (OUTPATIENT)
Dept: CARE COORDINATION | Age: 82
End: 2022-05-06

## 2022-05-06 NOTE — CARE COORDINATION
Ambulatory Care Coordination Note  5/6/2022  CM Risk Score: 1  Charlson 10 Year Mortality Risk Score: 79%     ACC: Mary Telles RN    Summary Note:   Initial attempt to reach pt unsuccessful. vm picked up and [employer] message left with request for return call. Writer received a return call  from pt introduced self and care coordination pt agreeable to future calls  Spoke briefly but agreeable to complete next week followup call  Plan:  Complete poc  Offer disease specific education  Assess for community resource          Prior to Admission medications    Medication Sig Start Date End Date Taking?  Authorizing Provider   amLODIPine (NORVASC) 10 MG tablet TAKE 1 TABLET BY MOUTH ONE TIME A DAY 4/21/22   Rohini Marcelino, DO   aspirin (ASPIRIN ADULT LOW STRENGTH) 81 MG EC tablet TAKE 1 TABLET BY MOUTH ONE TIME A DAY 4/21/22   Rohini Marcelino, DO   metFORMIN (GLUCOPHAGE) 500 MG tablet TAKE 1 TABLET BY MOUTH ONE TIME A DAY WITH BREAKFAST 4/21/22   Rohini Marcelino, DO   vitamin B-12 (CYANOCOBALAMIN) 500 MCG tablet TAKE 1 TABLET BY MOUTH 2 TIMES A DAY 4/21/22   Rohini Marcelino, DO   atorvastatin (LIPITOR) 80 MG tablet TAKE ONE TABLET BY MOUTH EVERY EVENING 4/21/22   Rohiin Marcelino, DO   lisinopril (PRINIVIL;ZESTRIL) 40 MG tablet TAKE 1 TABLET BY MOUTH ONE TIME A DAY 4/21/22   Rohini Marcelino, DO   clopidogrel (PLAVIX) 75 MG tablet TAKE 1 TABLET BY MOUTH ONE TIME A DAY 4/21/22   Rohini Marcelino, DO   gabapentin (NEURONTIN) 300 MG capsule TAKE 1 CAPSULE BY MOUTH 4 TIMES A DAY 12/30/21 3/30/22  Rohini Marcelino, DO       Future Appointments   Date Time Provider Veda Grace   7/21/2022  9:00 AM Rohini Marcelino, DO [facility]

## 2022-05-26 ENCOUNTER — TELEPHONE (OUTPATIENT)
Dept: PHARMACY | Facility: CLINIC | Age: 82
End: 2022-05-26

## 2022-05-26 NOTE — TELEPHONE ENCOUNTER
[facility] CLINICAL PHARMACY: ADHERENCE REVIEW  Identified care gap per Vera: fills at [address] [facility]: ACE/ARB, Diabetes and Statin adherence    Last Visit: 05.02.22        ASSESSMENT  ACE/ARB ADHERENCE    Insurance Records claims through 05.23.22 (Prior Year [place] = PASSED; YTD [facility] [place] = Filled only once; Potential Fail Date: 07.11.22):   Lisinopril last filled on 04.21.22 for 30 day supply. Next refill due: 05.21.22    Per Vera Portal:  (same as above). Per [facility]:   Lisinopril last picked up on 04.21.22 for 30 day supply. No refills remaining. Billed through Vera     BP Readings from Last 3 Encounters:   04/21/22 136/74   04/27/21 130/60   04/20/21 127/66     Estimated Creatinine Clearance: 33 mL/min (based on SCr of 1.3 mg/dL). DIABETES ADHERENCE    Insurance Records claims through 05.23.22 (Prior Year PDC = PASSED; YTD PDC = 100%; Potential Fail Date: 09.12.22):   Metformin last filled on 04.21.22 for 60 day supply. Next refill due: 06.20.22    Per Vera Portal:  (same as above). Per [facility]:   Metformin last picked up on 04.21.22 for 60 day supply. refills remaining. Billed through [employer] Value Date    LABA1C 6.5 04/21/2022    LABA1C 6.4 04/20/2021    LABA1C 6.2 03/23/2021     NOTE A1c <9%    STATIN ADHERENCE    Insurance Records claims through 05.23.22 (Prior Year PDC = PASSED; YTD [place] = 65%; Potential Fail Date: 06.14.22): Atorvastatin last filled on 04.21.22 for 30 day supply. Next refill due: 05.21.22    Per Vera Portal:  (same as above). Per [facility]:   Atorvastatin last picked up on 04.21.22 for 30 day supply. refills remaining.  Billed through [employer] Value Date    CHOL 136 04/20/2021    TRIG 49 04/20/2021    HDL 56 04/20/2021    LDLCALC 70 04/20/2021     ALT   Date Value Ref Range Status   04/21/2022 21 10 - 40 U/L Final     AST Date Value Ref Range Status   04/21/2022 25 15 - 37 U/L Final     The ASCVD Risk score (Maik Scott, et al., 2013) failed to calculate for the following reasons:     The patient has a prior MI or stroke diagnosis     PLAN  The following are interventions that have been identified:   - Patient overdue refilling Lisinopril and Atorvastatin and active on home medication list.   - Patient needs refills for Lisinopril      Attempting to reach patient to review.      Is patient interested in changing prescription from a 30-day supply to a 90-day supply.      Left message asking for return call       Future Appointments   Date Time Provider Veda Grace   7/21/2022  9:00 AM DO Louis Zuleta [address] [facility]  Phone: toll free [phone]

## 2022-05-27 NOTE — TELEPHONE ENCOUNTER
CLINICAL PHARMACY: ADHERENCE REVIEW    2nd Attempt Documentation:   Unable to LM, VM is full. Will send letter.

## 2022-06-07 ENCOUNTER — CARE COORDINATION (OUTPATIENT)
Dept: CARE COORDINATION | Age: 82
End: 2022-06-07

## 2022-06-24 RX ORDER — CHOLECALCIFEROL (VITAMIN D3) 125 MCG
CAPSULE ORAL
Qty: 60 TABLET | Refills: 0 | Status: SHIPPED | OUTPATIENT
Start: 2022-06-24 | End: 2022-07-28 | Stop reason: SDUPTHER

## 2022-07-05 ENCOUNTER — TELEPHONE (OUTPATIENT)
Dept: PHARMACY | Facility: CLINIC | Age: 82
End: 2022-07-05

## 2022-07-05 NOTE — LETTER
[place]  [address], [address]        Katie Sanon  [address] [facility] [address]        07/05/22    Dear Katie Sanon    We tried to reach you recently regarding your Lisinopril, Metformin, & Atorvastatin prescriptions, but were unable to reach you on the telephone. We have on file that you are currently due for refills. If you are no longer taking or taking differently, please call us at the number below so that we can discuss this and update your medication profile. It appears that this medication has not been filled at proper times. We are worried you might be missing doses or not taking it as directed. It is important that you take your medications regularly and try not to miss a single dose. Some ways to help you remember to take and refill your medications are to:  · Use a pill box, set an alarm, and/or keep your medication near something that you do every day  · Fill a 3-month supply of your prescription at a time to save you time and trips to the pharmacy  if you would like assistance in switching your prescriptions to a 3-month supply, please contact us.   · Ask your pharmacy if they participate in [facility]", a program where you can  all of your medications on the same day  · Ask your pharmacy if you can be set up with automatic refill, where they will automatically refill your prescription when it is due and let you know it's ready to     Sincerely,       Vanessa 2  Phone: toll free [phone] Option 1

## 2022-07-05 NOTE — TELEPHONE ENCOUNTER
[facility] CLINICAL PHARMACY: ADHERENCE REVIEW  Identified care gap per [employer]: fills at [address]: ACE/ARB and Diabetes adherence    Last Visit: 05.02.22    Patient also appears to be prescribed: Atorvastatin       ASSESSMENT  ACE/ARB ADHERENCE    Insurance Records claims through 06.14.22 (Prior Year [place] = PASSED; YTD [place] = Filled only once; Potential Fail Date: 07.11.22):   Lisinopril last filled on 04.21.22 for 30 day supply. Next refill due: 05.21.22    Per Reconciled Dispense Report:  (same as above). Unable to locate patient in [facility]. BP Readings from Last 3 Encounters:   04/21/22 136/74   04/27/21 130/60   04/20/21 127/66     Estimated Creatinine Clearance: 32 mL/min (based on SCr of 1.3 mg/dL). DIABETES ADHERENCE    Insurance Records claims through 06.14.22 (Prior Year PDC = PASSED; YTD PDC = 100%; Potential Fail Date: 09.12.22):   Metformin last filled on 04.21.22 for 60 day supply. Next refill due: 06.20.22    Per Reconciled Dispense Report:  (same as above). Unable to locate patient in [facility]. Lab Results   Component Value Date    LABA1C 6.5 04/21/2022    LABA1C 6.4 04/20/2021    LABA1C 6.2 03/23/2021     NOTE A1c <9%    STATIN ADHERENCE    Per Reconciled Dispense Report:  Atorvastatin last filled on 04.21.22 for 30 day supply. Unable to locate patient in [facility]. Lab Results   Component Value Date    CHOL 136 04/20/2021    TRIG 49 04/20/2021    HDL 56 04/20/2021    LDLCALC 70 04/20/2021     ALT   Date Value Ref Range Status   04/21/2022 21 10 - 40 U/L Final     AST   Date Value Ref Range Status   04/21/2022 25 15 - 37 U/L Final     The ASCVD Risk score (Veronica Osborn et al., 2013) failed to calculate for the following reasons:     The 2013 ASCVD risk score is only valid for ages 36 to 78    The patient has a prior MI or stroke diagnosis     PLAN  The following are interventions that have been identified:   - Patient overdue refilling Lisinopril, Metformin & Atorvastatin and active on home medication list.     Attempting to reach patient to review.  Left message asking for return call. Will send letter.      Future Appointments   Date Time Provider Veda Grace   7/21/2022  9:00 AM Markos Ventura DO [place] [address] Clinical Pharmacy  Phone: toll free [phone]

## 2022-07-25 ENCOUNTER — TELEMEDICINE (OUTPATIENT)
Dept: FAMILY MEDICINE CLINIC | Age: 82
End: 2022-07-25
Payer: MEDICARE

## 2022-07-25 DIAGNOSIS — Z00.00 ENCOUNTER FOR INITIAL ANNUAL WELLNESS VISIT (AWV) IN MEDICARE PATIENT: Primary | ICD-10-CM

## 2022-07-25 PROCEDURE — G0402 INITIAL PREVENTIVE EXAM: HCPCS | Performed by: FAMILY MEDICINE

## 2022-07-25 PROCEDURE — 1123F ACP DISCUSS/DSCN MKR DOCD: CPT | Performed by: FAMILY MEDICINE

## 2022-07-25 SDOH — ECONOMIC STABILITY: FOOD INSECURITY: WITHIN THE PAST 12 MONTHS, THE FOOD YOU BOUGHT JUST DIDN'T LAST AND YOU DIDN'T HAVE MONEY TO GET MORE.: NEVER TRUE

## 2022-07-25 SDOH — ECONOMIC STABILITY: FOOD INSECURITY: WITHIN THE PAST 12 MONTHS, YOU WORRIED THAT YOUR FOOD WOULD RUN OUT BEFORE YOU GOT MONEY TO BUY MORE.: NEVER TRUE

## 2022-07-25 ASSESSMENT — PATIENT HEALTH QUESTIONNAIRE - PHQ9
1. LITTLE INTEREST OR PLEASURE IN DOING THINGS: 0
SUM OF ALL RESPONSES TO PHQ QUESTIONS 1-9: 0
SUM OF ALL RESPONSES TO PHQ QUESTIONS 1-9: 0
2. FEELING DOWN, DEPRESSED OR HOPELESS: 0
SUM OF ALL RESPONSES TO PHQ QUESTIONS 1-9: 0
SUM OF ALL RESPONSES TO PHQ QUESTIONS 1-9: 0
SUM OF ALL RESPONSES TO PHQ9 QUESTIONS 1 & 2: 0

## 2022-07-25 ASSESSMENT — SOCIAL DETERMINANTS OF HEALTH (SDOH): HOW HARD IS IT FOR YOU TO PAY FOR THE VERY BASICS LIKE FOOD, HOUSING, MEDICAL CARE, AND HEATING?: NOT HARD AT ALL

## 2022-07-25 NOTE — PATIENT INSTRUCTIONS
Personalized Preventive Plan for Jurgen Escobar - 7/25/2022  Medicare offers a range of preventive health benefits. Some of the tests and screenings are paid in full while other may be subject to a deductible, co-insurance, and/or copay. Some of these benefits include a comprehensive review of your medical history including lifestyle, illnesses that may run in your family, and various assessments and screenings as appropriate. After reviewing your medical record and screening and assessments performed today your provider may have ordered immunizations, labs, imaging, and/or referrals for you. A list of these orders (if applicable) as well as your Preventive Care list are included within your After Visit Summary for your review. Other Preventive Recommendations:    A preventive eye exam performed by an eye specialist is recommended every 1-2 years to screen for glaucoma; cataracts, macular degeneration, and other eye disorders. A preventive dental visit is recommended every 6 months. Try to get at least 150 minutes of exercise per week or 10,000 steps per day on a pedometer . Order or download the FREE \"Exercise & Physical Activity: Your Everyday Guide\" from [employer]. Call 4-842.512.9373 or search [employer] online. You need 8091-0839 mg of calcium and 9475-4311 IU of vitamin D per day. It is possible to meet your calcium requirement with diet alone, but a vitamin D supplement is usually necessary to meet this goal.  When exposed to the sun, use a sunscreen that protects against both UVA and UVB radiation with an SPF of 30 or greater. Reapply every 2 to 3 hours or after sweating, drying off with a towel, or swimming. Always wear a seat belt when traveling in a car. Always wear a helmet when riding a bicycle or motorcycle.

## 2022-07-25 NOTE — PROGRESS NOTES
Medicare Annual Wellness Visit    Fernando Aguilar is here for Medicare AWV    Assessment & Plan    Recommendations for Preventive Services Due: see orders and patient instructions/AVS.  Recommended screening schedule for the next 5-10 years is provided to the patient in written form: see Patient Instructions/AVS.     Return for Medicare Annual Wellness Visit in 1 year. Subjective   The following acute and/or chronic problems were also addressed today:  Shingles vaccine needed    Patient's complete Health Risk Assessment and screening values have been reviewed and are found in Flowsheets. The following problems were reviewed today and where indicated follow up appointments were made and/or referrals ordered. Positive Risk Factor Screenings with Interventions:    Fall Risk:  Do you feel unsteady or are you worried about falling? : (!) yes  2 or more falls in past year?: no  Fall with injury in past year?: no   Fall Risk Interventions:    Home safety tips provided    Cognitive:   Words recalled: 1 Word Recalled  Total Score Interpretation: Abnormal Mini-Cog  Cognitive Impairment Interventions:  Patient advised to follow-up in this office for further evaluation and treatment within 4 day(s)           General Health and ACP:  General  In general, how would you say your health is?: Good  In the past 7 days, have you experienced any of the following: New or Increased Pain, New or Increased Fatigue, Loneliness, Social Isolation, Stress or Anger?: (!) Yes  Select all that apply: (!) Stress  Do you get the social and emotional support that you need?: (!) No  Do you have a Living Will?: (!) No    Advance Directives       Power of  Living Will ACP-Advance Directive ACP-Power of     Not on File Not on File Not on File Not on File        General Health Risk Interventions:  Feels well    Health Habits/Nutrition:  Physical Activity: Sufficiently Active    Days of Exercise per Week: 7 days    Minutes of Exercise per Session: 90 min     Have you lost any weight without trying in the past 3 months?: (!) Yes     Have you seen the dentist within the past year?: (!) No  Health Habits/Nutrition Interventions:  Needs dental exam    Hearing/Vision:  Do you or your family notice any trouble with your hearing that hasn't been managed with hearing aids?: No  Do you have difficulty driving, watching TV, or doing any of your daily activities because of your eyesight?: No  Have you had an eye exam within the past year?: (!) No  No results found. Hearing/Vision Interventions:  No concerns            Objective      Patient-Reported Vitals  No data recorded   NO PE       No Known Allergies  Prior to Visit Medications    Medication Sig Taking?  Authorizing Provider   vitamin B-12 (CYANOCOBALAMIN) 500 MCG tablet TAKE 1 TABLET BY MOUTH 2 TIMES A DAY  [facility] [place] [facility], DO   amLODIPine (NORVASC) 10 MG tablet TAKE 1 TABLET BY MOUTH ONE TIME A DAY  [facility] [place] [facility], DO   aspirin (ASPIRIN ADULT LOW STRENGTH) 81 MG EC tablet TAKE 1 TABLET BY MOUTH ONE TIME A DAY  [facility] [place], DO   metFORMIN (GLUCOPHAGE) 500 MG tablet TAKE 1 TABLET BY MOUTH ONE TIME A DAY WITH BREAKFAST  [facility] [place] [facility], DO   atorvastatin (LIPITOR) 80 MG tablet TAKE ONE TABLET BY MOUTH EVERY EVENING  [facility], DO   lisinopril (PRINIVIL;ZESTRIL) 40 MG tablet TAKE 1 TABLET BY MOUTH ONE TIME A DAY  [facility], DO   clopidogrel (PLAVIX) 75 MG tablet TAKE 1 TABLET BY MOUTH ONE TIME A DAY  [facility], DO   gabapentin (NEURONTIN) 300 MG capsule TAKE 1 CAPSULE BY MOUTH 4 TIMES A DAY  [facility], DO       CareTeam (Including outside providers/suppliers regularly involved in providing care):   Patient Care Team:  Brenda Tracey DO as PCP - General (Family Medicine)  Brenda Tracey DO as PCP - [facility] Empaneled Provider     Reviewed and updated this visit:  Tobacco  Allergies  Meds  Problems  Med Hx  Surg Hx  Soc Hx  Fam Hx            [place] [employer], was evaluated through a synchronous (real-time) audio-video encounter. The patient (or guardian if applicable) is aware that this is a billable service, which includes applicable co-pays. This Virtual Visit was conducted with patient's (and/or legal guardian's) consent. The visit was conducted pursuant to the emergency declaration under the [address] [place] [address], [address] [place] [address] authority and the [employer] General Act. Patient identification was verified, and a caregiver was present when appropriate. The patient was located at Home: [address]  [address] [facility] [address]. Provider was located at Home ([facility]): [place].

## 2022-07-28 ENCOUNTER — OFFICE VISIT (OUTPATIENT)
Dept: FAMILY MEDICINE CLINIC | Age: 82
End: 2022-07-28
Payer: MEDICARE

## 2022-07-28 VITALS
DIASTOLIC BLOOD PRESSURE: 62 MMHG | HEART RATE: 62 BPM | BODY MASS INDEX: 22.19 KG/M2 | WEIGHT: 113.6 LBS | OXYGEN SATURATION: 98 % | SYSTOLIC BLOOD PRESSURE: 134 MMHG

## 2022-07-28 DIAGNOSIS — I63.9 ACUTE CVA (CEREBROVASCULAR ACCIDENT) (HCC): ICD-10-CM

## 2022-07-28 DIAGNOSIS — E11.9 CONTROLLED TYPE 2 DIABETES MELLITUS WITHOUT COMPLICATION, WITHOUT LONG-TERM CURRENT USE OF INSULIN (HCC): Primary | ICD-10-CM

## 2022-07-28 DIAGNOSIS — I10 ESSENTIAL HYPERTENSION: ICD-10-CM

## 2022-07-28 DIAGNOSIS — Z87.898 HISTORY OF MEMORY LOSS: ICD-10-CM

## 2022-07-28 LAB — HBA1C MFR BLD: 5.9 %

## 2022-07-28 PROCEDURE — 83036 HEMOGLOBIN GLYCOSYLATED A1C: CPT | Performed by: FAMILY MEDICINE

## 2022-07-28 PROCEDURE — 1123F ACP DISCUSS/DSCN MKR DOCD: CPT | Performed by: FAMILY MEDICINE

## 2022-07-28 PROCEDURE — 99214 OFFICE O/P EST MOD 30 MIN: CPT | Performed by: FAMILY MEDICINE

## 2022-07-28 PROCEDURE — 3044F HG A1C LEVEL LT 7.0%: CPT | Performed by: FAMILY MEDICINE

## 2022-07-28 RX ORDER — LISINOPRIL 40 MG/1
TABLET ORAL
Qty: 30 TABLET | Refills: 0 | Status: SHIPPED | OUTPATIENT
Start: 2022-07-28 | End: 2022-09-13

## 2022-07-28 RX ORDER — CHOLECALCIFEROL (VITAMIN D3) 125 MCG
CAPSULE ORAL
Qty: 60 TABLET | Refills: 0 | Status: SHIPPED | OUTPATIENT
Start: 2022-07-28 | End: 2022-09-13

## 2022-07-28 RX ORDER — GABAPENTIN 300 MG/1
CAPSULE ORAL
Qty: 120 CAPSULE | Refills: 2 | Status: SHIPPED | OUTPATIENT
Start: 2022-07-28 | End: 2022-11-04

## 2022-07-28 RX ORDER — MEMANTINE HYDROCHLORIDE 5 MG/1
5 TABLET ORAL 2 TIMES DAILY
Qty: 60 TABLET | Refills: 5 | Status: SHIPPED | OUTPATIENT
Start: 2022-07-28 | End: 2022-11-04

## 2022-07-28 RX ORDER — AMLODIPINE BESYLATE 10 MG/1
TABLET ORAL
Qty: 90 TABLET | Refills: 0 | Status: SHIPPED | OUTPATIENT
Start: 2022-07-28

## 2022-07-28 RX ORDER — AMLODIPINE BESYLATE 10 MG/1
TABLET ORAL
Qty: 90 TABLET | Refills: 0 | Status: SHIPPED | OUTPATIENT
Start: 2022-07-28 | End: 2022-07-28 | Stop reason: SDUPTHER

## 2022-07-28 NOTE — PROGRESS NOTES
2022     Anita Read (:  1940) is a [de-identified] y.o. male, here for evaluation of the following medical concerns:    HPI    Patient presented today for multiple concerns. He is very complicated and hasn't followed up due to a number of reasons. He is feeling baseline today and has follow up with orthopedic surgeon. Memory is poor- lives with son, had CVA in the past, has failed to keep follow ups with specialist, will refer him back to his neurologist.     DM II- well controlled at this time, A1C is 5.9, doesn't believe he has had hypoglycemia, only on Metformin so more than likely not, will check labs today. HTN- has been an issue in the past but well controlled at this time. Today, he denied chest pain, sob, n, v or diarrhea. Review of Systems   Constitutional:  Positive for fatigue. Negative for activity change, fever and unexpected weight change. HENT:  Negative for congestion, ear pain, rhinorrhea, sinus pressure, sore throat and trouble swallowing. Respiratory:  Negative for cough, chest tightness and shortness of breath. Cardiovascular:  Negative for chest pain, palpitations and leg swelling. Gastrointestinal:  Negative for abdominal pain, diarrhea, nausea and vomiting. Endocrine: Negative for cold intolerance, heat intolerance, polydipsia and polyphagia. Musculoskeletal:  Negative for arthralgias. Skin:  Negative for rash. Neurological:  Negative for dizziness, syncope, light-headedness and headaches. Psychiatric/Behavioral:  Positive for decreased concentration. Negative for dysphoric mood. The patient is not nervous/anxious. Prior to Visit Medications    Medication Sig Taking?  Authorizing Provider   gabapentin (NEURONTIN) 300 MG capsule TAKE 1 CAPSULE BY MOUTH 4 TIMES A DAY Yes Pilo Barber, DO   metFORMIN (GLUCOPHAGE) 500 MG tablet TAKE 1 TABLET BY MOUTH ONE TIME A DAY WITH BREAKFAST Yes Pilo Barber, DO   memantine (NAMENDA) 5 MG tablet Take 1 tablet by mouth in the morning and 1 tablet before bedtime. Yes Jennifer Wong, DO   aspirin (ASPIRIN ADULT LOW STRENGTH) 81 MG EC tablet TAKE 1 TABLET BY MOUTH ONE TIME A DAY Yes Pilo Barber, DO   atorvastatin (LIPITOR) 80 MG tablet TAKE ONE TABLET BY MOUTH EVERY EVENING Yes Pilo Barber, DO   clopidogrel (PLAVIX) 75 MG tablet TAKE 1 TABLET BY MOUTH ONE TIME A DAY Yes Pilo Barber, DO   amLODIPine (NORVASC) 10 MG tablet TAKE 1 TABLET BY MOUTH ONE TIME A DAY  Pilo Barber, DO   lisinopril (PRINIVIL;ZESTRIL) 40 MG tablet TAKE 1 TABLET BY MOUTH ONE TIME A DAY  Pilo Barebr, DO   vitamin B-12 (CYANOCOBALAMIN) 500 MCG tablet TAKE 1 TABLET BY MOUTH 2 TIMES A DAY  Jennifer Wong, DO        Social History     Tobacco Use    Smoking status: Never    Smokeless tobacco: Never   Substance Use Topics    Alcohol use: Never        Vitals:    07/28/22 0935   BP: 134/62   Pulse: 62   SpO2: 98%   Weight: 113 lb 9.6 oz (51.5 kg)     Estimated body mass index is 22.19 kg/m² as calculated from the following:    Height as of 4/21/22: 5' (1.524 m). Weight as of this encounter: 113 lb 9.6 oz (51.5 kg). Physical Exam  Vitals and nursing note reviewed. Constitutional:       Appearance: He is well-developed. HENT:      Head: Normocephalic and atraumatic. Right Ear: Tympanic membrane, ear canal and external ear normal.      Left Ear: Tympanic membrane, ear canal and external ear normal.   Eyes:      Pupils: Pupils are equal, round, and reactive to light. Neck:      Thyroid: No thyromegaly. Cardiovascular:      Rate and Rhythm: Normal rate and regular rhythm. Heart sounds: No murmur heard. Pulmonary:      Effort: Pulmonary effort is normal.      Breath sounds: Normal breath sounds. No wheezing or rales. Abdominal:      General: Bowel sounds are normal.      Palpations: Abdomen is soft. Tenderness: There is no abdominal tenderness. Musculoskeletal:         General: Normal range of motion.    Skin: Findings: No rash. Neurological:      Mental Status: He is alert and oriented to person, place, and time. Psychiatric:         Behavior: Behavior normal.       ASSESSMENT/PLAN:  1. Controlled type 2 diabetes mellitus without complication, without long-term current use of insulin ([facility] [address]  Patient will need to keep a log of his glucose readings and bring them to the office. He needs to monitor his diet and exercise. Attempt to lose weight. Discussed proper eating habits. Continue to take medication as prescribed. Will obtain A1C at this visit. Educated on signs and symptoms for immediate evaluation in the ER.    - POCT glycosylated hemoglobin (Hb A1C)    2. Acute CVA (cerebrovascular accident) ([address]  Stable  Continue with medication  Keep appointments with specialist.   Pennie Hammonds questions     3. History of memory loss  Stable  Continue with medication  Keep appointments with specialist.   Pennie Hammonds questions      4. Essential hypertension  Stable  Continue with medication  Answered questions       Return in about 3 months (around 10/28/2022).

## 2022-07-28 NOTE — TELEPHONE ENCOUNTER
amLODIPine (NORVASC) 10 MG tablet  lisinopril (PRINIVIL;ZESTRIL) 40 MG tablet   vitamin B-12 (CYANOCOBALAMIN) 500 MCG tablet     Need refill.   [facility] 7-25.22    [address] [facility] [address], [address] - F [phone]

## 2022-08-08 ASSESSMENT — ENCOUNTER SYMPTOMS
VOMITING: 0
ABDOMINAL PAIN: 0
DIARRHEA: 0
RHINORRHEA: 0
SHORTNESS OF BREATH: 0
SINUS PRESSURE: 0
SORE THROAT: 0
TROUBLE SWALLOWING: 0
NAUSEA: 0
CHEST TIGHTNESS: 0
COUGH: 0

## 2022-08-23 ENCOUNTER — TELEPHONE (OUTPATIENT)
Dept: PHARMACY | Facility: CLINIC | Age: 82
End: 2022-08-23

## 2022-08-23 NOTE — LETTER
[place]  [address]        Uzair Muñoz   [address] [facility] [address]           08/24/22     Dear Uzair Muñoz,    We tried to reach you recently regarding your Atorvastatin and Lisinopril but were unable to reach you on the telephone. We have on file that you are currently taking Atorvastatin 80 mg once daily and Lisinopril 40 mg once daily . If you are no longer taking or taking differently, please call us at the number below so that we can discuss this and update your medication profile. It appears that this medication has not been filled at proper times. We are worried you might be missing doses or not taking it as directed. It is important that you take your medications regularly and try not to miss a single dose.     Some ways to help you remember to take and refill your medications are to:  · Use a pill box, set an alarm, and/or keep your medication near something that you do every day  · Fill a 3-month supply of your prescription at a time to save you time and trips to the pharmacy - if you would like assistance in switching your prescriptions to a 3-month supply, please contact us  · Ask your pharmacy if they participate in [facility]", a program where you can  all of your medications on the same day  · Ask your pharmacy if you can be set up with automatic refill, where they will automatically refill your prescription when it is due and let you know it's ready to     Sincerely,   Brenda Tamayo, PharmD, [address] Pharmacist  Department: [phone]

## 2022-08-23 NOTE — TELEPHONE ENCOUNTER
[facility] CLINICAL PHARMACY: ADHERENCE REVIEW  Identified care gap per [place]: fills at [facility]    Last Visit: 7/28/22    Patient not found in Outcomes NAZANIN Abad Records claims through 8/15/22     ACE/ARB ADHERENCE    (MONET King = Filled only once; Potential Fail Date: 9/27/22 ):   LISINOPRIL   TAB 40MG due to refill on 8/27/22. Last filled 30 day supply. BP Readings from Last 3 Encounters:   07/28/22 134/62   04/21/22 136/74   04/27/21 130/60     Estimated Creatinine Clearance: 32 mL/min (based on SCr of 1.3 mg/dL). DIABETES ADHERENCE    (MONET King = Filled only once; Potential Fail Date: 11/26/22 ):   METFORMIN    TAB 500MG due to refill on 10/26/22. Last filled 90 day supply. Lab Results   Component Value Date    LABA1C 5.9 07/28/2022    LABA1C 6.5 04/21/2022    LABA1C 6.4 04/20/2021       STATIN ADHERENCE    (MONET King = Filled only once; Potential Fail Date: 8/31/22 ):   ATORVASTATIN 80 MG TABLET due to refill on 7/24/22. Last filled 30 day supply. Lab Results   Component Value Date    CHOL 136 04/20/2021    TRIG 49 04/20/2021    HDL 56 04/20/2021    LDLCALC 70 04/20/2021     ALT   Date Value Ref Range Status   04/21/2022 21 10 - 40 U/L Final     AST   Date Value Ref Range Status   04/21/2022 25 15 - 37 U/L Final     The ASCVD Risk score (Jose Valenzuela et al., 2013) failed to calculate for the following reasons: The 2013 ASCVD risk score is only valid for ages 36 to 78    The patient has a prior MI or stroke diagnosis     PLAN  The following are interventions that have been identified:   - Patient overdue refilling atorvastatin 80 mg and active on home medication list.   - Patient eligible for 90 day supply of lisinopril and atorvastatin    Per chart review, patient had united insurance in beginning of this year. Per last pcp visit, pt has been lost to follow up with specialist but hopefully getting back on track.      Also wanted to see which pharmacy patient prefers to fill at. Last filled at [facility] [address] [facility].     Lisinopril sent to [facility] pharmacy for 30 ds with 0 refills. Atorvastatin sent for 30 ds with 3 refills. Attempting to reach patient to review. Left message asking for return call.     Future Appointments   Date Time Provider Veda Grace   11/4/2022  9:00 AM Albaro May DO [facility]     Rhett Quintanilla, PharmD, y [address] [facility] Pharmacist  Department: [phone]    For Pharmacy Admin Tracking Only    Gap Closed?: No   Time Spent (min): 15

## 2022-09-13 RX ORDER — LISINOPRIL 40 MG/1
TABLET ORAL
Qty: 30 TABLET | Refills: 0 | Status: SHIPPED | OUTPATIENT
Start: 2022-09-13 | End: 2022-10-19

## 2022-09-13 RX ORDER — CHOLECALCIFEROL (VITAMIN D3) 125 MCG
CAPSULE ORAL
Qty: 60 TABLET | Refills: 0 | Status: SHIPPED | OUTPATIENT
Start: 2022-09-13 | End: 2022-10-31

## 2022-09-29 ENCOUNTER — TELEPHONE (OUTPATIENT)
Dept: PHARMACY | Facility: CLINIC | Age: 82
End: 2022-09-29

## 2022-09-29 NOTE — TELEPHONE ENCOUNTER
POPULATION HEALTH CLINICAL PHARMACY: ADHERENCE REVIEW  Identified care gap per [place]: fills at [facility] : Statin adherence    Last Visit: 7/28/22    [address] Records claims through 9.12.22  YTD [place] =  71%; Potential Fail Date: 10/1/22 ):   Atorvastatin 80mg Next refill due: 9/15/22    Per Reconciled Dispense Report:   last filled on 8/16/22 for 30 day supply 2RF    Per [address] [facility]:   [place] will process refill today    Lab Results   Component Value Date    CHOL 136 04/20/2021    TRIG 49 04/20/2021    HDL 56 04/20/2021    LDLCALC 70 04/20/2021     ALT   Date Value Ref Range Status   04/21/2022 21 10 - 40 U/L Final     AST   Date Value Ref Range Status   04/21/2022 25 15 - 37 U/L Final     The ASCVD Risk score (Patel JARA, et al., 2019) failed to calculate for the following reasons: The 2019 ASCVD risk score is only valid for ages 36 to 78    The patient has a prior MI or stroke diagnosis     PLAN  The following are interventions that have been identified:   - Patient overdue refilling Atorvastatin 80mg and active on home medication list.     Unable to leave message Mailbox full  MyChart pending    Will send letter to home    Future Appointments   Date Time Provider Veda Grace   11/4/2022  9:00 AM Minerva Sawant DO [facility] - [address] free: [facility] in place:  No  Recommendation Provided To: Pharmacy: 1  Intervention Detail: Refill(s) Provided  Gap Closed?: Yes   Intervention Accepted By: Pharmacy: 1  Time Spent (min): 15

## 2022-09-29 NOTE — LETTER
[place]  [address], [address]        [facility]   [address] [facility] [address]           09/29/22     Dear [facility],    We tried to reach you recently regarding your Atorvastatin 80mg, but were unable to reach you on the telephone. We have on file that you are currently taking Atorvastatin 80mg daily. If you are no longer taking or taking differently, please call us at the number below so that we can discuss this and update your medication profile. It appears that this medication has not been filled at proper times. We are worried you might be missing doses or not taking it as directed. It is important that you take your medications regularly and try not to miss a single dose.     Some ways to help you remember to take and refill your medications are to:  · Use a pill box, set an alarm, and/or keep your medication near something that you do every day  · Fill a 3-month supply of your prescription at a time to save you time and trips to the pharmacy - if you would like assistance in switching your prescriptions to a 3-month supply, please contact us    This medication is ready for  at:  [address], [address] [facility] [phone]    Sincerely,     [address] [place] [address] free: [phone]

## 2022-10-19 RX ORDER — LISINOPRIL 40 MG/1
TABLET ORAL
Qty: 30 TABLET | Refills: 0 | Status: SHIPPED | OUTPATIENT
Start: 2022-10-19

## 2022-10-19 NOTE — TELEPHONE ENCOUNTER
Last office visit 7/28/2022     Last written      Next office visit scheduled 11/4/2022    Requested Prescriptions     Pending Prescriptions Disp Refills    lisinopril (PRINIVIL;ZESTRIL) 40 MG tablet [Pharmacy Med Name: LISINOPRIL 40MG TABS] 30 tablet 0     Sig: TAKE 1 TABLET BY MOUTH ONE TIME A DAY

## 2022-10-26 ENCOUNTER — TELEPHONE (OUTPATIENT)
Dept: PHARMACY | Facility: CLINIC | Age: 82
End: 2022-10-26

## 2022-10-26 NOTE — TELEPHONE ENCOUNTER
[facility] CLINICAL PHARMACY: ADHERENCE REVIEW  Identified care gap per [place]: fills at [address] : Statin adherence    Last Visit: 7/28/22    Patient prescribed:  Metformin 500 mg once daily with breakfast  Lisinopril 40 mg daily  Atorvastatin 80 mg every evening      ASSESSMENT  ACE/ARB ADHERENCE  Insurance Records claims through 10/10/22 (YTD Aleksander Ennisra =  77%; Potential Fail Date: 10/28/22 ):   Lisinopril last filled for 30 day supply. Next refill due: 10/13/22    Per Reconciled Dispense Report:  LISINOPRIL 40MG TAB 09/13/2022 30 30 tablet Mariano Lax, DO [address] .. Per [place] Portal:  Last filled 10/19/22 for 30 days; has 0 refills left. Lisinopril marked as NOT taking during 7/28/22 office visit; however, medication refill was approved on 10/19/22. Patient has an upcoming outpatient appointment on 11/4/22 to obtain further refills. BP Readings from Last 3 Encounters:   07/28/22 134/62   04/21/22 136/74   04/27/21 130/60     CrCl cannot be calculated (Patient's most recent lab result is older than the maximum 180 days allowed. ). DIABETES ADHERENCE  Insurance Records claims through 10/10/22 (MONET King = Filled only once; Potential Fail Date: 11/26/22 ):   Metformin last filled for 90 day supply. Next refill due: 10/26/22    Per Reconciled Dispense Report:  METFORMIN 500MG TAB 07/28/2022 90 90 tablet Mariano Lax, DO [address] .. Per [place] Portal:  Same as above; has 2 refills left. Per [address]:   Same as above    Lab Results   Component Value Date    LABA1C 5.9 07/28/2022    LABA1C 6.5 04/21/2022    LABA1C 6.4 04/20/2021     NOTE A1c <9%      STATIN ADHERENCE  Insurance Records claims through 10/10/22 (YTD PDC =  66%; Potential Fail Date: 10/31/22 ):   Atorvastatin last filled for 30 day supply.  Next refill due: 10/29/22    Per Reconciled Dispense Report:  ATORVASTATIN 80MG TAB 08/16/2022 30 30 tablet Pilo CARMENZA Tino Posada, [facility]. .. Per [facility] Portal:  Same as above; has 1 refill left. Per [address]:   Same as above    Lab Results   Component Value Date    CHOL 136 04/20/2021    TRIG 49 04/20/2021    HDL 56 04/20/2021    LDLCALC 70 04/20/2021     ALT   Date Value Ref Range Status   04/21/2022 21 10 - 40 U/L Final     AST   Date Value Ref Range Status   04/21/2022 25 15 - 37 U/L Final     The ASCVD Risk score (Patel JARA, et al., 2019) failed to calculate for the following reasons: The 2019 ASCVD risk score is only valid for ages 36 to 78    The patient has a prior MI or stroke diagnosis       PLAN  The following are interventions that have been identified:   - Patient is due to refill atorvastatin and metformin; has refills available for both medications at [address] is refilling both atorvastatin and metformin  Spoke with patient regarding refill pick-up; patient stated that he has an extra supply of metformin on hand; however, he does need the refill of atorvastatin   Confirmed dosing of metformin with patient and patient does not feel that he has missed any doses  Patient also asked about Namenda dosing - states that [address] makes him sleepy when he takes it in the morning. Patient has an upcoming provider appointment - I encouraged him to ask his provider if Namenda could be taken as 10 mg once daily in the evening (instead of 5 mg twice daily dosing). Reached patient to review.     Verified medication instructions     Future Appointments   Date Time Provider Veda Grace   11/4/2022  9:00 AM DO Lenora Sargent, PharmD, [address] [place] [address]  Department, toll free: [phone], option 1    =======================================================  For Pharmacy [address] [place] [address] in place: No  Recommendation Provided To: Patient/Caregiver: 2 via Telephone and Pharmacy: 2  Intervention Detail: Adherence Monitorin and Refill(s) Provided  Gap Closed?: Yes   Intervention Accepted By: Patient/Caregiver: 1 and Pharmacy: 2  Time Spent (min): 30

## 2022-10-31 RX ORDER — CHOLECALCIFEROL (VITAMIN D3) 125 MCG
CAPSULE ORAL
Qty: 60 TABLET | Refills: 0 | Status: SHIPPED | OUTPATIENT
Start: 2022-10-31

## 2022-10-31 NOTE — TELEPHONE ENCOUNTER
Last office visit 7/28/2022     Last written      Next office visit scheduled 11/4/2022    Requested Prescriptions     Pending Prescriptions Disp Refills    vitamin B-12 (CYANOCOBALAMIN) 500 MCG tablet [Pharmacy Med Name: VITAMIN B-12 500MCG TABS] 60 tablet 0     Sig: TAKE 1 TABLET BY MOUTH 2 TIMES A DAY

## 2022-11-04 ENCOUNTER — OFFICE VISIT (OUTPATIENT)
Dept: FAMILY MEDICINE CLINIC | Age: 82
End: 2022-11-04
Payer: MEDICARE

## 2022-11-04 VITALS
SYSTOLIC BLOOD PRESSURE: 138 MMHG | BODY MASS INDEX: 18.83 KG/M2 | DIASTOLIC BLOOD PRESSURE: 74 MMHG | HEIGHT: 65 IN | WEIGHT: 113 LBS

## 2022-11-04 DIAGNOSIS — I10 ESSENTIAL HYPERTENSION: Primary | ICD-10-CM

## 2022-11-04 DIAGNOSIS — Z23 NEED FOR INFLUENZA VACCINATION: ICD-10-CM

## 2022-11-04 DIAGNOSIS — E11.9 CONTROLLED TYPE 2 DIABETES MELLITUS WITHOUT COMPLICATION, WITHOUT LONG-TERM CURRENT USE OF INSULIN (HCC): ICD-10-CM

## 2022-11-04 DIAGNOSIS — Z87.898 HISTORY OF MEMORY LOSS: ICD-10-CM

## 2022-11-04 DIAGNOSIS — I63.9 ACUTE CVA (CEREBROVASCULAR ACCIDENT) (HCC): ICD-10-CM

## 2022-11-04 LAB
CHOLESTEROL, TOTAL: 155 MG/DL (ref 0–199)
ESTIMATED AVERAGE GLUCOSE: 128.4 MG/DL
HBA1C MFR BLD: 6.1 %
HDLC SERPL-MCNC: 57 MG/DL (ref 40–60)
LDL CHOLESTEROL CALCULATED: 71 MG/DL
TRIGL SERPL-MCNC: 135 MG/DL (ref 0–150)
VLDLC SERPL CALC-MCNC: 27 MG/DL

## 2022-11-04 PROCEDURE — 3074F SYST BP LT 130 MM HG: CPT | Performed by: FAMILY MEDICINE

## 2022-11-04 PROCEDURE — 3044F HG A1C LEVEL LT 7.0%: CPT | Performed by: FAMILY MEDICINE

## 2022-11-04 PROCEDURE — 90694 VACC AIIV4 NO PRSRV 0.5ML IM: CPT | Performed by: FAMILY MEDICINE

## 2022-11-04 PROCEDURE — 1123F ACP DISCUSS/DSCN MKR DOCD: CPT | Performed by: FAMILY MEDICINE

## 2022-11-04 PROCEDURE — 36415 COLL VENOUS BLD VENIPUNCTURE: CPT | Performed by: FAMILY MEDICINE

## 2022-11-04 PROCEDURE — G0008 ADMIN INFLUENZA VIRUS VAC: HCPCS | Performed by: FAMILY MEDICINE

## 2022-11-04 PROCEDURE — 3078F DIAST BP <80 MM HG: CPT | Performed by: FAMILY MEDICINE

## 2022-11-04 PROCEDURE — 99214 OFFICE O/P EST MOD 30 MIN: CPT | Performed by: FAMILY MEDICINE

## 2022-11-04 RX ORDER — MEMANTINE HYDROCHLORIDE 5 MG/1
5 TABLET ORAL DAILY
Qty: 90 TABLET | Refills: 1 | Status: SHIPPED | OUTPATIENT
Start: 2022-11-04

## 2022-11-04 ASSESSMENT — PATIENT HEALTH QUESTIONNAIRE - PHQ9
SUM OF ALL RESPONSES TO PHQ9 QUESTIONS 1 & 2: 0
SUM OF ALL RESPONSES TO PHQ QUESTIONS 1-9: 0
2. FEELING DOWN, DEPRESSED OR HOPELESS: 0
SUM OF ALL RESPONSES TO PHQ QUESTIONS 1-9: 0
1. LITTLE INTEREST OR PLEASURE IN DOING THINGS: 0

## 2022-11-04 NOTE — PROGRESS NOTES
2022     Ramirez Molina (:  1940) is a [de-identified] y.o. male, here for evaluation of the following medical concerns:    HPI  Patient presented today for multiple concerns. He is very complicated and hasn't followed up due to a number of reasons. Patient basically taking care of his adult son who is a supervisor at a [employer] store. His son drinks heavily and requires the patient drives him to where he needs to go. He is feeling baseline today and has follow up with orthopedic surgeon. Memory is poor- lives with son, had CVA in the past, has failed to keep follow ups with specialist, will refer him back to his neurologist.    1. No acute intracranial abnormality. No acute infarct. 2. 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 3. Mild global parenchymal volume loss with minimal chronic microvascular   ischemic changes. CVA- effected left side, still hasn't followed up with neurology, needs to see them, MRI was last year, no significant change today, rides bike on regular basis, mood is poor, B 12 is normal.     DM II- well controlled at this time, A1C is 6.1, doesn't believe he has had hypoglycemia, only on Metformin so more than likely not, will check labs today. HTN- has been an issue in the past had been well controlled, today too high, hasn't taken medication today, will when he gets home,      Today, he denied chest pain, sob, n, v or diarrhea. Review of Systems   Constitutional:  Negative for activity change, fatigue, fever and unexpected weight change. Eyes:  Negative for discharge and visual disturbance. Respiratory:  Negative for cough, chest tightness, shortness of breath and wheezing. Cardiovascular:  Negative for chest pain, palpitations and leg swelling. Gastrointestinal:  Negative for abdominal pain, diarrhea, nausea and vomiting. Musculoskeletal:  Negative for arthralgias. Skin:  Negative for rash. Neurological:  Negative for dizziness, syncope, light-headedness and headaches. Hematological:  Does not bruise/bleed easily. Psychiatric/Behavioral:  Negative for dysphoric mood. The patient is not nervous/anxious. Prior to Visit Medications    Medication Sig Taking? Authorizing Provider   memantine (NAMENDA) 5 MG tablet Take 1 tablet by mouth daily Yes Pilo Barber,    vitamin B-12 (CYANOCOBALAMIN) 500 MCG tablet TAKE 1 TABLET BY MOUTH 2 TIMES A DAY Yes Pilo Barber DO   lisinopril (PRINIVIL;ZESTRIL) 40 MG tablet TAKE 1 TABLET BY MOUTH ONE TIME A DAY Yes Robin York,    amLODIPine (NORVASC) 10 MG tablet TAKE 1 TABLET BY MOUTH ONE TIME A DAY Yes Pilo Barber DO   gabapentin (NEURONTIN) 300 MG capsule TAKE 1 CAPSULE BY MOUTH 4 TIMES A DAY Yes Pilo Barber DO   metFORMIN (GLUCOPHAGE) 500 MG tablet TAKE 1 TABLET BY MOUTH ONE TIME A DAY WITH BREAKFAST Yes Pilo Barber DO   aspirin (ASPIRIN ADULT LOW STRENGTH) 81 MG EC tablet TAKE 1 TABLET BY MOUTH ONE TIME A DAY Yes Pilo Barber DO   atorvastatin (LIPITOR) 80 MG tablet TAKE ONE TABLET BY MOUTH EVERY EVENING Yes Pilo Barber DO   clopidogrel (PLAVIX) 75 MG tablet TAKE 1 TABLET BY MOUTH ONE TIME A DAY Yes Inez Chambers, DO        Social History     Tobacco Use    Smoking status: Never    Smokeless tobacco: Never   Substance Use Topics    Alcohol use: Never        Vitals:    11/04/22 0859 11/04/22 0948   BP: (!) 162/74 138/74   Weight: 113 lb (51.3 kg)    Height: 5' 5\" (1.651 m)      Estimated body mass index is 18.8 kg/m² as calculated from the following:    Height as of this encounter: 5' 5\" (1.651 m). Weight as of this encounter: 113 lb (51.3 kg). Physical Exam  Vitals and nursing note reviewed. Constitutional:       Appearance: He is well-developed. HENT:      Head: Normocephalic and atraumatic.       Right Ear: External ear normal.      Left Ear: External ear normal.   Eyes:      Pupils: Pupils are equal, round, and reactive to light. Neck:      Thyroid: No thyromegaly. Cardiovascular:      Rate and Rhythm: Normal rate and regular rhythm. Heart sounds: No murmur heard. Pulmonary:      Effort: Pulmonary effort is normal.      Breath sounds: Normal breath sounds. No wheezing or rales. Abdominal:      General: Bowel sounds are normal.      Palpations: Abdomen is soft. Tenderness: There is no abdominal tenderness. Musculoskeletal:         General: Normal range of motion. Cervical back: Neck supple. Lymphadenopathy:      Cervical: No cervical adenopathy. Skin:     Findings: No rash. Neurological:      Mental Status: He is alert and oriented to person, place, and time. Psychiatric:         Behavior: Behavior normal.         Judgment: Judgment normal.       ASSESSMENT/PLAN:  1. Essential hypertension  controlled. Discussed signs and symptoms for immediate evaluation in the ER. Reduce sodium. Monitor diet, exercise and lose weight. Keep a BP log and bring it to your next appointment. - HEIDI Campbell MD, Neurology, [facility]  - Lipid Panel  - Hemoglobin A1C    2. Acute CVA (cerebrovascular accident) ([address]  Stable  Continue with medication  Keep appointments with specialist.   Gladis Adam MD, Neurology, [facility]  - Lipid Panel  - Hemoglobin A1C    3. Controlled type 2 diabetes mellitus without complication, without long-term current use of insulin ([address]  Patient will need to keep a log of his glucose readings and bring them to the office. He needs to monitor his diet and exercise. Attempt to lose weight. Discussed proper eating habits. Continue to take medication as prescribed. Will obtain A1C at this visit. Educated on signs and symptoms for immediate evaluation in the ER.    - HEIDI Campbell MD, Neurology, [facility]  - Lipid Panel  - Hemoglobin A1C    4.  History of memory loss  Continue with medication  Follow up with specialist  - Yadira Talbert MD, Neurology, [facility]  - Lipid Panel  - Hemoglobin A1C    5. Need for influenza vaccination  Need flu vaccine  - Influenza, FLUAD, (age 72 y+), IM, PF, 0.5 mL    Return if symptoms worsen or fail to improve.

## 2022-11-14 ASSESSMENT — ENCOUNTER SYMPTOMS
SHORTNESS OF BREATH: 0
DIARRHEA: 0
WHEEZING: 0
ABDOMINAL PAIN: 0
NAUSEA: 0
CHEST TIGHTNESS: 0
EYE DISCHARGE: 0
COUGH: 0
VOMITING: 0

## 2022-11-21 RX ORDER — AMLODIPINE BESYLATE 10 MG/1
TABLET ORAL
Qty: 90 TABLET | Refills: 0 | Status: SHIPPED | OUTPATIENT
Start: 2022-11-21

## 2022-11-21 RX ORDER — LISINOPRIL 40 MG/1
TABLET ORAL
Qty: 90 TABLET | Refills: 0 | Status: SHIPPED | OUTPATIENT
Start: 2022-11-21

## 2022-11-21 RX ORDER — ATORVASTATIN CALCIUM 80 MG/1
TABLET, FILM COATED ORAL
Qty: 30 TABLET | Refills: 3 | Status: SHIPPED | OUTPATIENT
Start: 2022-11-21

## 2022-11-21 NOTE — TELEPHONE ENCOUNTER
Last office visit 11/4/2022     Last written      Next office visit scheduled 3/3/2023    Requested Prescriptions     Pending Prescriptions Disp Refills    amLODIPine (NORVASC) 10 MG tablet [Pharmacy Med Name: AMLODIPINE BESYLATE 10MG TABS] 90 tablet 0     Sig: TAKE 1 TABLET BY MOUTH ONE TIME A DAY    atorvastatin (LIPITOR) 80 MG tablet [Pharmacy Med Name: ATORVASTATIN CALCIUM 80MG TABS] 30 tablet 3     Sig: TAKE ONE TABLET BY MOUTH EVERY EVENING

## 2022-11-21 NOTE — LETTER
[place]  [address], [address]        Vero Call   [address]  [address] [facility] [place] [address]           11/23/22     Dear [place] Call,    We have on file that you are currently taking lisinopril 40 mg tablet - 1 tablet everyday. If you are no longer taking or taking differently, please call us at the number below so that we can discuss this and update your medication profile. We wanted to make sure that you have this medication at home and that you are not having any troubles with it. It appears that this medication may be due to be refilled. Please contact us to discuss further or follow up with your [facility] pharmacy to refill it.     Sincerely,   [address]  Department, toll free: [phone], option 1

## 2022-11-21 NOTE — TELEPHONE ENCOUNTER
Jt Chi DO, patient out of refills of lisinopril. Rx pended for your signature/modification as appropriate    LOV: 11/4/22  Next: 3/3/23    Thank you,  Nafisa Fraser, PharmD, [email or web address]  Department, toll free: [phone], option 1  ===================================================================  POPULATION HEALTH CLINICAL PHARMACY: ADHERENCE REVIEW  Identified care gap per [place]: fills at [employer] : ACE/ARB, Diabetes, and Statin adherence    Last Visit: 11/4/22    ASSESSMENT  ACE/ARB ADHERENCE    Insurance Records claims through 11/7/22 (Prior Year PDC = n/a; YTD [place] =  78%; Potential Fail Date: 11/27/22 ):   Lisinopril 40 mg tab last filled for 30 day supply. Next refill due: 11/18/22    Per Reconciled Dispense Report:  LISINOPRIL 40MG TAB 10/19/2022 30 30 tablet Ernestina Saavedra, DO [facility] RE. .. LISINOPRIL 40MG TAB 09/13/2022 30 30 tablet Jt Chi, DO [facility] RE. .. LISINOPRIL 40MG TAB 07/28/2022 30 30 tablet [address] RE. .. LISINOPRIL  40 MG TABS 04/21/2022 30 30 tablet [address] RE. .. Last Rx 10/19/22 x 30 ds, 0 refills - needs refill Rx    BP Readings from Last 3 Encounters:   11/04/22 138/74   07/28/22 134/62   04/21/22 136/74     CrCl cannot be calculated (Patient's most recent lab result is older than the maximum 180 days allowed. ).     DIABETES ADHERENCE - passed in 2022    STATIN ADHERENCE - impossible in 2022    PLAN  The following are interventions that have been identified:   - Patient overdue refilling lisinopril and active on home medication list.   - Patient needs refills for lisinopril    Future Appointments   Date Time Provider Veda Grace   3/3/2023  9:00 AM DO [address], PharmD, [facility]  Department, toll free: [phone], option 1

## 2022-11-23 NOTE — TELEPHONE ENCOUNTER
Noted Rx signed by provider - thank you! Letter mailed to patient.     For Pharmacy [address] [facility] [address] in place:  No  Recommendation Provided To: Provider: 1 via Note to Provider  Intervention Detail: Adherence Monitorin and Refill(s) Provided  Gap Closed?: No   Intervention Accepted By: Provider: 1  Time Spent (min): 10

## 2022-11-29 RX ORDER — CLOPIDOGREL BISULFATE 75 MG/1
TABLET ORAL
Qty: 30 TABLET | Refills: 3 | Status: SHIPPED | OUTPATIENT
Start: 2022-11-29

## 2022-11-29 NOTE — TELEPHONE ENCOUNTER
Last office visit 11/4/2022     Last written      Next office visit scheduled 3/3/2023    Requested Prescriptions     Pending Prescriptions Disp Refills    clopidogrel (PLAVIX) 75 MG tablet [Pharmacy Med Name: CLOPIDOGREL BISULFATE 75MG TABS] 30 tablet 3     Sig: TAKE 1 TABLET BY MOUTH ONE TIME A DAY

## 2022-12-07 ENCOUNTER — APPOINTMENT (OUTPATIENT)
Dept: CT IMAGING | Age: 82
End: 2022-12-07
Payer: MEDICARE

## 2022-12-07 ENCOUNTER — APPOINTMENT (OUTPATIENT)
Dept: GENERAL RADIOLOGY | Age: 82
End: 2022-12-07
Payer: MEDICARE

## 2022-12-07 ENCOUNTER — HOSPITAL ENCOUNTER (INPATIENT)
Age: 82
LOS: 1 days | Discharge: HOME | End: 2022-12-08
Attending: EMERGENCY MEDICINE | Admitting: INTERNAL MEDICINE
Payer: MEDICARE

## 2022-12-07 DIAGNOSIS — S09.90XA CLOSED HEAD INJURY, INITIAL ENCOUNTER: ICD-10-CM

## 2022-12-07 DIAGNOSIS — J10.1 INFLUENZA A: ICD-10-CM
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
## 2022-12-07 DIAGNOSIS — R41.82 ALTERED MENTAL STATUS, UNSPECIFIED ALTERED MENTAL STATUS TYPE: Primary | ICD-10-CM
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
## 2022-12-07 PROBLEM — R53.1 GENERALIZED WEAKNESS: Status: ACTIVE | Noted: 2022-12-07

## 2022-12-07 LAB
A/G RATIO: 1.3 (ref 1.1–2.2)
ALBUMIN SERPL-MCNC: 4 G/DL (ref 3.4–5)
ALP BLD-CCNC: 92 U/L (ref 40–129)
ALT SERPL-CCNC: 20 U/L (ref 10–40)
ANION GAP SERPL CALCULATED.3IONS-SCNC: 11 MMOL/L (ref 3–16)
AST SERPL-CCNC: 23 U/L (ref 15–37)
BACTERIA: NORMAL /HPF
BASOPHILS ABSOLUTE: 0 K/UL (ref 0–0.2)
BASOPHILS RELATIVE PERCENT: 0.3 %
BILIRUB SERPL-MCNC: 0.5 MG/DL (ref 0–1)
BILIRUBIN URINE: NEGATIVE
BLOOD, URINE: NEGATIVE
BUN BLDV-MCNC: 18 MG/DL (ref 7–20)
CALCIUM SERPL-MCNC: 9 MG/DL (ref 8.3–10.6)
CHLORIDE BLD-SCNC: 100 MMOL/L (ref 99–110)
CLARITY: CLEAR
CO2: 23 MMOL/L (ref 21–32)
COLOR: YELLOW
CREAT SERPL-MCNC: 1.3 MG/DL (ref 0.8–1.3)
EOSINOPHILS ABSOLUTE: 0 K/UL (ref 0–0.6)
EOSINOPHILS RELATIVE PERCENT: 0.5 %
EPITHELIAL CELLS, UA: 0 /HPF (ref 0–5)
GFR SERPL CREATININE-BSD FRML MDRD: 55 ML/MIN/{1.73_M2}
GLUCOSE BLD-MCNC: 105 MG/DL (ref 70–99)
GLUCOSE URINE: NEGATIVE MG/DL
HCT VFR BLD CALC: 37.7 % (ref 40.5–52.5)
HEMOGLOBIN: 12.5 G/DL (ref 13.5–17.5)
HYALINE CASTS: 1 /LPF (ref 0–8)
KETONES, URINE: ABNORMAL MG/DL
LEUKOCYTE ESTERASE, URINE: NEGATIVE
LIPASE: 33 U/L (ref 13–60)
LYMPHOCYTES ABSOLUTE: 0.6 K/UL (ref 1–5.1)
LYMPHOCYTES RELATIVE PERCENT: 12.9 %
MCH RBC QN AUTO: 31.2 PG (ref 26–34)
MCHC RBC AUTO-ENTMCNC: 33.1 G/DL (ref 31–36)
MCV RBC AUTO: 94.3 FL (ref 80–100)
MICROSCOPIC EXAMINATION: YES
MONOCYTES ABSOLUTE: 0.9 K/UL (ref 0–1.3)
MONOCYTES RELATIVE PERCENT: 20.9 %
NEUTROPHILS ABSOLUTE: 2.9 K/UL (ref 1.7–7.7)
NEUTROPHILS RELATIVE PERCENT: 65.4 %
NITRITE, URINE: NEGATIVE
PDW BLD-RTO: 13.8 % (ref 12.4–15.4)
PH UA: 5.5 (ref 5–8)
PLATELET # BLD: 213 K/UL (ref 135–450)
PMV BLD AUTO: 7 FL (ref 5–10.5)
POTASSIUM REFLEX MAGNESIUM: 4.3 MMOL/L (ref 3.5–5.1)
PROTEIN UA: ABNORMAL MG/DL
RAPID INFLUENZA  B AGN: NEGATIVE
RAPID INFLUENZA A AGN: POSITIVE
RBC # BLD: 4 M/UL (ref 4.2–5.9)
RBC UA: 1 /HPF (ref 0–4)
SARS-COV-2, NAAT: NOT DETECTED
SODIUM BLD-SCNC: 134 MMOL/L (ref 136–145)
SPECIFIC GRAVITY UA: 1.02 (ref 1–1.03)
TOTAL PROTEIN: 7 G/DL (ref 6.4–8.2)
TROPONIN: <0.01 NG/ML
URINE REFLEX TO CULTURE: ABNORMAL
URINE TYPE: ABNORMAL
UROBILINOGEN, URINE: 0.2 E.U./DL
WBC # BLD: 4.4 K/UL (ref 4–11)
WBC UA: 0 /HPF (ref 0–5)

## 2022-12-07 PROCEDURE — 71045 X-RAY EXAM CHEST 1 VIEW: CPT

## 2022-12-07 PROCEDURE — 99285 EMERGENCY DEPT VISIT HI MDM: CPT

## 2022-12-07 PROCEDURE — 80053 COMPREHEN METABOLIC PANEL: CPT

## 2022-12-07 PROCEDURE — 93005 ELECTROCARDIOGRAM TRACING: CPT | Performed by: EMERGENCY MEDICINE

## 2022-12-07 PROCEDURE — 1200000000 HC SEMI PRIVATE

## 2022-12-07 PROCEDURE — 87635 SARS-COV-2 COVID-19 AMP PRB: CPT

## 2022-12-07 PROCEDURE — 81001 URINALYSIS AUTO W/SCOPE: CPT

## 2022-12-07 PROCEDURE — 83690 ASSAY OF LIPASE: CPT

## 2022-12-07 PROCEDURE — 70450 CT HEAD/BRAIN W/O DYE: CPT

## 2022-12-07 PROCEDURE — 84484 ASSAY OF TROPONIN QUANT: CPT

## 2022-12-07 PROCEDURE — 87804 INFLUENZA ASSAY W/OPTIC: CPT

## 2022-12-07 PROCEDURE — 85025 COMPLETE CBC W/AUTO DIFF WBC: CPT

## 2022-12-07 ASSESSMENT — PAIN SCALES - GENERAL: PAINLEVEL_OUTOF10: 0

## 2022-12-08 VITALS
OXYGEN SATURATION: 98 % | SYSTOLIC BLOOD PRESSURE: 142 MMHG | DIASTOLIC BLOOD PRESSURE: 72 MMHG | HEART RATE: 66 BPM | HEIGHT: 60 IN | RESPIRATION RATE: 16 BRPM | BODY MASS INDEX: 20.91 KG/M2 | TEMPERATURE: 97.9 F | WEIGHT: 106.48 LBS

## 2022-12-08 LAB
ANION GAP SERPL CALCULATED.3IONS-SCNC: 10 MMOL/L (ref 3–16)
BASOPHILS ABSOLUTE: 0 K/UL (ref 0–0.2)
BASOPHILS RELATIVE PERCENT: 0.3 %
BUN BLDV-MCNC: 20 MG/DL (ref 7–20)
CALCIUM SERPL-MCNC: 8.5 MG/DL (ref 8.3–10.6)
CHLORIDE BLD-SCNC: 105 MMOL/L (ref 99–110)
CO2: 23 MMOL/L (ref 21–32)
CREAT SERPL-MCNC: 1.2 MG/DL (ref 0.8–1.3)
EKG ATRIAL RATE: 82 BPM
EKG DIAGNOSIS: NORMAL
EKG P AXIS: 70 DEGREES
EKG P-R INTERVAL: 150 MS
EKG Q-T INTERVAL: 380 MS
EKG QRS DURATION: 124 MS
EKG QTC CALCULATION (BAZETT): 443 MS
EKG R AXIS: 89 DEGREES
EKG T AXIS: 35 DEGREES
EKG VENTRICULAR RATE: 82 BPM
EOSINOPHILS ABSOLUTE: 0 K/UL (ref 0–0.6)
EOSINOPHILS RELATIVE PERCENT: 0.2 %
GFR SERPL CREATININE-BSD FRML MDRD: >60 ML/MIN/{1.73_M2}
GLUCOSE BLD-MCNC: 104 MG/DL (ref 70–99)
GLUCOSE BLD-MCNC: 125 MG/DL (ref 70–99)
GLUCOSE BLD-MCNC: 93 MG/DL (ref 70–99)
HCT VFR BLD CALC: 36.4 % (ref 40.5–52.5)
HEMOGLOBIN: 12.2 G/DL (ref 13.5–17.5)
LYMPHOCYTES ABSOLUTE: 1 K/UL (ref 1–5.1)
LYMPHOCYTES RELATIVE PERCENT: 16.4 %
MCH RBC QN AUTO: 31.1 PG (ref 26–34)
MCHC RBC AUTO-ENTMCNC: 33.6 G/DL (ref 31–36)
MCV RBC AUTO: 92.6 FL (ref 80–100)
MONOCYTES ABSOLUTE: 1.2 K/UL (ref 0–1.3)
MONOCYTES RELATIVE PERCENT: 19.8 %
NEUTROPHILS ABSOLUTE: 3.7 K/UL (ref 1.7–7.7)
NEUTROPHILS RELATIVE PERCENT: 63.3 %
PDW BLD-RTO: 13.6 % (ref 12.4–15.4)
PERFORMED ON: ABNORMAL
PERFORMED ON: NORMAL
PLATELET # BLD: 192 K/UL (ref 135–450)
PMV BLD AUTO: 7.1 FL (ref 5–10.5)
POTASSIUM REFLEX MAGNESIUM: 4.7 MMOL/L (ref 3.5–5.1)
RBC # BLD: 3.93 M/UL (ref 4.2–5.9)
SODIUM BLD-SCNC: 138 MMOL/L (ref 136–145)
WBC # BLD: 5.9 K/UL (ref 4–11)

## 2022-12-08 PROCEDURE — 97530 THERAPEUTIC ACTIVITIES: CPT

## 2022-12-08 PROCEDURE — 85025 COMPLETE CBC W/AUTO DIFF WBC: CPT

## 2022-12-08 PROCEDURE — 2580000003 HC RX 258: Performed by: NURSE PRACTITIONER

## 2022-12-08 PROCEDURE — 80048 BASIC METABOLIC PNL TOTAL CA: CPT

## 2022-12-08 PROCEDURE — 97116 GAIT TRAINING THERAPY: CPT

## 2022-12-08 PROCEDURE — 97162 PT EVAL MOD COMPLEX 30 MIN: CPT

## 2022-12-08 PROCEDURE — 97535 SELF CARE MNGMENT TRAINING: CPT

## 2022-12-08 PROCEDURE — 93010 ELECTROCARDIOGRAM REPORT: CPT | Performed by: INTERNAL MEDICINE

## 2022-12-08 PROCEDURE — 94760 N-INVAS EAR/PLS OXIMETRY 1: CPT

## 2022-12-08 PROCEDURE — 97166 OT EVAL MOD COMPLEX 45 MIN: CPT

## 2022-12-08 PROCEDURE — 6370000000 HC RX 637 (ALT 250 FOR IP): Performed by: NURSE PRACTITIONER

## 2022-12-08 PROCEDURE — 36415 COLL VENOUS BLD VENIPUNCTURE: CPT

## 2022-12-08 PROCEDURE — 6360000002 HC RX W HCPCS: Performed by: INTERNAL MEDICINE

## 2022-12-08 RX ORDER — SODIUM CHLORIDE 0.9 % (FLUSH) 0.9 %
5-40 SYRINGE (ML) INJECTION EVERY 12 HOURS SCHEDULED
Status: DISCONTINUED | OUTPATIENT
Start: 2022-12-08 | End: 2022-12-08 | Stop reason: HOSPADM

## 2022-12-08 RX ORDER — ATORVASTATIN CALCIUM 80 MG/1
80 TABLET, FILM COATED ORAL NIGHTLY
Status: DISCONTINUED | OUTPATIENT
Start: 2022-12-08 | End: 2022-12-08 | Stop reason: HOSPADM

## 2022-12-08 RX ORDER — SODIUM CHLORIDE 9 MG/ML
INJECTION, SOLUTION INTRAVENOUS CONTINUOUS
Status: DISCONTINUED | OUTPATIENT
Start: 2022-12-08 | End: 2022-12-08 | Stop reason: HOSPADM

## 2022-12-08 RX ORDER — MEMANTINE HYDROCHLORIDE 5 MG/1
5 TABLET ORAL DAILY
Status: DISCONTINUED | OUTPATIENT
Start: 2022-12-08 | End: 2022-12-08 | Stop reason: HOSPADM

## 2022-12-08 RX ORDER — INSULIN LISPRO 100 [IU]/ML
0-4 INJECTION, SOLUTION INTRAVENOUS; SUBCUTANEOUS
Status: DISCONTINUED | OUTPATIENT
Start: 2022-12-08 | End: 2022-12-08 | Stop reason: HOSPADM

## 2022-12-08 RX ORDER — ASPIRIN 81 MG/1
81 TABLET ORAL DAILY
Status: DISCONTINUED | OUTPATIENT
Start: 2022-12-08 | End: 2022-12-08 | Stop reason: HOSPADM

## 2022-12-08 RX ORDER — ACETAMINOPHEN 325 MG/1
650 TABLET ORAL EVERY 6 HOURS PRN
Status: DISCONTINUED | OUTPATIENT
Start: 2022-12-08 | End: 2022-12-08 | Stop reason: HOSPADM

## 2022-12-08 RX ORDER — SODIUM CHLORIDE 0.9 % (FLUSH) 0.9 %
5-40 SYRINGE (ML) INJECTION PRN
Status: DISCONTINUED | OUTPATIENT
Start: 2022-12-08 | End: 2022-12-08 | Stop reason: HOSPADM

## 2022-12-08 RX ORDER — ONDANSETRON 2 MG/ML
4 INJECTION INTRAMUSCULAR; INTRAVENOUS EVERY 6 HOURS PRN
Status: DISCONTINUED | OUTPATIENT
Start: 2022-12-08 | End: 2022-12-08 | Stop reason: HOSPADM

## 2022-12-08 RX ORDER — SODIUM CHLORIDE 9 MG/ML
INJECTION, SOLUTION INTRAVENOUS PRN
Status: DISCONTINUED | OUTPATIENT
Start: 2022-12-08 | End: 2022-12-08 | Stop reason: HOSPADM

## 2022-12-08 RX ORDER — POLYETHYLENE GLYCOL 3350 17 G/17G
17 POWDER, FOR SOLUTION ORAL DAILY PRN
Status: DISCONTINUED | OUTPATIENT
Start: 2022-12-08 | End: 2022-12-08 | Stop reason: HOSPADM

## 2022-12-08 RX ORDER — OSELTAMIVIR PHOSPHATE 30 MG/1
30 CAPSULE ORAL 2 TIMES DAILY
Qty: 8 CAPSULE | Refills: 0 | Status: SHIPPED | OUTPATIENT
Start: 2022-12-08 | End: 2022-12-12

## 2022-12-08 RX ORDER — OSELTAMIVIR PHOSPHATE 6 MG/ML
30 FOR SUSPENSION ORAL 2 TIMES DAILY
Status: DISCONTINUED | OUTPATIENT
Start: 2022-12-08 | End: 2022-12-08 | Stop reason: HOSPADM

## 2022-12-08 RX ORDER — HEPARIN SODIUM 5000 [USP'U]/ML
5000 INJECTION, SOLUTION INTRAVENOUS; SUBCUTANEOUS 2 TIMES DAILY
Status: DISCONTINUED | OUTPATIENT
Start: 2022-12-08 | End: 2022-12-08 | Stop reason: HOSPADM

## 2022-12-08 RX ORDER — AMLODIPINE BESYLATE 10 MG/1
10 TABLET ORAL DAILY
Status: DISCONTINUED | OUTPATIENT
Start: 2022-12-08 | End: 2022-12-08 | Stop reason: HOSPADM

## 2022-12-08 RX ORDER — DEXTROSE MONOHYDRATE 100 MG/ML
INJECTION, SOLUTION INTRAVENOUS CONTINUOUS PRN
Status: DISCONTINUED | OUTPATIENT
Start: 2022-12-08 | End: 2022-12-08 | Stop reason: HOSPADM

## 2022-12-08 RX ORDER — INSULIN LISPRO 100 [IU]/ML
0-4 INJECTION, SOLUTION INTRAVENOUS; SUBCUTANEOUS NIGHTLY
Status: DISCONTINUED | OUTPATIENT
Start: 2022-12-08 | End: 2022-12-08 | Stop reason: HOSPADM

## 2022-12-08 RX ORDER — LISINOPRIL 40 MG/1
40 TABLET ORAL DAILY
Status: DISCONTINUED | OUTPATIENT
Start: 2022-12-08 | End: 2022-12-08 | Stop reason: HOSPADM

## 2022-12-08 RX ORDER — ENOXAPARIN SODIUM 100 MG/ML
40 INJECTION SUBCUTANEOUS DAILY
Status: DISCONTINUED | OUTPATIENT
Start: 2022-12-08 | End: 2022-12-08 | Stop reason: ALTCHOICE

## 2022-12-08 RX ORDER — ACETAMINOPHEN 650 MG/1
650 SUPPOSITORY RECTAL EVERY 6 HOURS PRN
Status: DISCONTINUED | OUTPATIENT
Start: 2022-12-08 | End: 2022-12-08 | Stop reason: HOSPADM

## 2022-12-08 RX ORDER — CLOPIDOGREL BISULFATE 75 MG/1
75 TABLET ORAL DAILY
Status: DISCONTINUED | OUTPATIENT
Start: 2022-12-08 | End: 2022-12-08 | Stop reason: HOSPADM

## 2022-12-08 RX ORDER — ONDANSETRON 4 MG/1
4 TABLET, ORALLY DISINTEGRATING ORAL EVERY 8 HOURS PRN
Status: DISCONTINUED | OUTPATIENT
Start: 2022-12-08 | End: 2022-12-08 | Stop reason: HOSPADM

## 2022-12-08 RX ADMIN — SODIUM CHLORIDE: 9 INJECTION, SOLUTION INTRAVENOUS at 01:28

## 2022-12-08 RX ADMIN — Medication 10 ML: at 09:20

## 2022-12-08 RX ADMIN — MEMANTINE HYDROCHLORIDE 5 MG: 5 TABLET ORAL at 09:20

## 2022-12-08 RX ADMIN — LISINOPRIL 40 MG: 40 TABLET ORAL at 09:20

## 2022-12-08 RX ADMIN — AMLODIPINE BESYLATE 10 MG: 10 TABLET ORAL at 09:20

## 2022-12-08 RX ADMIN — HEPARIN SODIUM 5000 UNITS: 5000 INJECTION INTRAVENOUS; SUBCUTANEOUS at 09:21

## 2022-12-08 RX ADMIN — Medication 30 MG: at 06:20

## 2022-12-08 RX ADMIN — ASPIRIN 81 MG: 81 TABLET, COATED ORAL at 09:20

## 2022-12-08 RX ADMIN — CLOPIDOGREL BISULFATE 75 MG: 75 TABLET ORAL at 09:20

## 2022-12-08 ASSESSMENT — PAIN SCALES - GENERAL
PAINLEVEL_OUTOF10: 0
PAINLEVEL_OUTOF10: 0

## 2022-12-08 NOTE — PROGRESS NOTES
Patient discharged. IV removed without complication. Discharged instruction provided, denies any further questions and needs. Transport patient to the car through wheel chair.

## 2022-12-08 NOTE — PLAN OF CARE
Problem: Pain  Goal: Verbalizes/displays adequate comfort level or baseline comfort level  12/8/2022 1557 by Trini Toussaint RN  Outcome: Progressing     Problem: Safety - Adult  Goal: Free from fall injury  12/8/2022 1557 by Trini Toussaint RN  Outcome: Progressing     Problem: Neurosensory - Adult  Goal: Achieves stable or improved neurological status  12/8/2022 1557 by Trini Toussaint RN  Outcome: Progressing     Problem: Respiratory - Adult  Goal: Achieves optimal ventilation and oxygenation  12/8/2022 1557 by Trini Toussaint RN  Outcome: Progressing     Problem: Musculoskeletal - Adult  Goal: Return mobility to safest level of function  12/8/2022 1557 by Trini Toussaint RN  Outcome: Progressing     Problem: Musculoskeletal - Adult  Goal: Return ADL status to a safe level of function  12/8/2022 1557 by Trini Toussaint RN    Problem: Infection - Adult  Goal: Absence of infection at discharge  12/8/2022 1557 by Trini Toussaint RN  Outcome: Progressing     Problem: Discharge Planning  Goal: Discharge to home or other facility with appropriate resources  12/8/2022 1557 by Trini Toussaint RN  Outcome: Progressing

## 2022-12-08 NOTE — CARE COORDINATION
Discharge Planning:  GARRETT contacted pts son, Jacki Fuentes [phone] to discuss d/c plans as pts bedside RN called and stated pt seems confused about who is picking him up. Jacki Fuentes stated pts son, Zainab Lopez is getting off work at 4998 and will be picking this pt up from the hospital.  Jacki Head stated pt had been driving but he is aware this pt should not be driving anymore. Jacki Head stated he will disengage the battery from the car. GARRETT talked with Jacki Fuentes about the [facility] [employer]. that was arranged through [employer]. No other d/c needs noted at this time.    EMILIANO Bacon  [phone]  Electronically signed by Deneen Gilford on 12/8/2022 at 3:33 PM

## 2022-12-08 NOTE — PROGRESS NOTES
Pt admitted to 4122 via stretcher. Pt is A&O x 2, disoriented to time and situation. Pt is unsteady when ambulating and impulsive. Tele cam placed in room for safety. Pt oriented to room, call light, fall precautions and POC.    Electronically signed by Chela Nagel RN on 12/8/2022 at 1:47 AM

## 2022-12-08 NOTE — PROGRESS NOTES
Occupational Therapy  Facility/Department: [facility] MED SURG  Occupational Therapy Initial Assessment    Name: Mona Hines  : 1940  MRN: [id number]  Date of Service: 2022    Discharge Recommendations:  24 hour supervision or assist, Home with Home health OT        Mona Hines scored a 19/24 on the AM-PAC ADL Inpatient form. Current research shows that an AM-PAC score of 18 or greater is typically associated with a discharge to the patient's home setting. Based on the patient's AM-PAC score, and their current ADL deficits, it is recommended that the patient have 2-3 sessions per week of Occupational Therapy at d/c to increase the patient's independence. At this time, this patient demonstrates the endurance and safety to discharge home with initial 24 hour assist and a home OT follow up treatment frequency of 2-3x/wk. Please see assessment section for further patient specific details. If patient discharges prior to next session this note will serve as a discharge summary. Please see below for the latest assessment towards goals. Patient Diagnosis(es): The primary encounter diagnosis was Altered mental status, unspecified altered mental status type. Diagnoses of Influenza A, Closed head injury, initial encounter, and Fall at home, initial encounter were also pertinent to this visit. Past Medical History:  has a past medical history of Active rheumatic fever, Diabetes mellitus type 2, uncontrolled, Essential hypertension, and Noncompliance with medications. Past Surgical History:  has a past surgical history that includes Appendectomy. Assessment   Performance deficits / Impairments: Decreased functional mobility ; Decreased ADL status; Decreased cognition;Decreased balance;Decreased strength;Decreased endurance  Assessment: Pt is an [de-identified] y.o. male AMS, Influenza A, Generalized weakness, Fall. PTA, pt living with son in [facility], independent ADLs, IADLs, and fxl mobility with no AD.  Pt currently functioning below baseline d/t the above deficits, today requiring SBA fxl transfers, CGA fxl mobility with no AD, CGA partial LB dressing, SBA grooming, and anticipate pt would require overall CGA for ADLs. Will cont to see on acute to address the above limitations and maximize pt's safety and independence. Anticipate pt will progress to be able to return home with increased family assist and home OT. Recommend initial 24 hour assist for safe transition home. Prognosis: Good  Decision Making: Medium Complexity  History: see above  REQUIRES OT FOLLOW-UP: Yes  Activity Tolerance  Activity Tolerance: Patient Tolerated treatment well;Treatment limited secondary to decreased cognition        Plan   Occupational Therapy Plan  Times Per Week: 3-5  Current Treatment Recommendations: Strengthening, Balance training, Functional mobility training, Endurance training, Safety education & training, Cognitive/Perceptual training, Neuromuscular re-education, Cognitive reorientation, Equipment evaluation, education, & procurement     Restrictions  Restrictions/Precautions  Restrictions/Precautions: Fall Risk    Subjective   General  Chart Reviewed: Yes  Patient assessed for rehabilitation services?: Yes  Additional Pertinent Hx: Per ERNESTO Cisneros - CNP's H&P : \"The patient is a [de-identified] y.o. male who presents to [facility] with PMHx: Rheumatic fever, DM type II, HTN, medication noncompliance, dementia, CVA. Patient presented to the emergency department with reports of a fall and altered mental status from baseline. Tanda Bun Tanda Bun ED workup: Influenza A CT head: Chronic generalized cortical atrophy, chronic small lacunar infarct  Chest x-ray negative, H&H stable 12.537, no leukocytosis. Troponin, lipase and metabolic panel is fairly unremarkable. Urinalysis also fairly unremarkable. Patient was noted to be afebrile, hemodynamically stable, no oxygen requirement was on room air. \"  Family / Caregiver Present: No  Referring Practitioner: ERNESTO Moreno CNP  Diagnosis: AMS, Influenza A, Generalized weakness, Fall  Subjective  Subjective: Pt met b/s for OT eval/tx. Pt in bed on arrival, agreeable to participate in therapy. Pt denies pain. Social/Functional History  Social/Functional History  Lives With: Son  Type of Home: Condo  Home Layout: One level  Home Access: Level entry  Bathroom Shower/Tub: Tub/Shower unit  Bathroom Toilet: Standard  Home Equipment: Crutches  Has the patient had two or more falls in the past year or any fall with injury in the past year?: No (denies falls, however had a fall prior to coming to hospital)  ADL Assistance: Independent  Homemaking Assistance: Independent  Homemaking Responsibilities: Yes  Ambulation Assistance: Independent  Transfer Assistance: Independent  Active : Yes  Occupation: Retired  Type of Occupation:        Objective     Safety Devices  Type of Devices: Call light within reach; Chair alarm in place;Gait belt;Left in chair;Telesitter in use;Nurse notified    Bed Mobility Training  Bed Mobility Training: Yes  Supine to Sit: Stand-by assistance  Balance  Sitting: Impaired  Sitting - Static: Good (unsupported)  Sitting - Dynamic: Fair (occasional)  Standing: Impaired  Standing - Static: Fair  Standing - Dynamic: Fair  Transfer Training  Transfer Training: Yes  Sit to Stand: Stand-by assistance  Stand to Sit: Stand-by assistance  Toilet Transfer: Contact-guard assistance  Gait Training  Gait Training: Yes  Gait  Overall Level of Assistance: Contact-guard assistance  Speed/Leslee: Pace decreased (< 100 feet/min)  Step Length: Left shortened;Right shortened  Gait Abnormalities: Decreased step clearance; Path deviations  Distance (ft): 25 Feet (and 30 ft)  Assistive Device: Gait belt    Transfers  Sit to stand: Stand by assistance  Stand to sit: Stand by assistance  Toilet Transfers  Toilet - Technique: Ambulating  Equipment Used: Grab bars  Toilet Transfer: Stand by assistance    AROM: Within functional limits  Strength: Within functional limits  Sensation: Impaired (pt reports L-sided N/T residual from previous CVA)    ADL  Grooming: Stand by assistance;Setup;Verbal cueing  Grooming Skilled Clinical Factors: standing at sink to wash face, VC's to initiate  UE Bathing Skilled Clinical Factors: standing at sink wash L UE  LE Dressing: Contact guard assistance;Setup  LE Dressing Skilled Clinical Factors: to don/doff socks  Toileting Skilled Clinical Factors: pt demo'd toilet transfer with SBA, anticipate CGA for toileting  Additional Comments: Anticipate pt is independent feeding, CGA bathing and dressing based on ROM/strength, balance, endurance.     Activity Tolerance  Activity Tolerance: Patient tolerated evaluation without incident    Bed mobility  Supine to Sit: Stand by assistance  Sit to Supine: Unable to assess    Cognition  Overall Cognitive Status: Exceptions  Attention Span: Attends with cues to redirect  Memory: Decreased short term memory;Decreased long term memory;Decreased recall of recent events;Decreased recall of precautions  Safety Judgement: Decreased awareness of need for safety;Decreased awareness of need for assistance  Problem Solving: Decreased awareness of errors;Assistance required to identify errors made;Assistance required to generate solutions  Insights: Decreased awareness of deficits  Initiation: Requires cues for some  Sequencing: Requires cues for some  Orientation  Overall Orientation Status: Impaired  Orientation Level: Oriented to person;Disoriented to situation;Oriented to place (partially oriented to situation)    Education Given To: Patient  Education Provided: Role of Therapy;Plan of Care;ADL Adaptive Strategies;Transfer Training;Orientation  Barriers to Learning: Cognition  Education Outcome: Continued education needed                            AM-PAC Score        AM-PAC Inpatient Daily Activity Raw Score: 19 (12/08/22 0842)  AM-PAC Inpatient ADL T-Scale Score : 40.22 (12/08/22 7448)  ADL Inpatient CMS 0-100% Score: 42.8 (12/08/22 6742)  ADL Inpatient CMS G-Code Modifier : CK (12/08/22 5290)        Goals  Short Term Goals  Time Frame for Short Term Goals: Prior to d/c:  Short Term Goal 1: Pt will bathe with supervision. Short Term Goal 2: Pt will dress with supervision. Short Term Goal 3: Pt will toilet with supervision. Short Term Goal 4: Pt will complete fxl mobility and fxl transfers to/from ADL surfaces with supervision. Short Term Goal 5: Pt will increase activity tolerate to achieve the above goals. Long Term Goals  Time Frame for Long Term Goals : STGs=LTGs  Patient Goals   Patient goals : to return home.        Therapy Time   Individual Concurrent Group Co-treatment   Time In [address] [place] [address] Drive         Time Out 0835         Minutes 60         Timed Code Treatment Minutes: Aurora Pires [address], OTR/L 1024

## 2022-12-08 NOTE — CARE COORDINATION
[facility]    Referral received from  to follow for home care services. [facility] unable to staff timely, will require alternate agency, no preference, CM aware. Referral accepted by Elizabeth at [employer], will pull from Epic.       Osmar Da Silva RN, BSN CTN  [facility] [phone]

## 2022-12-08 NOTE — DISCHARGE INSTR - COC
Continuity of Care Form    Patient Name: Flor Cisneros   :  1940  MRN:  [id number]    Admit date:  2022  Discharge date:  2022      Code Status Order: Full Code   Advance Directives:     Admitting Physician:  Ruth Diaz MD  PCP: Ludy Fernandez DO    Discharging Nurse: Jhoana Love [facility] Unit/Room#: M7N-9164/5288-32  Discharging Unit Phone Number: [phone]    Emergency Contact:   Extended Emergency Contact Information  [facility] [address] Phone: [phone]  Mobile Phone: [phone]  Relation: Child  Preferred language: English   needed? No  [facility]  Home Phone: [phone]  Mobile Phone: [phone]  Relation: Child   needed?  No    Past Surgical History:  Past Surgical History:   Procedure Laterality Date    APPENDECTOMY         Immunization History:   Immunization History   Administered Date(s) Administered    COVID-19, MODERNA BLUE border, Primary or Immunocompromised, (age 12y+), IM, 100 mcg/0.5mL 2021, 2021    Influenza Virus Vaccine 2009    Influenza, FLUAD, (age 72 y+), Adjuvanted, 0.5mL 2022    Influenza, FLUARIX, FLULAVAL, FLUZONE (age 10 mo+) AND AFLURIA, (age 1 y+), PF, 0.5mL 10/05/2020    Influenza, FLUZONE (age 72 y+), High Dose, 0.7mL 2020    Influenza, High Dose (Fluzone 65 yrs and older) 10/24/2013, 10/27/2017, 10/01/2019    Pneumococcal Conjugate 13-valent ([id number]) 2015    Pneumococcal Polysaccharide (Ykwckuttq86) 2009, 2016    Tdap (Boostrix, Adacel) 10/05/2020    Zoster Recombinant (Shingrix) 10/05/2020       Active Problems:  Patient Active Problem List   Diagnosis Code    Diabetes mellitus type 2, controlled ([facility] E11.9    Essential hypertension I10    Acute CVA (cerebrovascular accident) ([address] I63.9    Noncompliance with medications Z91.14    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 Mis Kim, Y92.009    Altered mental status, unspecified R41.82       Isolation/Infection:   Isolation            Droplet          Patient Infection Status       Infection Onset Added Last Indicated Last Indicated By Review Planned Expiration Resolved Resolved By    Influenza 12/07/22 12/07/22 12/07/22 Rapid influenza A/B antigens 12/14/22 12/17/22      Resolved    COVID-19 (Rule Out) 12/07/22 12/07/22 12/07/22 COVID-19, Rapid (Ordered)   12/07/22 Rule-Out Test Resulted            Nurse Assessment:  Last Vital Signs: BP (!) 162/68   Pulse 76   Temp 98.1 °F (36.7 °C) (Axillary)   Resp 16   Ht 5' (1.524 m)   Wt 106 lb 7.7 oz (48.3 kg)   SpO2 98%   BMI 20.80 kg/m²     Last documented pain score (0-10 scale): Pain Level: 0  Last Weight:   Wt Readings from Last 1 Encounters:   12/08/22 106 lb 7.7 oz (48.3 kg)     Mental Status:  alert and disoriented to situation and time    IV Access:  - None    Nursing Mobility/ADLs:  Walking   Assisted  Transfer  Assisted  Bathing  Assisted  Dressing  Assisted  Toileting  Assisted  Feeding  Independent  Med Admin  Assisted  Med Delivery   whole    Wound Care Documentation and Therapy:        Elimination:  Continence: Bowel: Yes  Bladder: Yes  Urinary Catheter: None   Colostomy/Ileostomy/Ileal Conduit: No       Date of Last BM: 12/08/2022    Intake/Output Summary (Last 24 hours) at 12/8/2022 1219  Last data filed at 12/8/2022 0956  Gross per 24 hour   Intake 240 ml   Output --   Net 240 ml     No intake/output data recorded.     Safety Concerns:     Sundowners [place]rome, At Risk for Falls, and baseline Dementia    Impairments/Disabilities:      None    Nutrition Therapy:  Current Nutrition Therapy:   - Oral Diet:  General    Routes of Feeding: Oral  Liquids: No Restrictions  Daily Fluid Restriction: no  Last Modified Barium Swallow with Video (Video Swallowing Test): not done    Treatments at the Time of Hospital Discharge:   Respiratory Treatments: 12/08/2022  Oxygen Therapy:  is not on home oxygen therapy. Ventilator:    - No ventilator support    Rehab Therapies: Physical Therapy, Occupational Therapy, and home health care  Weight Bearing Status/Restrictions: No weight bearing restrictions  Other Medical Equipment (for information only, NOT a DME order):  none    Other Treatments:     Patient's personal belongings (please select all that are sent with patient):  None    RN SIGNATURE:  Electronically signed by Huseyin Dillon RN on 12/8/22 at 3:55 PM EST    CASE MANAGEMENT/SOCIAL WORK SECTION    Inpatient Status Date: ***    Readmission Risk Assessment Score:  Readmission Risk              Risk of Unplanned Readmission:  11           Discharging to Facility/ Agency   Name:   Address:  Phone:  Fax:    Dialysis Facility (if applicable)   Name:  Address:  Dialysis Schedule:  Phone:  Fax:    / signature: {Esignature:[id number]}    PHYSICIAN SECTION    Prognosis: Fair    Condition at Discharge: Stable    Rehab Potential (if transferring to Rehab): Good    Recommended Labs or Other Treatments After Discharge:   - home pt and ot     Physician Certification: I certify the above information and transfer of Chau Garcia  is necessary for the continuing treatment of the diagnosis listed and that he requires Home Care for greater 30 days.      Update Admission H&P: No change in H&P    PHYSICIAN SIGNATURE:  Electronically signed by Isabel Gutierrez MD on 12/8/22 at 12:19 PM EST

## 2022-12-08 NOTE — DISCHARGE SUMMARY
Hospital Medicine Discharge Summary    Patient ID: Vadim De Santiago      Patient's PCP: Brayden Watts DO    Admit Date: 12/7/2022     Discharge Date:   12/08/22      Admitting Provider: Jennifer Lomas MD     Discharge Provider: Ananth Griffith MD     Discharge Diagnoses: Active Hospital Problems    Diagnosis     Influenza A [J10.1]      Priority: Medium    Generalized weakness [R53.1]      Priority: Medium    Fall at home, initial encounter [W19. Henok Fulling, Y92.009]      Priority: Medium    Altered mental status, unspecified [R41.82]      Priority: Medium       The patient was seen and examined on day of discharge and this discharge summary is in conjunction with any daily progress note from day of discharge. Hospital Course:     [de-identified] y.o. male who presents to [facility] with PMHx: Rheumatic fever, DM type II, HTN, medication noncompliance, dementia, CVA     Patient presented to the emergency department with reports of a fall and altered mental status from baseline. Apparently the patient's son reported that the patient fell in the bathroom at home 5 minutes prior to to the emergency department. There is reports of the patient striking his head. Noticed that the patient seemed more confused than his baseline status. Apparently he tried to start his car by using a credit card. ED workup: Influenza A CT head: Chronic generalized cortical atrophy, chronic small lacunar infarct  Chest x-ray negative, H&H stable 12.537, no leukocytosis. Troponin, lipase and metabolic panel is fairly unremarkable. Urinalysis also fairly unremarkable. Patient was noted to be afebrile, hemodynamically stable, no oxygen requirement was on room air. Altered mental status: Underlying dementia and prior CVA  More altered than baseline  Most likely secondary to influenza  No evidence of hypoxia sepsis or pneumonia. No evidence of uremia or acute anemia.   CT head obtained no acute changes: Cortical atrophy and chronic lacunar infarcts noted     Influenza A: Most likely contributing to the generalized weakness fall and mental status change from baseline  Currently no oxygen requirement  Currently afebrile without hypotension or tachycardia  Acetaminophen for symptomatic treatment of fever and body ache  Tamiflu     Generalized weakness: Influenced by comorbid conditions, current acute viral influenza infection  PT OT, patient is stable for DC with home pt and ot     Physical Exam Performed:     BP (!) 162/68   Pulse 76   Temp 98.1 °F (36.7 °C) (Axillary)   Resp 16   Ht 5' (1.524 m)   Wt 106 lb 7.7 oz (48.3 kg)   SpO2 98%   BMI 20.80 kg/m²       General appearance:  No apparent distress, appears stated age and cooperative. HEENT:  Normal cephalic, atraumatic without obvious deformity. Pupils equal, round, and reactive to light. Extra ocular muscles intact. Conjunctivae/corneas clear. Neck: Supple, with full range of motion. No jugular venous distention. Trachea midline. Respiratory:  Normal respiratory effort. Clear to auscultation, bilaterally without Rales/Wheezes/Rhonchi. Cardiovascular:  Regular rate and rhythm with normal S1/S2 without murmurs, rubs or gallops. Abdomen: Soft, non-tender, non-distended with normal bowel sounds. Musculoskeletal:  No clubbing, cyanosis or edema bilaterally. Full range of motion without deformity. Skin: Skin color, texture, turgor normal.  No rashes or lesions. Neurologic:  Neurovascularly intact without any focal sensory/motor deficits. Cranial nerves: II-XII intact, grossly non-focal.  Psychiatric:  Alert and oriented, thought content appropriate, normal insight  Capillary Refill: Brisk,< 3 seconds   Peripheral Pulses: +2 palpable, equal bilaterally       Labs:  For convenience and continuity at follow-up the following most recent labs are provided:      CBC:    Lab Results   Component Value Date/Time    WBC 5.9 12/08/2022 07:23 AM    HGB 12.2 12/08/2022 07:23 AM    HCT 36.4 12/08/2022 07:23 AM     12/08/2022 07:23 AM       Renal:    Lab Results   Component Value Date/Time     12/08/2022 07:23 AM    K 4.7 12/08/2022 07:23 AM     12/08/2022 07:23 AM    CO2 23 12/08/2022 07:23 AM    BUN 20 12/08/2022 07:23 AM    CREATININE 1.2 12/08/2022 07:23 AM    CALCIUM 8.5 12/08/2022 07:23 AM    PHOS 3.9 02/14/2020 05:11 AM         Significant Diagnostic Studies    Radiology:   CT HEAD WO CONTRAST   Final Result   No acute intracranial abnormality or calvarial fracture. Mild chronic generalized cortical atrophy with chronic small lacunar infarcts   noted. XR CHEST PORTABLE   Final Result   No acute cardiopulmonary process. Consults:     IP CONSULT TO HOSPITALIST  IP CONSULT TO SOCIAL WORK  IP CONSULT TO HOME CARE NEEDS    Disposition:  home with home pt and ot      Condition at Discharge: Stable    Discharge Instructions/Follow-up:    - home with PT and OT. - continue tamiflu.      Code Status:  Full Code     Activity: activity as tolerated    Diet: regular diet      Discharge Medications:     Current Discharge Medication List             Details   clopidogrel (PLAVIX) 75 MG tablet TAKE 1 TABLET BY MOUTH ONE TIME A DAY  Qty: 30 tablet, Refills: 3      lisinopril (PRINIVIL;ZESTRIL) 40 MG tablet TAKE 1 TABLET BY MOUTH ONE TIME A DAY  Qty: 90 tablet, Refills: 0      amLODIPine (NORVASC) 10 MG tablet TAKE 1 TABLET BY MOUTH ONE TIME A DAY  Qty: 90 tablet, Refills: 0      atorvastatin (LIPITOR) 80 MG tablet TAKE ONE TABLET BY MOUTH EVERY EVENING  Qty: 30 tablet, Refills: 3      memantine (NAMENDA) 5 MG tablet Take 1 tablet by mouth daily  Qty: 90 tablet, Refills: 1      vitamin B-12 (CYANOCOBALAMIN) 500 MCG tablet TAKE 1 TABLET BY MOUTH 2 TIMES A DAY  Qty: 60 tablet, Refills: 0      gabapentin (NEURONTIN) 300 MG capsule TAKE 1 CAPSULE BY MOUTH 4 TIMES A DAY  Qty: 120 capsule, Refills: 2      metFORMIN (GLUCOPHAGE) 500 MG tablet TAKE 1 TABLET BY MOUTH ONE TIME A DAY WITH BREAKFAST  Qty: 90 tablet, Refills: 2      aspirin (ASPIRIN ADULT LOW STRENGTH) 81 MG EC tablet TAKE 1 TABLET BY MOUTH ONE TIME A DAY  Qty: 30 tablet, Refills: 3             Time Spent on discharge: 35 in the examination, evaluation, counseling and review of medications and discharge plan. Signed:    Katharina Castle MD   12/8/2022      Thank you Karla Song DO for the opportunity to be involved in this patient's care. If you have any questions or concerns, please feel free to contact me at [phone].

## 2022-12-08 NOTE — PROGRESS NOTES
4 Eyes Skin Assessment     NAME:  Hattie Laws OF BIRTH:  1940  MEDICAL RECORD NUMBER:  [id number]    The patient is being assessed for  Admission    I agree that One RN have performed a thorough Head to Toe Skin Assessment on the patient. ALL assessment sites listed below have been assessed. Areas assessed by both nurses:    Head, Face, Ears, Shoulders, Back, Chest, Arms, Elbows, Hands, Sacrum. Buttock, Coccyx, Ischium, and Legs. Feet and Heels        Does the Patient have a Wound?  No noted wound(s)       Gal Prevention initiated by RN: No   Wound Care Orders initiated by RN: No    Pressure Injury (Stage 3,4, Unstageable, DTI, NWPT, and Complex wounds) if present place referral order by RN under : No    New and Established Ostomies, if present place, referral order under : No      Nurse 1 eSignature: Electronically signed by Presley Wilkerson RN on 12/8/22 at 6:57 AM EST    **SHARE this note so that the co-signing nurse is able to place an eSignature**    Nurse 2 eSignature: {Esignature:[id number]}

## 2022-12-08 NOTE — H&P
Hospital Medicine History & Physical      PCP: Jt Chi DO    Date of Admission: 12/7/2022    Date of Service: Pt seen/examined on 12/07/2022 and Admitted to Inpatient     Chief Complaint:  fall and confusion      History Of Present Illness: The patient is a [de-identified] y.o. male who presents to [facility] with PMHx: Rheumatic fever, DM type II, HTN, medication noncompliance, dementia, CVA    Lives at home  CODE status: Full  Anticoagulation: None    Information obtained by reviewing ED notes: Patient is not a reliable source, and there is no family at bedside for my review/interview:     Patient presented to the emergency department with reports of a fall and altered mental status from baseline. Apparently the patient's son reported that the patient fell in the bathroom at home 5 minutes prior to to the emergency department. There is reports of the patient striking his head. Noticed that the patient seemed more confused than his baseline status. Apparently he tried to start his car by using a credit card. ED workup: Influenza A CT head: Chronic generalized cortical atrophy, chronic small lacunar infarct  Chest x-ray negative, H&H stable 12.537, no leukocytosis. Troponin, lipase and metabolic panel is fairly unremarkable. Urinalysis also fairly unremarkable. Patient was noted to be afebrile, hemodynamically stable, no oxygen requirement was on room air. On my exam the patient is awake alert and oriented to person. He climbed himself over the side rails and was trying to walk around the room. He is disoriented to place and time. Asked me several times why was he at the hospital.  Had no recollection of the fall in the bathroom at home. External exam head ears eyes nose and throat unremarkable. No outward evidence of head or neck trauma.   The patient is ambulating in a shuffling manner. Upper and lower extremities, anterior posterior torso is fairly unremarkable. Respirations are easy and regular. Abdomen is soft. I escorted him to a chair at the bedside and sat him down and that seemed to make him a little bit more amendable to being at the hospital as opposed to being inside the stretcher with the rails up. Past Medical History:        Diagnosis Date    Active rheumatic fever     Diabetes mellitus type 2, uncontrolled     Essential hypertension     Noncompliance with medications    Dementia    Past Surgical History:        Procedure Laterality Date    APPENDECTOMY         Medications Prior to Admission:    Prior to Admission medications    Medication Sig Start Date End Date Taking? Authorizing Provider   clopidogrel (PLAVIX) 75 MG tablet TAKE 1 TABLET BY MOUTH ONE TIME A DAY 11/29/22   Pilo Barber, DO   lisinopril (PRINIVIL;ZESTRIL) 40 MG tablet TAKE 1 TABLET BY MOUTH ONE TIME A DAY 11/21/22   Pilo Barber, DO   amLODIPine (NORVASC) 10 MG tablet TAKE 1 TABLET BY MOUTH ONE TIME A DAY 11/21/22   Felix Hidalgo, DO   atorvastatin (LIPITOR) 80 MG tablet TAKE ONE TABLET BY MOUTH EVERY EVENING 11/21/22   Felix Hidalgo, DO   memantine (NAMENDA) 5 MG tablet Take 1 tablet by mouth daily 11/4/22   Felix Hidalgo, DO   vitamin B-12 (CYANOCOBALAMIN) 500 MCG tablet TAKE 1 TABLET BY MOUTH 2 TIMES A DAY 10/31/22   Pilo Barber, DO   gabapentin (NEURONTIN) 300 MG capsule TAKE 1 CAPSULE BY MOUTH 4 TIMES A DAY 7/28/22 11/4/22  Fleix Hidalgo, DO   metFORMIN (GLUCOPHAGE) 500 MG tablet TAKE 1 TABLET BY MOUTH ONE TIME A DAY WITH BREAKFAST 7/28/22   Felix Hidalgo, DO   aspirin (ASPIRIN ADULT LOW STRENGTH) 81 MG EC tablet TAKE 1 TABLET BY MOUTH ONE TIME A DAY 4/21/22   Felix Hidalgo, DO       Allergies:  Patient has no known allergies. Social History:  The patient currently lives at home. TOBACCO:   reports that he has never smoked.  He has never used smokeless tobacco.  ETOH:   reports no history of alcohol use. Family History:  Reviewed in detail and negative for DM, Early CAD, Cancer, CVA. Positive as follows:        Problem Relation Age of Onset    Dementia Mother     Dementia Father        REVIEW OF SYSTEMS:    as noted in the HPI. All other systems reviewed and negative. PHYSICAL EXAM:    BP (!) 154/77   Pulse 83   Temp 99.6 °F (37.6 °C) (Oral)   Resp 16   Ht 5' 5\" (1.651 m)   Wt 106 lb 7.7 oz (48.3 kg)   SpO2 98%   BMI 17.72 kg/m²     General appearance: No apparent distress appears stated age and cooperative. HEENT Normal cephalic, atraumatic without obvious deformity. Pupils equal, round, and reactive to light. Extra ocular muscles intact. Conjunctivae/corneas clear. Neck: Supple, No jugular venous distention/bruits. Trachea midline without thyromegaly or adenopathy with full range of motion. Lungs: Clear to auscultation, bilaterally without Rales/Wheezes/Rhonchi with good respiratory effort. Heart: Regular rate and rhythm with Normal S1/S2 without murmurs, rubs or gallops, point of maximum impulse non-displaced  Abdomen: Soft, non-tender or non-distended without rigidity or guarding and positive bowel sounds all four quadrants. Extremities: No clubbing, cyanosis, or edema bilaterally. Full range of motion without deformity, a bit of a shuffling gait  Skin: Skin color, texture, turgor normal.  No rashes or lesions. Neurologic: Alert and oriented person. Moving all extremities.   Mental status: Alert, oriented person, otherwise pleasantly disoriented  Capillary Refill: Acceptable  < 3 seconds  Peripheral Pulses: +3 Easily felt, not easily obliterated with pressure      CXR:  I have reviewed the CXR with the following interpretation: NAD  EKG:  I have reviewed the EKG with the following interpretation:     CBC   Recent Labs     12/07/22 2021   WBC 4.4   HGB 12.5*   HCT 37.7*         RENAL  Recent Labs     12/07/22 2021 *   K 4.3      CO2 23   BUN 18   CREATININE 1.3     LFT'S  Recent Labs     12/07/22 2021   AST 23   ALT 20   BILITOT 0.5   ALKPHOS 92     COAG  No results for input(s): INR in the last 72 hours. CARDIAC ENZYMES  Recent Labs     12/07/22 2021   TROPONINI <0.01       U/A:    Lab Results   Component Value Date/Time    NITRITE Neg 04/21/2022 10:38 AM    COLORU Yellow 12/07/2022 08:20 PM    WBCUA 0 12/07/2022 08:20 PM    RBCUA 1 12/07/2022 08:20 PM    BACTERIA None Seen 12/07/2022 08:20 PM    CLARITYU Clear 12/07/2022 08:20 PM    SPECGRAV 1.021 12/07/2022 08:20 PM    LEUKOCYTESUR Negative 12/07/2022 08:20 PM    BLOODU Negative 12/07/2022 08:20 PM    GLUCOSEU Negative 12/07/2022 08:20 PM       ABG  No results found for: Berenice España        Active Hospital Problems    Diagnosis Date Noted    Influenza A [J10.1] 12/07/2022     Priority: Medium    Generalized weakness [R53.1] 12/07/2022     Priority: Medium    Fall at home, initial encounter [W19. Mis Kim, V13.997] 12/07/2022     Priority: Medium    Altered mental status, unspecified [R41.82] 12/07/2022     Priority: Medium           PHYSICIANS CERTIFICATION:    I certify that Willy Stevenson is expected to be hospitalized for  than 2 midnights based on the following assessment and plan:      ASSESSMENT/PLAN:    Altered mental status: Underlying dementia and prior CVA  More altered than baseline  Most likely secondary to influenza  No evidence of hypoxia sepsis or pneumonia. No evidence of uremia or acute anemia.   CT head obtained no acute changes: Cortical atrophy and chronic lacunar infarcts noted    Influenza A: Most likely contributing to the generalized weakness fall and mental status change from baseline  Currently no oxygen requirement  Currently afebrile without hypotension or tachycardia  Acetaminophen for symptomatic treatment of fever and body ache  Tamiflu    Generalized weakness: Influenced by comorbid conditions, current acute viral influenza infection  PT OT    Fall at home: Related to all the above  CT head unremarkable with no evidence of skull fracture or intracranial hemorrhage  No outward evidence of upper or lower extremity injury or spine injury. Patient is mobile. HTN: Continue amlodipine, statin lisinopril and aspirin, clopidogrel    Dementia: Continue memantine     DM: Accu-Cheks, diabetic diet, hypoglycemic protocol  Hold metformin for now, no additional diagnostic studies needed can restart, may need to add SSI during hospital course. DVT Prophylaxis: Hep  Diet: ADULT DIET; Regular  Code Status: Full Code  PT/OT Eval Status: appears fairly independent, but definitely at risk for falls    Dispo - admit, inpt       Shena Whitlock, APRN - CNP    Thank you Kesha Leon DO for the opportunity to be involved in this patient's care. If you have any questions or concerns please feel free to contact me at [phone]. This dictation was performed with a verbal recognition program (DRAGON) and it was checked for errors. It is possible that there are still dictated errors within this office note. If so, please bring any errors to my attention for an addendum. All efforts were made to ensure that this office note is accurate.

## 2022-12-08 NOTE — PROGRESS NOTES
Physical Therapy  Facility/Department: [facility]  Physical Therapy Initial Assessment    Name: Sagar Swann  : 1940  MRN: [id number]  Date of Service: 2022    Discharge Recommendations:  24 hour supervision or assist, Home with Home health PT   PT Equipment Recommendations  Equipment Needed: No      Patient Diagnosis(es): The primary encounter diagnosis was Altered mental status, unspecified altered mental status type. Diagnoses of Influenza A, Closed head injury, initial encounter, and Fall at home, initial encounter were also pertinent to this visit. Past Medical History:  has a past medical history of Active rheumatic fever, Diabetes mellitus type 2, uncontrolled, Essential hypertension, and Noncompliance with medications. Past Surgical History:  has a past surgical history that includes Appendectomy. Assessment   Body Structures, Functions, Activity Limitations Requiring Skilled Therapeutic Intervention: Decreased functional mobility ; Decreased endurance;Decreased balance;Decreased cognition  Assessment: Pt is an [de-identified] y/o male who presents with conufsion and flu. Pt was independent prior to admit [facility] Pt currently requires CGA for transfers and ambulation without device. Unsteady at times but no loss of balance. Pt would benefit from continued skilled PT to address these limitations. Recommend home with initial 24hr supervision and home PT.   Treatment Diagnosis: impaired functional mobility  Therapy Prognosis: Good  Decision Making: Medium Complexity  Requires PT Follow-Up: Yes  Activity Tolerance  Activity Tolerance: Patient tolerated evaluation without incident     Plan   Physcial Therapy Plan  General Plan: 3-5 times per week  Current Treatment Recommendations: Balance training, Functional mobility training, Transfer training, Gait training, Safety education & training, Patient/Caregiver education & training, Endurance training, Therapeutic activities  Safety Devices  Type of Devices: Call light within reach, Chair alarm in place, Gait belt, Left in chair, Telesitter in use, Nurse notified     Restrictions  Restrictions/Precautions  Restrictions/Precautions: Fall Risk     Subjective   General  Chart Reviewed: Yes  Patient assessed for rehabilitation services?: Yes  Additional Pertinent Hx: Per H&P, Reina Evangelista is a [de-identified] y.o. male who  has a past medical history of Active rheumatic fever, Diabetes mellitus type 2, uncontrolled, Essential hypertension, and Noncompliance with medications. presents to the ED with EMS for evaluation of fall as well as altered mental status. Patient son at bedside states that the patient had a fall approximately 45 minutes to arrival.  States that the patient was in the bathroom when he had his fall. States he did hit his head but denies any loss of consciousness. Denies any neck pain or back pain. No vision changes. Does have history of dementia. Patient's son states that the patient was more confused than normal and was having difficulty starting the car and tried to start the car with his credit card. Patient denies any headache. No vision changes. No new numbness or weakness. Has history of left-sided numbness from previous CVA. Denies any chest pain or shortness of breath. No abdominal pain. No vomiting. States he is having urinary frequency but denies any dysuria. Normal bowel movements. Patient son states the pain also started with a cough over the past day and a half. No sputum production. No increased work of breathing. No documented fevers at home\"  Family / Caregiver Present: No  Referring Practitioner: ERNESTO Hernandez CNP  Referral Date : 12/07/22  Diagnosis: influenza A  Follows Commands: Within Functional Limits  Subjective  Subjective: Pt agreeable to evaluation. Denies pain.          Social/Functional History  Social/Functional History  Lives With: Son  Type of Home: [address] [place] [address] Layout: One level  Home Access: Level entry  Bathroom Shower/Tub: Tub/Shower unit  Bathroom Toilet: Standard  Home Equipment: Crutches  Has the patient had two or more falls in the past year or any fall with injury in the past year?: No (denies falls, however had a fall prior to coming to hospital)  ADL Assistance: [address]: Independent  Homemaking Responsibilities: Yes  Ambulation Assistance: Independent  Transfer Assistance: Independent  Active : Yes  Occupation: Retired  Type of Occupation:       Objective   Heart Rate: 89  [address]: Monitor  BP: (!) 169/74  BP Location: Right Arm  MAP (Calculated): 106  Resp: 16  SpO2: 93 %  O2 Device: None (Room air)        Gross Assessment  AROM: Within functional limits  Strength: Within functional limits  Sensation: Impaired (numbness and tingling in L side of body from previous side of body)                 Bed Mobility Training  Bed Mobility Training: Yes  Supine to Sit: Stand-by assistance  Balance  Sitting: Impaired  Sitting - Static: Good (unsupported)  Sitting - Dynamic: Fair (occasional)  Standing: Impaired  Standing - Static: Fair  Standing - Dynamic: Fair  Transfer Training  Transfer Training: Yes  Sit to Stand: Stand-by assistance  Stand to Sit: Stand-by assistance  Toilet Transfer: Contact-guard assistance  Gait Training  Gait Training: Yes  Gait  Overall Level of Assistance: Contact-guard assistance  Speed/Leslee: Pace decreased (< 100 feet/min)  Step Length: Left shortened;Right shortened  Gait Abnormalities: Decreased step clearance; Path deviations  Distance (ft): 25 Feet (and 30 ft)  Assistive Device: Gait belt                 Dynamic Sitting Balance Exercises: SBA while reaching toward feet with multiple losses of balance posteriorly  Dynamic Standing Balance Exercises: Standing at sink to complete ADL task with SBA x4 minutes          AM-PAC Score  AM-PAC Inpatient Mobility Raw Score : 19 (12/08/22 8555)  AM-PAC Inpatient T-Scale Score : 45.44 (12/08/22 0839)  Mobility Inpatient CMS 0-100% Score: 41.77 (12/08/22 0839)  Mobility Inpatient CMS G-Code Modifier : CK (12/08/22 0839)            Goals  Short Term Goals  Time Frame for Short Term Goals: until d/c  Short Term Goal 1: Pt will perform bed mobility mod I  Short Term Goal 2: Pt will perform transfers with supervision  Short Term Goal 3: Pt will ambulate 150' without device with supervision  Patient Goals   Patient Goals : \"to go home\"       Education  Patient Education  Education Given To: Patient  Education Provided: Role of Therapy;Plan of Care;Transfer Training;Orientation  Education Provided Comments: Role of therapy and safety with mobility  Education Method: Demonstration;Verbal  Barriers to Learning: Cognition  Education Outcome: Verbalized understanding;Continued education needed      Therapy Time   Individual Concurrent Group Co-treatment   Time In 0735         Time Out 0835         Minutes 60         Timed Code Treatment Minutes: Milan More,   [facility]

## 2022-12-08 NOTE — PLAN OF CARE
Problem: Discharge Planning  Goal: Discharge to home or other facility with appropriate resources  12/8/2022 1600 by Heidi Funk RN  Outcome: Completed  12/8/2022 1557 by Zoran Obrien RN  Outcome: Progressing  12/8/2022 0218 by Iqra Moon RN  Outcome: Progressing  Flowsheets (Taken 12/8/2022 0120)  Discharge to home or other facility with appropriate resources: Identify barriers to discharge with patient and caregiver     Problem: Pain  Goal: Verbalizes/displays adequate comfort level or baseline comfort level  12/8/2022 1600 by Heidi Funk RN  Outcome: Completed  12/8/2022 1557 by Zoran Obrien RN  Outcome: Progressing  12/8/2022 0218 by Iqra Moon RN  Outcome: Progressing     Problem: Safety - Adult  Goal: Free from fall injury  12/8/2022 1600 by Heidi Funk RN  Outcome: Completed  12/8/2022 1557 by Zoran Obrien RN  Outcome: Progressing  12/8/2022 0218 by Iqra Moon RN  Outcome: Progressing     Problem: Neurosensory - Adult  Goal: Achieves stable or improved neurological status  12/8/2022 1600 by Heidi Funk RN  Outcome: Completed  12/8/2022 1557 by Zoran Obrien RN  Outcome: Progressing  12/8/2022 0218 by Iqra Moon RN  Outcome: Progressing     Problem: Respiratory - Adult  Goal: Achieves optimal ventilation and oxygenation  12/8/2022 1600 by Heidi Funk RN  Outcome: Completed  12/8/2022 1557 by Zoran Obrien RN  Outcome: Progressing  12/8/2022 0218 by Iqra Moon RN  Outcome: Progressing     Problem: Musculoskeletal - Adult  Goal: Return mobility to safest level of function  12/8/2022 1600 by Heidi Funk RN  Outcome: Completed  12/8/2022 1557 by Zoran Obrien RN  Outcome: Progressing  12/8/2022 0218 by Iqra Moon RN  Outcome: Progressing  Goal: Return ADL status to a safe level of function  12/8/2022 1600 by Heidi Funk RN  Outcome: Completed  12/8/2022 1557 by Zoran Obrien RN  Outcome: Progressing  12/8/2022 0218 by Rose Aceves RN  Outcome: Progressing     Problem: Infection - Adult  Goal: Absence of infection at discharge  12/8/2022 1600 by Lou Schaumann, RN  Outcome: Completed  12/8/2022 1557 by Purvi Pozo RN  Outcome: Progressing  12/8/2022 0218 by Rose Aceves RN  Outcome: Progressing

## 2022-12-08 NOTE — ED PROVIDER NOTES
CHIEF COMPLAINT  Fall (Patient to ED via squad from home for fall in bathroom. Unwitnessed. Hx dementia.  per squad)      HISTORY OF PRESENT ILLNESS  Moraima Austin is a [de-identified] y.o. male who  has a past medical history of Active rheumatic fever, Diabetes mellitus type 2, uncontrolled, Essential hypertension, and Noncompliance with medications. presents to the ED with EMS for evaluation of fall as well as altered mental status. Patient son at bedside states that the patient had a fall approximately 45 minutes to arrival.  States that the patient was in the bathroom when he had his fall. States he did hit his head but denies any loss of consciousness. Denies any neck pain or back pain. No vision changes. Does have history of dementia. Patient's son states that the patient was more confused than normal and was having difficulty starting the car and tried to start the car with his credit card. Patient denies any headache. No vision changes. No new numbness or weakness. Has history of left-sided numbness from previous CVA. Denies any chest pain or shortness of breath. No abdominal pain. No vomiting. States he is having urinary frequency but denies any dysuria. Normal bowel movements. Patient son states the pain also started with a cough over the past day and a half. No sputum production. No increased work of breathing. No documented fevers at home. No other complaints, modifying factors or associated symptoms. Pt was seen during the [place] 19 pandemic. Appropriate PPE worn by ME during patient encounters. Patient was cared for during a time with constrained hospital bed capacity with nationwide stress on resources and staffing. Nursing notes reviewed.    Past Medical History:   Diagnosis Date    Active rheumatic fever     Diabetes mellitus type 2, uncontrolled     Essential hypertension     Noncompliance with medications      Past Surgical History:   Procedure Laterality Date APPENDECTOMY       Family History   Problem Relation Age of Onset    Dementia Mother     Dementia Father      Social History     Socioeconomic History    Marital status: Single     Spouse name: Not on file    Number of children: Not on file    Years of education: Not on file    Highest education level: Not on file   Occupational History    Not on file   Tobacco Use    Smoking status: Never    Smokeless tobacco: Never   Substance and Sexual Activity    Alcohol use: Never    Drug use: Never    Sexual activity: Not on file   Other Topics Concern    Not on file   Social History Narrative    Not on file     Social Determinants of Health     Financial Resource Strain: Low Risk     Difficulty of Paying Living Expenses: Not hard at all   Food Insecurity: No Food Insecurity    Worried About Running Out of Food in the Last Year: Never true    Brett of Food in the Last Year: Never true   Transportation Needs: Not on file   Physical Activity: Sufficiently Active    Days of Exercise per Week: 7 days    Minutes of Exercise per Session: 90 min   Stress: Not on file   Social Connections: Not on file   Intimate Partner Violence: Not on file   Housing Stability: Not on file     No current facility-administered medications for this encounter.      Current Outpatient Medications   Medication Sig Dispense Refill    clopidogrel (PLAVIX) 75 MG tablet TAKE 1 TABLET BY MOUTH ONE TIME A DAY 30 tablet 3    lisinopril (PRINIVIL;ZESTRIL) 40 MG tablet TAKE 1 TABLET BY MOUTH ONE TIME A DAY 90 tablet 0    amLODIPine (NORVASC) 10 MG tablet TAKE 1 TABLET BY MOUTH ONE TIME A DAY 90 tablet 0    atorvastatin (LIPITOR) 80 MG tablet TAKE ONE TABLET BY MOUTH EVERY EVENING 30 tablet 3    memantine (NAMENDA) 5 MG tablet Take 1 tablet by mouth daily 90 tablet 1    vitamin B-12 (CYANOCOBALAMIN) 500 MCG tablet TAKE 1 TABLET BY MOUTH 2 TIMES A DAY 60 tablet 0    gabapentin (NEURONTIN) 300 MG capsule TAKE 1 CAPSULE BY MOUTH 4 TIMES A  capsule 2 metFORMIN (GLUCOPHAGE) 500 MG tablet TAKE 1 TABLET BY MOUTH ONE TIME A DAY WITH BREAKFAST 90 tablet 2    aspirin (ASPIRIN ADULT LOW STRENGTH) 81 MG EC tablet TAKE 1 TABLET BY MOUTH ONE TIME A DAY 30 tablet 3     No Known Allergies      REVIEW OF SYSTEMS  (up to 7 for level 4, 8 or more for level 5) pertinent positives per HPI otherwise noted to be negative    PHYSICAL EXAM  BP (!) 150/72   Pulse 87   Temp 99.8 °F (37.7 °C) (Oral)   Resp 16   SpO2 98%      CONSTITUTIONAL: AOx4, cooperative with exam, afebrile   HEAD: normocephalic, atraumatic   EYES: PERRL, EOMI, anicteric sclera   ENT: Moist mucous membranes, uvula midline, no hemotympanum, no hollingsworth sign, no raccoon eyes   NECK: Supple, symmetric, trachea midline, no midline tenderness of the cervical spine   BACK: Symmetric, no deformity, no midline tenderness of the thoracic and lumbar spine   LUNGS: Bilateral breath sounds, CTAB, no rales/ronchi/wheezes   CARDIOVASCULAR: RRR, normal S1/S2, no m/r/g, 2+ pulses throughout   ABDOMEN: Soft, non-tender, non-distended, +BS   NEUROLOGIC:  MAEx4, 5/5 strength throughout; fine touch sensation intact throughout; normal gait; GCS 15, cranial nerves II through XII intact, no dysarthria, no aphasia, no facial droop   MUSCULOSKELETAL: No clubbing, cyanosis or edema   SKIN: No rash, pallor or wounds on exposed surfaces         RADIOLOGY  X-RAYS:  I have reviewed radiologic plain film image(s). ALL OTHER NON-PLAIN FILM IMAGES SUCH AS CT, ULTRASOUND AND MRI HAVE BEEN READ BY THE RADIOLOGIST. XR CHEST PORTABLE   Final Result   No acute cardiopulmonary process.          CT HEAD WO CONTRAST    (Results Pending)          EKG INTERPRETATION  Normal sinus rhythm with a rate of 82, normal axis, , , QTc 443, no ST elevations, nonspecific ST changes, right bundle branch block present, T wave inversion V3, no significant change compared EKG from 4/19/2021    PROCEDURES      ED COURSE/MDM  Ingestion, infectious, trauma, seizure, AMS, electrolytes, encephalopathy, insulin, opiates, uremia, toxins, tumor, thyrotoxicosis, psychiatric, sepsis, stroke, N/V, seizure, headache, elderly age, alcohol / drugs / toxidromes, signs of trauma  Patient seen and evaluated. History and physical as above. Nontoxic, afebrile. Patient presents with his son for evaluation of confusion as well as a recent fall approximate 45 minutes prior to arrival.  Patient has no signs of trauma on exam.  States he did hit his head but denies any loss of consciousness. Patient is alert to person place and time but patient son at bedside states that the patient was confused and trying to start his car with a credit card. Patient does have history of dementia but son states he is more confused than normal.  Patient is alert to person place and time here in the emergency room. No focal deficits on exam.  Has had a recent cough. Will perform altered mental status work-up including CT head, chest x-ray, routine labs, EKG, troponin, COVID and flu swabs and urinalysis. Patient and son agreeable. ED Course as of 12/07/22 2203   Wed Dec 07, 2022   2124 Patient WBC 4.4, hemoglobin 12.5. Urinalysis negative for infection. Creatinine at baseline 1.3 with BUN of 18. Sodium 134, potassium 4.3. COVID-negative. Patient did come back positive for influenza A which does coincide with his cough, confusion earlier today as well as general fatigue. Patient and son updated on results. [DS]   2137 On reassessment, patient is still very confused. Patient states that he has to slide his credit card into the ureter in his car in order to get the dashboard to light up. This is much more confused than patient's baseline per son. Did discuss admission for altered mental status and influenza. Patient and son agreeable. [DS]   2138 Consult placed to hospitalist for admission.  [DS]      ED Course User Index  [DS] Citlalli Javier MD       Is this patient to be included in the SEP-1 Core Measure due to severe sepsis or septic shock? No   Exclusion criteria - the patient is NOT to be included for SEP-1 Core Measure due to:  2+ SIRS criteria are not met    CRITICAL CARE TIME   Total Critical Care time was 15 minutes, excluding separately reportable procedures. There was a high probability of clinically significant/life threatening deterioration in the patient's condition which required my urgent intervention. Alerted mental status       Patient was given scripts for the following medications. I counseled patient how to take these medications. New Prescriptions    No medications on file           CLINICAL IMPRESSION  1. Altered mental status, unspecified altered mental status type    2. Influenza A    3. Closed head injury, initial encounter    4. Fall at home, initial encounter        Blood pressure (!) 150/72, pulse 87, temperature 99.8 °F (37.7 °C), temperature source Oral, resp. rate 16, SpO2 98 %. DISPOSITION  DISPOSITION Decision To Admit 12/07/2022 10:02:27 PM      No follow-up provider specified. Disclaimer: All medical record entries made by [address] [place] [address] [facility].       (Please note that this note was completed with a voice recognition program. Every attempt was made to edit the dictations, but inevitably there remain words that are mis-transcribed.)            Patti Herrera MD  12/07/22 0529

## 2022-12-08 NOTE — CARE COORDINATION
DISCHARGE PLAN: Pt plans to return home today. SIRI arranged for pt to return home. Referral to [facility].  ____________________________________  INITIAL ASSESSMENT  Met w/pt to address barriers to dc. HOME: Pt reported he resides with his son in a one level condo. There are 0 DEEPTI. Disease Specific: Influenza A    COVID Vaccination: Yes    DME/O2: Crutches. Pt denied the need for any DME at this time. ACTIVE SERVICES: Pt reported he was independent with all self care PTA. TRANSPORTATION: Pt is an active . Pt is reporting that he may need a ride home. PHARMACY: Denies difficulty obtaining/taking meds. Pt has all scripts filled at [facility]. PCP: Phu Iqbal DO     DEMOGRAPHICS: Verified address/phone number as correct    INSURANCE:  [employer]  PRESCRIPTION COVERAGE: [employer]    HD/PD: No    THERAPY RECS    PHYSICAL THERAPY  \"Discharge Recommendations:  24 hour supervision or assist, Home with Home health PT   PT Equipment Recommendations  Equipment Needed: No\"    OCCUPATIONAL THERAPY  \"Discharge Recommendations:  24 hour supervision or assist, Home with Home health OT     Jocelyn Pascal scored a 19/24 on the AM-PAC ADL Inpatient form. Current research shows that an AM-PAC score of 18 or greater is typically associated with a discharge to the patient's home setting. Based on the patient's AM-PAC score, and their current ADL deficits, it is recommended that the patient have 2-3 sessions per week of Occupational Therapy at d/c to increase the patient's independence. At this time, this patient demonstrates the endurance and safety to discharge home with initial 24 hour assist and a home OT follow up treatment frequency of 2-3x/wk. Please see assessment section for further patient specific details. \"    The Plan for Transition of Care is related to the following treatment goals: Strengthening    The Patient  was provided with a choice of provider and agrees   with the discharge plan. [x] Yes [] No    Freedom of choice list was provided with basic dialogue that supports the patient's individualized plan of care/goals, treatment preferences and shares the quality data associated with the providers. [x] Yes [] No    Pt has no agency preference. Referral to [facility]. Discharge planning team will remain available for needs. Please consult for any specifics not addressed in this note.     EMILIANO Mccoy [facility]  [phone]  Electronically signed by Tyler Ervin on 12/8/2022 at 1:01 PM

## 2022-12-09 ENCOUNTER — CARE COORDINATION (OUTPATIENT)
Dept: CASE MANAGEMENT | Age: 82
End: 2022-12-09

## 2022-12-09 NOTE — CARE COORDINATION
[facility] Care Transitions Initial Follow Up Call    Call within 2 business days of discharge: Yes      Patient: Rahat Carrington Patient : 1940   MRN: [id number]  Reason for Admission: Influenza A  Discharge Date: 22 RARS: Readmission Risk Score: 11.1      Last Discharge  Street       Date Complaint Diagnosis Description Type Department Provider    22 Fall Altered mental status, unspecified altered mental status type . .. ED to Hosp-Admission (Discharged) (ADMITTED) Crow Jaramillo MD; Chris Cornelius. ..                1st attempt to reach for Care Transition discharge call unsuccessful. HIPAA compliant message left requesting call back. Will route note to PCP for hospital f/u. Message was left with [employer] to confirm orders for start of care. Return call to CTN if no orders.        Follow Up  Future Appointments   Date Time Provider Veda Grace   3/3/2023  9:00 AM Miles Graham DO [address] RN

## 2022-12-10 NOTE — PROGRESS NOTES
Physician Progress Note      PATIENT:               Lulu Martinez  CSN #:                  [id number]  :                       1940  ADMIT DATE:       2022 7:58 PM  [address] [place] DATE:        2022 5:32 PM  RESPONDING  PROVIDER #:        Anderson Tai MD          QUERY TEXT:    Pt admitted with Influenza A . Pt noted to have AMS. If possible, please   document in the progress notes and discharge summary if you are evaluating and   / or treating any of the following: The medical record reflects the following:  Risk Factors: History CVA Dementia and Influenza A  Clinical Indicators: per ED \"Does have history of dementia. Patient's son   states that the patient was more confused than normal and was having   difficulty starting the car and tried to start the car with his credit card     Has history of left-sided numbness from previous CVA. \" per H&P On my exam the   patient is awake alert and oriented to person. He climbed himself over the   side rails and was trying to walk around the room. He is disoriented to place   and time. Asked me several times why was he at the hospital.  Had no   recollection of the fall in the bathroom at home. Altered mental status: Underlying dementia and prior CVA  More altered than   baseline    Most likely secondary to influenza\"  Treatment: Telecamera, Namenda, and bed alarm  Options provided:  -- Dementia with behavioral disturbance  -- Metabolic encephalopathy  -- Other - I will add my own diagnosis  -- Disagree - Not applicable / Not valid  -- Disagree - Clinically unable to determine / Unknown  -- Refer to Clinical Documentation Reviewer    PROVIDER RESPONSE TEXT:    This patient has dementia with behavioral disturbances.     Query created by: Yola Hills on 2022 3:03 PM      Electronically signed by:  Anderson Tai MD 12/10/2022 1:53 PM

## 2022-12-12 ENCOUNTER — CARE COORDINATION (OUTPATIENT)
Dept: CASE MANAGEMENT | Age: 82
End: 2022-12-12

## 2022-12-12 NOTE — CARE COORDINATION
office for questions related to their healthcare. Medication reconciliation was performed with patient, who verbalizes understanding of administration of home medications. Medications reviewed, 1111F entered: yes    Was patient discharged with a pulse oximeter? no    Non-face-to-face services provided:  Obtained and reviewed discharge summary and/or continuity of care documents  Assessment and support for treatment adherence and medication management-med rec    Offered patient enrollment in the Remote Patient Monitoring (RPM) program for in-home monitoring: NA.    Care Transitions 24 Hour Call    Do you have a copy of your discharge instructions?: Yes  Do you have all of your prescriptions and are they filled?: No  Have you been contacted by a [address]?: No  Have you scheduled your follow up appointment?: No  Do you have support at home?: Child  Do you feel like you have everything you need to keep you well at home?: Yes  Are you an active caregiver in your home?: No  Care Transitions Interventions         Follow Up  Future Appointments   Date Time Provider Veda Grace   3/3/2023  9:00 AM Mariano Leon DO [id number] [facility] Transition Nurse provided contact information. Plan for follow-up call in 5-7 days based on severity of symptoms and risk factors. Plan for next call: symptom management-.     GREGORIO Fregoso, RN   Care Transition Nurse  Mobile: [phone]

## 2022-12-20 ENCOUNTER — CARE COORDINATION (OUTPATIENT)
Dept: CASE MANAGEMENT | Age: 82
End: 2022-12-20

## 2022-12-20 NOTE — CARE COORDINATION
[facility] Transitions Follow Up Call    2022    Patient: Vero Call  Patient : 1940   MRN: [id number]  Reason for Admission: flu  Discharge Date: 22 RARS: Readmission Risk Score: 11.1         Spoke with: NA    Attempted to reach patient via phone for transition call. VM left stating purpose of call along with my contact information requesting a return call. Candace Day LPN [address] [place] [address]  Care Transitions  [phone]    Care Transitions Subsequent and Final Call    Subsequent and Final Calls  Care Transitions Interventions  Other Interventions:              Follow Up  Future Appointments   Date Time Provider Veda Grace   3/3/2023  9:00 AM Alesha Curiel DO [facility] - [facility] Day LPN

## 2022-12-23 ENCOUNTER — CARE COORDINATION (OUTPATIENT)
Dept: CASE MANAGEMENT | Age: 82
End: 2022-12-23

## 2022-12-23 NOTE — CARE COORDINATION
[facility] Care Transitions Follow Up Call    Care Transition Nurse contacted the patient by telephone. Patient: Castillo Roldan  Patient : 1940   MRN: [id number]  Reason for Admission: Influenza   Discharge Date: 22 RARS: Readmission Risk Score: 11.1          Final attempt made to reach patient for post hospital follow up call. Left a voice message for patient with my contact information and informed of final outreach attempt.          Follow Up  Future Appointments   Date Time Provider Veda Grace   3/3/2023  9:00 AM DO Bereket Elmore  [facility]        Care Transitions Subsequent and Final Call    Subsequent and Final Calls  Care Transitions Interventions  Other Interventions:           Katey Crooks BSN, RN, [facility]  Care Transition Nurse  [phone] mobile

## 2023-01-06 PROBLEM — J10.1 INFLUENZA A: Status: RESOLVED | Noted: 2022-12-07 | Resolved: 2023-01-06

## 2023-01-26 RX ORDER — CHOLECALCIFEROL (VITAMIN D3) 125 MCG
CAPSULE ORAL
Qty: 60 TABLET | Refills: 0 | Status: SHIPPED | OUTPATIENT
Start: 2023-01-26

## 2023-01-26 RX ORDER — GABAPENTIN 300 MG/1
CAPSULE ORAL
Qty: 120 CAPSULE | Refills: 2 | Status: SHIPPED | OUTPATIENT
Start: 2023-01-26 | End: 2023-04-26

## 2023-01-26 RX ORDER — ATORVASTATIN CALCIUM 80 MG/1
TABLET, FILM COATED ORAL
Qty: 30 TABLET | Refills: 3 | Status: SHIPPED | OUTPATIENT
Start: 2023-01-26

## 2023-01-26 NOTE — TELEPHONE ENCOUNTER
Last office visit 11/4/2022     Last written      Next office visit scheduled 3/3/2023    Requested Prescriptions     Pending Prescriptions Disp Refills    atorvastatin (LIPITOR) 80 MG tablet [Pharmacy Med Name: ATORVASTATIN CALCIUM 80MG TABS] 30 tablet 3     Sig: TAKE ONE TABLET BY MOUTH EVERY EVENING    vitamin B-12 (CYANOCOBALAMIN) 500 MCG tablet [Pharmacy Med Name: VITAMIN B-12 500MCG TABS] 60 tablet 0     Sig: TAKE 1 TABLET BY MOUTH 2 TIMES A DAY    gabapentin (NEURONTIN) 300 MG capsule [Pharmacy Med Name: GABAPENTIN 300MG CAPS] 120 capsule 2     Sig: TAKE 1 CAPSULE BY MOUTH 4 TIMES A DAY

## 2023-03-09 ENCOUNTER — OFFICE VISIT (OUTPATIENT)
Dept: FAMILY MEDICINE CLINIC | Age: 83
End: 2023-03-09
Payer: MEDICARE

## 2023-03-09 VITALS
BODY MASS INDEX: 22.5 KG/M2 | SYSTOLIC BLOOD PRESSURE: 138 MMHG | DIASTOLIC BLOOD PRESSURE: 74 MMHG | HEIGHT: 60 IN | WEIGHT: 114.6 LBS | HEART RATE: 68 BPM

## 2023-03-09 DIAGNOSIS — R19.7 DIARRHEA, UNSPECIFIED TYPE: ICD-10-CM

## 2023-03-09 DIAGNOSIS — I65.02 STENOSIS OF LEFT VERTEBRAL ARTERY: ICD-10-CM

## 2023-03-09 DIAGNOSIS — I63.9 ACUTE CVA (CEREBROVASCULAR ACCIDENT) (HCC): ICD-10-CM

## 2023-03-09 DIAGNOSIS — I10 ESSENTIAL HYPERTENSION: Primary | ICD-10-CM

## 2023-03-09 DIAGNOSIS — E11.65 TYPE 2 DIABETES MELLITUS WITH HYPERGLYCEMIA, WITHOUT LONG-TERM CURRENT USE OF INSULIN (HCC): ICD-10-CM

## 2023-03-09 LAB — HBA1C MFR BLD: 5.9 %

## 2023-03-09 PROCEDURE — 99214 OFFICE O/P EST MOD 30 MIN: CPT | Performed by: FAMILY MEDICINE

## 2023-03-09 PROCEDURE — 3074F SYST BP LT 130 MM HG: CPT | Performed by: FAMILY MEDICINE

## 2023-03-09 PROCEDURE — 3044F HG A1C LEVEL LT 7.0%: CPT | Performed by: FAMILY MEDICINE

## 2023-03-09 PROCEDURE — 83036 HEMOGLOBIN GLYCOSYLATED A1C: CPT | Performed by: FAMILY MEDICINE

## 2023-03-09 PROCEDURE — 1123F ACP DISCUSS/DSCN MKR DOCD: CPT | Performed by: FAMILY MEDICINE

## 2023-03-09 PROCEDURE — 3078F DIAST BP <80 MM HG: CPT | Performed by: FAMILY MEDICINE

## 2023-03-09 SDOH — ECONOMIC STABILITY: HOUSING INSECURITY
IN THE LAST 12 MONTHS, WAS THERE A TIME WHEN YOU DID NOT HAVE A STEADY PLACE TO SLEEP OR SLEPT IN A SHELTER (INCLUDING NOW)?: NO

## 2023-03-09 SDOH — ECONOMIC STABILITY: FOOD INSECURITY: WITHIN THE PAST 12 MONTHS, YOU WORRIED THAT YOUR FOOD WOULD RUN OUT BEFORE YOU GOT MONEY TO BUY MORE.: NEVER TRUE

## 2023-03-09 SDOH — ECONOMIC STABILITY: INCOME INSECURITY: HOW HARD IS IT FOR YOU TO PAY FOR THE VERY BASICS LIKE FOOD, HOUSING, MEDICAL CARE, AND HEATING?: NOT HARD AT ALL

## 2023-03-09 SDOH — ECONOMIC STABILITY: FOOD INSECURITY: WITHIN THE PAST 12 MONTHS, THE FOOD YOU BOUGHT JUST DIDN'T LAST AND YOU DIDN'T HAVE MONEY TO GET MORE.: NEVER TRUE

## 2023-03-09 ASSESSMENT — ENCOUNTER SYMPTOMS
COUGH: 0
DIARRHEA: 0
SHORTNESS OF BREATH: 0
NAUSEA: 0
VOMITING: 0
ABDOMINAL PAIN: 0

## 2023-03-09 ASSESSMENT — PATIENT HEALTH QUESTIONNAIRE - PHQ9
SUM OF ALL RESPONSES TO PHQ9 QUESTIONS 1 & 2: 0
SUM OF ALL RESPONSES TO PHQ QUESTIONS 1-9: 0
1. LITTLE INTEREST OR PLEASURE IN DOING THINGS: 0
2. FEELING DOWN, DEPRESSED OR HOPELESS: 0
SUM OF ALL RESPONSES TO PHQ QUESTIONS 1-9: 0

## 2023-03-09 NOTE — PROGRESS NOTES
Cardiovascular:      Rate and Rhythm: Normal rate and regular rhythm. Heart sounds: No murmur heard. Pulmonary:      Effort: Pulmonary effort is normal.      Breath sounds: Normal breath sounds. No wheezing or rales. Abdominal:      General: Bowel sounds are normal.      Palpations: Abdomen is soft. Tenderness: There is no abdominal tenderness. Musculoskeletal:         General: Normal range of motion. Cervical back: Neck supple. Lymphadenopathy:      Cervical: No cervical adenopathy. Skin:     Findings: No rash. Neurological:      Mental Status: He is alert and oriented to person, place, and time. Psychiatric:         Behavior: Behavior normal.         Judgment: Judgment normal.       ASSESSMENT/PLAN:  1. Essential hypertension   controlled. Discussed signs and symptoms for immediate evaluation in the ER. Reduce sodium. Monitor diet, exercise and lose weight. Keep a BP log and bring it to your next appointment. Needs to rtc in one month for BP check     2. Type 2 diabetes mellitus with hyperglycemia, without long-term current use of insulin ([address]  Patient will need to keep a log of his glucose readings and bring them to the office. He needs to monitor his diet and exercise. Attempt to lose weight. Discussed proper eating habits. Continue to take medication as prescribed. Will obtain A1C at this visit. Educated on signs and symptoms for immediate evaluation in the ER.    - POCT glycosylated hemoglobin (Hb A1C)    3. Acute CVA (cerebrovascular accident) ([address]  Stable  Continue with medication  Keep appointments with specialist.   Delmus Mandril questions     4. Stenosis of left vertebral artery  Stable  Continue with medication  Keep appointments with specialist.   Delmus Mandril questions     5. Diarrhea, unspecified type  Will stop medication to see if this helps      No follow-ups on file.

## 2023-03-13 ENCOUNTER — TELEPHONE (OUTPATIENT)
Dept: FAMILY MEDICINE CLINIC | Age: 83
End: 2023-03-13

## 2023-03-13 NOTE — LETTER
[place]  [address], [address]        [place]lali Soto   [address] [facility] [place] [address]           03/15/23     Dear Citlali Soto,    We have on file that you are currently taking lisinopril, metformin, and atorvastatin. These medications are filled and ready for you at:  [address] [facility]   Phone:  [phone]    Please pick these medications up as soon as possible, if you have not already done so. We are worried you might be missing doses or not taking it as directed. It is important that you take your medications regularly and try not to miss a single dose.     Some ways to help you remember to take and refill your medications are to:  · Use a pill box, set an alarm, and/or keep your medication near something that you do every day  · Fill a 3-month supply of your prescription at a time to save you time and trips to the pharmacy - if you would like assistance in switching your prescriptions to a 3-month supply, please contact us  · Ask your pharmacy if you can be set up with automatic refill, where they will automatically refill your prescription when it is due and let you know it's ready to     Sincerely,   Irene Huggins PharmD, [facility]  Department, toll free: [phone], option 1

## 2023-03-13 NOTE — TELEPHONE ENCOUNTER
Lopez Aparicio DO,     Patient out of refills of lisinopril. Rx pended for your signature/modification as appropriate. Last Visit: 3/9/23  Next Visit: 6/9/23    Thank you,  Hyun Roper, PharmD, [address] [place] [address], toll free: [phone], option 1    =======================================================    POPULATION [facility]ALTH [facility]INICAL PHARMACY: ADHERENCE REVIEW  Identified care gap per [place]: fills at [address] : Statin adherence      Last Visit: 3/9/23 with Lopez Aparicio DO      Patient also appears to be prescribed: ACE/ARB, Diabetes, and Statin     Current Outpatient Medications   Medication Instructions    amLODIPine (NORVASC) 10 MG tablet TAKE 1 TABLET BY MOUTH ONE TIME A DAY    aspirin (ASPIRIN ADULT LOW STRENGTH) 81 MG EC tablet TAKE 1 TABLET BY MOUTH ONE TIME A DAY    atorvastatin (LIPITOR) 80 MG tablet TAKE ONE TABLET BY MOUTH EVERY EVENING    clopidogrel (PLAVIX) 75 MG tablet TAKE 1 TABLET BY MOUTH ONE TIME A DAY    gabapentin (NEURONTIN) 300 MG capsule TAKE 1 CAPSULE BY MOUTH 4 TIMES A DAY    lisinopril (PRINIVIL;ZESTRIL) 40 MG tablet TAKE 1 TABLET BY MOUTH ONE TIME A DAY    metFORMIN (GLUCOPHAGE) 500 MG tablet TAKE 1 TABLET BY MOUTH ONE TIME A DAY WITH BREAKFAST    vitamin B-12 (CYANOCOBALAMIN) 500 MCG tablet TAKE 1 TABLET BY MOUTH 2 TIMES A DAY       ASSESSMENT    DIABETES ADHERENCE  Insurance Records claims through 3/13/23  [facility] = no data in 2023 yet:     Per Reconciled Dispense Report:  METFORMIN 500MG TAB 10/26/2022 90 90 tablet Lopez Aparicio DO [facility] .. Per [facility] Portal:  Same as above; 1 refill(s) remaining. Per Pharmacy:   Same as above. Billed through [employer].   will get 90 day supply ready to  since past due      Lab Results   Component Value Date    LABA1C 5.9 03/09/2023    LABA1C 6.1 11/04/2022    LABA1C 5.9 07/28/2022     NOTE A1c <9%      ACE/ARB ADHERENCE  IInsurance Records claims through 3/13/23  [facility] Chirstine = no data in 2023 yet:     Per Reconciled Dispense Report:  LISINOPRIL 40MG TAB 11/21/2022 90 90 tablet Lennis Weymouth, DO [address] .. Per Patient Home Monitoring Portal:  Same as above; 0 refill(s) remaining. Per Pharmacy:   Same as above. Billed through [employer]. 0 refills remaining - will need a new prescription for refills. BP Readings from Last 3 Encounters:   03/09/23 138/74   12/08/22 (!) 142/72   11/04/22 138/74     Estimated Creatinine Clearance: 35 mL/min (based on SCr of 1.2 mg/dL). Lab Results   Component Value Date    CREATININE 1.2 12/08/2022    SCr is at baseline for patient  Lab Results   Component Value Date    K 4.7 12/08/2022          STATIN ADHERENCE  Insurance Records claims through 3/13/23  [facility] Christine = FIRST FILL; Potential Fail Date: 5/4/23): Atorvastatin last filled for 30 days supply on 1/26/23. Next refill due: 2/25/23    Per Reconciled Dispense Report:  ATORVASTATIN 80MG TAB 11/21/2022 30 30 tablet Lennis Weymouth, DO [address] .. Per Patient Home Monitoring Portal:  Refilled 1/26 for 30 days; 3 refill(s) remaining. Per Pharmacy:   Same as above. Billed through [employer]. will get 30 day supply ready to  since past due. Lab Results   Component Value Date    CHOL 155 11/04/2022    TRIG 135 11/04/2022    HDL 57 11/04/2022    LDLCALC 71 11/04/2022     ALT   Date Value Ref Range Status   12/07/2022 20 10 - 40 U/L Final     AST   Date Value Ref Range Status   12/07/2022 23 15 - 37 U/L Final     The ASCVD Risk score (Patel JARA, et al., 2019) failed to calculate for the following reasons:     The 2019 ASCVD risk score is only valid for ages 36 to 78    The patient has a prior MI or stroke diagnosis       PLAN  The following are interventions that have been identified:   - Patient overdue refilling metformin, lisinopril, and atorvastatin and active on home medication list.   - Patient needs refills for lisinopril  Cindy 56 will refill metformin and atorvastatin today.   Will pend a prescription for refills of lisinopril for provider review and modification/approval.      Next Visit:  Future Appointments   Date Time Provider Veda Grace   6/9/2023 11:00 AM DO Stan [address] Dr, PharmD, [address] [facility], toll free: [phone], option 1

## 2023-03-15 RX ORDER — LISINOPRIL 40 MG/1
TABLET ORAL
Qty: 90 TABLET | Refills: 0 | Status: SHIPPED | OUTPATIENT
Start: 2023-03-15

## 2023-03-15 RX ORDER — LISINOPRIL 40 MG/1
TABLET ORAL
Qty: 90 TABLET | Refills: 3 | OUTPATIENT
Start: 2023-03-15

## 2023-03-15 NOTE — TELEPHONE ENCOUNTER
Patient returned call, went through current medications. Patient states he is currently completely out of Atorvastatin and Vitamin B12. Patient states he has about 1 week left of Plavix, Amlodipine, Lisinopril, and Gabapentin. Patient states he no longer takes Metformin or Namenda, these were discontinued at his last visit with PCP. Patient confirms all dose and sig on file are correct, and that he is using [address] and Dr. Sima Wang (on file) is correct. Will route to PharmD.

## 2023-03-15 NOTE — TELEPHONE ENCOUNTER
Cindy Benton also sent a refill request for lisinopril at same time as my pended order. Prescription was approved for 90 days and 0 refills. Attempted to contact patient to inform him that Cindy Benton is refilling lisinopril, metformin and atorvastatin today. Left message for return call and will also send a letter.     Amina Cooper, PharmD, [address] [facility], toll free: [phone], option 1      For Pharmacy Admin Tracking Only  Program: [address] in place:  No  Recommendation Provided To: Provider: 1 via Note to Provider and Pharmacy: 2  Intervention Detail: Adherence Monitoring: 3 and Refill(s) Provided  Intervention Accepted By: Provider: 1 and Pharmacy: 2  Gap Closed?: No   Time Spent (min): 30

## 2023-03-16 RX ORDER — CHOLECALCIFEROL (VITAMIN D3) 125 MCG
CAPSULE ORAL
Qty: 60 TABLET | Refills: 0 | Status: SHIPPED | OUTPATIENT
Start: 2023-03-16

## 2023-03-17 NOTE — TELEPHONE ENCOUNTER
Refills were already processed for the following medications:  Atorvastatin - refilled 3/15/23  Cyanocobalamin - refill approved by provider on 3/16/23  Lisinopril - refill approved by provided on 3/15/23  Metformin - refilled on 3/15/23      Patient has called and requested refills of the following Plavix, Amlodipine and Gabapentin - called [address] [employer] to discuss refill status for the following:  CLOPIDOGREL 75MG TAB 02/27/2023 30 30 tablet DO [address] .. Has 1 refill left - too soon to fill per pharmacy - will auto refill on 3/22    AMLODIPINE 10MG TAB 11/21/2022 90 90 tablet DO [address] .. Ready for  today     GABAPENTIN 300MG CAP 01/26/2023 30 120 capsule Ludy Fernandez DO [facility]. .. Has 2 refills left - will get ready for  on Monday      When patient returned call, he stated that he no longer takes metformin. Attempted to call patient to provide an update on refill status and ask him about metformin discontinuation (provider note from 3/9/23 does not include any documentation about stopping metformin). Left message requesting a call back. May need to also route a note to provider to clarify if metformin has been discontinued.     Eva Poole, PharmD, [address] [place] [address], toll free: [phone], option 1

## 2023-03-20 ASSESSMENT — ENCOUNTER SYMPTOMS: BACK PAIN: 1

## 2023-03-21 NOTE — TELEPHONE ENCOUNTER
Spoke with Gill and she states the pt states he was never prescribed Amlodipine and if he was he never knew about it.  With his BP being so high she would like to know if you call it in or prescribe something different 21-Mar-2023 08:58

## 2023-03-31 ENCOUNTER — TELEPHONE (OUTPATIENT)
Dept: PHARMACY | Facility: CLINIC | Age: 83
End: 2023-03-31

## 2023-03-31 NOTE — TELEPHONE ENCOUNTER
Polo Willis, DO - Metformin Update and Question    During a recent adherence outreach, patient stated that he has stopped taking metformin. Patient stated that metformin was discontinued at his last visit with his PCP. I do not see documentation of this change in the 3/9/23 outpatient visit note. Patient's metformin refill on 3/15/23 was not picked up. He last refilled the medication on 10/26/2022 for 90 days. Metformin is currently an active medication on patient's [facility] medication list.  Should medication be removed from the medication list at this time?       LOV: 3/9/23  Next: 6/9/23    Thank you,  Verna Navarrete, PharmD, [address] [facility], toll free: [phone], option 1

## 2023-04-05 NOTE — TELEPHONE ENCOUNTER
Spoke with Glo Sellers and informed him that he is to be taking metformin per Dr. Rody Arshad note on 23. Asked patient if he needed a refill for metformin at this time and offered to call the pharmacy for him. Patient stated that he still has a full bottle of 90 tablets of metformin that he filled on 2022 - he is planning to use this supply now. He stated that he never took any of the medication from this bottle. Patient's previous refill of metformin was in July. METFORMIN 500MG TAB 10/26/2022 90 90 tablet Buster Canal, DO [address] .. METFORMIN 500MG TAB 2022 90 90 tablet Buster Canal, DO [address] .. Reviewed metformin dosing and directions to ensure that patient understood what dose of metformin to take - he confirmed that his bottle of metformin had the same directions. Asked patient if he is testing his blood glucose at home and patient replied no. Patient stated again that he thought his provider told him to stop metformin. I recommended that he contact provider's office if he has any questions. Will route this note to [facility], DO  so that provider is aware that per conversation with patient today, patient has not been taking metformin since 2022.     Summer Wang, PharmD, [address] [facility], toll free: [phone], option 1      For Pharmacy Admin Tracking Only  Program: [address] in place:  No  Intervention Detail: Adherence Monitorin  Gap Closed?: Yes   Time Spent (min): 30

## 2023-05-10 RX ORDER — CLOPIDOGREL BISULFATE 75 MG/1
TABLET ORAL
Qty: 30 TABLET | Refills: 3 | Status: SHIPPED | OUTPATIENT
Start: 2023-05-10

## 2023-05-10 RX ORDER — CHOLECALCIFEROL (VITAMIN D3) 125 MCG
CAPSULE ORAL
Qty: 60 TABLET | Refills: 0 | Status: SHIPPED | OUTPATIENT
Start: 2023-05-10

## 2023-05-12 ENCOUNTER — APPOINTMENT (OUTPATIENT)
Dept: GENERAL RADIOLOGY | Age: 83
End: 2023-05-12
Payer: MEDICARE

## 2023-05-12 ENCOUNTER — APPOINTMENT (OUTPATIENT)
Dept: CT IMAGING | Age: 83
End: 2023-05-12
Payer: MEDICARE

## 2023-05-12 ENCOUNTER — HOSPITAL ENCOUNTER (EMERGENCY)
Age: 83
Discharge: HOME | End: 2023-05-12
Payer: MEDICARE

## 2023-05-12 VITALS
DIASTOLIC BLOOD PRESSURE: 68 MMHG | BODY MASS INDEX: 21.73 KG/M2 | HEART RATE: 80 BPM | TEMPERATURE: 97.1 F | OXYGEN SATURATION: 99 % | WEIGHT: 110.67 LBS | RESPIRATION RATE: 18 BRPM | SYSTOLIC BLOOD PRESSURE: 144 MMHG | HEIGHT: 60 IN

## 2023-05-12 DIAGNOSIS — Z92.29 UP TO DATE WITH DIPHTHERIA-TETANUS VACCINATION: ICD-10-CM

## 2023-05-12 DIAGNOSIS — M79.641 PAIN OF RIGHT HAND: ICD-10-CM
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
## 2023-05-12 DIAGNOSIS — Z79.02 ANTIPLATELET OR ANTITHROMBOTIC LONG-TERM USE: ICD-10-CM

## 2023-05-12 DIAGNOSIS — M79.643 TENDERNESS OF ANATOMICAL SNUFFBOX: Primary | ICD-10-CM

## 2023-05-12 DIAGNOSIS — R93.89 ABNORMAL FINDING ON CT SCAN: ICD-10-CM

## 2023-05-12 DIAGNOSIS — S60.511A ABRASION OF RIGHT HAND, INITIAL ENCOUNTER: ICD-10-CM

## 2023-05-12 DIAGNOSIS — S01.111A EYEBROW LACERATION, RIGHT, INITIAL ENCOUNTER: ICD-10-CM

## 2023-05-12 DIAGNOSIS — S09.90XA INJURY OF HEAD, INITIAL ENCOUNTER: ICD-10-CM

## 2023-05-12 PROCEDURE — 70450 CT HEAD/BRAIN W/O DYE: CPT

## 2023-05-12 PROCEDURE — 6370000000 HC RX 637 (ALT 250 FOR IP): Performed by: NURSE PRACTITIONER

## 2023-05-12 PROCEDURE — 29125 APPL SHORT ARM SPLINT STATIC: CPT

## 2023-05-12 PROCEDURE — 2500000003 HC RX 250 WO HCPCS: Performed by: NURSE PRACTITIONER

## 2023-05-12 PROCEDURE — 73130 X-RAY EXAM OF HAND: CPT

## 2023-05-12 PROCEDURE — 73110 X-RAY EXAM OF WRIST: CPT

## 2023-05-12 PROCEDURE — 12051 INTMD RPR FACE/MM 2.5 CM/<: CPT

## 2023-05-12 PROCEDURE — 99284 EMERGENCY DEPT VISIT MOD MDM: CPT

## 2023-05-12 PROCEDURE — 72125 CT NECK SPINE W/O DYE: CPT

## 2023-05-12 RX ORDER — ACETAMINOPHEN 500 MG
1000 TABLET ORAL ONCE
Status: COMPLETED | OUTPATIENT
Start: 2023-05-12 | End: 2023-05-12

## 2023-05-12 RX ORDER — BUPIVACAINE HYDROCHLORIDE 5 MG/ML
30 INJECTION, SOLUTION EPIDURAL; INTRACAUDAL ONCE
Status: COMPLETED | OUTPATIENT
Start: 2023-05-12 | End: 2023-05-12

## 2023-05-12 RX ORDER — BACITRACIN, NEOMYCIN, POLYMYXIN B 400; 3.5; 5 [USP'U]/G; MG/G; [USP'U]/G
OINTMENT TOPICAL ONCE
Status: COMPLETED | OUTPATIENT
Start: 2023-05-12 | End: 2023-05-12

## 2023-05-12 RX ORDER — ACETAMINOPHEN 500 MG
500 TABLET ORAL 4 TIMES DAILY PRN
Qty: 28 TABLET | Refills: 0 | Status: SHIPPED | OUTPATIENT
Start: 2023-05-12 | End: 2023-05-19

## 2023-05-12 RX ORDER — IBUPROFEN 600 MG/1
600 TABLET ORAL 3 TIMES DAILY PRN
Qty: 15 TABLET | Refills: 0 | Status: SHIPPED | OUTPATIENT
Start: 2023-05-12 | End: 2023-05-12

## 2023-05-12 RX ORDER — IBUPROFEN 600 MG/1
600 TABLET ORAL ONCE
Status: COMPLETED | OUTPATIENT
Start: 2023-05-12 | End: 2023-05-12

## 2023-05-12 RX ORDER — LIDOCAINE HYDROCHLORIDE AND EPINEPHRINE 10; 10 MG/ML; UG/ML
20 INJECTION, SOLUTION INFILTRATION; PERINEURAL ONCE
Status: COMPLETED | OUTPATIENT
Start: 2023-05-12 | End: 2023-05-12

## 2023-05-12 RX ADMIN — ACETAMINOPHEN 1000 MG: 500 TABLET ORAL at 21:45

## 2023-05-12 RX ADMIN — BACITRACIN, NEOMYCIN, POLYMYXIN B: 400; 3.5; 5 OINTMENT TOPICAL at 21:43

## 2023-05-12 RX ADMIN — LIDOCAINE HYDROCHLORIDE,EPINEPHRINE BITARTRATE 20 ML: 10; .01 INJECTION, SOLUTION INFILTRATION; PERINEURAL at 21:46

## 2023-05-12 RX ADMIN — BUPIVACAINE HYDROCHLORIDE 150 MG: 5 INJECTION, SOLUTION EPIDURAL; INTRACAUDAL; PERINEURAL at 22:58

## 2023-05-12 RX ADMIN — IBUPROFEN 600 MG: 600 TABLET, FILM COATED ORAL at 21:45

## 2023-05-12 ASSESSMENT — PAIN SCALES - GENERAL
PAINLEVEL_OUTOF10: 5
PAINLEVEL_OUTOF10: 5
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
## 2023-05-13 NOTE — DISCHARGE INSTRUCTIONS
Return to the emergency department worsening symptoms including, not limited to, developing severe headache, neck or back pain, inability ambulate, nausea, vomiting, change in temperature of your fingers, confusion, slurred speech, weakness on one side of your body versus the other, inability to walk, or other symptoms/concerns. Utilize RICE. Sleep with your head up on pillows. Remember to remove your arm from the sling every hour and perform range of motion exercises in order to prevent frozen shoulder. Eat a light diet since she struck your head. Follow-up with your PCP for reevaluation in the next 1-2 days and call the orthopedic specialist office to schedule an appointment for evaluation of your right anatomic snuffbox tenderness.
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
## 2023-05-15 ENCOUNTER — CARE COORDINATION (OUTPATIENT)
Dept: CARE COORDINATION | Age: 83
End: 2023-05-15

## 2023-05-15 ASSESSMENT — ENCOUNTER SYMPTOMS
COLOR CHANGE: 1
SORE THROAT: 0
ABDOMINAL PAIN: 0
EYE PAIN: 0
ANAL BLEEDING: 0
VOMITING: 0
COUGH: 0
BACK PAIN: 0
SHORTNESS OF BREATH: 0
NAUSEA: 0

## 2023-05-16 ENCOUNTER — CARE COORDINATION (OUTPATIENT)
Dept: CARE COORDINATION | Age: 83
End: 2023-05-16

## 2023-05-16 ENCOUNTER — OFFICE VISIT (OUTPATIENT)
Dept: FAMILY MEDICINE CLINIC | Age: 83
End: 2023-05-16
Payer: MEDICARE

## 2023-05-16 VITALS
SYSTOLIC BLOOD PRESSURE: 120 MMHG | BODY MASS INDEX: 21.6 KG/M2 | HEIGHT: 60 IN | WEIGHT: 110 LBS | DIASTOLIC BLOOD PRESSURE: 70 MMHG

## 2023-05-16 DIAGNOSIS — S09.90XD INJURY OF HEAD, SUBSEQUENT ENCOUNTER: Primary | ICD-10-CM

## 2023-05-16 DIAGNOSIS — S05.31XD: ICD-10-CM

## 2023-05-16 DIAGNOSIS — S69.91XD INJURY OF RIGHT WRIST, SUBSEQUENT ENCOUNTER: ICD-10-CM

## 2023-05-16 PROCEDURE — 99214 OFFICE O/P EST MOD 30 MIN: CPT | Performed by: FAMILY MEDICINE

## 2023-05-16 PROCEDURE — 3078F DIAST BP <80 MM HG: CPT | Performed by: FAMILY MEDICINE

## 2023-05-16 PROCEDURE — 1123F ACP DISCUSS/DSCN MKR DOCD: CPT | Performed by: FAMILY MEDICINE

## 2023-05-16 PROCEDURE — 3074F SYST BP LT 130 MM HG: CPT | Performed by: FAMILY MEDICINE

## 2023-05-16 NOTE — CARE COORDINATION
CC ed fu outreach 2. Attempted to reach patient by phone. No answer. Left brief message with name, contact number and asked for return call back. Waiting for patient response at this time. Will update notes when callback is received. Will follow up at another date and time.
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
## 2023-05-17 ASSESSMENT — ENCOUNTER SYMPTOMS
DIARRHEA: 0
SHORTNESS OF BREATH: 0
ABDOMINAL PAIN: 0
VOMITING: 0
COUGH: 0
BACK PAIN: 1
NAUSEA: 0

## 2023-05-18 ENCOUNTER — HOSPITAL ENCOUNTER (EMERGENCY)
Age: 83
Discharge: HOME | End: 2023-05-18
Payer: MEDICARE

## 2023-05-18 VITALS
TEMPERATURE: 97.1 F | RESPIRATION RATE: 18 BRPM | BODY MASS INDEX: 21.94 KG/M2 | HEIGHT: 60 IN | SYSTOLIC BLOOD PRESSURE: 142 MMHG | HEART RATE: 90 BPM | OXYGEN SATURATION: 100 % | DIASTOLIC BLOOD PRESSURE: 69 MMHG | WEIGHT: 111.77 LBS

## 2023-05-18 DIAGNOSIS — Z48.02 VISIT FOR SUTURE REMOVAL: Primary | ICD-10-CM

## 2023-05-18 PROCEDURE — 99282 EMERGENCY DEPT VISIT SF MDM: CPT

## 2023-05-18 ASSESSMENT — PAIN - FUNCTIONAL ASSESSMENT
PAIN_FUNCTIONAL_ASSESSMENT: NONE - DENIES PAIN
PAIN_FUNCTIONAL_ASSESSMENT: NONE - DENIES PAIN

## 2023-05-18 NOTE — ED PROVIDER NOTES
**ADVANCED PRACTICE PROVIDER, I HAVE EVALUATED THIS PATIENT**        [address]      Pt Name: Mona Hines  TNY:[id number]  [place] 1940  Date of evaluation: 5/18/2023  Provider: Lilo Oreilly PA-C  Note Started: 12:42 PM EDT 5/18/2023        Chief Complaint:    Chief Complaint   Patient presents with    Suture / Staple Removal     Pt states that he is here to have stitches from forehead removed. Nursing Notes, Past Medical Hx, Past Surgical Hx, Social Hx, Allergies, and Family Hx were all reviewed and agreed with or any disagreements were addressed in the HPI.    HPI: (Location, Duration, Timing, Severity, Quality, Assoc Sx, Context, Modifying factors)    History From: patient          Chief Complaint of stitches in the right eyebrow to forehead area    This is a  [de-identified] y.o. male who presents indicating that it was maybe a week or so ago that he came into the ER for care. He states that on following up with his family doctor it was indicated to him that he needed sutures removed from the right brow area. Therefore patient came back to the ER for further care and treatment. He states that he is not having any particular pain or worsening otherwise. He was just told he needed the stitches to be removed.     PastMedical/Surgical History:      Diagnosis Date    Active rheumatic fever     Diabetes mellitus type 2, uncontrolled     Essential hypertension     Noncompliance with medications          Procedure Laterality Date    APPENDECTOMY         Medications:  Previous Medications    ACETAMINOPHEN (TYLENOL) 500 MG TABLET    Take 1 tablet by mouth 4 times daily as needed for Pain    AMLODIPINE (NORVASC) 10 MG TABLET    TAKE 1 TABLET BY MOUTH ONE TIME A DAY    ASPIRIN (ASPIRIN ADULT LOW STRENGTH) 81 MG EC TABLET    TAKE 1 TABLET BY MOUTH ONE TIME A DAY    ATORVASTATIN (LIPITOR) 80 MG TABLET    TAKE ONE TABLET BY MOUTH EVERY EVENING

## 2023-05-18 NOTE — DISCHARGE INSTRUCTIONS
The sutures are removed from your right eyebrow. Continue conservative home care and follow-up with your family doctor for further care when needed. Return to the ER for any emergency worsening or concern.

## 2023-05-18 NOTE — ED NOTES
D/C: Order noted for d/c. Pt confirmed d/c paperwork has correct name. Discharge and education instructions reviewed with patient. Teach-back successful. Pt verbalized understanding and signed d/c papers. Pt denied questions at this time. No acute distress noted. Patient instructed to follow-up as noted - return to emergency department if symptoms worsen. Patient verbalized understanding. Discharged per EDMD with discharge instructions. Pt discharged to private vehicle. Patient stable upon departure. Thanked patient for choosing [facility]) for care. Provider aware of patient pain at time of discharge.        Ora Olivera RN  05/18/23 [id number]

## 2023-05-19 ENCOUNTER — CARE COORDINATION (OUTPATIENT)
Dept: CARE COORDINATION | Age: 83
End: 2023-05-19

## 2023-05-19 NOTE — CARE COORDINATION
UTR patient for 3 outreach attempts from ed follow up. No additional outreaches will be made at this time.

## 2023-05-22 ENCOUNTER — OFFICE VISIT (OUTPATIENT)
Dept: ORTHOPEDIC SURGERY | Age: 83
End: 2023-05-22
Payer: MEDICARE

## 2023-05-22 VITALS — HEIGHT: 60 IN | BODY MASS INDEX: 21.79 KG/M2 | WEIGHT: 111 LBS

## 2023-05-22 DIAGNOSIS — S60.211A CONTUSION OF RIGHT WRIST, INITIAL ENCOUNTER: Primary | ICD-10-CM

## 2023-05-22 PROCEDURE — 99203 OFFICE O/P NEW LOW 30 MIN: CPT | Performed by: ORTHOPAEDIC SURGERY

## 2023-05-22 PROCEDURE — 1123F ACP DISCUSS/DSCN MKR DOCD: CPT | Performed by: ORTHOPAEDIC SURGERY
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
## 2023-06-09 ENCOUNTER — OFFICE VISIT (OUTPATIENT)
Dept: FAMILY MEDICINE CLINIC | Age: 83
End: 2023-06-09

## 2023-06-09 VITALS
WEIGHT: 112 LBS | HEIGHT: 60 IN | SYSTOLIC BLOOD PRESSURE: 118 MMHG | BODY MASS INDEX: 21.99 KG/M2 | DIASTOLIC BLOOD PRESSURE: 80 MMHG

## 2023-06-09 DIAGNOSIS — E11.9 CONTROLLED TYPE 2 DIABETES MELLITUS WITHOUT COMPLICATION, WITHOUT LONG-TERM CURRENT USE OF INSULIN (HCC): ICD-10-CM

## 2023-06-09 DIAGNOSIS — I63.9 ACUTE CVA (CEREBROVASCULAR ACCIDENT) (HCC): ICD-10-CM

## 2023-06-09 DIAGNOSIS — I10 ESSENTIAL HYPERTENSION: Primary | ICD-10-CM

## 2023-06-09 LAB — HBA1C MFR BLD: 6 %

## 2023-06-09 ASSESSMENT — ENCOUNTER SYMPTOMS
NAUSEA: 0
RHINORRHEA: 0
SHORTNESS OF BREATH: 0
COUGH: 0
ABDOMINAL PAIN: 0
SORE THROAT: 0
TROUBLE SWALLOWING: 0
VOMITING: 0
DIARRHEA: 0
SINUS PRESSURE: 0

## 2023-06-09 NOTE — PROGRESS NOTES
Right Ear: External ear normal.      Left Ear: External ear normal.   Eyes:      Pupils: Pupils are equal, round, and reactive to light. Neck:      Thyroid: No thyromegaly. Cardiovascular:      Rate and Rhythm: Normal rate and regular rhythm. Heart sounds: No murmur heard. Pulmonary:      Effort: Pulmonary effort is normal.      Breath sounds: Normal breath sounds. No wheezing or rales. Abdominal:      General: Bowel sounds are normal.      Palpations: Abdomen is soft. Tenderness: There is no abdominal tenderness. Musculoskeletal:      Cervical back: Neck supple. Lymphadenopathy:      Cervical: No cervical adenopathy. Skin:     Findings: No rash. Neurological:      Mental Status: He is alert and oriented to person, place, and time. Psychiatric:         Behavior: Behavior normal.       ASSESSMENT/PLAN:  1. Essential hypertension  controlled. Discussed signs and symptoms for immediate evaluation in the ER. Reduce sodium. Monitor diet, exercise and lose weight. Keep a BP log and bring it to your next appointment. 2. Acute CVA (cerebrovascular accident) ([address]  Stable  Continue with medication  Keep appointments with specialist.   Isabela trinh     3. Controlled type 2 diabetes mellitus without complication, without long-term current use of insulin ([address]  Patient will need to keep a log of his glucose readings and bring them to the office. He needs to monitor his diet and exercise. Attempt to lose weight. Discussed proper eating habits. Continue to take medication as prescribed. Will obtain A1C at this visit. Educated on signs and symptoms for immediate evaluation in the ER.    - POCT glycosylated hemoglobin (Hb A1C)      Return in about 3 months (around 9/9/2023).

## 2023-06-11 PROBLEM — R55 HYPOTENSIVE SYNCOPE: Status: RESOLVED | Noted: 2020-02-16 | Resolved: 2023-06-11

## 2023-06-11 PROBLEM — R53.1 GENERALIZED WEAKNESS: Status: RESOLVED | Noted: 2022-12-07 | Resolved: 2023-06-11

## 2023-06-11 PROBLEM — Z86.73 HISTORY OF CVA (CEREBROVASCULAR ACCIDENT): Status: RESOLVED | Noted: 2021-04-19 | Resolved: 2023-06-11

## 2023-06-11 PROBLEM — E11.65 DIABETES MELLITUS WITH HYPERGLYCEMIA (HCC): Status: RESOLVED | Noted: 2020-02-12 | Resolved: 2023-06-11

## 2023-06-11 PROBLEM — R42 DIZZINESS: Status: RESOLVED | Noted: 2021-04-19 | Resolved: 2023-06-11

## 2023-08-07 ENCOUNTER — OFFICE VISIT (OUTPATIENT)
Dept: FAMILY MEDICINE CLINIC | Age: 83
End: 2023-08-07
Payer: MEDICARE

## 2023-08-07 DIAGNOSIS — Z00.00 MEDICARE ANNUAL WELLNESS VISIT, SUBSEQUENT: Primary | ICD-10-CM

## 2023-08-07 PROCEDURE — 1123F ACP DISCUSS/DSCN MKR DOCD: CPT | Performed by: FAMILY MEDICINE

## 2023-08-07 PROCEDURE — G0439 PPPS, SUBSEQ VISIT: HCPCS | Performed by: FAMILY MEDICINE

## 2023-08-07 ASSESSMENT — PATIENT HEALTH QUESTIONNAIRE - PHQ9
SUM OF ALL RESPONSES TO PHQ QUESTIONS 1-9: 0
1. LITTLE INTEREST OR PLEASURE IN DOING THINGS: 0
SUM OF ALL RESPONSES TO PHQ9 QUESTIONS 1 & 2: 0
2. FEELING DOWN, DEPRESSED OR HOPELESS: 0
SUM OF ALL RESPONSES TO PHQ QUESTIONS 1-9: 0

## 2023-08-07 ASSESSMENT — LIFESTYLE VARIABLES
HOW OFTEN DO YOU HAVE A DRINK CONTAINING ALCOHOL: NEVER
HOW MANY STANDARD DRINKS CONTAINING ALCOHOL DO YOU HAVE ON A TYPICAL DAY: PATIENT DOES NOT DRINK

## 2023-08-07 NOTE — PROGRESS NOTES
DO Elisabeth       CareTeam (Including outside providers/suppliers regularly involved in providing care):   Patient Care Team:  Irish Ojeda DO as PCP - General (Family Medicine)  Irish Ojeda DO as PCP - Empaneled Provider     Reviewed and updated this visit:          Tariq Giron, was evaluated through a synchronous (real-time) audio-video encounter. The patient (or guardian if applicable) is aware that this is a billable service, which includes applicable co-pays. This Virtual Visit was conducted with patient's (and/or legal guardian's) consent. Patient identification was verified, and a caregiver was present when appropriate. The patient was located at Home: [address] [place] [address] [facility]  Provider was located at Home ([facility]): [place]         Total time spent for this encounter: Not billed by time    --Irish Ojeda DO on 8/7/2023 at 1:04 PM    An electronic signature was used to authenticate this note.

## 2023-08-08 ENCOUNTER — TELEPHONE (OUTPATIENT)
Dept: FAMILY MEDICINE CLINIC | Age: 83
End: 2023-08-08

## 2023-08-09 RX ORDER — GABAPENTIN 300 MG/1
CAPSULE ORAL
Qty: 120 CAPSULE | Refills: 2 | Status: SHIPPED | OUTPATIENT
Start: 2023-08-09 | End: 2023-11-09

## 2023-08-09 RX ORDER — ATORVASTATIN CALCIUM 80 MG/1
TABLET, FILM COATED ORAL
Qty: 30 TABLET | Refills: 3 | Status: SHIPPED | OUTPATIENT
Start: 2023-08-09

## 2023-08-09 RX ORDER — CHOLECALCIFEROL (VITAMIN D3) 125 MCG
CAPSULE ORAL
Qty: 60 TABLET | Refills: 0 | Status: SHIPPED | OUTPATIENT
Start: 2023-08-09

## 2023-08-09 NOTE — TELEPHONE ENCOUNTER
Last office visit 8/7/2023     Last written      Next office visit scheduled 9/15/2023    Requested Prescriptions     Pending Prescriptions Disp Refills    vitamin B-12 (CYANOCOBALAMIN) 500 MCG tablet [Pharmacy Med Name: VITAMIN B-12 500MCG TABS] 60 tablet 0     Sig: TAKE ONE TABLET BY MOUTH TWICE A DAY    gabapentin (NEURONTIN) 300 MG capsule [Pharmacy Med Name: GABAPENTIN 300MG CAPS] 120 capsule 2     Sig: TAKE 1 CAPSULE BY MOUTH 4 TIMES A DAY    atorvastatin (LIPITOR) 80 MG tablet [Pharmacy Med Name: ATORVASTATIN CALCIUM 80MG TABS] 30 tablet 3     Sig: TAKE ONE TABLET BY MOUTH EVERY EVENING

## 2023-08-15 ENCOUNTER — OFFICE VISIT (OUTPATIENT)
Dept: FAMILY MEDICINE CLINIC | Age: 83
End: 2023-08-15
Payer: COMMERCIAL

## 2023-08-15 VITALS
WEIGHT: 114 LBS | DIASTOLIC BLOOD PRESSURE: 78 MMHG | HEIGHT: 60 IN | SYSTOLIC BLOOD PRESSURE: 128 MMHG | BODY MASS INDEX: 22.38 KG/M2

## 2023-08-15 DIAGNOSIS — I10 ESSENTIAL HYPERTENSION: ICD-10-CM

## 2023-08-15 DIAGNOSIS — I63.9 ACUTE CVA (CEREBROVASCULAR ACCIDENT) (HCC): Primary | ICD-10-CM

## 2023-08-15 DIAGNOSIS — E11.9 CONTROLLED TYPE 2 DIABETES MELLITUS WITHOUT COMPLICATION, WITHOUT LONG-TERM CURRENT USE OF INSULIN (HCC): ICD-10-CM

## 2023-08-15 DIAGNOSIS — Z87.898 HISTORY OF MEMORY LOSS: ICD-10-CM

## 2023-08-15 PROCEDURE — 3074F SYST BP LT 130 MM HG: CPT | Performed by: FAMILY MEDICINE

## 2023-08-15 PROCEDURE — 3044F HG A1C LEVEL LT 7.0%: CPT | Performed by: FAMILY MEDICINE

## 2023-08-15 PROCEDURE — 3078F DIAST BP <80 MM HG: CPT | Performed by: FAMILY MEDICINE

## 2023-08-15 PROCEDURE — 36415 COLL VENOUS BLD VENIPUNCTURE: CPT | Performed by: FAMILY MEDICINE

## 2023-08-15 PROCEDURE — 99214 OFFICE O/P EST MOD 30 MIN: CPT | Performed by: FAMILY MEDICINE

## 2023-08-15 PROCEDURE — 1123F ACP DISCUSS/DSCN MKR DOCD: CPT | Performed by: FAMILY MEDICINE

## 2023-08-16 LAB
ALBUMIN SERPL-MCNC: 4.5 G/DL (ref 3.4–5)
ALBUMIN/GLOB SERPL: 1.8 {RATIO} (ref 1.1–2.2)
ALP SERPL-CCNC: 83 U/L (ref 40–129)
ALT SERPL-CCNC: 26 U/L (ref 10–40)
ANION GAP SERPL CALCULATED.3IONS-SCNC: 12 MMOL/L (ref 3–16)
AST SERPL-CCNC: 24 U/L (ref 15–37)
BASOPHILS # BLD: 0 K/UL (ref 0–0.2)
BASOPHILS NFR BLD: 0.5 %
BILIRUB SERPL-MCNC: 0.5 MG/DL (ref 0–1)
BUN SERPL-MCNC: 24 MG/DL (ref 7–20)
CALCIUM SERPL-MCNC: 9.5 MG/DL (ref 8.3–10.6)
CHLORIDE SERPL-SCNC: 104 MMOL/L (ref 99–110)
CHOLEST SERPL-MCNC: 155 MG/DL (ref 0–199)
CO2 SERPL-SCNC: 24 MMOL/L (ref 21–32)
CREAT SERPL-MCNC: 1.4 MG/DL (ref 0.8–1.3)
DEPRECATED RDW RBC AUTO: 14.7 % (ref 12.4–15.4)
EOSINOPHIL # BLD: 0.3 K/UL (ref 0–0.6)
EOSINOPHIL NFR BLD: 4.2 %
FOLATE SERPL-MCNC: 17.21 NG/ML (ref 4.78–24.2)
GFR SERPLBLD CREATININE-BSD FMLA CKD-EPI: 50 ML/MIN/{1.73_M2}
GLUCOSE SERPL-MCNC: 117 MG/DL (ref 70–99)
HCT VFR BLD AUTO: 41.4 % (ref 40.5–52.5)
HDLC SERPL-MCNC: 55 MG/DL (ref 40–60)
HGB BLD-MCNC: 13.8 G/DL (ref 13.5–17.5)
LDLC SERPL CALC-MCNC: 84 MG/DL
LYMPHOCYTES # BLD: 2.1 K/UL (ref 1–5.1)
LYMPHOCYTES NFR BLD: 30.4 %
MCH RBC QN AUTO: 31.4 PG (ref 26–34)
MCHC RBC AUTO-ENTMCNC: 33.3 G/DL (ref 31–36)
MCV RBC AUTO: 94.3 FL (ref 80–100)
MONOCYTES # BLD: 0.5 K/UL (ref 0–1.3)
MONOCYTES NFR BLD: 8 %
NEUTROPHILS # BLD: 3.9 K/UL (ref 1.7–7.7)
NEUTROPHILS NFR BLD: 56.9 %
PLATELET # BLD AUTO: 241 K/UL (ref 135–450)
PMV BLD AUTO: 7.9 FL (ref 5–10.5)
POTASSIUM SERPL-SCNC: 5 MMOL/L (ref 3.5–5.1)
PROT SERPL-MCNC: 7 G/DL (ref 6.4–8.2)
RBC # BLD AUTO: 4.38 M/UL (ref 4.2–5.9)
SODIUM SERPL-SCNC: 140 MMOL/L (ref 136–145)
TRIGL SERPL-MCNC: 82 MG/DL (ref 0–150)
TSH SERPL DL<=0.005 MIU/L-ACNC: 1.92 UIU/ML (ref 0.27–4.2)
VIT B12 SERPL-MCNC: 1053 PG/ML (ref 211–911)
VLDLC SERPL CALC-MCNC: 16 MG/DL
WBC # BLD AUTO: 6.8 K/UL (ref 4–11)

## 2023-08-22 ASSESSMENT — ENCOUNTER SYMPTOMS
COUGH: 0
VOMITING: 0
ANAL BLEEDING: 0
NAUSEA: 0
DIARRHEA: 0
SHORTNESS OF BREATH: 0
ABDOMINAL PAIN: 0

## 2023-08-24 NOTE — TELEPHONE ENCOUNTER
Family Psychotherapy (PhD/LCSW)    8/24/2023    Site: [facility]    Length of service: 45    Therapeutic intervention: 90846-Family therapy without patient; needed to review results of the evaluation    Persons present: mother and father(he was on the phone)    Interval history:  When over the average cognitive findings, the data supporting ADHD, his strong educational profile, in particular writing skills, and the combination of anxiety and ADHD, anxiety being the most prominent obstacles, and the 1 Luke once the most help with.  He will come back to me for a session to demystify.    Target symptoms: distractability, anxiety      Patient's interpersonal/verbal exchanges: 90846-Family therapy without patient: patient not present    Progress toward goals: progressing slowly    Diagnosis: 314.01  300.02    Plan: individual psychotherapy  family psychotherapy    Return to clinic: as scheduled     Last office visit 3/9/2023     Last written      Next office visit scheduled 6/9/2023    Requested Prescriptions     Pending Prescriptions Disp Refills    lisinopril (PRINIVIL;ZESTRIL) 40 MG tablet [Pharmacy Med Name: LISINOPRIL 40MG TABS] 90 tablet 0     Sig: TAKE ONE TABLET BY MOUTH ONE TIME A DAY

## 2023-09-15 ENCOUNTER — OFFICE VISIT (OUTPATIENT)
Dept: FAMILY MEDICINE CLINIC | Age: 83
End: 2023-09-15
Payer: COMMERCIAL

## 2023-09-15 VITALS
HEIGHT: 60 IN | DIASTOLIC BLOOD PRESSURE: 78 MMHG | BODY MASS INDEX: 22.58 KG/M2 | SYSTOLIC BLOOD PRESSURE: 110 MMHG | WEIGHT: 115 LBS

## 2023-09-15 DIAGNOSIS — Z87.898 HISTORY OF MEMORY LOSS: ICD-10-CM

## 2023-09-15 DIAGNOSIS — I63.9 CEREBRAL INFARCTION, LEFT HEMISPHERE (HCC): ICD-10-CM

## 2023-09-15 DIAGNOSIS — E11.9 CONTROLLED TYPE 2 DIABETES MELLITUS WITHOUT COMPLICATION, WITHOUT LONG-TERM CURRENT USE OF INSULIN (HCC): Primary | ICD-10-CM

## 2023-09-15 DIAGNOSIS — I10 ESSENTIAL HYPERTENSION: ICD-10-CM

## 2023-09-15 LAB — HBA1C MFR BLD: 6.4 %

## 2023-09-15 PROCEDURE — 99214 OFFICE O/P EST MOD 30 MIN: CPT | Performed by: FAMILY MEDICINE

## 2023-09-15 PROCEDURE — 3078F DIAST BP <80 MM HG: CPT | Performed by: FAMILY MEDICINE

## 2023-09-15 PROCEDURE — 1123F ACP DISCUSS/DSCN MKR DOCD: CPT | Performed by: FAMILY MEDICINE

## 2023-09-15 PROCEDURE — 3074F SYST BP LT 130 MM HG: CPT | Performed by: FAMILY MEDICINE

## 2023-09-15 PROCEDURE — 83036 HEMOGLOBIN GLYCOSYLATED A1C: CPT | Performed by: FAMILY MEDICINE

## 2023-09-15 PROCEDURE — 3044F HG A1C LEVEL LT 7.0%: CPT | Performed by: FAMILY MEDICINE

## 2023-09-15 NOTE — PROGRESS NOTES
9/15/2023     Rafa Agustin (:  1940) is a 82 y.o. male, here for evaluation of the following medical concerns:    HPI  Patient presented today for multiple concerns. He is very complicated but is trying to do a better job of keeping his appointments. Patient basically taking care of his adult son who is a supervisor at a grocery store. His son drinks heavily and requires the patient  to drive him to where he needs to go. I spoke to the patient concerning him not driving. The patient was unaware of this even though we have spoken about this last visit. Memory is poor- lives with son, had CVA in the past, has failed to keep follow ups with specialist, will refer him back to his neurologist.     1. No acute intracranial abnormality. No acute infarct. 2. 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 3. Mild global parenchymal volume loss with minimal chronic microvascular   ischemic changes. CVA- effected left side, still hasn't followed up with neurology, needs to see them, MRI was last year, no significant change today, rides bike on regular basis, mood is poor, B 12 is normal.      DM II- well controlled at this time, A1C is 6., doesn't believe he has had hypoglycemia, only on Metformin so more than likely not, will check labs today. HTN- has been an issue in the past had been well controlled, today too high, hasn't taken medication today, will when he gets home,      Today, he denied chest pain, sob, n, v or diarrhea. Review of Systems   Constitutional:  Positive for activity change and fatigue. Negative for fever and unexpected weight change. HENT:  Negative for congestion, ear pain, facial swelling, hearing loss, rhinorrhea, sinus pressure, sore throat and trouble swallowing. Eyes:  Negative for visual disturbance. Respiratory:  Negative for cough and shortness of breath.     Cardiovascular:  Negative for chest pain,

## 2023-09-18 ENCOUNTER — CARE COORDINATION (OUTPATIENT)
Dept: CARE COORDINATION | Age: 83
End: 2023-09-18

## 2023-09-18 NOTE — CARE COORDINATION
CC outreach 1 pcp referral. Attempted to reach patient by phone. No answer. Left brief message with name, contact number and asked for return call back. Waiting for patient response at this time. Will update notes when callback is received. Will follow up at another date and time.

## 2023-09-19 ENCOUNTER — CARE COORDINATION (OUTPATIENT)
Dept: CARE COORDINATION | Age: 83
End: 2023-09-19

## 2023-09-25 ENCOUNTER — CARE COORDINATION (OUTPATIENT)
Dept: CARE COORDINATION | Age: 83
End: 2023-09-25

## 2023-09-25 NOTE — CARE COORDINATION
UTR patient for care coordination initial cc screening from referral. No additional attempts will occur at this time.

## 2023-10-01 ASSESSMENT — ENCOUNTER SYMPTOMS
COUGH: 0
SHORTNESS OF BREATH: 0
DIARRHEA: 0
FACIAL SWELLING: 0
ABDOMINAL PAIN: 0
SORE THROAT: 0
TROUBLE SWALLOWING: 0
VOMITING: 0
NAUSEA: 0
SINUS PRESSURE: 0
RHINORRHEA: 0

## 2023-10-18 RX ORDER — AMLODIPINE BESYLATE 10 MG/1
10 TABLET ORAL DAILY
Qty: 90 TABLET | Refills: 0 | Status: SHIPPED | OUTPATIENT
Start: 2023-10-18

## 2023-10-18 NOTE — TELEPHONE ENCOUNTER
Pharmacy calling about refill. States they have sent on 3 separate occasions and no response. Find nothing in chart. Said sent on 10/11, 10/13 and 10/16. Hence call today.           Last office visit 9/15/2023     Next office visit scheduled 12/15/2023    Requested Prescriptions     Pending Prescriptions Disp Refills    amLODIPine (NORVASC) 10 MG tablet 90 tablet 0     Sig: Take 1 tablet by mouth daily

## 2023-10-23 RX ORDER — LISINOPRIL 40 MG/1
TABLET ORAL
Qty: 90 TABLET | Refills: 0 | Status: SHIPPED | OUTPATIENT
Start: 2023-10-23

## 2023-10-25 ENCOUNTER — HOSPITAL ENCOUNTER (INPATIENT)
Age: 83
LOS: 2 days | Discharge: HOME | DRG: 948 | End: 2023-10-27
Attending: STUDENT IN AN ORGANIZED HEALTH CARE EDUCATION/TRAINING PROGRAM | Admitting: INTERNAL MEDICINE
Payer: COMMERCIAL

## 2023-10-25 ENCOUNTER — APPOINTMENT (OUTPATIENT)
Dept: CT IMAGING | Age: 83
DRG: 948 | End: 2023-10-25
Payer: COMMERCIAL

## 2023-10-25 ENCOUNTER — APPOINTMENT (OUTPATIENT)
Dept: MRI IMAGING | Age: 83
DRG: 948 | End: 2023-10-25
Payer: COMMERCIAL

## 2023-10-25 ENCOUNTER — APPOINTMENT (OUTPATIENT)
Dept: GENERAL RADIOLOGY | Age: 83
DRG: 948 | End: 2023-10-25
Payer: COMMERCIAL

## 2023-10-25 DIAGNOSIS — R29.810 FACIAL DROOP: ICD-10-CM

## 2023-10-25 DIAGNOSIS — R41.82 ALTERED MENTAL STATUS, UNSPECIFIED ALTERED MENTAL STATUS TYPE: Primary | ICD-10-CM

## 2023-10-25 DIAGNOSIS — R25.1 TREMULOUSNESS: ICD-10-CM

## 2023-10-25 LAB
ALBUMIN SERPL-MCNC: 4.2 G/DL (ref 3.4–5)
ALBUMIN/GLOB SERPL: 1.6 {RATIO} (ref 1.1–2.2)
ALP SERPL-CCNC: 74 U/L (ref 40–129)
ALT SERPL-CCNC: 15 U/L (ref 10–40)
ANION GAP SERPL CALCULATED.3IONS-SCNC: 11 MMOL/L (ref 3–16)
APTT BLD: 26.8 SEC (ref 22.7–35.9)
AST SERPL-CCNC: 24 U/L (ref 15–37)
BASE EXCESS BLDV CALC-SCNC: 1.6 MMOL/L
BASOPHILS # BLD: 0.1 K/UL (ref 0–0.2)
BASOPHILS NFR BLD: 0.8 %
BILIRUB SERPL-MCNC: 0.5 MG/DL (ref 0–1)
BILIRUB UR QL STRIP.AUTO: NEGATIVE
BUN SERPL-MCNC: 29 MG/DL (ref 7–20)
CALCIUM SERPL-MCNC: 8.8 MG/DL (ref 8.3–10.6)
CHLORIDE SERPL-SCNC: 102 MMOL/L (ref 99–110)
CLARITY UR: CLEAR
CO2 BLDV-SCNC: 30 MMOL/L
CO2 SERPL-SCNC: 25 MMOL/L (ref 21–32)
COHGB MFR BLDV: 1.1 %
COLOR UR: YELLOW
CREAT SERPL-MCNC: 1.4 MG/DL (ref 0.8–1.3)
DEPRECATED RDW RBC AUTO: 14.1 % (ref 12.4–15.4)
EKG ATRIAL RATE: 63 BPM
EKG DIAGNOSIS: NORMAL
EKG P AXIS: 78 DEGREES
EKG P-R INTERVAL: 164 MS
EKG Q-T INTERVAL: 446 MS
EKG QRS DURATION: 126 MS
EKG QTC CALCULATION (BAZETT): 456 MS
EKG R AXIS: 83 DEGREES
EKG T AXIS: 39 DEGREES
EKG VENTRICULAR RATE: 63 BPM
EOSINOPHIL # BLD: 0.4 K/UL (ref 0–0.6)
EOSINOPHIL NFR BLD: 4.6 %
ETHANOLAMINE SERPL-MCNC: NORMAL MG/DL (ref 0–0.08)
GFR SERPLBLD CREATININE-BSD FMLA CKD-EPI: 50 ML/MIN/{1.73_M2}
GLUCOSE BLD-MCNC: 106 MG/DL (ref 70–99)
GLUCOSE BLD-MCNC: 152 MG/DL (ref 70–99)
GLUCOSE BLD-MCNC: 172 MG/DL (ref 70–99)
GLUCOSE BLD-MCNC: 254 MG/DL (ref 70–99)
GLUCOSE SERPL-MCNC: 106 MG/DL (ref 70–99)
GLUCOSE UR STRIP.AUTO-MCNC: NEGATIVE MG/DL
HCO3 BLDV-SCNC: 28 MMOL/L (ref 23–29)
HCT VFR BLD AUTO: 37.5 % (ref 40.5–52.5)
HGB BLD-MCNC: 12.7 G/DL (ref 13.5–17.5)
HGB UR QL STRIP.AUTO: NEGATIVE
INR PPP: 1.04 (ref 0.84–1.16)
KETONES UR STRIP.AUTO-MCNC: NEGATIVE MG/DL
LACTATE BLDV-SCNC: 0.9 MMOL/L (ref 0.4–1.9)
LEUKOCYTE ESTERASE UR QL STRIP.AUTO: NEGATIVE
LYMPHOCYTES # BLD: 2.7 K/UL (ref 1–5.1)
LYMPHOCYTES NFR BLD: 31.9 %
MCH RBC QN AUTO: 30.9 PG (ref 26–34)
MCHC RBC AUTO-ENTMCNC: 33.8 G/DL (ref 31–36)
MCV RBC AUTO: 91.5 FL (ref 80–100)
METHGB MFR BLDV: 0.4 %
MONOCYTES # BLD: 1.1 K/UL (ref 0–1.3)
MONOCYTES NFR BLD: 12.4 %
NEUTROPHILS # BLD: 4.3 K/UL (ref 1.7–7.7)
NEUTROPHILS NFR BLD: 50.3 %
NITRITE UR QL STRIP.AUTO: NEGATIVE
NT-PROBNP SERPL-MCNC: 276 PG/ML (ref 0–449)
O2 THERAPY: ABNORMAL
PCO2 BLDV: 51.6 MMHG (ref 40–50)
PERFORMED ON: ABNORMAL
PH BLDV: 7.35 [PH] (ref 7.35–7.45)
PH UR STRIP.AUTO: 5.5 [PH] (ref 5–8)
PLATELET # BLD AUTO: 246 K/UL (ref 135–450)
PMV BLD AUTO: 7.3 FL (ref 5–10.5)
PO2 BLDV: <30 MMHG
POTASSIUM SERPL-SCNC: 4.1 MMOL/L (ref 3.5–5.1)
PROT SERPL-MCNC: 6.9 G/DL (ref 6.4–8.2)
PROT UR STRIP.AUTO-MCNC: NEGATIVE MG/DL
PROTHROMBIN TIME: 13.6 SEC (ref 11.5–14.8)
RBC # BLD AUTO: 4.1 M/UL (ref 4.2–5.9)
SAO2 % BLDV: 50 %
SARS-COV-2 RDRP RESP QL NAA+PROBE: NOT DETECTED
SODIUM SERPL-SCNC: 138 MMOL/L (ref 136–145)
SP GR UR STRIP.AUTO: 1.02 (ref 1–1.03)
TROPONIN, HIGH SENSITIVITY: 15 NG/L (ref 0–22)
TROPONIN, HIGH SENSITIVITY: 17 NG/L (ref 0–22)
TSH SERPL DL<=0.005 MIU/L-ACNC: 2.14 UIU/ML (ref 0.27–4.2)
UA COMPLETE W REFLEX CULTURE PNL UR: NORMAL
UA DIPSTICK W REFLEX MICRO PNL UR: NORMAL
URN SPEC COLLECT METH UR: NORMAL
UROBILINOGEN UR STRIP-ACNC: 0.2 E.U./DL
WBC # BLD AUTO: 8.5 K/UL (ref 4–11)

## 2023-10-25 PROCEDURE — 81003 URINALYSIS AUTO W/O SCOPE: CPT

## 2023-10-25 PROCEDURE — 82077 ASSAY SPEC XCP UR&BREATH IA: CPT

## 2023-10-25 PROCEDURE — 6370000000 HC RX 637 (ALT 250 FOR IP): Performed by: STUDENT IN AN ORGANIZED HEALTH CARE EDUCATION/TRAINING PROGRAM

## 2023-10-25 PROCEDURE — 70551 MRI BRAIN STEM W/O DYE: CPT

## 2023-10-25 PROCEDURE — 70496 CT ANGIOGRAPHY HEAD: CPT

## 2023-10-25 PROCEDURE — G0378 HOSPITAL OBSERVATION PER HR: HCPCS

## 2023-10-25 PROCEDURE — 6370000000 HC RX 637 (ALT 250 FOR IP): Performed by: INTERNAL MEDICINE

## 2023-10-25 PROCEDURE — 2580000003 HC RX 258: Performed by: INTERNAL MEDICINE

## 2023-10-25 PROCEDURE — 84484 ASSAY OF TROPONIN QUANT: CPT

## 2023-10-25 PROCEDURE — 6360000004 HC RX CONTRAST MEDICATION: Performed by: STUDENT IN AN ORGANIZED HEALTH CARE EDUCATION/TRAINING PROGRAM

## 2023-10-25 PROCEDURE — 94760 N-INVAS EAR/PLS OXIMETRY 1: CPT

## 2023-10-25 PROCEDURE — 6360000002 HC RX W HCPCS: Performed by: INTERNAL MEDICINE

## 2023-10-25 PROCEDURE — 70450 CT HEAD/BRAIN W/O DYE: CPT

## 2023-10-25 PROCEDURE — 93010 ELECTROCARDIOGRAM REPORT: CPT | Performed by: INTERNAL MEDICINE

## 2023-10-25 PROCEDURE — 83880 ASSAY OF NATRIURETIC PEPTIDE: CPT

## 2023-10-25 PROCEDURE — 87635 SARS-COV-2 COVID-19 AMP PRB: CPT

## 2023-10-25 PROCEDURE — 71045 X-RAY EXAM CHEST 1 VIEW: CPT

## 2023-10-25 PROCEDURE — 84443 ASSAY THYROID STIM HORMONE: CPT

## 2023-10-25 PROCEDURE — 85025 COMPLETE CBC W/AUTO DIFF WBC: CPT

## 2023-10-25 PROCEDURE — 85610 PROTHROMBIN TIME: CPT

## 2023-10-25 PROCEDURE — 80053 COMPREHEN METABOLIC PANEL: CPT

## 2023-10-25 PROCEDURE — 82803 BLOOD GASES ANY COMBINATION: CPT

## 2023-10-25 PROCEDURE — 96372 THER/PROPH/DIAG INJ SC/IM: CPT

## 2023-10-25 PROCEDURE — 94150 VITAL CAPACITY TEST: CPT

## 2023-10-25 PROCEDURE — 1200000000 HC SEMI PRIVATE

## 2023-10-25 PROCEDURE — 85730 THROMBOPLASTIN TIME PARTIAL: CPT

## 2023-10-25 PROCEDURE — 93005 ELECTROCARDIOGRAM TRACING: CPT | Performed by: STUDENT IN AN ORGANIZED HEALTH CARE EDUCATION/TRAINING PROGRAM

## 2023-10-25 PROCEDURE — 99285 EMERGENCY DEPT VISIT HI MDM: CPT

## 2023-10-25 PROCEDURE — 83605 ASSAY OF LACTIC ACID: CPT

## 2023-10-25 RX ORDER — MAGNESIUM HYDROXIDE/ALUMINUM HYDROXICE/SIMETHICONE 120; 1200; 1200 MG/30ML; MG/30ML; MG/30ML
30 SUSPENSION ORAL EVERY 6 HOURS PRN
Status: DISCONTINUED | OUTPATIENT
Start: 2023-10-25 | End: 2023-10-27 | Stop reason: HOSPADM

## 2023-10-25 RX ORDER — ACETAMINOPHEN 325 MG/1
650 TABLET ORAL EVERY 6 HOURS PRN
Status: DISCONTINUED | OUTPATIENT
Start: 2023-10-25 | End: 2023-10-27 | Stop reason: HOSPADM

## 2023-10-25 RX ORDER — ASPIRIN 81 MG/1
324 TABLET, CHEWABLE ORAL ONCE
Status: COMPLETED | OUTPATIENT
Start: 2023-10-25 | End: 2023-10-25

## 2023-10-25 RX ORDER — POLYETHYLENE GLYCOL 3350 17 G/17G
17 POWDER, FOR SOLUTION ORAL DAILY PRN
Status: DISCONTINUED | OUTPATIENT
Start: 2023-10-25 | End: 2023-10-27 | Stop reason: HOSPADM

## 2023-10-25 RX ORDER — ATORVASTATIN CALCIUM 80 MG/1
80 TABLET, FILM COATED ORAL NIGHTLY
Status: DISCONTINUED | OUTPATIENT
Start: 2023-10-25 | End: 2023-10-27 | Stop reason: HOSPADM

## 2023-10-25 RX ORDER — GABAPENTIN 300 MG/1
300 CAPSULE ORAL 4 TIMES DAILY
Status: DISCONTINUED | OUTPATIENT
Start: 2023-10-25 | End: 2023-10-25

## 2023-10-25 RX ORDER — INSULIN LISPRO 100 [IU]/ML
0-4 INJECTION, SOLUTION INTRAVENOUS; SUBCUTANEOUS NIGHTLY
Status: DISCONTINUED | OUTPATIENT
Start: 2023-10-25 | End: 2023-10-27 | Stop reason: HOSPADM

## 2023-10-25 RX ORDER — ASPIRIN 81 MG/1
81 TABLET ORAL DAILY
Status: DISCONTINUED | OUTPATIENT
Start: 2023-10-25 | End: 2023-10-27 | Stop reason: HOSPADM

## 2023-10-25 RX ORDER — AMLODIPINE BESYLATE 10 MG/1
10 TABLET ORAL DAILY
Status: DISCONTINUED | OUTPATIENT
Start: 2023-10-25 | End: 2023-10-27 | Stop reason: HOSPADM

## 2023-10-25 RX ORDER — CLOPIDOGREL BISULFATE 75 MG/1
75 TABLET ORAL DAILY
Status: DISCONTINUED | OUTPATIENT
Start: 2023-10-25 | End: 2023-10-27 | Stop reason: HOSPADM

## 2023-10-25 RX ORDER — ONDANSETRON 2 MG/ML
4 INJECTION INTRAMUSCULAR; INTRAVENOUS EVERY 6 HOURS PRN
Status: DISCONTINUED | OUTPATIENT
Start: 2023-10-25 | End: 2023-10-27 | Stop reason: HOSPADM

## 2023-10-25 RX ORDER — CHOLECALCIFEROL (VITAMIN D3) 125 MCG
500 CAPSULE ORAL 2 TIMES DAILY
Status: DISCONTINUED | OUTPATIENT
Start: 2023-10-25 | End: 2023-10-27 | Stop reason: HOSPADM

## 2023-10-25 RX ORDER — DEXTROSE MONOHYDRATE 100 MG/ML
INJECTION, SOLUTION INTRAVENOUS CONTINUOUS PRN
Status: DISCONTINUED | OUTPATIENT
Start: 2023-10-25 | End: 2023-10-27 | Stop reason: HOSPADM

## 2023-10-25 RX ORDER — SODIUM CHLORIDE 9 MG/ML
INJECTION, SOLUTION INTRAVENOUS CONTINUOUS
Status: DISCONTINUED | OUTPATIENT
Start: 2023-10-25 | End: 2023-10-25

## 2023-10-25 RX ORDER — SODIUM CHLORIDE 0.9 % (FLUSH) 0.9 %
5-40 SYRINGE (ML) INJECTION PRN
Status: DISCONTINUED | OUTPATIENT
Start: 2023-10-25 | End: 2023-10-27 | Stop reason: HOSPADM

## 2023-10-25 RX ORDER — POTASSIUM CHLORIDE 7.45 MG/ML
10 INJECTION INTRAVENOUS PRN
Status: DISCONTINUED | OUTPATIENT
Start: 2023-10-25 | End: 2023-10-25 | Stop reason: CLARIF

## 2023-10-25 RX ORDER — ACETAMINOPHEN 650 MG/1
650 SUPPOSITORY RECTAL EVERY 6 HOURS PRN
Status: DISCONTINUED | OUTPATIENT
Start: 2023-10-25 | End: 2023-10-27 | Stop reason: HOSPADM

## 2023-10-25 RX ORDER — ENOXAPARIN SODIUM 100 MG/ML
30 INJECTION SUBCUTANEOUS DAILY
Status: DISCONTINUED | OUTPATIENT
Start: 2023-10-25 | End: 2023-10-27 | Stop reason: HOSPADM

## 2023-10-25 RX ORDER — POTASSIUM CHLORIDE 20 MEQ/1
40 TABLET, EXTENDED RELEASE ORAL PRN
Status: DISCONTINUED | OUTPATIENT
Start: 2023-10-25 | End: 2023-10-25 | Stop reason: CLARIF

## 2023-10-25 RX ORDER — SODIUM CHLORIDE 0.9 % (FLUSH) 0.9 %
5-40 SYRINGE (ML) INJECTION EVERY 12 HOURS SCHEDULED
Status: DISCONTINUED | OUTPATIENT
Start: 2023-10-25 | End: 2023-10-27 | Stop reason: HOSPADM

## 2023-10-25 RX ORDER — ONDANSETRON 4 MG/1
4 TABLET, ORALLY DISINTEGRATING ORAL EVERY 8 HOURS PRN
Status: DISCONTINUED | OUTPATIENT
Start: 2023-10-25 | End: 2023-10-27 | Stop reason: HOSPADM

## 2023-10-25 RX ORDER — SODIUM CHLORIDE 9 MG/ML
INJECTION, SOLUTION INTRAVENOUS PRN
Status: DISCONTINUED | OUTPATIENT
Start: 2023-10-25 | End: 2023-10-27 | Stop reason: HOSPADM

## 2023-10-25 RX ORDER — LISINOPRIL 40 MG/1
40 TABLET ORAL DAILY
Status: DISCONTINUED | OUTPATIENT
Start: 2023-10-25 | End: 2023-10-27 | Stop reason: HOSPADM

## 2023-10-25 RX ORDER — INSULIN LISPRO 100 [IU]/ML
0-4 INJECTION, SOLUTION INTRAVENOUS; SUBCUTANEOUS
Status: DISCONTINUED | OUTPATIENT
Start: 2023-10-25 | End: 2023-10-27 | Stop reason: HOSPADM

## 2023-10-25 RX ADMIN — IOPAMIDOL 75 ML: 755 INJECTION, SOLUTION INTRAVENOUS at 02:47

## 2023-10-25 RX ADMIN — CYANOCOBALAMIN TAB 500 MCG 500 MCG: 500 TAB at 21:18

## 2023-10-25 RX ADMIN — ENOXAPARIN SODIUM 30 MG: 100 INJECTION SUBCUTANEOUS at 09:25

## 2023-10-25 RX ADMIN — SODIUM CHLORIDE: 9 INJECTION, SOLUTION INTRAVENOUS at 09:33

## 2023-10-25 RX ADMIN — ATORVASTATIN CALCIUM 80 MG: 80 TABLET, FILM COATED ORAL at 21:18

## 2023-10-25 RX ADMIN — AMLODIPINE BESYLATE 10 MG: 10 TABLET ORAL at 09:25

## 2023-10-25 RX ADMIN — LISINOPRIL 40 MG: 40 TABLET ORAL at 14:52

## 2023-10-25 RX ADMIN — ASPIRIN 324 MG: 81 TABLET, CHEWABLE ORAL at 05:31

## 2023-10-25 RX ADMIN — ASPIRIN 81 MG: 81 TABLET, COATED ORAL at 09:25

## 2023-10-25 RX ADMIN — CLOPIDOGREL BISULFATE 75 MG: 75 TABLET ORAL at 09:25

## 2023-10-25 ASSESSMENT — PAIN SCALES - GENERAL
PAINLEVEL_OUTOF10: 0

## 2023-10-25 NOTE — PROGRESS NOTES
4 Eyes Skin Assessment     NAME:  Yahir Marcelino OF BIRTH:  1940  MEDICAL RECORD NUMBER:  [id number]    The patient is being assessed for  Admission    I agree that at least one RN has performed a thorough Head to Toe Skin Assessment on the patient. ALL assessment sites listed below have been assessed. Areas assessed by both nurses:    Head, Face, Ears, Shoulders, Back, Chest, Arms, Elbows, Hands, Sacrum. Buttock, Coccyx, Ischium, Legs. Feet and Heels, and Under Medical Devices         Does the Patient have a Wound?  No noted wound(s)       Gal Prevention initiated by RN: Yes  Wound Care Orders initiated by RN: No    Pressure Injury (Stage 3,4, Unstageable, DTI, NWPT, and Complex wounds) if present, place Wound referral order by RN under : No    New Ostomies, if present place, Ostomy referral order under : No     Nurse 1 eSignature: Electronically signed by Noah Cifuentes RN on 10/25/23 at 3:54 PM EDT    **SHARE this note so that the co-signing nurse can place an eSignature**    Nurse 2 eSignature: Electronically signed by Kristen Lagunas RN on 10/25/23 at 4:03 PM EDT

## 2023-10-25 NOTE — CONSULTS
light touch in all extremities. Cerebellar/coordination: FNF normal without ataxia  Tone: Normal in all 4 extremities. Gait: Deferred for patient safety. LABS (All labs are from the date of today's encounter unless otherwise noted. )  Na:  138  K:  4.1  BUN:   29  Creatinine:  1.4  Glucose:  106  Calcium:  8.8  M.10 (21)  Ph:  3.9 (20)    Total cholesterol:  155 (8/15/23)  HDL:  55 (8/15/23)  LDL:  84 (8/15/23)  Triglycerides:  82 (8/15/23)    HbA1c:  6.4 (9/15/23)  TSH:  2.14    WBC:  8.5  RBC:  4.10  Hgb:  12.7  Hct:  37.5  Platelets:  844    PT:  13.6  INR:  1.04    UA: Negative nitrite, negative leukocyte esterase (10/25/23)    STUDIES:  MRI Brain w/o contrast pending. XR Chest Portable 10/25/23. Reviewed the read. IMPRESSION:  Stable chest x-ray without acute cardiopulmonary abnormality. Clear lungs  bilaterally. Stable normal cardiac size without CHF. CT Head w/o contrast 10/25/23. Independently reviewed images. CTA Head & Neck w/contrast 10/25/23. Reviewed the read. IMPRESSION:  1. No acute intracranial abnormality. 2. Moderate to severe narrowing at the origin of left V1 vertebral artery,  unchanged. Otherwise unremarkable CTA of the head and neck. MRI Brain w/o contrast  2/15/2020  IMPRESSION:  Increasing size of the previously noted infarct in the left posterior limb of  internal capsule     Numerous additional acute infarcts are noted as described. Multifocal small-vessel ischemic change bilaterally. IMPRESSION:  1. Acute change in mental status concerning for stroke. 2. 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 3.  HTN  4.  DM2  5. Hx of bradycardia on event monitor  6. Rheumatic fever      Joe Killer is a 82 y.o. male who presented from home with AMS and involuntary twitching. He has a hx of bi-hemisphere strokes with residual L side paresthesias. Event monitor in the past did not show any afib.  Patient was somewhat responsive during the episode of twitching. There are no irregular labs to explain myoclonus. Patient does have seizure risk factor given his hx of stroke and reported mild dementia. RECOMMENDATIONS:  -Follow results of MRI brain w/o. -EEG (ordered)  -Telemetry  -PT/OT/SLP  -Patient would likely benefit from formal neurocognitive evaluation as an outpatient in 2-3 months.  -Will discuss with neurology attending physician, Dr. Navi Cannon. See attestation for additional recommendations.     Patricia Renteria, [facility]-BC  [address] [facility] Neurology    A copy of this note was provided for Dr Isha Cain MD

## 2023-10-25 NOTE — H&P
Hospital Medicine History & Physical      PCP: Rosita Curtis DO    Date of Admission: 10/25/2023    Date of Service: Pt seen/examined on 10/25/23   and Admitted to Inpatient with expected LOS greater than two midnights due to medical therapy. Chief Complaint:  AMS       History Of Present Illness:      82 y.o. male who presented to [facility] with a past medical history of DM, hypertension, rheumatic fever. Patient was brought to ED with AMS and \" jerking movements\". Patient has no recollection of reasons of why he was brought to emergency, this morning he is alert alert and coherent, was able to name the year and the hospital.    Collateral history acquired from patient's sister, she had been visiting from out of town and did note that the patient is more forgetful than usual 24 hours prior to presentation however patient's son had noted him sitting in a chair at 1 AM, with jerking movement of bilateral upper and lower limbs, was unable to answer questions when addressed. Patient has no history of seizures however did recall head trauma 3 months ago, patient lives with his son, some concern is documented in the chart about medication noncompliance. Patient continues to exercise regularly  ( cycling )     No complaints, no focus of infection identified. Past Medical History:          Diagnosis Date    Active rheumatic fever     Diabetes mellitus type 2, uncontrolled     Essential hypertension     Noncompliance with medications        Past Surgical History:          Procedure Laterality Date    APPENDECTOMY         Medications Prior to Admission:      Prior to Admission medications    Medication Sig Start Date End Date Taking?  Authorizing Provider   lisinopril (PRINIVIL;ZESTRIL) 40 MG tablet TAKE ONE TABLET BY MOUTH ONE TIME A DAY 10/23/23   Pilo Barber, DO   amLODIPine (NORVASC) 10 MG tablet Take 1 tablet by mouth daily 10/18/23   Pilo Barber, DO   vitamin B-12 (CYANOCOBALAMIN) 500

## 2023-10-25 NOTE — PROGRESS NOTES
EDSBAR report has been reviewed.       Electronically signed by Zackary Gardner RN on 10/25/2023 at 2:31 PM

## 2023-10-25 NOTE — ED NOTES
Pt states they would like to stay in personal clothes and would not like to be placed in hospital gown at this time.       Glen Her RN  10/25/23 3065

## 2023-10-25 NOTE — ED TRIAGE NOTES
Patient is brought to the ER via EMS for c/o altered mental status that started at 1600 yesterday afternoon. Per EMS, patient lives with his son. 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 He arrives tonight, alert to self,situation, and place but not time. Per EMS, patient has hx of previous stroke and hypertension. /82 at time of triage. Denies any known falls today. EMS reports a BS of 106 en route. Patient has an involuntary twitch at time of triage that is not normal for him.

## 2023-10-25 NOTE — CARE COORDINATION
10/25/23 1337   Service Assessment   Patient Orientation Alert and Oriented   Cognition Alert   History Provided By Patient   Primary Caregiver Self   Accompanied By/Relationship N/A   [address] is: Legal Next of Kin   PCP Verified by CM Yes   Last Visit to PCP Within last 6 months   Prior Functional Level Independent in ADLs/IADLs   Current Functional Level Independent in ADLs/IADLs   Can patient return to prior living arrangement Yes   Ability to make needs known: Good   Family able to assist with home care needs: Yes   Would you like for me to discuss the discharge plan with any other family members/significant others, and if so, who? No   Financial Resources [employer] None   CM/SW Referral Other (see comment)  (N/A)   Discharge Planning   Type of Residence Other (Comment)  (Condo)   Living Arrangements Children   Current Services Prior To Admission None   Potential Assistance Needed N/A   DME Ordered? No   Potential Assistance Purchasing Medications No   Type of Home Care Services None   Patient expects to be discharged to: Other (comment)  (Condo)   History of falls? 0   Services At/After Discharge   Transition of Care Consult (CM Consult) Discharge Planning   Services At/After Discharge None   Mode of Transport at Discharge Other (see comment)  (Family)   Confirm Follow Up Transport Self   Condition of Participation: Discharge Planning   The Plan for Transition of Care is related to the following treatment goals: Strengthening     Case Management Assessment  Initial Evaluation    Date/Time of Evaluation: 10/25/2023 1:40 PM  Assessment Completed by: DAYANA Mac    If patient is discharged prior to next notation, then this note serves as note for discharge by case management.     Patient Name: Eleuterio Terrazas                   YOB: 1940  Diagnosis: AMS (altered mental status) [R41.82]                   Date / Time:

## 2023-10-26 LAB
ANION GAP SERPL CALCULATED.3IONS-SCNC: 8 MMOL/L (ref 3–16)
BASOPHILS # BLD: 0 K/UL (ref 0–0.2)
BASOPHILS NFR BLD: 0.3 %
BUN SERPL-MCNC: 22 MG/DL (ref 7–20)
CALCIUM SERPL-MCNC: 8.6 MG/DL (ref 8.3–10.6)
CHLORIDE SERPL-SCNC: 107 MMOL/L (ref 99–110)
CO2 SERPL-SCNC: 25 MMOL/L (ref 21–32)
CREAT SERPL-MCNC: 1.4 MG/DL (ref 0.8–1.3)
DEPRECATED RDW RBC AUTO: 14.3 % (ref 12.4–15.4)
EOSINOPHIL # BLD: 0.4 K/UL (ref 0–0.6)
EOSINOPHIL NFR BLD: 5.2 %
GFR SERPLBLD CREATININE-BSD FMLA CKD-EPI: 50 ML/MIN/{1.73_M2}
GLUCOSE BLD-MCNC: 130 MG/DL (ref 70–99)
GLUCOSE BLD-MCNC: 138 MG/DL (ref 70–99)
GLUCOSE BLD-MCNC: 150 MG/DL (ref 70–99)
GLUCOSE BLD-MCNC: 193 MG/DL (ref 70–99)
GLUCOSE SERPL-MCNC: 128 MG/DL (ref 70–99)
HCT VFR BLD AUTO: 37.9 % (ref 40.5–52.5)
HGB BLD-MCNC: 13.1 G/DL (ref 13.5–17.5)
LYMPHOCYTES # BLD: 2.1 K/UL (ref 1–5.1)
LYMPHOCYTES NFR BLD: 30.7 %
MCH RBC QN AUTO: 31.3 PG (ref 26–34)
MCHC RBC AUTO-ENTMCNC: 34.6 G/DL (ref 31–36)
MCV RBC AUTO: 90.5 FL (ref 80–100)
MONOCYTES # BLD: 0.7 K/UL (ref 0–1.3)
MONOCYTES NFR BLD: 10.5 %
NEUTROPHILS # BLD: 3.7 K/UL (ref 1.7–7.7)
NEUTROPHILS NFR BLD: 53.3 %
PERFORMED ON: ABNORMAL
PLATELET # BLD AUTO: 234 K/UL (ref 135–450)
PMV BLD AUTO: 7.6 FL (ref 5–10.5)
POTASSIUM SERPL-SCNC: 3.7 MMOL/L (ref 3.5–5.1)
RBC # BLD AUTO: 4.18 M/UL (ref 4.2–5.9)
SODIUM SERPL-SCNC: 140 MMOL/L (ref 136–145)
WBC # BLD AUTO: 7 K/UL (ref 4–11)

## 2023-10-26 PROCEDURE — 1200000000 HC SEMI PRIVATE

## 2023-10-26 PROCEDURE — G0378 HOSPITAL OBSERVATION PER HR: HCPCS

## 2023-10-26 PROCEDURE — 94760 N-INVAS EAR/PLS OXIMETRY 1: CPT

## 2023-10-26 PROCEDURE — 2580000003 HC RX 258: Performed by: INTERNAL MEDICINE

## 2023-10-26 PROCEDURE — 97161 PT EVAL LOW COMPLEX 20 MIN: CPT

## 2023-10-26 PROCEDURE — 6370000000 HC RX 637 (ALT 250 FOR IP): Performed by: INTERNAL MEDICINE

## 2023-10-26 PROCEDURE — 80048 BASIC METABOLIC PNL TOTAL CA: CPT

## 2023-10-26 PROCEDURE — 85025 COMPLETE CBC W/AUTO DIFF WBC: CPT

## 2023-10-26 PROCEDURE — 95819 EEG AWAKE AND ASLEEP: CPT

## 2023-10-26 PROCEDURE — 96372 THER/PROPH/DIAG INJ SC/IM: CPT

## 2023-10-26 PROCEDURE — 97530 THERAPEUTIC ACTIVITIES: CPT

## 2023-10-26 PROCEDURE — 6360000002 HC RX W HCPCS: Performed by: INTERNAL MEDICINE

## 2023-10-26 PROCEDURE — 36415 COLL VENOUS BLD VENIPUNCTURE: CPT

## 2023-10-26 PROCEDURE — 97165 OT EVAL LOW COMPLEX 30 MIN: CPT

## 2023-10-26 RX ADMIN — LISINOPRIL 40 MG: 40 TABLET ORAL at 08:06

## 2023-10-26 RX ADMIN — CYANOCOBALAMIN TAB 500 MCG 500 MCG: 500 TAB at 08:06

## 2023-10-26 RX ADMIN — ASPIRIN 81 MG: 81 TABLET, COATED ORAL at 08:06

## 2023-10-26 RX ADMIN — ENOXAPARIN SODIUM 30 MG: 100 INJECTION SUBCUTANEOUS at 08:06

## 2023-10-26 RX ADMIN — CLOPIDOGREL BISULFATE 75 MG: 75 TABLET ORAL at 08:06

## 2023-10-26 RX ADMIN — CYANOCOBALAMIN TAB 500 MCG 500 MCG: 500 TAB at 20:24

## 2023-10-26 RX ADMIN — Medication 10 ML: at 08:07

## 2023-10-26 RX ADMIN — AMLODIPINE BESYLATE 10 MG: 10 TABLET ORAL at 08:06

## 2023-10-26 RX ADMIN — Medication 10 ML: at 20:24

## 2023-10-26 RX ADMIN — ATORVASTATIN CALCIUM 80 MG: 80 TABLET, FILM COATED ORAL at 20:24

## 2023-10-26 ASSESSMENT — PAIN SCALES - GENERAL: PAINLEVEL_OUTOF10: 0

## 2023-10-26 NOTE — CARE COORDINATION
Discharge Planning:   Call from RN requesting I meet with patient to discuss home care. PT/OT: 24 hour supervision or assist.     Met with patient, sister and brother-in-law. Patient inquired about services through insurance. Patient states he may have services through insurance for transportation or home health. Patient inquired about additional services. Discussed home health care. Patient agreeable to a nurse and PT if insurance will cover it. Provided Home care list. Patient requested referral placed to [place] and [facility] agencies. Referrals sent via [employer] to [address] [place] [address]. Await return phone call to confirm they can accept patient.      EMILIANO Noyola, LSW, Social Work/Case Management   [phone]  Electronically signed by EMILIANO Noyola on 10/26/2023 at 5:09 PM

## 2023-10-26 NOTE — PROGRESS NOTES
Occupational Therapy  Facility/Department: [address] PROGRESSIVE CARE  Occupational Therapy Initial Assessment and Tentative D/C      Name: Oli Hernandez  : 1940  MRN: [id number]  Date of Service: 10/26/2023    Discharge Recommendations: Oli Hernandez scored a 19/24 on the AM-PAC ADL Inpatient form. Current research shows that an AM-PAC score of 18 or greater is typically associated with a discharge to the patient's home setting. If patient discharges prior to next session this note will serve as a discharge summary. Please see below for the latest assessment towards goals. Continue to assess pending progress, 24 hour supervision or assist (anticipate home with 24hr due to decreased cognition/safety awareness)  OT Equipment Recommendations  Equipment Needed: Yes  Mobility Devices: ADL Assistive Devices  ADL Assistive Devices: Shower Chair with back       Patient Diagnosis(es): The primary encounter diagnosis was Altered mental status, unspecified altered mental status type. Diagnoses of Tremulousness and Facial droop were also pertinent to this visit. Past Medical History:  has a past medical history of Active rheumatic fever, Diabetes mellitus type 2, uncontrolled, Essential hypertension, and Noncompliance with medications. Past Surgical History:  has a past surgical history that includes Appendectomy. Assessment   Performance deficits / Impairments: Decreased functional mobility ; Decreased balance;Decreased safe awareness;Decreased ADL status; Decreased cognition;Decreased high-level IADLs  Assessment: PTA pt from home where pt was Ind with mobility and ADLs. Pt currently requires CGA for mobility and transfers. Pt slightly unsteady although no overt LOB. Anticipate pt needing up to CGA/Min A for ADLs. Pt with no noted B UE strength, coordination, or sensation acute deficits. Pt with decreased safety awareness and noted confusion. Pt will benefit from skilled OT services at this time. Care;Transfer Training  Education Method: Verbal;Demonstration  Barriers to Learning: Cognition  Education Outcome: Verbalized understanding;Demonstrated understanding;Continued education needed             AM-PAC Score        AM-PAC Inpatient Daily Activity Raw Score: 19 (10/26/23 0734)  AM-PAC Inpatient ADL T-Scale Score : 40.22 (10/26/23 0734)  ADL Inpatient CMS 0-100% Score: 42.8 (10/26/23 0734)  ADL Inpatient CMS G-Code Modifier : CK (10/26/23 0734)    Tinneti Score       Goals  Short Term Goals  Time Frame for Short Term Goals: prior to D/C  Short Term Goal 1: complete functional mobility and transfers Ind  Short Term Goal 2: complete bathing and dressing Ind  Short Term Goal 3: complete toileting Ind  Short Term Goal 4: complete grooming in stance at sink Ind  Long Term Goals  Time Frame for Long Term Goals : STG=LTG  Patient Goals   Patient goals : no stated goals       Therapy Time   Individual Concurrent Group Co-treatment   Time In 0700         Time Out 0730         Minutes 30         Timed Code Treatment Minutes: 15 Minutes (15 minute eval)       ANDRE Wodos/L

## 2023-10-26 NOTE — PLAN OF CARE
Problem: Discharge Planning  Goal: Discharge to home or other facility with appropriate resources  10/26/2023 1003 by Andrea Ac RN  Outcome: Progressing  10/25/2023 2321 by Santos Clemens RN  Outcome: Progressing  Flowsheets (Taken 10/25/2023 2321)  Discharge to home or other facility with appropriate resources:   Identify barriers to discharge with patient and caregiver   Identify discharge learning needs (meds, wound care, etc)   Refer to discharge planning if patient needs post-hospital services based on physician order or complex needs related to functional status, cognitive ability or social support system     Problem: Safety - Adult  Goal: Free from fall injury  10/26/2023 1003 by Andrea Ac RN  Outcome: Progressing  10/25/2023 2321 by Santos Clemens RN  Outcome: Progressing  Flowsheets (Taken 10/25/2023 2321)  Free From Fall Injury: Instruct family/caregiver on patient safety     Problem: Pain  Goal: Verbalizes/displays adequate comfort level or baseline comfort level  10/26/2023 1003 by Andrea Ac RN  Outcome: Progressing  10/25/2023 2321 by Santos Clemens RN  Outcome: Progressing  Flowsheets (Taken 10/25/2023 2321)  Verbalizes/displays adequate comfort level or baseline comfort level:   Implement non-pharmacological measures as appropriate and evaluate response   Encourage patient to monitor pain and request assistance

## 2023-10-26 NOTE — PROGRESS NOTES
Family at bedside and was wanting patient to get home health care. Patient agrees and will discuss with .

## 2023-10-26 NOTE — PROGRESS NOTES
NEUROLOGY PROGRESS NOTE  Reason for Consult: Dr Sole Kay MD asked me to see Di Sam in consultation for evaluation of:  Seizure,  noted to have jerking movements with AMS. Chief complaint: \"I don't remember it. \"      UPDATE 10/26/23:  Chart reviewed. Appreciate nursing notes. Bedside RN stated patient seemed confused later in the evening. No reported seizure like activities. MRI completed and was negative for any acute processes. Patient was up in chair during my encounter. He stated he was \"beside myself\" because his phone wasn't working. He is afraid that his sister (who is visiting from out of town) doesn't know where he is. Reassured him that she visited yesterday and knows where he is. He does seems a little more confused than he was yesterday. We discussed that I recommend that he not drive. He states he misses turns and gets lost because he is concentrating on his driving. He states multiple family members have told him not to drive. There was no family at the bedside at the time of encounter.     MEDICAL HISTORY:  Past Medical History:   Diagnosis Date    Active rheumatic fever     Diabetes mellitus type 2, uncontrolled     Essential hypertension     Noncompliance with medications      Past Surgical History:   Procedure Laterality Date    APPENDECTOMY       Scheduled Meds:   aspirin  81 mg Oral Daily    clopidogrel  75 mg Oral Daily    vitamin B-12  500 mcg Oral BID    atorvastatin  80 mg Oral Nightly    amLODIPine  10 mg Oral Daily    lisinopril  40 mg Oral Daily    sodium chloride flush  5-40 mL IntraVENous 2 times per day    enoxaparin  30 mg SubCUTAneous Daily    insulin lispro  0-4 Units SubCUTAneous TID WC    insulin lispro  0-4 Units SubCUTAneous Nightly     Continuous Infusions:   sodium chloride      dextrose       PRN Meds:.sodium chloride flush, sodium chloride, ondansetron **OR** ondansetron, polyethylene glycol, aluminum & magnesium hydroxide-simethicone,

## 2023-10-26 NOTE — PROGRESS NOTES
Physical Therapy  Facility/Department: [facility] PROGRESSIVE CARE  Physical Therapy Initial Assessment    Name: Di Sam  : 1940  MRN: [id number]  Date of Service: 10/26/2023    Discharge Recommendations:  Continue to assess pending progress, 24 hour supervision or assist   PT Equipment Recommendations  Other: Do not anticipate any equipt needs. Di Sam scored a 20/24 on the AM-PAC short mobility form. At this time, no further PT is recommended upon discharge. Assessment   Body Structures, Functions, Activity Limitations Requiring Skilled Therapeutic Intervention: Decreased functional mobility ; Decreased cognition;Decreased balance  Assessment: 81 y/o male admit 10/25/2023 with AMS and \"Jerking Mvts. \"  Family reports pt more forgetful than usual; noted sitting in chair at 1AM with jerking mvts and inability to answer questions. CT/CTA Head : Mod to Severe narrowing origin L V1 Vert Artery, unchanged. 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 PMH : Rheumatic Fever, Noncompliance with medications. PTA pt living with son in [address] [place] [address], [address] setting (son works, doesn't drive (pt drives son to/from work) with few steps to enter and 1 level; independent daily care and functional mobility. Currently pt nancy oob/amb without assist device Slight CGA. Current issues with cognition/safety rather than functional.  Need to ensure at least brief 24/7 upon d/c. Therapy Prognosis: Good  Decision Making: Low Complexity  History: 81 y/o male admit 10/25/2023 with AMS and \"Jerking Mvts. \"  Family reports pt more forgetful than usual; noted sitting in chair at 1AM with jerking mvts and inability to answer questions. CT/CTA Head : Mod to Severe narrowing origin L V1 Vert Artery, unchanged. 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 PMH : Rheumatic Fever, Noncompliance with medications. Exam: See above. Clinical Presentation: See above.   Barriers to Learning:

## 2023-10-26 NOTE — PROGRESS NOTES
Patient is showing some signs of mild dementia. It could also be sundowners. I went in the room to check on him and he was worried about where his wallet and his college ring is. I did not see a wallet or a college ring in the room or charted in the patients belongings. I did notice that the patient was also having trouble navigating through his cell phone to find his sisters phone number and I had to help him with that. Patient is calm and resting in bed with call light in reach. Will continue to monitor.

## 2023-10-26 NOTE — PLAN OF CARE
Problem: Discharge Planning  Goal: Discharge to home or other facility with appropriate resources  10/25/2023 2321 by Zoë Ramon RN  Outcome: Progressing  Flowsheets (Taken 10/25/2023 2321)  Discharge to home or other facility with appropriate resources:   Identify barriers to discharge with patient and caregiver   Identify discharge learning needs (meds, wound care, etc)   Refer to discharge planning if patient needs post-hospital services based on physician order or complex needs related to functional status, cognitive ability or social support system     Problem: Safety - Adult  Goal: Free from fall injury  10/25/2023 2321 by Zoë Ramon RN  Outcome: Progressing  Flowsheets (Taken 10/25/2023 2321)  Free From Fall Injury: Instruct family/caregiver on patient safety     Problem: Pain  Goal: Verbalizes/displays adequate comfort level or baseline comfort level  10/25/2023 2321 by Zoë Ramon RN  Outcome: Progressing  Flowsheets (Taken 10/25/2023 2321)  Verbalizes/displays adequate comfort level or baseline comfort level:   Implement non-pharmacological measures as appropriate and evaluate response   Encourage patient to monitor pain and request assistance

## 2023-10-27 VITALS
RESPIRATION RATE: 14 BRPM | SYSTOLIC BLOOD PRESSURE: 123 MMHG | HEART RATE: 65 BPM | TEMPERATURE: 97.8 F | DIASTOLIC BLOOD PRESSURE: 47 MMHG | HEIGHT: 60 IN | OXYGEN SATURATION: 98 % | BODY MASS INDEX: 22.33 KG/M2 | WEIGHT: 113.76 LBS

## 2023-10-27 LAB
GLUCOSE BLD-MCNC: 131 MG/DL (ref 70–99)
PERFORMED ON: ABNORMAL

## 2023-10-27 PROCEDURE — 97110 THERAPEUTIC EXERCISES: CPT

## 2023-10-27 PROCEDURE — 97129 THER IVNTJ 1ST 15 MIN: CPT

## 2023-10-27 PROCEDURE — 96372 THER/PROPH/DIAG INJ SC/IM: CPT

## 2023-10-27 PROCEDURE — G0378 HOSPITAL OBSERVATION PER HR: HCPCS

## 2023-10-27 PROCEDURE — 94760 N-INVAS EAR/PLS OXIMETRY 1: CPT

## 2023-10-27 PROCEDURE — 97130 THER IVNTJ EA ADDL 15 MIN: CPT

## 2023-10-27 PROCEDURE — 2580000003 HC RX 258: Performed by: INTERNAL MEDICINE

## 2023-10-27 PROCEDURE — 6370000000 HC RX 637 (ALT 250 FOR IP): Performed by: INTERNAL MEDICINE

## 2023-10-27 PROCEDURE — 97116 GAIT TRAINING THERAPY: CPT

## 2023-10-27 PROCEDURE — 6360000002 HC RX W HCPCS: Performed by: INTERNAL MEDICINE

## 2023-10-27 PROCEDURE — 97530 THERAPEUTIC ACTIVITIES: CPT

## 2023-10-27 RX ADMIN — LISINOPRIL 40 MG: 40 TABLET ORAL at 09:04

## 2023-10-27 RX ADMIN — ASPIRIN 81 MG: 81 TABLET, COATED ORAL at 09:04

## 2023-10-27 RX ADMIN — CLOPIDOGREL BISULFATE 75 MG: 75 TABLET ORAL at 09:04

## 2023-10-27 RX ADMIN — Medication 10 ML: at 09:04

## 2023-10-27 RX ADMIN — AMLODIPINE BESYLATE 10 MG: 10 TABLET ORAL at 09:04

## 2023-10-27 RX ADMIN — CYANOCOBALAMIN TAB 500 MCG 500 MCG: 500 TAB at 09:04

## 2023-10-27 RX ADMIN — ENOXAPARIN SODIUM 30 MG: 100 INJECTION SUBCUTANEOUS at 09:03

## 2023-10-27 NOTE — PROGRESS NOTES
Patient received information from OT about driving reevaluation in 3 months and family is aware and info given to son. Nurse reiterated to family and patient to not drive after he is discharged.

## 2023-10-27 NOTE — PROGRESS NOTES
Patient with confusion lastnight and anxious about talking to MD. Slept in recliner chair. Denies complaints. Reminded to use call light for assistance. Up to BR with SBA. Will monitor.

## 2023-10-27 NOTE — PROGRESS NOTES
Sitting up in recliner chair. Denies complaints. Reminded to use call light for assistance. Safety precautions in place.

## 2023-10-27 NOTE — PROCEDURES
[address], [address]                          ELECTROENCEPHALOGRAM REPORT    PATIENT NAME: Jony Christine                     :        1940  MED REC NO:   [id number]                          ROOM:       5262  ACCOUNT NO:   [de-identified]                           ADMIT DATE: 10/25/2023  PROVIDER:     Courtney Fisher DO    DATE OF EEG:  10/26/2023    REFERRING PROVIDER:  Micheline Cornell NP    REASON FOR STUDY:  Jerking movements. BRIEF HISTORY AND NEUROLOGIC FINDINGS:  The patient is an 82-year-old  male being evaluated for reported jerking movements. MEDICATIONS:  The patient was on no listed centrally acting medications. EEG FINDINGS:  This is a 20-channel digital EEG performed utilizing  bipolar and referential montages. Wakefulness and brief drowsiness were  obtained during the recording. During maximum wakefulness, there was a  moderate voltage, symmetric, well-regulated and reactive 9 Hz posterior  dominant background rhythm. The anterior background consists of low  voltage fast frequencies. Drowsiness is manifested by attenuation of  the waking background rhythms. Photic stimulation was performed at various flash frequencies and evoked  a symmetric posterior driving response at multiple frequencies. Hyperventilation exercise was not performed due to the patient's age. A 1-channel EKG rhythm strip was reviewed and showed no obvious cardiac  dysrhythmias. Heart rates were typically at around 60 beats per minute. EEG DIAGNOSIS:  Normal awake EEG. CLINICAL INTERPRETATION:  No definite epileptiform activity or evidence  for focal cerebral dysfunction was present during this recording. If  seizures remain a clinical concern, then a repeat EEG may be of further  benefit. Clinical correlation is advised.         Don Rod DO    D: 10/26/2023 18:26:12       T: 10/26/2023 18:28:39

## 2023-10-27 NOTE — CARE COORDINATION
CASE MANAGEMENT DISCHARGE SUMMARY:    DISCHARGE DATE: 10/27/23    DISCHARGED TO: Home with family support & home care     HOME CARE AGENCY: referral made to Cris Shin  Name: [facility]  Address:   [facility] [address]  Phone:  [phone]  Fax:  [phone]    TRANSPORTATION: [facility]             TIME: after 12:30pm    COMMENTS: Patient to discharge home with home care. [facility]erral to [facility] at [facility], thinks he should be able to accept and will call if issues. Cris Shin able to pull orders via Epic. RN Saad aware of dc plan. Electronically signed by Luiz Canavan, RN Case Management on 10/27/2023 at 12:30 PM         Spoke to Cris Shin with [facility], confirmed agency is able to accept.      Electronically signed by Luiz Canavan, RN Case Management on 10/27/2023 at 3:03 PM

## 2023-10-27 NOTE — PROGRESS NOTES
Patient and family at bedside and understands discharge instructions. Aware to set up appt with Neuro and Dec 15 appt with PCP. Patient knows not to drive and aware home health care will call and be to see him. Pt wheeled down in wheelchair and gone home with family.

## 2023-10-27 NOTE — PROGRESS NOTES
Physical Therapy  Facility/Department: [address] PROGRESSIVE CARE  Physical Therapy Treatment Note    Name: Joseph Reeves  : 1940  MRN: [id number]  Date of Service: 10/27/2023    Discharge Recommendations:  Continue to assess pending progress, 24 hour supervision or assist   Joseph Reeves scored a 20/24 on the AM-PAC short mobility form. At this time, no further PT is recommended upon discharge. Assessment   Body Structures, Functions, Activity Limitations Requiring Skilled Therapeutic Intervention: Decreased functional mobility ; Decreased cognition;Decreased balance  Assessment: 81 y/o male admit 10/25/2023 with AMS and \"Jerking Mvts. \"  Family reports pt more forgetful than usual; noted sitting in chair at 1AM with jerking mvts and inability to answer questions. CT/CTA Head : Mod to Severe narrowing origin L V1 Vert Artery, unchanged. 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 PMH : Rheumatic Fever, Noncompliance with medications. PTA pt living with son in [address] [place] [address], [address] setting (son works, doesn't drive (pt drives son to/from work) with few steps to enter and 1 level; independent daily care and functional mobility. Currently pt nancy oob/amb functional distances without assist device Slight CGA. Current issues with cognition/safety rather than functional.  Need to ensure at least brief 24/7 upon d/c. Do not anticipate need cont PT upon d/c.   Requires PT Follow-Up: Yes  Activity Tolerance  Activity Tolerance: Patient tolerated treatment well     Plan   Physcial Therapy Plan  General Plan: 3-5 times per week  Current Treatment Recommendations: Therapeutic activities, Balance training, Functional mobility training, Transfer training, Gait training, Stair training, Safety education & training, Patient/Caregiver education & training  Safety Devices  Type of Devices: Call light within reach, Chair alarm in place, Gait belt, Left in chair, Nurse notified  Restraints  Restraints Span: Attends with cues to redirect  Memory: Decreased recall of recent events;Decreased short term memory  Safety Judgement: Decreased awareness of need for assistance  Problem Solving: Assistance required to generate solutions;Assistance required to implement solutions;Assistance required to identify errors made;Assistance required to correct errors made;Decreased awareness of errors  Insights: Decreased awareness of deficits  Initiation: Requires cues for some  Sequencing: Requires cues for some  Cognition Comment: Pt reports \"parents staying with me at my condo. \" When questioned, pt able to report his age (82) and his parents are \"90.\"     Objective                                Bed mobility  Supine to Sit: Independent  Transfers  Sit to Stand: Stand by assistance  Stand to Sit: Stand by assistance  Ambulation  Surface: Level tile  Device: No Device  Distance: Pt amb 150'x 2  without assist device Slight CGA. No LE buckling/giving way; mild/occass gait path veer (moreso when distracted)although no specific LOB. Exercise Treatment: Stand at countertop : Marching, Mini Squats, Toe/Heel Raises. AM-PAC Score  -PAC Inpatient Mobility Raw Score : 20 (10/27/23 1203)  AM-PAC Inpatient T-Scale Score : 47.67 (10/27/23 1203)  Mobility Inpatient CMS 0-100% Score: 35.83 (10/27/23 1203)  Mobility Inpatient CMS G-Code Modifier : CJ (10/27/23 1203)          Goals  Short Term Goals  Time Frame for Short Term Goals: Upon d/c acute care setting. Short Term Goal 1: Transfers with/without assist device Supervision. Short Term Goal 2: Amb with/without assist device 150-200' Supervision. Short Term Goal 3: Stair Negotiation as approp. Patient Goals   Patient Goals : None stated.        Education  Patient Education  Education Given To: Patient  Education Provided Comments: Role of PT, POC, Need to call for assist.  Education Method: Verbal  Barriers to Learning: Cognition  Education Outcome: Verbalized

## 2023-10-27 NOTE — DISCHARGE INSTRUCTIONS
Please abstain from driving for the next 3 months.    You will need further assessment after that to establish safety of driving

## 2023-10-27 NOTE — DISCHARGE INSTR - COC
Continuity of Care Form    Patient Name: Bogdan Gonzalez   :  1940  MRN:  [id number]    Admit date:  10/25/2023  Discharge date:  10/27/2023  Code Status Order: Full Code   Advance Directives:     Admitting Physician:  Connie Gatica MD  PCP: Lonny Mohan DO    Discharging Nurse: Aquilino Verde, [address] Unit/Room#: M5E-3973/4463-74  Discharging Unit Phone Number: [phone]    Emergency Contact:   Extended Emergency Contact Information  Primary Emergency Contact: [address] [address] Phone: [phone]  Mobile Phone: [phone]  Relation: Child  Preferred language: English   needed? No  Secondary Emergency Contact: david mendez  [place] Phone: [phone]  Mobile Phone: [phone]  Relation: Child   needed?  No    Past Surgical History:  Past Surgical History:   Procedure Laterality Date    APPENDECTOMY         Immunization History:   Immunization History   Administered Date(s) Administered    COVID-19, MODERNA BLUE border, Primary or Immunocompromised, (age 12y+), IM, 100 mcg/0.5mL 2021, 2021    Influenza Virus Vaccine 2009    Influenza, FLUAD, (age 72 y+), Adjuvanted, 0.5mL 2022    Influenza, FLUARIX, FLULAVAL, FLUZONE (age 10 mo+) AND AFLURIA, (age 1 y+), PF, 0.5mL 10/05/2020    Influenza, FLUZONE (age 72 y+), High Dose, 0.7mL 2020    Influenza, High Dose (Fluzone 65 yrs and older) 10/24/2013, 10/27/2017, 10/01/2019    Pneumococcal, PCV-13, PREVNAR 15, (age 6w+), IM, 0.5mL 2015    Pneumococcal, PPSV23, PNEUMOVAX 23, (age 2y+), SC/IM, 0.5mL 2009, 2016    TDaP, ADACEL (age 6y-58y), BOOSTRIX (age 10y+), IM, 0.5mL 10/05/2020    Zoster Recombinant (Shingrix) 10/05/2020       Active Problems:  Patient Active Problem List   Diagnosis Code    Diabetes mellitus type 2, controlled ([address]) E11.9    Essential hypertension I10    Acute CVA (cerebrovascular accident) ([address]) I63.9    Noncompliance with medications Z91.148    Stenosis Vision    Nutrition Therapy:  Current Nutrition Therapy:   - Oral Diet:  General    Routes of Feeding: Oral  Liquids: No Restrictions  Daily Fluid Restriction: no  Last Modified Barium Swallow with Video (Video Swallowing Test): not done    Treatments at the Time of Hospital Discharge:   Respiratory Treatments: n/a  Oxygen Therapy:  is not on home oxygen therapy. Ventilator:    - No ventilator support    Rehab Therapies: n/a  Weight Bearing Status/Restrictions: No weight bearing restrictions  Other Medical Equipment (for information only, NOT a DME order):  N/a  Other Treatments: Patient instructed not to drive     Patient's personal belongings (please select all that are sent with patient):  Glasses    RN SIGNATURE:  Electronically signed by Lucas Corona RN on 10/27/23 at 12:59 PM EDT    CASE MANAGEMENT/SOCIAL WORK SECTION    Inpatient Status Date: 10/25/23    Readmission Risk Assessment Score:  Readmission Risk              Risk of Unplanned Readmission:  17           Discharging to Facility/ Agency   Name: [facility]  Address:   [place] [address]  Phone:  [phone]  Fax:  [phone]    / signature: Electronically signed by Patricia Salvador RN Case Management on 10/27/23 at 12:32 PM EDT    PHYSICIAN SECTION    Prognosis: Good    Condition at Discharge: Stable    Rehab Potential (if transferring to Rehab): Good    Recommended Labs or Other Treatments After Discharge:   - home OT     Physician Certification: I certify the above information and transfer of Joseph Reeves  is necessary for the continuing treatment of the diagnosis listed and that he requires [address] [place] [address] for greater 30 days.      Update Admission H&P: No change in H&P    PHYSICIAN SIGNATURE:  Electronically signed by Courtney Hyman MD on 10/27/23 at 12:09 PM EDT

## 2023-10-27 NOTE — PROGRESS NOTES
Occupational Therapy  Facility/Department: [address] PROGRESSIVE CARE  Occupational Daily Treatment Note      Name: Raheel Wu  : 1940  MRN: [id number]  Date of Service: 10/27/2023    Discharge Recommendations:  24 hour supervision or assist, Home with Home health OT, 2-3 sessions per week  OT Equipment Recommendations  ADL Assistive Devices: Shower Chair with back       Patient Diagnosis(es): The primary encounter diagnosis was Altered mental status, unspecified altered mental status type. Diagnoses of Tremulousness and Facial droop were also pertinent to this visit. Past Medical History:  has a past medical history of Active rheumatic fever, Diabetes mellitus type 2, uncontrolled, Essential hypertension, and Noncompliance with medications. Past Surgical History:  has a past surgical history that includes Appendectomy. Raheel Wu scored a 19/ on the AM-PAC ADL Inpatient form. Current research shows that an AM-PAC score of 18 or greater is typically associated with a discharge to the patient's home setting. Based on the patient's AM-PAC score, and their current ADL deficits, it is recommended that the patient have 2-3 sessions per week of Occupational Therapy at d/c to increase the patient's independence. At this time, this patient demonstrates the endurance and safety to discharge home with home (home vs OP services) and a follow up treatment frequency of 2-3x/wk. Please see assessment section for further patient specific details. If patient discharges prior to next session this note will serve as a discharge summary. Please see below for the latest assessment towards goals.    HOME HEALTH CARE: LEVEL 3 SAFETY       -Initial home health evaluation to occur within 24-48 hours, in patient home   -Home health agency to establish plan of care for patient over 60 day period   -Medication Reconciliation   -PT/OT/Speech evaluations in home within 24-48 hours of discharge; including  -DME and home safety   -Frontload therapy 5 days, then 3x a week   -OT to evaluate if patient has [address] needs for personal care   - evaluation within 24-48 hours, includes evaluation of resources   and insurance to determine [place], [place], LTC, and Medicaid options   -PCP Visit scheduled within three to seven days of discharge       Assessment   Performance deficits / Impairments: Decreased functional mobility ; Decreased balance;Decreased safe awareness;Decreased ADL status; Decreased cognition;Decreased high-level IADLs  Assessment: Discussed with OTR am pac score is 19. Anticipate that patient will be able to return home. Up in chair at start of session. SBA for sit<>stand. Functional mobility without device with SBA, shuffling gait noted, slightly quick pace with no overt LOB. Transfer to couch and recliner chair with SBA. Static standing without device with SBA. Extensive education and hand out given regarding drivers evaluation. Provide education as to no driving until drivers evaluation completed. Patient would benefit from 24/7 supervsion due to decreased cognition and safety. Would benefit from Home OT for safe discharge to home, homemaking/cooking tasks due to decrease cognition.   REQUIRES OT FOLLOW-UP: Yes  Activity Tolerance  Activity Tolerance: Patient Tolerated treatment well        Plan   Occupational Therapy Plan  Times Per Week: 3-5x  Current Treatment Recommendations: Strengthening, Balance training, Functional mobility training, Endurance training, Safety education & training, Self-Care / ADL, Patient/Caregiver education & training, Equipment evaluation, education, & procurement, Cognitive reorientation     Restrictions  Restrictions/Precautions  Restrictions/Precautions: Fall Risk    Subjective   General  Chart Reviewed: Yes  Patient assessed for rehabilitation services?: Yes  Additional Pertinent Hx: per ED note, Lidia Dillard is a 82 y.o. male who presents with altered mental status from

## 2023-10-30 ENCOUNTER — CARE COORDINATION (OUTPATIENT)
Dept: CARE COORDINATION | Age: 83
End: 2023-10-30

## 2023-10-31 ENCOUNTER — CARE COORDINATION (OUTPATIENT)
Dept: CASE MANAGEMENT | Age: 83
End: 2023-10-31

## 2023-10-31 NOTE — CARE COORDINATION
Care Transitions Outreach Attempt    Call within 2 business days of discharge: Yes     Second and final outreach call attempt, no answer. CTN left  with contact information and request for return call. CTN will resolve episode and remain available. Unable to send a [employer] message as pt is not active. Pt to see PCP  for a HFU. Patient: Bogdan Gonzalez Patient : 1940 MRN: [id number]    Last Discharge Facility       Date Complaint Diagnosis Description Type Department Provider    10/25/23 Altered Mental Status Altered mental status, unspecified altered mental status type . .. ED to Hosp-Admission (Discharged) (ADMITTED) SOCRATES MillerN Jerome Ayala MD; Neftali. .. Was this an external facility discharge?  No Discharge Facility Name: [facility]    Noted following upcoming appointments from discharge chart review:   [facility] follow up appointment(s):   Future Appointments   Date Time Provider [address]   2023  2:00 PM Lonny Mohan DO [place] LOVE   12/15/2023  8:45 AM Lonny Mohan DO [facility] [place] - [facility]     Non-[facility]  follow up appointment(s): n/a    GREGORIO Lerma, RN   Care Transition Nurse  Mobile: [phone]

## 2023-11-02 ENCOUNTER — OFFICE VISIT (OUTPATIENT)
Dept: FAMILY MEDICINE CLINIC | Age: 83
End: 2023-11-02
Payer: COMMERCIAL

## 2023-11-02 VITALS — WEIGHT: 113 LBS | SYSTOLIC BLOOD PRESSURE: 138 MMHG | BODY MASS INDEX: 22.07 KG/M2 | DIASTOLIC BLOOD PRESSURE: 78 MMHG

## 2023-11-02 DIAGNOSIS — Z87.898 HISTORY OF MEMORY LOSS: ICD-10-CM

## 2023-11-02 DIAGNOSIS — R41.82 ALTERED MENTAL STATUS, UNSPECIFIED ALTERED MENTAL STATUS TYPE: ICD-10-CM

## 2023-11-02 DIAGNOSIS — I63.9 ACUTE CVA (CEREBROVASCULAR ACCIDENT) (HCC): Primary | ICD-10-CM

## 2023-11-02 PROCEDURE — 1123F ACP DISCUSS/DSCN MKR DOCD: CPT | Performed by: FAMILY MEDICINE

## 2023-11-02 PROCEDURE — 3078F DIAST BP <80 MM HG: CPT | Performed by: FAMILY MEDICINE

## 2023-11-02 PROCEDURE — 3074F SYST BP LT 130 MM HG: CPT | Performed by: FAMILY MEDICINE

## 2023-11-02 PROCEDURE — 99214 OFFICE O/P EST MOD 30 MIN: CPT | Performed by: FAMILY MEDICINE

## 2023-11-02 NOTE — PROGRESS NOTES
2023     Queta White (:  1940) is a 82 y.o. male, here for evaluation of the following medical concerns:    HPI    Patient needs to follow up concerning recent hospitalization, he is driving again which I have cautioned him not to do     Hospital Course:   82 y.o. male who presents with a past medical history of DM, hypertension, rheumatic fever. Patient was brought to ED with AMS and \" jerking movements\". Patient has no recollection of reasons of why he was brought to emergency, this morning he is alert alert and coherent, was able to name the year and the hospital.     Collateral history acquired from patient's sister, she had been visiting from out of town and did note that the patient is more forgetful than usual 24 hours prior to presentation however patient's son had noted him sitting in a chair at 1 AM, with jerking movement of bilateral upper and lower limbs, was unable to answer questions when addressed. Patient has no history of seizures however did recall head trauma 3 months ago, patient lives with his son, some concern is documented in the chart about medication noncompliance. Patient continues to exercise regularly  ( cycling )      Discharge Instructions/Follow-up:    -Facial neurocognitive testing as outpatient, I contacted patient's son and explained to him the need for neurocognitive testing to establish diagnosis of dementia.  -No driving for at least 3 months, to be assessed by OT afterwards.  -Continue home medications.  -Home OT    Schedule an appointment with Anjelica Pedersen MD (Neuropsychiatry) in 1 week (11/3/2023); For fomral neurocognitive  evaulation  Today, he denied chest pain, sob, n, v ,or diarrhea. Review of Systems   Constitutional:  Negative for activity change and fatigue. Respiratory:  Negative for cough and shortness of breath. Cardiovascular:  Negative for chest pain, palpitations and leg swelling.    Gastrointestinal:  Negative for

## 2023-11-06 NOTE — PROGRESS NOTES
[facility] without complications    2sst  1lav
The patient is a 76y Male complaining of fall.
prescribed. Will obtain A1C at this visit. Educated on signs and symptoms for immediate evaluation in the ER.    - Comprehensive Metabolic Panel  - CBC with Auto Differential  - Lipid Panel  - Vitamin B12 & Folate  - TSH with Reflex to FT4  - AFL - Randal Mcpherson MD, Neurology, [address] screen; Future    4. History of memory loss  Patient was told he shouldn't be driving at this time, an order for driving exam was given and was educated on the need not to drive until he passed the exam.    He will be referred to Neurology  Patient had questions answered  Reassured the patient. - Comprehensive Metabolic Panel  - CBC with Auto Differential  - Lipid Panel  - Vitamin B12 & Folate  - TSH with Reflex to FT4  - AFL - Randal Mcpherson MD, Neurology, [address] screen; Future      Return in about 3 months (around 11/15/2023).

## 2023-11-07 NOTE — PROGRESS NOTES
Physician Progress Note      PATIENT:               Ventura Sebastian  CSN #:                  [id number]  :                       1940  ADMIT DATE:       10/25/2023 2:10 AM  DISCH DATE:        10/27/2023 1:57 PM  RESPONDING  PROVIDER #:        Ethan Barclay MD          QUERY TEXT:    Pt admitted with AMS. Pt noted to have prior CVA. 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 Clinical Indicators: Neurology PN Enioan Mohs Manley Ocean is a 82 y.o. male who   presented from home with AMS and involuntary twitching. He has a hx of   bi-hemisphere strokes with residual L side paresthesias. Event monitor in the   past did not show any afib. Patient was somewhat responsive during the   episode of twitching. There are no irregular labs to explain myoclonus and it   has not returned during admission. Patient does have seizure risk factor   given his hx of stroke and reported mild dementia\"  Treatment: Neurology consult, CT, MRI, EEG  Options provided:  -- AMS is a possible sequela of prior CVA  -- AMS is not a sequela of prior CVA  -- Other - I will add my own diagnosis  -- Disagree - Not applicable / Not valid  -- Disagree - Clinically unable to determine / Unknown  -- Refer to Clinical Documentation Reviewer    PROVIDER RESPONSE TEXT:    Provider is clinically unable to determine a response to this query.     Query created by: Bernadine Borges on 11/3/2023 10:17 AM      Electronically signed by:  Ethan Barclay MD 2023 8:08 AM

## 2023-11-13 ASSESSMENT — ENCOUNTER SYMPTOMS
ABDOMINAL PAIN: 0
VOMITING: 0
NAUSEA: 0
SHORTNESS OF BREATH: 0
DIARRHEA: 0
COUGH: 0

## 2024-01-11 ENCOUNTER — OFFICE VISIT (OUTPATIENT)
Dept: FAMILY MEDICINE CLINIC | Age: 84
End: 2024-01-11
Payer: COMMERCIAL

## 2024-01-11 VITALS
BODY MASS INDEX: 22.38 KG/M2 | SYSTOLIC BLOOD PRESSURE: 120 MMHG | DIASTOLIC BLOOD PRESSURE: 70 MMHG | HEIGHT: 60 IN | WEIGHT: 114 LBS

## 2024-01-11 DIAGNOSIS — I10 ESSENTIAL HYPERTENSION: ICD-10-CM

## 2024-01-11 DIAGNOSIS — G62.9 NEUROPATHY: ICD-10-CM

## 2024-01-11 DIAGNOSIS — I63.9 ACUTE CVA (CEREBROVASCULAR ACCIDENT) (HCC): Primary | ICD-10-CM

## 2024-01-11 DIAGNOSIS — E11.9 CONTROLLED TYPE 2 DIABETES MELLITUS WITHOUT COMPLICATION, WITHOUT LONG-TERM CURRENT USE OF INSULIN (HCC): ICD-10-CM

## 2024-01-11 PROBLEM — E11.40 TYPE 2 DIABETES MELLITUS WITH DIABETIC NEUROPATHY (HCC): Status: ACTIVE | Noted: 2024-01-11

## 2024-01-11 LAB — HBA1C MFR BLD: 7 %

## 2024-01-11 PROCEDURE — 1123F ACP DISCUSS/DSCN MKR DOCD: CPT | Performed by: FAMILY MEDICINE

## 2024-01-11 PROCEDURE — 3078F DIAST BP <80 MM HG: CPT | Performed by: FAMILY MEDICINE

## 2024-01-11 PROCEDURE — 3074F SYST BP LT 130 MM HG: CPT | Performed by: FAMILY MEDICINE

## 2024-01-11 PROCEDURE — 99214 OFFICE O/P EST MOD 30 MIN: CPT | Performed by: FAMILY MEDICINE

## 2024-01-11 PROCEDURE — 83036 HEMOGLOBIN GLYCOSYLATED A1C: CPT | Performed by: FAMILY MEDICINE

## 2024-01-11 PROCEDURE — 3051F HG A1C>EQUAL 7.0%<8.0%: CPT | Performed by: FAMILY MEDICINE

## 2024-01-11 ASSESSMENT — PATIENT HEALTH QUESTIONNAIRE - PHQ9
SUM OF ALL RESPONSES TO PHQ QUESTIONS 1-9: 0
2. FEELING DOWN, DEPRESSED OR HOPELESS: 0
1. LITTLE INTEREST OR PLEASURE IN DOING THINGS: 0
SUM OF ALL RESPONSES TO PHQ QUESTIONS 1-9: 0
SUM OF ALL RESPONSES TO PHQ9 QUESTIONS 1 & 2: 0
SUM OF ALL RESPONSES TO PHQ QUESTIONS 1-9: 0
SUM OF ALL RESPONSES TO PHQ QUESTIONS 1-9: 0

## 2024-01-11 NOTE — PROGRESS NOTES
alert and oriented to person, place, and time.   Psychiatric:         Behavior: Behavior normal.         Judgment: Judgment normal.         ASSESSMENT/PLAN:  1. Acute CVA (cerebrovascular accident) (HCC)  Stable  Continue with medication  Keep appointments with specialist.   Answered questions   - Rubin Do MD, Neurology, [facility] [place]    2. Controlled type 2 diabetes mellitus without complication, without long-term current use of insulin (HCC)  Patient will need to keep a log of his glucose readings and bring them to the office.   He needs to monitor his diet and exercise.   Attempt to lose weight.   Discussed proper eating habits.   Continue to take medication as prescribed.   Will obtain A1C at this visit.   Educated on signs and symptoms for immediate evaluation in the ER.    - POCT glycosylated hemoglobin (Hb A1C)    3. Essential hypertension  Difficult and poorly controlled.   Discussed signs and symptoms for immediate evaluation in the ER.   Reduce sodium.   Monitor diet, exercise and lose weight.   Keep a BP log and bring it to your next appointment.   Needs to rtc in one month for BP check     4. Neuropathy  Stable  Continue with medication  Keep appointments with specialist.   Answered questions   - Rubin Do MD, Neurology, [facility] [place]      No follow-ups on file.

## 2024-01-15 ASSESSMENT — ENCOUNTER SYMPTOMS
VOMITING: 0
COUGH: 0
EYE DISCHARGE: 0
ABDOMINAL PAIN: 0
DIARRHEA: 0
NAUSEA: 0
SHORTNESS OF BREATH: 0

## 2024-03-04 RX ORDER — AMLODIPINE BESYLATE 10 MG/1
10 TABLET ORAL DAILY
Qty: 90 TABLET | Refills: 0 | Status: SHIPPED | OUTPATIENT
Start: 2024-03-04

## 2024-04-25 RX ORDER — ATORVASTATIN CALCIUM 80 MG/1
TABLET, FILM COATED ORAL
Qty: 30 TABLET | Refills: 3 | Status: SHIPPED | OUTPATIENT
Start: 2024-04-25

## 2024-04-25 NOTE — TELEPHONE ENCOUNTER
Last office visit 1/11/2024     Last written      Next office visit scheduled Visit date not found    Requested Prescriptions     Pending Prescriptions Disp Refills    atorvastatin (LIPITOR) 80 MG tablet [Pharmacy Med Name: ATORVASTATIN CALCIUM 80MG TABS] 30 tablet 3     Sig: TAKE ONE TABLET BY MOUTH EVERY EVENING

## 2024-06-07 RX ORDER — CLOPIDOGREL BISULFATE 75 MG/1
TABLET ORAL
Qty: 30 TABLET | Refills: 3 | Status: SHIPPED | OUTPATIENT
Start: 2024-06-07

## 2024-06-07 NOTE — TELEPHONE ENCOUNTER
Last office visit 1/11/2024     Last written     Next office visit scheduled Visit date not found    Requested Prescriptions     Pending Prescriptions Disp Refills    clopidogrel (PLAVIX) 75 MG tablet [Pharmacy Med Name: CLOPIDOGREL BISULFATE 75MG TABS] 30 tablet 3     Sig: TAKE ONE TABLET BY MOUTH ONCE A DAY

## 2024-07-22 RX ORDER — CLOPIDOGREL BISULFATE 75 MG/1
TABLET ORAL
Qty: 30 TABLET | Refills: 0 | Status: SHIPPED | OUTPATIENT
Start: 2024-07-22

## 2024-07-22 RX ORDER — GABAPENTIN 300 MG/1
CAPSULE ORAL
Qty: 120 CAPSULE | Refills: 0 | Status: SHIPPED | OUTPATIENT
Start: 2024-07-22 | End: 2024-08-21

## 2024-07-22 RX ORDER — AMLODIPINE BESYLATE 10 MG/1
10 TABLET ORAL DAILY
Qty: 30 TABLET | Refills: 0 | Status: SHIPPED | OUTPATIENT
Start: 2024-07-22

## 2024-07-22 NOTE — TELEPHONE ENCOUNTER
Last office visit 1/11/2024     Last written      Next office visit scheduled Visit date not found    Requested Prescriptions     Pending Prescriptions Disp Refills    gabapentin (NEURONTIN) 300 MG capsule [Pharmacy Med Name: GABAPENTIN 300MG CAPS] 120 capsule 2     Sig: TAKE ONE CAPSULE BY MOUTH FOUR TIMES A DAY    amLODIPine (NORVASC) 10 MG tablet [Pharmacy Med Name: AMLODIPINE BESYLATE 10MG TABS] 90 tablet 0     Sig: TAKE ONE TABLET BY MOUTH ONCE A DAY    clopidogrel (PLAVIX) 75 MG tablet [Pharmacy Med Name: CLOPIDOGREL BISULFATE 75MG TABS] 30 tablet 3     Sig: TAKE ONE TABLET BY MOUTH ONCE A DAY

## 2024-08-27 RX ORDER — AMLODIPINE BESYLATE 10 MG/1
10 TABLET ORAL DAILY
Qty: 30 TABLET | Refills: 0 | Status: SHIPPED | OUTPATIENT
Start: 2024-08-27

## 2024-08-27 NOTE — TELEPHONE ENCOUNTER
Last office visit 1/11/2024       Next office visit scheduled Visit date not found    Requested Prescriptions     Pending Prescriptions Disp Refills    amLODIPine (NORVASC) 10 MG tablet [Pharmacy Med Name: AMLODIPINE BESYLATE 10MG TABS] 30 tablet 0     Sig: TAKE ONE TABLET BY MOUTH ONCE A DAY

## 2024-08-28 ENCOUNTER — OFFICE VISIT (OUTPATIENT)
Dept: FAMILY MEDICINE CLINIC | Age: 84
End: 2024-08-28
Payer: COMMERCIAL

## 2024-08-28 VITALS
BODY MASS INDEX: 22.38 KG/M2 | SYSTOLIC BLOOD PRESSURE: 122 MMHG | HEIGHT: 60 IN | DIASTOLIC BLOOD PRESSURE: 80 MMHG | WEIGHT: 114 LBS

## 2024-08-28 DIAGNOSIS — G62.9 NEUROPATHY: Primary | ICD-10-CM

## 2024-08-28 DIAGNOSIS — I63.9 ACUTE CVA (CEREBROVASCULAR ACCIDENT) (HCC): ICD-10-CM

## 2024-08-28 DIAGNOSIS — E11.65 TYPE 2 DIABETES MELLITUS WITH HYPERGLYCEMIA, WITHOUT LONG-TERM CURRENT USE OF INSULIN (HCC): ICD-10-CM

## 2024-08-28 DIAGNOSIS — Z79.4 TYPE 2 DIABETES MELLITUS WITH DIABETIC NEUROPATHY, WITH LONG-TERM CURRENT USE OF INSULIN (HCC): ICD-10-CM

## 2024-08-28 DIAGNOSIS — I10 ESSENTIAL HYPERTENSION: ICD-10-CM

## 2024-08-28 DIAGNOSIS — E11.40 TYPE 2 DIABETES MELLITUS WITH DIABETIC NEUROPATHY, WITH LONG-TERM CURRENT USE OF INSULIN (HCC): ICD-10-CM

## 2024-08-28 LAB
ALBUMIN SERPL-MCNC: 4.7 G/DL (ref 3.4–5)
ALBUMIN/GLOB SERPL: 2 {RATIO} (ref 1.1–2.2)
ALP SERPL-CCNC: 95 U/L (ref 40–129)
ALT SERPL-CCNC: 26 U/L (ref 10–40)
ANION GAP SERPL CALCULATED.3IONS-SCNC: 12 MMOL/L (ref 3–16)
AST SERPL-CCNC: 27 U/L (ref 15–37)
BASOPHILS # BLD: 0 K/UL (ref 0–0.2)
BASOPHILS NFR BLD: 0.5 %
BILIRUB SERPL-MCNC: 0.7 MG/DL (ref 0–1)
BUN SERPL-MCNC: 25 MG/DL (ref 7–20)
CALCIUM SERPL-MCNC: 9.8 MG/DL (ref 8.3–10.6)
CHLORIDE SERPL-SCNC: 105 MMOL/L (ref 99–110)
CHOLEST SERPL-MCNC: 188 MG/DL (ref 0–199)
CO2 SERPL-SCNC: 26 MMOL/L (ref 21–32)
CREAT SERPL-MCNC: 1.2 MG/DL (ref 0.8–1.3)
CREATININE URINE POCT: NORMAL
DEPRECATED RDW RBC AUTO: 14.2 % (ref 12.4–15.4)
EOSINOPHIL # BLD: 0.2 K/UL (ref 0–0.6)
EOSINOPHIL NFR BLD: 2.8 %
GFR SERPLBLD CREATININE-BSD FMLA CKD-EPI: 60 ML/MIN/{1.73_M2}
GLUCOSE SERPL-MCNC: 130 MG/DL (ref 70–99)
HCT VFR BLD AUTO: 39.2 % (ref 40.5–52.5)
HDLC SERPL-MCNC: 58 MG/DL (ref 40–60)
HGB BLD-MCNC: 13.4 G/DL (ref 13.5–17.5)
LDLC SERPL CALC-MCNC: 113 MG/DL
LYMPHOCYTES # BLD: 1.6 K/UL (ref 1–5.1)
LYMPHOCYTES NFR BLD: 22.7 %
MCH RBC QN AUTO: 31.3 PG (ref 26–34)
MCHC RBC AUTO-ENTMCNC: 34.1 G/DL (ref 31–36)
MCV RBC AUTO: 91.7 FL (ref 80–100)
MICROALBUMIN/CREAT 24H UR: 30 MG/DL
MICROALBUMIN/CREAT UR-RTO: 100 MG/G
MONOCYTES # BLD: 0.6 K/UL (ref 0–1.3)
MONOCYTES NFR BLD: 8 %
NEUTROPHILS # BLD: 4.6 K/UL (ref 1.7–7.7)
NEUTROPHILS NFR BLD: 66 %
PLATELET # BLD AUTO: 232 K/UL (ref 135–450)
PMV BLD AUTO: 7.8 FL (ref 5–10.5)
POTASSIUM SERPL-SCNC: 4.4 MMOL/L (ref 3.5–5.1)
PROT SERPL-MCNC: 7.1 G/DL (ref 6.4–8.2)
PSA SERPL DL<=0.01 NG/ML-MCNC: 1.66 NG/ML (ref 0–4)
RBC # BLD AUTO: 4.28 M/UL (ref 4.2–5.9)
SODIUM SERPL-SCNC: 143 MMOL/L (ref 136–145)
TRIGL SERPL-MCNC: 85 MG/DL (ref 0–150)
TSH SERPL DL<=0.005 MIU/L-ACNC: 1.52 UIU/ML (ref 0.27–4.2)
VLDLC SERPL CALC-MCNC: 17 MG/DL
WBC # BLD AUTO: 6.9 K/UL (ref 4–11)

## 2024-08-28 PROCEDURE — 1123F ACP DISCUSS/DSCN MKR DOCD: CPT | Performed by: FAMILY MEDICINE

## 2024-08-28 PROCEDURE — 3044F HG A1C LEVEL LT 7.0%: CPT | Performed by: FAMILY MEDICINE

## 2024-08-28 PROCEDURE — 82044 UR ALBUMIN SEMIQUANTITATIVE: CPT | Performed by: FAMILY MEDICINE

## 2024-08-28 PROCEDURE — 99214 OFFICE O/P EST MOD 30 MIN: CPT | Performed by: FAMILY MEDICINE

## 2024-08-28 PROCEDURE — G2211 COMPLEX E/M VISIT ADD ON: HCPCS | Performed by: FAMILY MEDICINE

## 2024-08-28 PROCEDURE — 3079F DIAST BP 80-89 MM HG: CPT | Performed by: FAMILY MEDICINE

## 2024-08-28 PROCEDURE — 3074F SYST BP LT 130 MM HG: CPT | Performed by: FAMILY MEDICINE

## 2024-08-28 SDOH — ECONOMIC STABILITY: FOOD INSECURITY: WITHIN THE PAST 12 MONTHS, THE FOOD YOU BOUGHT JUST DIDN'T LAST AND YOU DIDN'T HAVE MONEY TO GET MORE.: NEVER TRUE

## 2024-08-28 SDOH — ECONOMIC STABILITY: FOOD INSECURITY: WITHIN THE PAST 12 MONTHS, YOU WORRIED THAT YOUR FOOD WOULD RUN OUT BEFORE YOU GOT MONEY TO BUY MORE.: NEVER TRUE

## 2024-08-28 SDOH — ECONOMIC STABILITY: INCOME INSECURITY: HOW HARD IS IT FOR YOU TO PAY FOR THE VERY BASICS LIKE FOOD, HOUSING, MEDICAL CARE, AND HEATING?: NOT HARD AT ALL

## 2024-08-28 ASSESSMENT — ENCOUNTER SYMPTOMS
ABDOMINAL PAIN: 0
FACIAL SWELLING: 0
EYE DISCHARGE: 0
TROUBLE SWALLOWING: 0
VOMITING: 0
CHEST TIGHTNESS: 0
SORE THROAT: 0
DIARRHEA: 0
NAUSEA: 0
BLOOD IN STOOL: 0
SHORTNESS OF BREATH: 0
WHEEZING: 0
SINUS PRESSURE: 0
RHINORRHEA: 0
ANAL BLEEDING: 0
COUGH: 0

## 2024-08-28 NOTE — PROGRESS NOTES
visit.   Educated on signs and symptoms for immediate evaluation in the ER.    - Rubin Do MD, Neurology, [facility] [place]  - Comprehensive Metabolic Panel  - Hemoglobin A1C  - CBC with Auto Differential  - Lipid Panel  - PSA Screening  - TSH with Reflex to FT4  - POCT microalbumin    4. Acute CVA (cerebrovascular accident) (HCC)  Stable  Continue with medication  Keep appointments with specialist.   Answered questions   - Rubin Do MD, Neurology, [facility] [place]  - Comprehensive Metabolic Panel  - Hemoglobin A1C  - CBC with Auto Differential  - Lipid Panel  - PSA Screening  - TSH with Reflex to FT4  - POCT microalbumin    5. Essential hypertension  Difficult and poorly controlled.   Discussed signs and symptoms for immediate evaluation in the ER.   Reduce sodium.   Monitor diet, exercise and lose weight.   Keep a BP log and bring it to your next appointment.   Needs to rtc in one month for BP check   - Rubin Do MD, Neurology, [facility] [place]  - Comprehensive Metabolic Panel  - Hemoglobin A1C  - CBC with Auto Differential  - Lipid Panel  - PSA Screening  - TSH with Reflex to FT4  - POCT microalbumin      No follow-ups on file.

## 2024-08-29 LAB
EST. AVERAGE GLUCOSE BLD GHB EST-MCNC: 131.2 MG/DL
HBA1C MFR BLD: 6.2 %

## 2024-08-30 ENCOUNTER — TELEMEDICINE (OUTPATIENT)
Dept: FAMILY MEDICINE CLINIC | Age: 84
End: 2024-08-30

## 2024-08-30 DIAGNOSIS — Z00.00 MEDICARE ANNUAL WELLNESS VISIT, SUBSEQUENT: Primary | ICD-10-CM

## 2024-08-30 ASSESSMENT — PATIENT HEALTH QUESTIONNAIRE - PHQ9
SUM OF ALL RESPONSES TO PHQ QUESTIONS 1-9: 0
SUM OF ALL RESPONSES TO PHQ9 QUESTIONS 1 & 2: 0
1. LITTLE INTEREST OR PLEASURE IN DOING THINGS: NOT AT ALL
SUM OF ALL RESPONSES TO PHQ QUESTIONS 1-9: 0
SUM OF ALL RESPONSES TO PHQ QUESTIONS 1-9: 0
2. FEELING DOWN, DEPRESSED OR HOPELESS: NOT AT ALL
SUM OF ALL RESPONSES TO PHQ QUESTIONS 1-9: 0

## 2024-08-30 ASSESSMENT — LIFESTYLE VARIABLES: HOW MANY STANDARD DRINKS CONTAINING ALCOHOL DO YOU HAVE ON A TYPICAL DAY: PATIENT DOES NOT DRINK

## 2024-08-30 NOTE — PATIENT INSTRUCTIONS
A Healthy Heart: Care Instructions  Overview     Coronary artery disease, also called heart disease, occurs when a substance called plaque builds up in the vessels that supply oxygen-rich blood to your heart muscle. This can narrow the blood vessels and reduce blood flow. A heart attack happens when blood flow is completely blocked. A high-fat diet, smoking, and other factors increase the risk of heart disease.  Your doctor has found that you have a chance of having heart disease. A heart-healthy lifestyle can help keep your heart healthy and prevent heart disease. This lifestyle includes eating healthy, being active, staying at a weight that's healthy for you, and not smoking or using tobacco. It also includes taking medicines as directed, managing other health conditions, and trying to get a healthy amount of sleep.  Follow-up care is a key part of your treatment and safety. Be sure to make and go to all appointments, and call your doctor if you are having problems. It's also a good idea to know your test results and keep a list of the medicines you take.  How can you care for yourself at home?  Diet    Use less salt when you cook and eat. This helps lower your blood pressure. Taste food before salting. Add only a little salt when you think you need it. With time, your taste buds will adjust to less salt.     Eat fewer snack items, fast foods, canned soups, and other high-salt, high-fat, processed foods.     Read food labels and try to avoid saturated and trans fats. They increase your risk of heart disease by raising cholesterol levels.     Limit the amount of solid fat--butter, margarine, and shortening--you eat. Use olive, peanut, or canola oil when you cook. Bake, broil, and steam foods instead of frying them.     Eat a variety of fruit and vegetables every day. Dark green, deep orange, red, or yellow fruits and vegetables are especially good for you. Examples include spinach, carrots, peaches, and  years to screen for glaucoma; cataracts, macular degeneration, and other eye disorders.  A preventive dental visit is recommended every 6 months.  Try to get at least 150 minutes of exercise per week or 10,000 steps per day on a pedometer .  Order or download the FREE \"Exercise & Physical Activity: Your Everyday Guide\" from [employer]. Call 1-941.770.6496 or search [employer] online.  You need 2574-5185 mg of calcium and 2963-5575 IU of vitamin D per day. It is possible to meet your calcium requirement with diet alone, but a vitamin D supplement is usually necessary to meet this goal.  When exposed to the sun, use a sunscreen that protects against both UVA and UVB radiation with an SPF of 30 or greater. Reapply every 2 to 3 hours or after sweating, drying off with a towel, or swimming.  Always wear a seat belt when traveling in a car. Always wear a helmet when riding a bicycle or motorcycle.

## 2024-08-30 NOTE — PROGRESS NOTES
Medicare Annual Wellness Visit    Rk Willett is here for Medicare AWV    Assessment & Plan   Medicare annual wellness visit, subsequent    Recommendations for Preventive Services Due: see orders and patient instructions/AVS.  Recommended screening schedule for the next 5-10 years is provided to the patient in written form: see Patient Instructions/AVS.     No follow-ups on file.     Subjective   The following acute and/or chronic problems were also addressed today:  Chronic medical conditions     Patient's complete Health Risk Assessment and screening values have been reviewed and are found in Flowsheets. The following problems were reviewed today and where indicated follow up appointments were made and/or referrals ordered.    No Positive Risk Factors identified today.           Cognitive:      Words recalled: 0 Words Recalled     Total Score Interpretation: Abnormal Mini-Cog            Inactivity:  On average, how many days per week do you engage in moderate to strenuous exercise (like a brisk walk)?: 0 days (!) Abnormal  On average, how many minutes do you engage in exercise at this level?: 0 min    Poor Eating Habits/Diet:  Do you eat balanced/healthy meals regularly?: (!) No     Dentist Screen:  Have you seen the dentist within the past year?: (!) No   Vision Screen:  Do you have difficulty driving, watching TV, or doing any of your daily activities because of your eyesight?: No  Have you had an eye exam within the past year?: (!) No    Safety:  Do you have any tripping hazards - loose or unsecured carpets or rugs?: (!) Yes     Advanced Directives:  Do you have a Living Will?: (!) No         Objective    Patient-Reported Vitals  No data recorded   NO PE          No Known Allergies  Prior to Visit Medications    Medication Sig Taking? Authorizing Provider   amLODIPine (NORVASC) 10 MG tablet TAKE ONE TABLET BY MOUTH ONCE A DAY  Pilo Barber, DO   gabapentin (NEURONTIN) 300 MG capsule TAKE ONE CAPSULE BY  MOUTH FOUR TIMES A DAY  Pilo Barber DO   clopidogrel (PLAVIX) 75 MG tablet TAKE ONE TABLET BY MOUTH ONCE A DAY  Pilo Barber DO   atorvastatin (LIPITOR) 80 MG tablet TAKE ONE TABLET BY MOUTH EVERY EVENING  Pilo Barber DO   lisinopril (PRINIVIL;ZESTRIL) 40 MG tablet TAKE ONE TABLET BY MOUTH ONE TIME A DAY  Pilo Barber DO   vitamin B-12 (CYANOCOBALAMIN) 500 MCG tablet TAKE ONE TABLET BY MOUTH TWICE A DAY  Pilo Barber DO   acetaminophen (TYLENOL) 500 MG tablet Take 1 tablet by mouth 4 times daily as needed for Pain  KavitaMichaelle valencia APRN - CNP   metFORMIN (GLUCOPHAGE) 500 MG tablet TAKE 1 TABLET BY MOUTH ONE TIME A DAY WITH BREAKFAST  Pilo Barber DO   aspirin (ASPIRIN ADULT LOW STRENGTH) 81 MG EC tablet TAKE 1 TABLET BY MOUTH ONE TIME A DAY  Pilo Barber DO       CareTeam (Including outside providers/suppliers regularly involved in providing care):   Patient Care Team:  Pilo Barber DO as PCP - General (Family Medicine)  Pilo Barber DO as PCP - Empaneled Provider      Reviewed and updated this visit:  Tobacco  Allergies  Meds  Problems  Med Hx  Surg Hx  Soc Hx  Fam Hx            Rk GLORIA Willett, was evaluated through a synchronous (real-time) audio-video encounter. The patient (or guardian if applicable) is aware that this is a billable service, which includes applicable co-pays. This Virtual Visit was conducted with patient's (and/or legal guardian's) consent. Patient identification was verified, and a caregiver was present when appropriate.   The patient was located at Home: [address] [place] [id number]  Provider was located at Facility (Appt Dept): [address] [facility]  [address]  [place] [address]  Confirm you are appropriately licensed, registered, or certified to deliver care in the state where the patient is located as indicated above. If you are not or unsure, please re-schedule the visit: Yes, I confirm.       Total time spent on this

## 2024-10-28 RX ORDER — ATORVASTATIN CALCIUM 80 MG/1
TABLET, FILM COATED ORAL
Qty: 30 TABLET | Refills: 3 | Status: SHIPPED | OUTPATIENT
Start: 2024-10-28

## 2024-10-28 NOTE — TELEPHONE ENCOUNTER
Last ov 8/28/24  No Future appt   [facility] [phone]  Please send rx for a 100 day supply per insurance.

## 2024-11-12 RX ORDER — GABAPENTIN 300 MG/1
CAPSULE ORAL
Qty: 120 CAPSULE | Refills: 2 | Status: SHIPPED | OUTPATIENT
Start: 2024-11-12 | End: 2025-02-12

## 2024-11-12 RX ORDER — AMLODIPINE BESYLATE 10 MG/1
10 TABLET ORAL DAILY
Qty: 30 TABLET | Refills: 0 | Status: SHIPPED | OUTPATIENT
Start: 2024-11-12

## 2024-11-12 NOTE — TELEPHONE ENCOUNTER
Last office visit 8/30/2024       Next office visit scheduled Visit date not found    Requested Prescriptions     Pending Prescriptions Disp Refills    gabapentin (NEURONTIN) 300 MG capsule [Pharmacy Med Name: GABAPENTIN 300MG CAPS] 120 capsule 0     Sig: TAKE ONE CAPSULE BY MOUTH FOUR TIMES A DAY    amLODIPine (NORVASC) 10 MG tablet [Pharmacy Med Name: AMLODIPINE BESYLATE 10MG TABS] 30 tablet 0     Sig: TAKE ONE TABLET BY MOUTH ONCE A DAY

## 2024-12-09 RX ORDER — AMLODIPINE BESYLATE 10 MG/1
10 TABLET ORAL DAILY
Qty: 30 TABLET | Refills: 0 | Status: SHIPPED | OUTPATIENT
Start: 2024-12-09

## 2024-12-09 NOTE — TELEPHONE ENCOUNTER
Last office visit 8/30/2024     Last written 11/12/2024     Next office visit scheduled Visit date not found    Requested Prescriptions     Pending Prescriptions Disp Refills    amLODIPine (NORVASC) 10 MG tablet [Pharmacy Med Name: AMLODIPINE BESYLATE 10MG TABS] 30 tablet 0     Sig: TAKE ONE TABLET BY MOUTH ONCE A DAY

## 2025-01-22 RX ORDER — CLOPIDOGREL BISULFATE 75 MG/1
TABLET ORAL
Qty: 30 TABLET | Refills: 0 | Status: SHIPPED | OUTPATIENT
Start: 2025-01-22 | End: 2025-01-24

## 2025-01-22 RX ORDER — AMLODIPINE BESYLATE 10 MG/1
10 TABLET ORAL DAILY
Qty: 30 TABLET | Refills: 0 | Status: SHIPPED | OUTPATIENT
Start: 2025-01-22 | End: 2025-01-24 | Stop reason: SDUPTHER

## 2025-01-22 NOTE — TELEPHONE ENCOUNTER
Last office visit 8/30/2024       Next office visit scheduled Visit date not found    Requested Prescriptions     Pending Prescriptions Disp Refills    clopidogrel (PLAVIX) 75 MG tablet [Pharmacy Med Name: CLOPIDOGREL BISULFATE 75MG TABS] 30 tablet 3     Sig: TAKE ONE TABLET BY MOUTH ONCE A DAY    amLODIPine (NORVASC) 10 MG tablet [Pharmacy Med Name: AMLODIPINE BESYLATE 10MG TABS] 30 tablet 0     Sig: TAKE ONE TABLET BY MOUTH ONCE A DAY

## 2025-01-24 ENCOUNTER — OFFICE VISIT (OUTPATIENT)
Dept: FAMILY MEDICINE CLINIC | Age: 85
End: 2025-01-24

## 2025-01-24 VITALS
SYSTOLIC BLOOD PRESSURE: 160 MMHG | DIASTOLIC BLOOD PRESSURE: 70 MMHG | WEIGHT: 113 LBS | BODY MASS INDEX: 22.19 KG/M2 | HEIGHT: 60 IN

## 2025-01-24 DIAGNOSIS — R53.1 LEFT-SIDED WEAKNESS: ICD-10-CM

## 2025-01-24 DIAGNOSIS — G62.9 NEUROPATHY: ICD-10-CM

## 2025-01-24 DIAGNOSIS — E11.40 TYPE 2 DIABETES MELLITUS WITH DIABETIC NEUROPATHY, WITHOUT LONG-TERM CURRENT USE OF INSULIN (HCC): Primary | ICD-10-CM

## 2025-01-24 DIAGNOSIS — E53.8 B12 DEFICIENCY: ICD-10-CM

## 2025-01-24 DIAGNOSIS — I10 ESSENTIAL HYPERTENSION: ICD-10-CM

## 2025-01-24 DIAGNOSIS — Z86.73 H/O: CVA (CEREBROVASCULAR ACCIDENT): ICD-10-CM

## 2025-01-24 PROBLEM — I63.9 CEREBRAL INFARCTION, LEFT HEMISPHERE (HCC): Status: RESOLVED | Noted: 2020-02-14 | Resolved: 2025-01-24

## 2025-01-24 PROBLEM — R41.82 ALTERED MENTAL STATUS, UNSPECIFIED: Status: RESOLVED | Noted: 2022-12-07 | Resolved: 2025-01-24

## 2025-01-24 PROBLEM — R41.82 ALTERED MENTAL STATE: Status: RESOLVED | Noted: 2020-02-15 | Resolved: 2025-01-24

## 2025-01-24 PROBLEM — R41.82 AMS (ALTERED MENTAL STATUS): Status: RESOLVED | Noted: 2023-10-25 | Resolved: 2025-01-24
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
## 2025-01-24 PROBLEM — R00.1 BRADYCARDIA: Status: RESOLVED | Noted: 2020-02-16 | Resolved: 2025-01-24
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
## 2025-01-24 RX ORDER — AMLODIPINE BESYLATE 10 MG/1
10 TABLET ORAL DAILY
Qty: 90 TABLET | Refills: 3 | Status: SHIPPED | OUTPATIENT
Start: 2025-01-24

## 2025-01-24 RX ORDER — ASPIRIN 81 MG/1
TABLET ORAL
Qty: 90 TABLET | Refills: 3 | Status: SHIPPED | OUTPATIENT
Start: 2025-01-24

## 2025-01-24 RX ORDER — ATORVASTATIN CALCIUM 80 MG/1
TABLET, FILM COATED ORAL
Qty: 90 TABLET | Refills: 3 | Status: SHIPPED | OUTPATIENT
Start: 2025-01-24

## 2025-01-24 RX ORDER — GABAPENTIN 300 MG/1
CAPSULE ORAL
Qty: 360 CAPSULE | Refills: 3 | Status: SHIPPED | OUTPATIENT
Start: 2025-01-24 | End: 2025-04-26

## 2025-01-24 RX ORDER — CLOPIDOGREL BISULFATE 75 MG/1
TABLET ORAL
Qty: 30 TABLET | Refills: 5 | Status: CANCELLED | OUTPATIENT
Start: 2025-01-24

## 2025-01-24 RX ORDER — MULTIVITAMIN WITH IRON
TABLET ORAL
Qty: 60 TABLET | Refills: 3 | Status: SHIPPED | OUTPATIENT
Start: 2025-01-24

## 2025-01-24 RX ORDER — LISINOPRIL 40 MG/1
TABLET ORAL
Qty: 90 TABLET | Refills: 3 | Status: SHIPPED | OUTPATIENT
Start: 2025-01-24

## 2025-01-24 SDOH — ECONOMIC STABILITY: FOOD INSECURITY: WITHIN THE PAST 12 MONTHS, YOU WORRIED THAT YOUR FOOD WOULD RUN OUT BEFORE YOU GOT MONEY TO BUY MORE.: NEVER TRUE

## 2025-01-24 SDOH — ECONOMIC STABILITY: FOOD INSECURITY: WITHIN THE PAST 12 MONTHS, THE FOOD YOU BOUGHT JUST DIDN'T LAST AND YOU DIDN'T HAVE MONEY TO GET MORE.: NEVER TRUE

## 2025-01-24 ASSESSMENT — PATIENT HEALTH QUESTIONNAIRE - PHQ9
SUM OF ALL RESPONSES TO PHQ QUESTIONS 1-9: 0
1. LITTLE INTEREST OR PLEASURE IN DOING THINGS: NOT AT ALL
2. FEELING DOWN, DEPRESSED OR HOPELESS: NOT AT ALL
SUM OF ALL RESPONSES TO PHQ QUESTIONS 1-9: 0
SUM OF ALL RESPONSES TO PHQ QUESTIONS 1-9: 0
SUM OF ALL RESPONSES TO PHQ9 QUESTIONS 1 & 2: 0
SUM OF ALL RESPONSES TO PHQ QUESTIONS 1-9: 0

## 2025-01-24 NOTE — PROGRESS NOTES
documented was personally performed at this visit with the necessary additions, deletions and changes made as appropriate. Voice recognition software may have been used to facilitate documentation and note may have errors due to use of this software. Please reach out if any questions about contents of this note.        Angel Rivera MD  Med-Peds: Primary Care  [address] [address]  Office Phone: [phone]  Office Fax: [phone]

## 2025-01-24 NOTE — PATIENT INSTRUCTIONS
I have sent all of your mediations to [facility] delivery [facility]. This is the same network of pharmacies as the [facility] pharmacy

## 2025-01-24 NOTE — ASSESSMENT & PLAN NOTE
A1c has been controlled and no finding of urine microalbumin on last check.  Plan to get labs at next visit anyway, so we will hold off on A1c checked today.  On metformin 500 mg once daily

## 2025-01-24 NOTE — ASSESSMENT & PLAN NOTE
Blood pressure very much out of control and noted history of CVA events that likely was related to uncontrolled hypertension, patient has been without medications, will refill these and follow-up in 1 month  Amlodipine 10, lisinopril 40 mg

## 2025-02-01 NOTE — ASSESSMENT & PLAN NOTE
Last MRI 10/2023:  IMPRESSION:  Motion artifact mildly degrades the images.  Cerebral atrophy.  Mild chronic  small vessel ischemic changes.  Remote lacunar infarcts in the basal ganglia  and left thalamus.  No acute brain parenchymal abnormality.    With patient still having neuropathy and having more concerns with ambulating, using a wheelchair for the last month or so, would like patient to continue the plan for him to see neurology, which it appears he has not done since referral was pended by previous primary care physician.     Orders:    HEIDI - Angel Horton MD, Neurology, [facility] [place]

## 2025-02-19 ENCOUNTER — TELEPHONE (OUTPATIENT)
Dept: FAMILY MEDICINE CLINIC | Age: 85
End: 2025-02-19

## 2025-02-19 DIAGNOSIS — E53.8 B12 DEFICIENCY: ICD-10-CM

## 2025-02-19 DIAGNOSIS — G62.9 NEUROPATHY: ICD-10-CM

## 2025-02-19 RX ORDER — AMLODIPINE BESYLATE 10 MG/1
10 TABLET ORAL DAILY
Qty: 90 TABLET | Refills: 3 | Status: SHIPPED | OUTPATIENT
Start: 2025-02-19

## 2025-02-19 RX ORDER — ASPIRIN 81 MG/1
TABLET ORAL
Qty: 90 TABLET | Refills: 3 | Status: SHIPPED | OUTPATIENT
Start: 2025-02-19

## 2025-02-19 RX ORDER — ATORVASTATIN CALCIUM 80 MG/1
TABLET, FILM COATED ORAL
Qty: 90 TABLET | Refills: 3 | Status: SHIPPED | OUTPATIENT
Start: 2025-02-19

## 2025-02-19 RX ORDER — MULTIVITAMIN WITH IRON
TABLET ORAL
Qty: 60 TABLET | Refills: 3 | Status: SHIPPED | OUTPATIENT
Start: 2025-02-19

## 2025-02-19 NOTE — TELEPHONE ENCOUNTER
Requesting refills on:     Clopidogrel 75 mg takes 1 qd    atorvastatin (LIPITOR) 80 MG tablet     amLODIPine (NORVASC) 10 MG tablet    aspirin (ASPIRIN ADULT LOW STRENGTH) 81 MG EC tablet    vitamin B-12 (CYANOCOBALAMIN) 500 MCG tablet    Vitamin C with vitamin D3 & zinc    Please call into [facility] [phone]      Pt has an appt on Friday. Has been without meds for several days.

## 2025-02-21 ENCOUNTER — OFFICE VISIT (OUTPATIENT)
Dept: FAMILY MEDICINE CLINIC | Age: 85
End: 2025-02-21

## 2025-02-21 VITALS
RESPIRATION RATE: 14 BRPM | BODY MASS INDEX: 23.36 KG/M2 | OXYGEN SATURATION: 98 % | SYSTOLIC BLOOD PRESSURE: 164 MMHG | HEIGHT: 60 IN | HEART RATE: 80 BPM | WEIGHT: 119 LBS | DIASTOLIC BLOOD PRESSURE: 80 MMHG

## 2025-02-21 DIAGNOSIS — I10 ESSENTIAL HYPERTENSION: Primary | ICD-10-CM

## 2025-02-21 DIAGNOSIS — E11.40 TYPE 2 DIABETES MELLITUS WITH DIABETIC NEUROPATHY, WITHOUT LONG-TERM CURRENT USE OF INSULIN (HCC): ICD-10-CM

## 2025-02-21 DIAGNOSIS — Z59.82 TRANSPORTATION INSECURITY: ICD-10-CM

## 2025-02-21 RX ORDER — LISINOPRIL 40 MG/1
TABLET ORAL
Qty: 90 TABLET | Refills: 3 | Status: SHIPPED | OUTPATIENT
Start: 2025-02-21

## 2025-02-21 SDOH — ECONOMIC STABILITY - TRANSPORTATION SECURITY: TRANSPORTATION INSECURITY: Z59.82

## 2025-02-21 NOTE — PROGRESS NOTES
[facility] - Primary Care   PCP progress note     Patient: Rk Willett : 1940  Sex: male  MRN: [id number]    Assessment and Plan:     Assessment & Plan  Essential hypertension  Still elevated, patient reports he is taking 10 mg of amlodipine, but does not appear that he is taking ACE inhibitor,  On discussion with patient today, he is having a lot of trouble keeping track of his medications and I am concerned that he may be having some memory difficulties and will discuss this with him at future visit    Check labs today, return for blood pressure visit in 1 month      Orders:    Comprehensive Metabolic Panel    CBC with Auto Differential    Lipid Panel    Hemoglobin A1C    Type 2 diabetes mellitus with diabetic neuropathy, without long-term current use of insulin (HCC)  Check A1c today    Orders:    Comprehensive Metabolic Panel    CBC with Auto Differential    Lipid Panel    Hemoglobin A1C    Transportation insecurity  Offered patient referral to social work to help with transportation, he declined stating that he wants to try to remain independent              -Risks and benefit as well as most common side effects of new medications were discussed with patient.  -Recommended immunizations and screenings as noted in patients care gaps report. Patient was agreeable to screening tests for appropriate indications as listed above       Patient's questions answered. Additional information printed on AVS.  Patient/Caregiver verbalizes understanding of plan of care/instructions discussed today.       LOS Today       Follow-up Information  Return in about 4 weeks (around 3/21/2025) for blood pressure.      Subjective:  HPI:   Chief Complaint   Patient presents with    Hypertension    Follow-up        Patient following up for blood pressure today  Reports that after last visit, I had sent his medications to mail order pharmacy, but medications were never delivered, he had up taking his empty medication

## 2025-02-21 NOTE — ASSESSMENT & PLAN NOTE
Still elevated, patient reports he is taking 10 mg of amlodipine, but does not appear that he is taking ACE inhibitor,  On discussion with patient today, he is having a lot of trouble keeping track of his medications and I am concerned that he may be having some memory difficulties and will discuss this with him at future visit    Check labs today, return for blood pressure visit in 1 month      Orders:    Comprehensive Metabolic Panel    CBC with Auto Differential    Lipid Panel    Hemoglobin A1C

## 2025-02-21 NOTE — ASSESSMENT & PLAN NOTE
Check A1c today    Orders:    Comprehensive Metabolic Panel    CBC with Auto Differential    Lipid Panel    Hemoglobin A1C

## 2025-02-21 NOTE — PATIENT INSTRUCTIONS
I sent a new prescription for your other blood pressure medication (Lisinopril) to [facility].     You do not need to keep taking Clopidogrel (Plavix)    Follow up to repeat blood pressure in 1 month

## 2025-02-22 LAB
ALBUMIN SERPL-MCNC: 4.5 G/DL (ref 3.4–5)
ALBUMIN/GLOB SERPL: 2 {RATIO} (ref 1.1–2.2)
ALP SERPL-CCNC: 92 U/L (ref 40–129)
ALT SERPL-CCNC: 27 U/L (ref 10–40)
ANION GAP SERPL CALCULATED.3IONS-SCNC: 11 MMOL/L (ref 3–16)
AST SERPL-CCNC: 28 U/L (ref 15–37)
BASOPHILS # BLD: 0 K/UL (ref 0–0.2)
BASOPHILS NFR BLD: 0.2 %
BILIRUB SERPL-MCNC: 0.4 MG/DL (ref 0–1)
BUN SERPL-MCNC: 15 MG/DL (ref 7–20)
CALCIUM SERPL-MCNC: 9.8 MG/DL (ref 8.3–10.6)
CHLORIDE SERPL-SCNC: 104 MMOL/L (ref 99–110)
CHOLEST SERPL-MCNC: 177 MG/DL (ref 0–199)
CO2 SERPL-SCNC: 27 MMOL/L (ref 21–32)
CREAT SERPL-MCNC: 1.3 MG/DL (ref 0.8–1.3)
DEPRECATED RDW RBC AUTO: 14.3 % (ref 12.4–15.4)
EOSINOPHIL # BLD: 0.3 K/UL (ref 0–0.6)
EOSINOPHIL NFR BLD: 3.1 %
EST. AVERAGE GLUCOSE BLD GHB EST-MCNC: 125.5 MG/DL
GFR SERPLBLD CREATININE-BSD FMLA CKD-EPI: 55 ML/MIN/{1.73_M2}
GLUCOSE SERPL-MCNC: 131 MG/DL (ref 70–99)
HBA1C MFR BLD: 6 %
HCT VFR BLD AUTO: 38.8 % (ref 40.5–52.5)
HDLC SERPL-MCNC: 55 MG/DL (ref 40–60)
HGB BLD-MCNC: 13.1 G/DL (ref 13.5–17.5)
LDLC SERPL CALC-MCNC: 100 MG/DL
LYMPHOCYTES # BLD: 1.7 K/UL (ref 1–5.1)
LYMPHOCYTES NFR BLD: 18.1 %
MCH RBC QN AUTO: 31.8 PG (ref 26–34)
MCHC RBC AUTO-ENTMCNC: 33.7 G/DL (ref 31–36)
MCV RBC AUTO: 94.4 FL (ref 80–100)
MONOCYTES # BLD: 0.8 K/UL (ref 0–1.3)
MONOCYTES NFR BLD: 8.7 %
NEUTROPHILS # BLD: 6.6 K/UL (ref 1.7–7.7)
NEUTROPHILS NFR BLD: 69.9 %
PLATELET # BLD AUTO: 230 K/UL (ref 135–450)
PMV BLD AUTO: 8.3 FL (ref 5–10.5)
POTASSIUM SERPL-SCNC: 4.9 MMOL/L (ref 3.5–5.1)
PROT SERPL-MCNC: 6.8 G/DL (ref 6.4–8.2)
RBC # BLD AUTO: 4.11 M/UL (ref 4.2–5.9)
SODIUM SERPL-SCNC: 142 MMOL/L (ref 136–145)
TRIGL SERPL-MCNC: 110 MG/DL (ref 0–150)
VLDLC SERPL CALC-MCNC: 22 MG/DL
WBC # BLD AUTO: 9.4 K/UL (ref 4–11)

## 2025-06-11 ENCOUNTER — TELEPHONE (OUTPATIENT)
Dept: FAMILY MEDICINE CLINIC | Age: 85
End: 2025-06-11

## 2025-06-11 DIAGNOSIS — R53.1 LEFT-SIDED WEAKNESS: ICD-10-CM

## 2025-06-11 DIAGNOSIS — E53.8 B12 DEFICIENCY: Primary | ICD-10-CM

## 2025-06-11 NOTE — TELEPHONE ENCOUNTER
Calling from [facility] to verify diagnosis for pt- chronic condition codes. Please call back at [phone] REF#[id number]

## 2025-06-15 PROBLEM — S62.102A LEFT WRIST FRACTURE: Status: RESOLVED | Noted: 2021-04-19 | Resolved: 2025-06-15

## 2025-06-15 PROBLEM — E53.8 B12 DEFICIENCY: Status: ACTIVE | Noted: 2025-06-15

## 2025-06-15 PROBLEM — R53.1 LEFT-SIDED WEAKNESS: Status: ACTIVE | Noted: 2025-06-15

## 2025-06-15 NOTE — TELEPHONE ENCOUNTER
Left message that the dates he is requesting for surgery are all good. Asked he call me back to discuss.   Reviewed problem list and appears to be up to date. Can fax copy to insurance if needed.

## 2025-06-16 NOTE — TELEPHONE ENCOUNTER
Disregard, received fax with requested form and have completed, will place in yellow folder on MA's desk